# Patient Record
Sex: FEMALE | Race: WHITE | Employment: OTHER | URBAN - NONMETROPOLITAN AREA
[De-identification: names, ages, dates, MRNs, and addresses within clinical notes are randomized per-mention and may not be internally consistent; named-entity substitution may affect disease eponyms.]

---

## 2017-10-25 ENCOUNTER — OFFICE VISIT (OUTPATIENT)
Dept: GASTROENTEROLOGY | Age: 77
End: 2017-10-25
Payer: MEDICARE

## 2017-10-25 VITALS
HEART RATE: 61 BPM | SYSTOLIC BLOOD PRESSURE: 120 MMHG | WEIGHT: 147 LBS | HEIGHT: 66 IN | BODY MASS INDEX: 23.63 KG/M2 | OXYGEN SATURATION: 98 % | DIASTOLIC BLOOD PRESSURE: 82 MMHG

## 2017-10-25 DIAGNOSIS — Z86.010 HISTORY OF ADENOMATOUS POLYP OF COLON: Primary | ICD-10-CM

## 2017-10-25 DIAGNOSIS — Z80.0 FAMILY HISTORY OF COLON CANCER: ICD-10-CM

## 2017-10-25 PROCEDURE — G8400 PT W/DXA NO RESULTS DOC: HCPCS | Performed by: NURSE PRACTITIONER

## 2017-10-25 PROCEDURE — G8484 FLU IMMUNIZE NO ADMIN: HCPCS | Performed by: NURSE PRACTITIONER

## 2017-10-25 PROCEDURE — G8427 DOCREV CUR MEDS BY ELIG CLIN: HCPCS | Performed by: NURSE PRACTITIONER

## 2017-10-25 PROCEDURE — 4040F PNEUMOC VAC/ADMIN/RCVD: CPT | Performed by: NURSE PRACTITIONER

## 2017-10-25 PROCEDURE — 1036F TOBACCO NON-USER: CPT | Performed by: NURSE PRACTITIONER

## 2017-10-25 PROCEDURE — 1090F PRES/ABSN URINE INCON ASSESS: CPT | Performed by: NURSE PRACTITIONER

## 2017-10-25 PROCEDURE — 99214 OFFICE O/P EST MOD 30 MIN: CPT | Performed by: NURSE PRACTITIONER

## 2017-10-25 PROCEDURE — 1123F ACP DISCUSS/DSCN MKR DOCD: CPT | Performed by: NURSE PRACTITIONER

## 2017-10-25 PROCEDURE — G8420 CALC BMI NORM PARAMETERS: HCPCS | Performed by: NURSE PRACTITIONER

## 2017-10-25 RX ORDER — LOSARTAN POTASSIUM 50 MG/1
50 TABLET ORAL DAILY
COMMUNITY

## 2017-10-25 RX ORDER — ROSUVASTATIN CALCIUM 5 MG/1
5 TABLET, COATED ORAL DAILY
COMMUNITY

## 2017-10-25 RX ORDER — HYDROCHLOROTHIAZIDE 25 MG/1
25 TABLET ORAL DAILY
COMMUNITY
End: 2019-07-23

## 2017-10-25 RX ORDER — POTASSIUM CHLORIDE 750 MG/1
10 CAPSULE, EXTENDED RELEASE ORAL 2 TIMES DAILY
Status: ON HOLD | COMMUNITY
End: 2017-11-20

## 2017-10-25 ASSESSMENT — ENCOUNTER SYMPTOMS
BLOOD IN STOOL: 0
VOICE CHANGE: 0
SHORTNESS OF BREATH: 0
SORE THROAT: 0
BACK PAIN: 0
COUGH: 0
CHEST TIGHTNESS: 0
DIARRHEA: 0
VOMITING: 0
ABDOMINAL PAIN: 0
ABDOMINAL DISTENTION: 0
RECTAL PAIN: 0
NAUSEA: 0
CONSTIPATION: 0

## 2017-10-25 NOTE — PROGRESS NOTES
Subjective:      Patient ID: Elan Bryan is a 65 y.o. female. PCP: [facility]     [facility]  Chief Complaint   Patient presents with    Colonoscopy    Other     history of colon polyps       Pt due for screening colonoscopy with peronal history of adenomatous colon polyps and brother diagnosed colon cancer age early 62s. The patient denies abdominal or flank pain, anorexia, nausea or vomiting, dysphagia, change in bowel habits or black or bloody stools or weight loss. Last c-scope 2014 with removal of tubular adenoma x2. Prep was noted as suboptimal. 3 yr repeat screening recommended. Patient reports vomiting with trilyte last procedure. Has tolerated in past.       family history includes Colon Polyps in her brother. Past Medical History:   Diagnosis Date    Diverticulosis     Family history of colon cancer     Hyperlipidemia     Hypertension     Reflux        Past Surgical History:   Procedure Laterality Date     SECTION      CHOLECYSTECTOMY      COLONOSCOPY  /    COLONOSCOPY  2014    TA x2, suboptimal prep (3 yr)    FOOT SURGERY      HYSTERECTOMY      PARTIAL NEPHRECTOMY         Current Outpatient Prescriptions   Medication Sig Dispense Refill    losartan (COZAAR) 50 MG tablet Take 50 mg by mouth daily      potassium chloride (MICRO-K) 10 MEQ extended release capsule Take 10 mEq by mouth 2 times daily      hydrochlorothiazide (HYDRODIURIL) 25 MG tablet Take 25 mg by mouth daily      rosuvastatin (CRESTOR) 5 MG tablet Take 5 mg by mouth daily      Multiple Vitamins-Minerals (MULTIVITAMIN & MINERAL PO) Take  by mouth.  Bimatoprost (LUMIGAN OP) Apply  to eye.  NIFEDIPINE PO Take  by mouth.  Metoprolol Succinate (TOPROL XL PO) Take 50 mg by mouth.  omeprazole (PRILOSEC) 20 MG capsule Take 20 mg by mouth daily.  ASPIRIN PO Take  by mouth.  Cyanocobalamin (B-12 PO) Take  by mouth.       Calcium Carbonate-Vitamin D (OSCAL 500/200 D-3 alert and oriented to person, place, and time. She has normal strength. Skin: Skin is warm, dry and intact. No cyanosis. No pallor. Psychiatric: She has a normal mood and affect. Her behavior is normal. Thought content normal. Cognition and memory are normal.       Assessment:      1. History of adenomatous polyp of colon    2. Family history of colon cancer            Plan:      Schedule colonoscopy screening    Instruct on bowel prep. Nothing to eat or drink after midnight the day of the exam.  Unable to drive for 24 hours after the procedure. No aspirin or nonsteroidal anti-inflammatories for 5 days before procedure. Risks, benefits and alternatives to colonoscopy were discussed. Patient voices understanding of risk of perforation, bleeding and infection. Time was allowed for questions which were answered to patients satisfaction. Patient is agreeable to proceed. Plan for anesthesia: MAC  no reported complications    I have discussed with the patient and/or the patient representative the indication, alternatives, and the possible risks and/or complications of the planned anesthesia methods.

## 2017-10-25 NOTE — PATIENT INSTRUCTIONS
Schedule colonoscopy. No aspirin, ibuprofen, naproxen, fish oil or vitamin E for 5 days before procedure. Do not eat or drink after midnight the day of the procedure. Allowed medications can be taken with a small sip of water. Please review your prep instructions for allowed medications. You will not be able to drive for 24 hours after the procedure due to sedation. Bring a  with you the day of the procedure. If you are on blood thinners, clearance from the prescribing physician will be obtained before your procedure is scheduled. If it is determined it is not safe to hold these medications for a short time an alternative procedure for evaluation may be recommended. Risks of colonoscopy include, but are not limited to, perforation, bleeding, and infection, Risk of perforation and bleeding are increased if there is a polyp removed. Anesthesia risks will be reviewed with you before the procedure by a member of the anesthesia department. Your physician may also schedule a follow up appointment with the nurse practitioner to discuss pathology, symptoms or to check if you have had any problems related to your procedure. If you prefer not to return to the office after your procedure please discuss this with your physician on the day of your colonoscopy. The physician will talk with you and/or your family after the procedure is completed. Final recommendations are based on the pathologist report if biopsies or specimens are taken. For Colonoscopy: You will be given specific directions regarding restrictions to diet and bowel prep instructions including laxatives. Please read these instructions one week prior to your scheduled procedure to ensure that you are prepared. If you have any questions regarding these instructions please call our office Mon through Fri from 8:00 am to 4:00 pm.     Follow prep instructions provided for bowel prep. Take all of the bowel prep as directed.  If you are having problems with nausea, stop your prep for 30-45 min to allow the nausea to subside before resuming your prep. It is important to drink plenty of fluids throughout the day before taking your laxatives. This will help to protect your kidneys, prevent dehydration and maximize the effect of the bowel prep. If polyps are removed during the procedure they will be sent to a pathologist for analysis. Unless you have a follow up appointment scheduled, you will be notified by mail of the pathology results within 4 weeks. If you have not received results after 4 weeks you may call the office to obtain this information. Your diet before a colonoscopy bowel preparation is very important to ensure a successful colon exam. It is recommended to consider certain changes to your diet three to four days prior to the procedure. Remember that your bowels need to be empty for the exam.    What foods are good to eat? Cut down on heavy solid foods three to four days before the procedure and start introducing lighter meals to your diet. The following food suggestions are a good part of your diet before a colonoscopy bowel preparation. Light meat that is easily digestible such as chicken (without the skin)   Potatoes without skin   Cheese   Eggs   A light meal of steamed white fish   Light clear soups    Foods and drinks to avoid  Avoid foods that contain too much fiber. Stay clear of dark colored beverages. They can stick to the walls of the digestive tract and make it difficult to differentiate from blood. Some of these foods are:  Red meat, rice, nuts and vegetables   Milk, other milk based fluids and cream   Most fruit and puddings   Whole grain pasta   Cereals, bran and seeds   Colored beverages, especially those that are red or purple in color   Red colored Jell-O   On the day before the colonoscopy, continue to drink plenty of clear fluids.  It is important   to keep yourself hydrated before the exam. Please follow all instructions as provided for cleansing the bowel. Failure to have an adequately prepped colon may cause you to have incomplete exam with further testing required.      [email or web address]

## 2017-11-20 ENCOUNTER — ANESTHESIA (OUTPATIENT)
Dept: OPERATING ROOM | Age: 77
End: 2017-11-20

## 2017-11-20 ENCOUNTER — HOSPITAL ENCOUNTER (OUTPATIENT)
Age: 77
Setting detail: SPECIMEN
Discharge: HOME | End: 2017-11-20
Payer: MEDICARE

## 2017-11-20 ENCOUNTER — ANESTHESIA EVENT (OUTPATIENT)
Dept: OPERATING ROOM | Age: 77
End: 2017-11-20

## 2017-11-20 ENCOUNTER — HOSPITAL ENCOUNTER (OUTPATIENT)
Age: 77
Setting detail: OUTPATIENT SURGERY
Discharge: HOME | End: 2017-11-20
Attending: INTERNAL MEDICINE | Admitting: INTERNAL MEDICINE
Payer: MEDICARE

## 2017-11-20 VITALS — OXYGEN SATURATION: 98 % | DIASTOLIC BLOOD PRESSURE: 50 MMHG | SYSTOLIC BLOOD PRESSURE: 114 MMHG

## 2017-11-20 VITALS
WEIGHT: 145 LBS | HEART RATE: 60 BPM | RESPIRATION RATE: 20 BRPM | TEMPERATURE: 98.5 F | DIASTOLIC BLOOD PRESSURE: 76 MMHG | BODY MASS INDEX: 24.16 KG/M2 | SYSTOLIC BLOOD PRESSURE: 180 MMHG | OXYGEN SATURATION: 98 % | HEIGHT: 65 IN

## 2017-11-20 PROCEDURE — 45380 COLONOSCOPY AND BIOPSY: CPT

## 2017-11-20 PROCEDURE — G8907 PT DOC NO EVENTS ON DISCHARG: HCPCS

## 2017-11-20 PROCEDURE — G8918 PT W/O PREOP ORDER IV AB PRO: HCPCS

## 2017-11-20 PROCEDURE — 88305 TISSUE EXAM BY PATHOLOGIST: CPT

## 2017-11-20 PROCEDURE — 45380 COLONOSCOPY AND BIOPSY: CPT | Performed by: INTERNAL MEDICINE

## 2017-11-20 RX ORDER — LIDOCAINE HYDROCHLORIDE 10 MG/ML
INJECTION, SOLUTION EPIDURAL; INFILTRATION; INTRACAUDAL; PERINEURAL PRN
Status: DISCONTINUED | OUTPATIENT
Start: 2017-11-20 | End: 2017-11-20 | Stop reason: SDUPTHER

## 2017-11-20 RX ORDER — SODIUM CHLORIDE 9 MG/ML
INJECTION, SOLUTION INTRAVENOUS CONTINUOUS
Status: DISCONTINUED | OUTPATIENT
Start: 2017-11-20 | End: 2017-11-20 | Stop reason: HOSPADM

## 2017-11-20 RX ORDER — PROPOFOL 10 MG/ML
INJECTION, EMULSION INTRAVENOUS PRN
Status: DISCONTINUED | OUTPATIENT
Start: 2017-11-20 | End: 2017-11-20 | Stop reason: SDUPTHER

## 2017-11-20 RX ORDER — SODIUM CHLORIDE, SODIUM LACTATE, POTASSIUM CHLORIDE, CALCIUM CHLORIDE 600; 310; 30; 20 MG/100ML; MG/100ML; MG/100ML; MG/100ML
INJECTION, SOLUTION INTRAVENOUS CONTINUOUS
Status: DISCONTINUED | OUTPATIENT
Start: 2017-11-20 | End: 2017-11-20 | Stop reason: HOSPADM

## 2017-11-20 RX ADMIN — SODIUM CHLORIDE: 9 INJECTION, SOLUTION INTRAVENOUS at 10:00

## 2017-11-20 RX ADMIN — PROPOFOL 240 MG: 10 INJECTION, EMULSION INTRAVENOUS at 10:23

## 2017-11-20 RX ADMIN — LIDOCAINE HYDROCHLORIDE 5 ML: 10 INJECTION, SOLUTION EPIDURAL; INFILTRATION; INTRACAUDAL; PERINEURAL at 10:23

## 2017-11-20 ASSESSMENT — PAIN SCALES - GENERAL
PAINLEVEL_OUTOF10: 0
PAINLEVEL_OUTOF10: 0

## 2017-11-20 NOTE — OP NOTE
Post Procedure Note    Name of surgeon / : Lyric Fuller DO    Date of Service: 11/20/17    Withdrawal Time: >6min    Prep Quality: good     Pre-operative Diagnosis:   Active Hospital Problems    Diagnosis Date Noted    History of adenomatous polyp of colon [Z86.010] 10/25/2017    Family history of colon cancer [Z80.0] 02/07/2014    Diverticulosis [K57.90] 02/07/2014       Post-operative Diagnosis/Findings: colon polyp, diverticulosis    Procedure: Procedure(s):  COLONOSCOPY     Anesthesia: Monitor Anesthesia Care    Surgeons/Assistants: Lyric Fuller DO    Referring Physician: No ref. provider found    Procedure Note:  After informed consent was obtained, the patient was placed in the left lateral decubitus position and sedated per MAC. A rectal exam was done which was normal.  The colonoscope was inserted into the rectum and retroflexed which was normal.  The colonoscope was then advanced to the cecum under direct visualization. The appendiceal orifice and ileocecal valve were identified. The colonoscope was withdrawn. A polyp was seen in the transverse colon. It was dimunitive in size. It was removed via removed by cold biopsy and sent for pathology. She had pandiverticulosis. No other abnormalities were discovered. The colonoscope was completely withdrawn. The patient tolerated the procedure. Estimated Blood Loss: None    Complications: None    Specimens:   ID Type Source Tests Collected by Time Destination   A : transverse colon polyp Tissue Colon SURGICAL PATHOLOGY Aliyah Hanna DO 11/20/2017 1030        Discussion: The patient had Colon Polyps. I will f/u on Pathology and likely recommend a repeat colonoscopy in 5 years.     Aliyah Hanna DO  11/20/17  10:43 AM

## 2017-11-20 NOTE — ANESTHESIA POSTPROCEDURE EVALUATION
Department of Anesthesiology  Postprocedure Note    Patient: Gabi Irwin  MRN: [id number]  YOB: 1940  Date of evaluation: 11/20/2017  Time:  10:39 AM     Procedure Summary     Date:  11/20/17 Room / Location:  [facility] ENDO 01 / [facility] OR    Anesthesia Start:  1021 Anesthesia Stop:      Procedure:  COLONOSCOPY DIAGNOSTIC OR SCREENING (N/A ) Diagnosis:  (SCREEN, HX YANELI POLYPS, FH CLN CA)    Surgeon:  Rylee Snow DO Responsible Provider:  Rebeca Lowery CRNA    Anesthesia Type:  general ASA Status:  3          Anesthesia Type: general    Kody Phase I:      Kody Phase II:      Last vitals: Reviewed and per EMR flowsheets.        Anesthesia Post Evaluation    Patient location during evaluation: bedside  Patient participation: complete - patient participated  Level of consciousness: sleepy but conscious  Pain score: 0  Airway patency: patent  Nausea & Vomiting: no nausea and no vomiting  Complications: no  Cardiovascular status: hemodynamically stable and blood pressure returned to baseline  Respiratory status: acceptable and nasal cannula  Hydration status: stable

## 2017-11-20 NOTE — H&P
Patient Name: Elidia Stewart  : 1940  MRN: [id number]    Allergies: Allergies   Allergen Reactions    Codeine          ENDOSCOPY / COLONOSCOPY / BRONCHOSCOPY      PRE-SEDATION ASSESSMENT      Procedure:    [x] Colonoscopy     [] Endoscopy      [] ERCP      [] Bronchoscopy      [] Other  [] History and Physical completed in chart for Inpatient or within 30 daysfrom office. I have examined the patient's status immediately prior to the procedure and:    [x] No interval change in patient status since H&P completed  [] Interval change in patient status (explained below)           BRIEF H&P    HPI/changes/indicators/diagnosis  Active Hospital Problems    Diagnosis Date Noted    History of adenomatous polyp of colon [Z86.010] 10/25/2017    Family history of colon cancer [Z80.0] 2014    Diverticulosis [K57.90] 2014       Medications:   Prior to Admission medications    Medication Sig Start Date End Date Taking? Authorizing Provider   losartan (COZAAR) 50 MG tablet Take 50 mg by mouth daily    Historical Provider, MD   hydrochlorothiazide (HYDRODIURIL) 25 MG tablet Take 25 mg by mouth daily    Historical Provider, MD   rosuvastatin (CRESTOR) 5 MG tablet Take 5 mg by mouth daily    Historical Provider, MD   Bimatoprost (LUMIGAN OP) Apply  to eye. Historical Provider, MD   NIFEDIPINE PO Take  by mouth. Historical Provider, MD   Metoprolol Succinate (TOPROL XL PO) Take 50 mg by mouth. Historical Provider, MD   omeprazole (PRILOSEC) 20 MG capsule Take 20 mg by mouth daily. Historical Provider, MD   ASPIRIN PO Take  by mouth. Historical Provider, MD   Calcium Carbonate-Vitamin D (OSCAL 500/200 D-3 PO) Take  by mouth. Historical Provider, MD       Allergies:   is allergic to codeine.       Vital Signs:   Vitals:    17 0938   BP: (!) 160/80   Pulse: 74   Resp: 18   SpO2: 98%       ROS:  Cardiac:  [x]WNL  []Comments:  Pulmonary:  [x]WNL   []Comments:  Neuro/Mental Status:  [x]WNL

## 2017-11-20 NOTE — ANESTHESIA PRE PROCEDURE
Department of Anesthesiology  Preprocedure Note       Name:  Jovan Young   Age:  65 y.o.  :  1940                                          MRN:  [id number]         Date:  2017      Surgeon: Lilia Weinberg):  Yunier Hanna DO    Procedure: Procedure(s):  COLONOSCOPY DIAGNOSTIC OR SCREENING    Medications prior to admission:   Prior to Admission medications    Medication Sig Start Date End Date Taking? Authorizing Provider   losartan (COZAAR) 50 MG tablet Take 50 mg by mouth daily    Historical Provider, MD   hydrochlorothiazide (HYDRODIURIL) 25 MG tablet Take 25 mg by mouth daily    Historical Provider, MD   rosuvastatin (CRESTOR) 5 MG tablet Take 5 mg by mouth daily    Historical Provider, MD   Bimatoprost (LUMIGAN OP) Apply  to eye. Historical Provider, MD   NIFEDIPINE PO Take  by mouth. Historical Provider, MD   Metoprolol Succinate (TOPROL XL PO) Take 50 mg by mouth. Historical Provider, MD   omeprazole (PRILOSEC) 20 MG capsule Take 20 mg by mouth daily. Historical Provider, MD   ASPIRIN PO Take  by mouth. Historical Provider, MD   Calcium Carbonate-Vitamin D (OSCAL 500/200 D-3 PO) Take  by mouth. Historical Provider, MD       Current medications:    Current Facility-Administered Medications   Medication Dose Route Frequency Provider Last Rate Last Dose    lactated ringers infusion   Intravenous Continuous Cristian Hanna DO           Allergies:     Allergies   Allergen Reactions    Codeine        Problem List:    Patient Active Problem List   Diagnosis Code    Encounter for screening colonoscopy Z12.11    Family history of colon cancer Z80.0    Family history of colonic polyps Z83.71    Diverticulosis K57.90    History of diverticulitis of colon Z87.19    History of adenomatous polyp of colon Z86.010       Past Medical History:        Diagnosis Date    Diverticulosis     Family history of colon cancer     Hyperlipidemia     Hypertension     Reflux        Past Surgical

## 2019-07-23 ENCOUNTER — OFFICE VISIT (OUTPATIENT)
Dept: NEUROSURGERY | Age: 79
End: 2019-07-23
Payer: MEDICARE

## 2019-07-23 VITALS
WEIGHT: 124 LBS | BODY MASS INDEX: 19.93 KG/M2 | HEART RATE: 83 BPM | DIASTOLIC BLOOD PRESSURE: 89 MMHG | SYSTOLIC BLOOD PRESSURE: 139 MMHG | OXYGEN SATURATION: 100 % | HEIGHT: 66 IN

## 2019-07-23 DIAGNOSIS — M79.605 LEFT LEG PAIN: ICD-10-CM

## 2019-07-23 DIAGNOSIS — M48.061 LUMBAR FORAMINAL STENOSIS: Primary | ICD-10-CM

## 2019-07-23 DIAGNOSIS — M54.42 ACUTE MIDLINE LOW BACK PAIN WITH LEFT-SIDED SCIATICA: ICD-10-CM

## 2019-07-23 DIAGNOSIS — M51.37 DDD (DEGENERATIVE DISC DISEASE), LUMBOSACRAL: ICD-10-CM

## 2019-07-23 DIAGNOSIS — M51.36 DDD (DEGENERATIVE DISC DISEASE), LUMBAR: ICD-10-CM

## 2019-07-23 DIAGNOSIS — M43.16 SPONDYLOLISTHESIS AT L4-L5 LEVEL: ICD-10-CM

## 2019-07-23 DIAGNOSIS — R20.8 DECREASED SENSATION OF FOOT: ICD-10-CM

## 2019-07-23 PROCEDURE — 1090F PRES/ABSN URINE INCON ASSESS: CPT | Performed by: NURSE PRACTITIONER

## 2019-07-23 PROCEDURE — 1123F ACP DISCUSS/DSCN MKR DOCD: CPT | Performed by: NURSE PRACTITIONER

## 2019-07-23 PROCEDURE — 99214 OFFICE O/P EST MOD 30 MIN: CPT | Performed by: NURSE PRACTITIONER

## 2019-07-23 PROCEDURE — 4040F PNEUMOC VAC/ADMIN/RCVD: CPT | Performed by: NURSE PRACTITIONER

## 2019-07-23 PROCEDURE — 1036F TOBACCO NON-USER: CPT | Performed by: NURSE PRACTITIONER

## 2019-07-23 PROCEDURE — G8427 DOCREV CUR MEDS BY ELIG CLIN: HCPCS | Performed by: NURSE PRACTITIONER

## 2019-07-23 PROCEDURE — G8420 CALC BMI NORM PARAMETERS: HCPCS | Performed by: NURSE PRACTITIONER

## 2019-07-23 PROCEDURE — G8400 PT W/DXA NO RESULTS DOC: HCPCS | Performed by: NURSE PRACTITIONER

## 2019-07-23 RX ORDER — METOPROLOL TARTRATE 50 MG/1
TABLET, FILM COATED ORAL
COMMUNITY
Start: 2019-06-13

## 2019-07-23 RX ORDER — HYDROCHLOROTHIAZIDE 50 MG/1
TABLET ORAL
COMMUNITY
Start: 2019-05-22

## 2019-07-23 RX ORDER — NIFEDIPINE 90 MG/1
TABLET, FILM COATED, EXTENDED RELEASE ORAL
COMMUNITY
Start: 2019-06-13

## 2019-07-23 RX ORDER — NABUMETONE 500 MG/1
TABLET, FILM COATED ORAL
Refills: 0 | COMMUNITY
Start: 2019-06-13

## 2019-07-23 ASSESSMENT — ENCOUNTER SYMPTOMS
RESPIRATORY NEGATIVE: 1
GASTROINTESTINAL NEGATIVE: 1
BACK PAIN: 1
PHOTOPHOBIA: 1

## 2019-07-30 ENCOUNTER — HOSPITAL ENCOUNTER (OUTPATIENT)
Dept: GENERAL RADIOLOGY | Age: 79
Discharge: HOME | End: 2019-07-30
Payer: MEDICARE

## 2019-07-30 ENCOUNTER — HOSPITAL ENCOUNTER (OUTPATIENT)
Age: 79
Setting detail: OUTPATIENT SURGERY
Discharge: HOME | End: 2019-07-30
Attending: PHYSICAL MEDICINE & REHABILITATION | Admitting: PHYSICAL MEDICINE & REHABILITATION

## 2019-07-30 VITALS
OXYGEN SATURATION: 97 % | HEART RATE: 67 BPM | RESPIRATION RATE: 20 BRPM | SYSTOLIC BLOOD PRESSURE: 173 MMHG | DIASTOLIC BLOOD PRESSURE: 78 MMHG

## 2019-07-30 DIAGNOSIS — R52 PAIN: ICD-10-CM

## 2019-07-30 PROCEDURE — 62323 NJX INTERLAMINAR LMBR/SAC: CPT

## 2019-07-30 PROCEDURE — G8918 PT W/O PREOP ORDER IV AB PRO: HCPCS

## 2019-07-30 PROCEDURE — G8907 PT DOC NO EVENTS ON DISCHARG: HCPCS

## 2019-07-30 PROCEDURE — 3209999900 FLUORO FOR SURGICAL PROCEDURES

## 2019-07-30 RX ORDER — 0.9 % SODIUM CHLORIDE 0.9 %
VIAL (ML) INJECTION PRN
Status: DISCONTINUED | OUTPATIENT
Start: 2019-07-30 | End: 2019-07-30 | Stop reason: ALTCHOICE

## 2019-07-30 RX ORDER — LIDOCAINE HYDROCHLORIDE 10 MG/ML
INJECTION, SOLUTION INFILTRATION; PERINEURAL PRN
Status: DISCONTINUED | OUTPATIENT
Start: 2019-07-30 | End: 2019-07-30 | Stop reason: ALTCHOICE

## 2019-07-30 RX ORDER — METHYLPREDNISOLONE ACETATE 80 MG/ML
INJECTION, SUSPENSION INTRA-ARTICULAR; INTRALESIONAL; INTRAMUSCULAR; SOFT TISSUE PRN
Status: DISCONTINUED | OUTPATIENT
Start: 2019-07-30 | End: 2019-07-30 | Stop reason: ALTCHOICE

## 2019-09-17 ENCOUNTER — HOSPITAL ENCOUNTER (OUTPATIENT)
Dept: GENERAL RADIOLOGY | Age: 79
Discharge: HOME | End: 2019-09-17
Payer: MEDICARE

## 2019-09-17 ENCOUNTER — HOSPITAL ENCOUNTER (OUTPATIENT)
Age: 79
Setting detail: OUTPATIENT SURGERY
Discharge: HOME | End: 2019-09-17
Attending: PHYSICAL MEDICINE & REHABILITATION | Admitting: PHYSICAL MEDICINE & REHABILITATION

## 2019-09-17 VITALS
WEIGHT: 127 LBS | BODY MASS INDEX: 20.41 KG/M2 | DIASTOLIC BLOOD PRESSURE: 72 MMHG | SYSTOLIC BLOOD PRESSURE: 168 MMHG | HEIGHT: 66 IN | HEART RATE: 57 BPM | RESPIRATION RATE: 16 BRPM | OXYGEN SATURATION: 98 %

## 2019-09-17 DIAGNOSIS — L90.5 SCAR PAINFUL: ICD-10-CM

## 2019-09-17 DIAGNOSIS — R52 SCAR PAINFUL: ICD-10-CM

## 2019-09-17 PROCEDURE — G8918 PT W/O PREOP ORDER IV AB PRO: HCPCS

## 2019-09-17 PROCEDURE — G8907 PT DOC NO EVENTS ON DISCHARG: HCPCS

## 2019-09-17 PROCEDURE — 3209999900 FLUORO FOR SURGICAL PROCEDURES

## 2019-09-17 PROCEDURE — 62323 NJX INTERLAMINAR LMBR/SAC: CPT

## 2019-09-17 RX ORDER — LIDOCAINE HYDROCHLORIDE 10 MG/ML
INJECTION, SOLUTION INFILTRATION; PERINEURAL PRN
Status: DISCONTINUED | OUTPATIENT
Start: 2019-09-17 | End: 2019-09-17 | Stop reason: ALTCHOICE

## 2019-09-17 RX ORDER — METHYLPREDNISOLONE ACETATE 80 MG/ML
INJECTION, SUSPENSION INTRA-ARTICULAR; INTRALESIONAL; INTRAMUSCULAR; SOFT TISSUE PRN
Status: DISCONTINUED | OUTPATIENT
Start: 2019-09-17 | End: 2019-09-17 | Stop reason: ALTCHOICE

## 2019-09-17 RX ORDER — 0.9 % SODIUM CHLORIDE 0.9 %
VIAL (ML) INJECTION PRN
Status: DISCONTINUED | OUTPATIENT
Start: 2019-09-17 | End: 2019-09-17 | Stop reason: ALTCHOICE

## 2019-09-17 ASSESSMENT — PAIN SCALES - GENERAL: PAINLEVEL_OUTOF10: 0

## 2019-09-17 NOTE — INTERVAL H&P NOTE
H&P Update         Patient examined. There has been no change.     Electronically signed by Mery Jefferson on 9/17/19 at 9:14 AM

## 2019-09-24 ENCOUNTER — OFFICE VISIT (OUTPATIENT)
Dept: NEUROSURGERY | Age: 79
End: 2019-09-24
Payer: MEDICARE

## 2019-09-24 VITALS
SYSTOLIC BLOOD PRESSURE: 150 MMHG | HEART RATE: 55 BPM | BODY MASS INDEX: 20.73 KG/M2 | WEIGHT: 129 LBS | DIASTOLIC BLOOD PRESSURE: 76 MMHG | HEIGHT: 66 IN

## 2019-09-24 DIAGNOSIS — M51.37 DDD (DEGENERATIVE DISC DISEASE), LUMBOSACRAL: ICD-10-CM

## 2019-09-24 DIAGNOSIS — M51.36 DDD (DEGENERATIVE DISC DISEASE), LUMBAR: ICD-10-CM

## 2019-09-24 DIAGNOSIS — M48.061 LUMBAR FORAMINAL STENOSIS: Primary | ICD-10-CM

## 2019-09-24 DIAGNOSIS — M43.16 SPONDYLOLISTHESIS AT L4-L5 LEVEL: ICD-10-CM

## 2019-09-24 PROCEDURE — 1090F PRES/ABSN URINE INCON ASSESS: CPT | Performed by: NURSE PRACTITIONER

## 2019-09-24 PROCEDURE — 4040F PNEUMOC VAC/ADMIN/RCVD: CPT | Performed by: NURSE PRACTITIONER

## 2019-09-24 PROCEDURE — 1036F TOBACCO NON-USER: CPT | Performed by: NURSE PRACTITIONER

## 2019-09-24 PROCEDURE — G8427 DOCREV CUR MEDS BY ELIG CLIN: HCPCS | Performed by: NURSE PRACTITIONER

## 2019-09-24 PROCEDURE — 1123F ACP DISCUSS/DSCN MKR DOCD: CPT | Performed by: NURSE PRACTITIONER

## 2019-09-24 PROCEDURE — G8400 PT W/DXA NO RESULTS DOC: HCPCS | Performed by: NURSE PRACTITIONER

## 2019-09-24 PROCEDURE — 99213 OFFICE O/P EST LOW 20 MIN: CPT | Performed by: NURSE PRACTITIONER

## 2019-09-24 PROCEDURE — G8420 CALC BMI NORM PARAMETERS: HCPCS | Performed by: NURSE PRACTITIONER

## 2019-09-24 RX ORDER — TRAMADOL HYDROCHLORIDE 50 MG/1
TABLET ORAL
Refills: 0 | COMMUNITY
Start: 2019-07-23

## 2019-09-24 RX ORDER — BIMATOPROST 0.01 %
DROPS OPHTHALMIC (EYE)
COMMUNITY
Start: 2019-08-02

## 2019-09-24 RX ORDER — GABAPENTIN 300 MG/1
CAPSULE ORAL
Refills: 2 | COMMUNITY
Start: 2019-09-09

## 2019-09-24 RX ORDER — POTASSIUM CHLORIDE 750 MG/1
TABLET, EXTENDED RELEASE ORAL
COMMUNITY
Start: 2019-09-09

## 2019-09-24 ASSESSMENT — ENCOUNTER SYMPTOMS
EYES NEGATIVE: 1
GASTROINTESTINAL NEGATIVE: 1
RESPIRATORY NEGATIVE: 1

## 2019-09-24 NOTE — PROGRESS NOTES
Gastrointestinal: Negative. Genitourinary: Negative. Musculoskeletal: Positive for back pain. Skin: Negative. Neurological: Positive for tingling. Endo/Heme/Allergies: Negative. Psychiatric/Behavioral: Positive for memory loss.        PHYSICAL EXAM:  Vitals:    09/24/19 1015   BP: (!) 150/76   Pulse: 55     Constitutional: appears well-developed and well-nourished. Eyes - conjunctiva normal.  Pupils react to light  Ear, nose, throat -hearing intact to finger rub, No scars, masses, or lesions over external nose or ears, no atrophy oftongue  Neck-symmetric, no masses noted, no jugular vein distension  Respiration- chest wall appears symmetric, good expansion, normal effort without use of accessory muscles  Musculoskeletal - no significantwasting of muscles noted, no bony deformities, gait no gross ataxia  Extremities-no clubbing, cyanosis oredema  Skin - warm, dry, and intact. No rash, erythema, or pallor. Psychiatric - mood, affect, and behavior appear normal.     Neurologic Examination  Awake, Alert and oriented x 4  Normal speech pattern, following commands    Motor:  RIGHT:     iliopsoas 5/5    knee flexor 5/5    knee extension 5/5    EHL/dorsiflexion 5/5    plantar flexion 5/5    LEFT:      iliopsoas 5/5    knee flexor 5/5    knee extension 5/5    EHL/dorsiflexion 5/5    plantar flexion 5/5    Decrease to pinprick sensation left L5 and S1 dermatome   Reflexes are 2+ and symmetric  Moderate myofacial tenderness to palpation left SI joint   Slight Antalgic Gait pattern        DATA and IMAGING:    Nursing/pcp notes, imaging, labs, and vitals reviewed.      PT,OT and/or speech notes reviewed    No results found for: WBC, HGB, HCT, MCV, PLTNo results found for: NA, K, CL, CO2, BUN, CREATININE, GLUCOSE, CALCIUM, PROT, LABALBU, BILITOT, ALKPHOS, AST, ALT, LABGLOM, GFRAA, AGRATIO, GLOBNo results found for: INR, PROTIME      MRI Lumbar spine [place]11[place]2019[place] [facility] [place]  I have personally reviewed these imagesand

## 2020-01-28 ENCOUNTER — HOSPITAL ENCOUNTER (OUTPATIENT)
Age: 80
Setting detail: OUTPATIENT SURGERY
Discharge: HOME | End: 2020-01-28
Attending: PHYSICAL MEDICINE & REHABILITATION | Admitting: PHYSICAL MEDICINE & REHABILITATION

## 2020-01-28 ENCOUNTER — HOSPITAL ENCOUNTER (OUTPATIENT)
Dept: GENERAL RADIOLOGY | Age: 80
Discharge: HOME | End: 2020-01-28
Payer: MEDICARE

## 2020-01-28 VITALS
RESPIRATION RATE: 18 BRPM | DIASTOLIC BLOOD PRESSURE: 83 MMHG | SYSTOLIC BLOOD PRESSURE: 162 MMHG | HEART RATE: 55 BPM | OXYGEN SATURATION: 99 %

## 2020-01-28 PROCEDURE — 62323 NJX INTERLAMINAR LMBR/SAC: CPT

## 2020-01-28 PROCEDURE — 3209999900 FLUORO FOR SURGICAL PROCEDURES

## 2020-01-28 PROCEDURE — G8918 PT W/O PREOP ORDER IV AB PRO: HCPCS

## 2020-01-28 PROCEDURE — G8907 PT DOC NO EVENTS ON DISCHARG: HCPCS

## 2020-01-28 RX ORDER — LIDOCAINE HYDROCHLORIDE 10 MG/ML
INJECTION, SOLUTION INFILTRATION; PERINEURAL PRN
Status: DISCONTINUED | OUTPATIENT
Start: 2020-01-28 | End: 2020-01-28 | Stop reason: ALTCHOICE

## 2020-01-28 RX ORDER — SODIUM CHLORIDE 9 MG/ML
INJECTION INTRAVENOUS PRN
Status: DISCONTINUED | OUTPATIENT
Start: 2020-01-28 | End: 2020-01-28 | Stop reason: ALTCHOICE

## 2020-01-28 RX ORDER — METHYLPREDNISOLONE ACETATE 80 MG/ML
INJECTION, SUSPENSION INTRA-ARTICULAR; INTRALESIONAL; INTRAMUSCULAR; SOFT TISSUE PRN
Status: DISCONTINUED | OUTPATIENT
Start: 2020-01-28 | End: 2020-01-28 | Stop reason: ALTCHOICE

## 2020-06-23 ENCOUNTER — HOSPITAL ENCOUNTER (OUTPATIENT)
Dept: PAIN MANAGEMENT | Age: 80
Discharge: HOME | End: 2020-06-23
Payer: MEDICARE

## 2020-06-23 VITALS
DIASTOLIC BLOOD PRESSURE: 73 MMHG | BODY MASS INDEX: 20.82 KG/M2 | RESPIRATION RATE: 18 BRPM | HEIGHT: 66 IN | SYSTOLIC BLOOD PRESSURE: 151 MMHG | TEMPERATURE: 96 F | HEART RATE: 60 BPM | OXYGEN SATURATION: 95 %

## 2020-06-23 PROCEDURE — 3209999900 FLUORO FOR SURGICAL PROCEDURES

## 2020-06-23 PROCEDURE — 62323 NJX INTERLAMINAR LMBR/SAC: CPT

## 2020-06-23 PROCEDURE — 2580000003 HC RX 258

## 2020-06-23 PROCEDURE — 6360000002 HC RX W HCPCS

## 2020-06-23 PROCEDURE — 2500000003 HC RX 250 WO HCPCS

## 2020-06-23 RX ORDER — LIDOCAINE HYDROCHLORIDE 10 MG/ML
INJECTION, SOLUTION EPIDURAL; INFILTRATION; INTRACAUDAL; PERINEURAL
Status: COMPLETED | OUTPATIENT
Start: 2020-06-23 | End: 2020-06-23

## 2020-06-23 RX ORDER — METHYLPREDNISOLONE ACETATE 80 MG/ML
INJECTION, SUSPENSION INTRA-ARTICULAR; INTRALESIONAL; INTRAMUSCULAR; SOFT TISSUE
Status: COMPLETED | OUTPATIENT
Start: 2020-06-23 | End: 2020-06-23

## 2020-06-23 RX ORDER — SODIUM CHLORIDE 9 MG/ML
INJECTION INTRAVENOUS
Status: COMPLETED | OUTPATIENT
Start: 2020-06-23 | End: 2020-06-23

## 2020-06-23 RX ADMIN — LIDOCAINE HYDROCHLORIDE 5 ML: 10 INJECTION, SOLUTION EPIDURAL; INFILTRATION; INTRACAUDAL; PERINEURAL at 08:28

## 2020-06-23 RX ADMIN — SODIUM CHLORIDE 5 ML: 9 INJECTION INTRAVENOUS at 08:28

## 2020-06-23 RX ADMIN — METHYLPREDNISOLONE ACETATE 80 MG: 80 INJECTION, SUSPENSION INTRA-ARTICULAR; INTRALESIONAL; INTRAMUSCULAR; SOFT TISSUE at 08:28

## 2020-06-23 ASSESSMENT — PAIN DESCRIPTION - DESCRIPTORS: DESCRIPTORS: ACHING;CONSTANT;TINGLING

## 2020-06-23 ASSESSMENT — PAIN DESCRIPTION - LOCATION: LOCATION: BACK

## 2020-06-23 ASSESSMENT — PAIN DESCRIPTION - ONSET: ONSET: AWAKENED FROM SLEEP

## 2020-06-23 ASSESSMENT — PAIN - FUNCTIONAL ASSESSMENT
PAIN_FUNCTIONAL_ASSESSMENT: PREVENTS OR INTERFERES SOME ACTIVE ACTIVITIES AND ADLS
PAIN_FUNCTIONAL_ASSESSMENT: 0-10

## 2020-06-23 ASSESSMENT — PAIN DESCRIPTION - FREQUENCY: FREQUENCY: CONTINUOUS

## 2020-06-23 ASSESSMENT — PAIN SCALES - GENERAL: PAINLEVEL_OUTOF10: 6

## 2020-06-23 ASSESSMENT — PAIN DESCRIPTION - ORIENTATION: ORIENTATION: RIGHT

## 2020-06-23 ASSESSMENT — PAIN DESCRIPTION - PAIN TYPE: TYPE: CHRONIC PAIN

## 2020-06-23 ASSESSMENT — PAIN DESCRIPTION - PROGRESSION: CLINICAL_PROGRESSION: NOT CHANGED

## 2025-05-07 ENCOUNTER — APPOINTMENT (OUTPATIENT)
Dept: GENERAL RADIOLOGY | Facility: HOSPITAL | Age: 85
End: 2025-05-07
Payer: COMMERCIAL

## 2025-05-07 ENCOUNTER — HOSPITAL ENCOUNTER (OUTPATIENT)
Facility: HOSPITAL | Age: 85
Discharge: NURSING, REHAB OR LONG-TERM CARE | End: 2025-05-27
Attending: INTERNAL MEDICINE | Admitting: INTERNAL MEDICINE
Payer: COMMERCIAL

## 2025-05-07 DIAGNOSIS — J90 PLEURAL EFFUSION: Primary | ICD-10-CM

## 2025-05-07 PROCEDURE — 71045 X-RAY EXAM CHEST 1 VIEW: CPT

## 2025-05-07 RX ORDER — SODIUM CHLORIDE 0.9 % (FLUSH) 0.9 %
10 SYRINGE (ML) INJECTION EVERY 12 HOURS SCHEDULED
Status: DISCONTINUED | OUTPATIENT
Start: 2025-05-07 | End: 2025-05-11

## 2025-05-07 RX ORDER — ACETAMINOPHEN 325 MG/1
650 TABLET ORAL EVERY 6 HOURS PRN
Status: DISCONTINUED | OUTPATIENT
Start: 2025-05-07 | End: 2025-05-27 | Stop reason: HOSPADM

## 2025-05-07 RX ORDER — TIMOLOL MALEATE 5 MG/ML
1 SOLUTION/ DROPS OPHTHALMIC DAILY
Status: DISCONTINUED | OUTPATIENT
Start: 2025-05-08 | End: 2025-05-27 | Stop reason: HOSPADM

## 2025-05-07 RX ORDER — ROSUVASTATIN CALCIUM 10 MG/1
5 TABLET, COATED ORAL NIGHTLY
Status: DISCONTINUED | OUTPATIENT
Start: 2025-05-07 | End: 2025-05-27 | Stop reason: HOSPADM

## 2025-05-07 RX ORDER — FUROSEMIDE 40 MG/1
40 TABLET ORAL DAILY
Status: DISCONTINUED | OUTPATIENT
Start: 2025-05-08 | End: 2025-05-12

## 2025-05-07 RX ORDER — ALLOPURINOL 100 MG/1
100 TABLET ORAL DAILY
Status: DISCONTINUED | OUTPATIENT
Start: 2025-05-08 | End: 2025-05-27 | Stop reason: HOSPADM

## 2025-05-07 RX ORDER — DEXTROSE MONOHYDRATE 25 G/50ML
25 INJECTION, SOLUTION INTRAVENOUS
Status: DISCONTINUED | OUTPATIENT
Start: 2025-05-07 | End: 2025-05-27 | Stop reason: HOSPADM

## 2025-05-07 RX ORDER — SODIUM CHLORIDE 0.9 % (FLUSH) 0.9 %
10 SYRINGE (ML) INJECTION AS NEEDED
Status: DISCONTINUED | OUTPATIENT
Start: 2025-05-07 | End: 2025-05-11

## 2025-05-07 RX ORDER — PANTOPRAZOLE SODIUM 40 MG/1
40 TABLET, DELAYED RELEASE ORAL
Status: DISCONTINUED | OUTPATIENT
Start: 2025-05-08 | End: 2025-05-27 | Stop reason: HOSPADM

## 2025-05-07 RX ORDER — ASPIRIN 81 MG/1
81 TABLET ORAL DAILY
Status: DISCONTINUED | OUTPATIENT
Start: 2025-05-08 | End: 2025-05-27 | Stop reason: HOSPADM

## 2025-05-07 RX ORDER — METOPROLOL TARTRATE 25 MG/1
25 TABLET, FILM COATED ORAL EVERY 12 HOURS SCHEDULED
Status: DISCONTINUED | OUTPATIENT
Start: 2025-05-07 | End: 2025-05-27 | Stop reason: HOSPADM

## 2025-05-07 RX ORDER — IPRATROPIUM BROMIDE AND ALBUTEROL SULFATE 2.5; .5 MG/3ML; MG/3ML
3 SOLUTION RESPIRATORY (INHALATION) EVERY 6 HOURS PRN
Status: DISCONTINUED | OUTPATIENT
Start: 2025-05-07 | End: 2025-05-27 | Stop reason: HOSPADM

## 2025-05-07 RX ORDER — POTASSIUM CHLORIDE 750 MG/1
10 TABLET, EXTENDED RELEASE ORAL DAILY
Status: DISCONTINUED | OUTPATIENT
Start: 2025-05-08 | End: 2025-05-21

## 2025-05-07 RX ORDER — NICOTINE POLACRILEX 4 MG
15 LOZENGE BUCCAL
Status: DISCONTINUED | OUTPATIENT
Start: 2025-05-07 | End: 2025-05-27 | Stop reason: HOSPADM

## 2025-05-07 RX ORDER — SODIUM CHLORIDE 9 MG/ML
40 INJECTION, SOLUTION INTRAVENOUS AS NEEDED
Status: DISCONTINUED | OUTPATIENT
Start: 2025-05-07 | End: 2025-05-11

## 2025-05-07 RX ORDER — LATANOPROST 50 UG/ML
1 SOLUTION/ DROPS OPHTHALMIC NIGHTLY
Status: DISCONTINUED | OUTPATIENT
Start: 2025-05-07 | End: 2025-05-27 | Stop reason: HOSPADM

## 2025-05-07 RX ORDER — TAMSULOSIN HYDROCHLORIDE 0.4 MG/1
0.4 CAPSULE ORAL NIGHTLY
Status: DISCONTINUED | OUTPATIENT
Start: 2025-05-07 | End: 2025-05-27 | Stop reason: HOSPADM

## 2025-05-07 RX ORDER — ASPIRIN 81 MG/1
81 TABLET, CHEWABLE ORAL DAILY
Status: DISCONTINUED | OUTPATIENT
Start: 2025-05-08 | End: 2025-05-07

## 2025-05-07 RX ORDER — GABAPENTIN 300 MG/1
300 CAPSULE ORAL EVERY 8 HOURS SCHEDULED
Status: DISCONTINUED | OUTPATIENT
Start: 2025-05-07 | End: 2025-05-27 | Stop reason: HOSPADM

## 2025-05-07 RX ORDER — IPRATROPIUM BROMIDE AND ALBUTEROL SULFATE 2.5; .5 MG/3ML; MG/3ML
3 SOLUTION RESPIRATORY (INHALATION)
Status: DISCONTINUED | OUTPATIENT
Start: 2025-05-07 | End: 2025-05-09

## 2025-05-07 RX ORDER — IBUPROFEN 600 MG/1
1 TABLET ORAL
Status: DISCONTINUED | OUTPATIENT
Start: 2025-05-07 | End: 2025-05-27 | Stop reason: HOSPADM

## 2025-05-08 ENCOUNTER — APPOINTMENT (OUTPATIENT)
Dept: GENERAL RADIOLOGY | Facility: HOSPITAL | Age: 85
End: 2025-05-08
Payer: COMMERCIAL

## 2025-05-08 ENCOUNTER — APPOINTMENT (OUTPATIENT)
Dept: ULTRASOUND IMAGING | Facility: HOSPITAL | Age: 85
End: 2025-05-08
Payer: COMMERCIAL

## 2025-05-08 LAB
ALBUMIN SERPL-MCNC: 2.3 G/DL (ref 3.5–5.2)
ALBUMIN/GLOB SERPL: 1.3 G/DL
ALP SERPL-CCNC: 70 U/L (ref 39–117)
ALT SERPL W P-5'-P-CCNC: 16 U/L (ref 1–33)
ANION GAP SERPL CALCULATED.3IONS-SCNC: 13 MMOL/L (ref 5–15)
AST SERPL-CCNC: 18 U/L (ref 1–32)
BACTERIA UR QL AUTO: ABNORMAL /HPF
BASOPHILS # BLD AUTO: 0.03 10*3/MM3 (ref 0–0.2)
BASOPHILS NFR BLD AUTO: 0.3 % (ref 0–1.5)
BILIRUB SERPL-MCNC: 0.2 MG/DL (ref 0–1.2)
BILIRUB UR QL STRIP: NEGATIVE
BUN SERPL-MCNC: 96 MG/DL (ref 8–23)
BUN/CREAT SERPL: 48 (ref 7–25)
CALCIUM SPEC-SCNC: 7.7 MG/DL (ref 8.6–10.5)
CHLORIDE SERPL-SCNC: 102 MMOL/L (ref 98–107)
CLARITY UR: ABNORMAL
CO2 SERPL-SCNC: 22 MMOL/L (ref 22–29)
COLOR UR: YELLOW
CREAT SERPL-MCNC: 2 MG/DL (ref 0.57–1)
DEPRECATED RDW RBC AUTO: 51.8 FL (ref 37–54)
EGFRCR SERPLBLD CKD-EPI 2021: 24.1 ML/MIN/1.73
EOSINOPHIL # BLD AUTO: 0.48 10*3/MM3 (ref 0–0.4)
EOSINOPHIL NFR BLD AUTO: 4.9 % (ref 0.3–6.2)
ERYTHROCYTE [DISTWIDTH] IN BLOOD BY AUTOMATED COUNT: 19.5 % (ref 12.3–15.4)
GLOBULIN UR ELPH-MCNC: 1.8 GM/DL
GLUCOSE BLDC GLUCOMTR-MCNC: 125 MG/DL (ref 70–130)
GLUCOSE SERPL-MCNC: 96 MG/DL (ref 65–99)
GLUCOSE UR STRIP-MCNC: NEGATIVE MG/DL
HCT VFR BLD AUTO: 33.9 % (ref 34–46.6)
HGB BLD-MCNC: 10.9 G/DL (ref 12–15.9)
HGB UR QL STRIP.AUTO: NEGATIVE
HYALINE CASTS UR QL AUTO: ABNORMAL /LPF
IMM GRANULOCYTES # BLD AUTO: 0.23 10*3/MM3 (ref 0–0.05)
IMM GRANULOCYTES NFR BLD AUTO: 2.3 % (ref 0–0.5)
KETONES UR QL STRIP: NEGATIVE
LEUKOCYTE ESTERASE UR QL STRIP.AUTO: ABNORMAL
LYMPHOCYTES # BLD AUTO: 0.69 10*3/MM3 (ref 0.7–3.1)
LYMPHOCYTES NFR BLD AUTO: 7 % (ref 19.6–45.3)
MCH RBC QN AUTO: 27.9 PG (ref 26.6–33)
MCHC RBC AUTO-ENTMCNC: 32.2 G/DL (ref 31.5–35.7)
MCV RBC AUTO: 86.7 FL (ref 79–97)
MONOCYTES # BLD AUTO: 0.73 10*3/MM3 (ref 0.1–0.9)
MONOCYTES NFR BLD AUTO: 7.4 % (ref 5–12)
NEUTROPHILS NFR BLD AUTO: 7.72 10*3/MM3 (ref 1.7–7)
NEUTROPHILS NFR BLD AUTO: 78.1 % (ref 42.7–76)
NITRITE UR QL STRIP: NEGATIVE
NRBC BLD AUTO-RTO: 0 /100 WBC (ref 0–0.2)
PH UR STRIP.AUTO: <=5 [PH] (ref 5–8)
PLATELET # BLD AUTO: 316 10*3/MM3 (ref 140–450)
PMV BLD AUTO: 10.8 FL (ref 6–12)
POTASSIUM SERPL-SCNC: 4.5 MMOL/L (ref 3.5–5.2)
PREALB SERPL-MCNC: 25.2 MG/DL (ref 20–40)
PROT SERPL-MCNC: 4.1 G/DL (ref 6–8.5)
PROT UR QL STRIP: ABNORMAL
RBC # BLD AUTO: 3.91 10*6/MM3 (ref 3.77–5.28)
RBC # UR STRIP: ABNORMAL /HPF
REF LAB TEST METHOD: ABNORMAL
SODIUM SERPL-SCNC: 137 MMOL/L (ref 136–145)
SP GR UR STRIP: 1.01 (ref 1–1.03)
SQUAMOUS #/AREA URNS HPF: ABNORMAL /HPF
UROBILINOGEN UR QL STRIP: ABNORMAL
WBC # UR STRIP: ABNORMAL /HPF
WBC NRBC COR # BLD AUTO: 9.88 10*3/MM3 (ref 3.4–10.8)
YEAST URNS QL MICRO: ABNORMAL /HPF

## 2025-05-08 PROCEDURE — 93010 ELECTROCARDIOGRAM REPORT: CPT | Performed by: INTERNAL MEDICINE

## 2025-05-08 PROCEDURE — 82948 REAGENT STRIP/BLOOD GLUCOSE: CPT

## 2025-05-08 PROCEDURE — 84134 ASSAY OF PREALBUMIN: CPT | Performed by: INTERNAL MEDICINE

## 2025-05-08 PROCEDURE — 71045 X-RAY EXAM CHEST 1 VIEW: CPT

## 2025-05-08 PROCEDURE — 76775 US EXAM ABDO BACK WALL LIM: CPT

## 2025-05-08 PROCEDURE — 97162 PT EVAL MOD COMPLEX 30 MIN: CPT | Performed by: PHYSICAL THERAPIST

## 2025-05-08 PROCEDURE — 92523 SPEECH SOUND LANG COMPREHEN: CPT

## 2025-05-08 PROCEDURE — 97166 OT EVAL MOD COMPLEX 45 MIN: CPT | Performed by: OCCUPATIONAL THERAPIST

## 2025-05-08 PROCEDURE — 92610 EVALUATE SWALLOWING FUNCTION: CPT

## 2025-05-08 PROCEDURE — 93005 ELECTROCARDIOGRAM TRACING: CPT | Performed by: INTERNAL MEDICINE

## 2025-05-08 PROCEDURE — 99222 1ST HOSP IP/OBS MODERATE 55: CPT | Performed by: SURGERY

## 2025-05-08 PROCEDURE — 81001 URINALYSIS AUTO W/SCOPE: CPT | Performed by: INTERNAL MEDICINE

## 2025-05-08 PROCEDURE — 97530 THERAPEUTIC ACTIVITIES: CPT | Performed by: PHYSICAL THERAPIST

## 2025-05-08 PROCEDURE — 85025 COMPLETE CBC W/AUTO DIFF WBC: CPT | Performed by: INTERNAL MEDICINE

## 2025-05-08 PROCEDURE — 80053 COMPREHEN METABOLIC PANEL: CPT | Performed by: INTERNAL MEDICINE

## 2025-05-08 NOTE — CONSULTS
Nephrology ([facility]) Consult Note      Patient:  Genevieve Cerda  YOB: 1940  Date of Service: 5/8/2025  MRN: [id number]   Acct: [id number]   Primary Care Physician: Provider, No Known  Advance Directive:   There are no questions and answers to display.     Admit Date: 5/7/2025       Hospital Day: 0  Referring Provider: Nelson Ramirez MD      Patient personally seen and examined.  Complete chart including Consults, Notes, Operative Reports, Labs, Cardiology, and Radiology studies reviewed as able.        Subjective:  Genevieve Cerda is a 84 y.o. female for whom we were consulted for evaluation and treatment of acute kidney injury with chronic kidney disease stage IIIb.  Patient is followed Dr. Gamez in the office was last seen in January of this year.  Baseline creatinine around 1.6.  More recently she was treated at [facility] for hypoxemic respiratory failure with pleural effusion, MICHOACANO, acidosis, hyponatremia and hyperkalemia.  She was treated with diuretics, antibiotics, dopamine and ultimately had chest tube placements.  Seen with family initially and also reviewed with primary service.  Patient denied chest pain, nausea vomiting.  Denied dysuria or hematuria.  Family noted she has frequent urinary tract infections.    Temp- 98.3  Pulse- 116  Resp- 14  BP- 112/82  O2%- 100      Allergies:  Codeine    Home Meds:  No medications prior to admission.       Medicines:  Current Facility-Administered Medications   Medication Dose Route Frequency Provider Last Rate Last Admin    acetaminophen (TYLENOL) tablet 650 mg  650 mg Oral Q6H PRN Nelson Ramirez MD        allopurinol (ZYLOPRIM) tablet 100 mg  100 mg Oral Daily Nelson Ramirez MD        apixaban (ELIQUIS) tablet 2.5 mg  2.5 mg Oral Q12H Nelson Ramirez MD        aspirin EC tablet 81 mg  81 mg Oral Daily Nelson Ramirez MD        dextrose (D50W) (25 g/50 mL) IV injection  25 g  25 g Intravenous Q15 Min PRN Nelson Ramirez MD        dextrose (GLUTOSE) oral gel 15 g  15 g Oral Q15 Min PRN Nelson Ramirez MD        [START ON 5/9/2025] epoetin louis (EPOGEN,PROCRIT) injection 10,000 Units  10,000 Units Subcutaneous Once per day on Monday Wednesday Friday Nelson Ramirez MD        furosemide (LASIX) tablet 40 mg  40 mg Oral Daily Nelson Ramirez MD        gabapentin (NEURONTIN) capsule 300 mg  300 mg Oral Q8H Nelson Ramirez MD        glucagon (GLUCAGEN) injection 1 mg  1 mg Subcutaneous Q15 Min PRN Nelson Ramirez MD        ipratropium-albuterol (DUO-NEB) nebulizer solution 3 mL  3 mL Nebulization Q6H - RT Nelson Ramirez MD        ipratropium-albuterol (DUO-NEB) nebulizer solution 3 mL  3 mL Nebulization Q6H PRN Nelson Ramirez MD        latanoprost (XALATAN) 0.005 % ophthalmic solution 1 drop  1 drop Both Eyes Nightly Nelsno Ramirez MD        magnesium oxide (MAG-OX) tablet 400 mg  400 mg Oral Daily Nelson Ramirez MD        metoprolol tartrate (LOPRESSOR) tablet 25 mg  25 mg Oral Q12H Nelson Ramirez MD        pantoprazole (PROTONIX) EC tablet 40 mg  40 mg Oral Q AM Nelson Ramirez MD        potassium chloride (KLOR-CON M10) CR tablet 10 mEq  10 mEq Oral Daily Nelson Ramirez MD        rosuvastatin (CRESTOR) tablet 5 mg  5 mg Oral Nightly Nelson Ramirez MD        sodium chloride 0.9 % flush 10 mL  10 mL Intravenous Q12H Nelson Ramirez MD        sodium chloride 0.9 % flush 10 mL  10 mL Intravenous PRN Nelson Ramirez MD        sodium chloride 0.9 % infusion 40 mL  40 mL Intravenous PRN Nelson Ramirez MD        tamsulosin (FLOMAX) 24 hr capsule 0.4 mg  0.4 mg Oral Nightly Nelson Ramirez MD        timolol (TIMOPTIC) 0.5 % ophthalmic solution 1 drop  1 drop Both Eyes Daily Nelson Ramirez MD           Past Medical History:  No past medical history on  "file.    Past Surgical History:  No past surgical history on file.    Family History  No family history on file.    Social History  Social History     Socioeconomic History    Marital status: Unknown         Review of Systems:  History obtained from chart review and the patient  General ROS: No fever or chills  Respiratory ROS: No cough, shortness of breath, wheezing  Cardiovascular ROS: No chest pain or palpitations  Gastrointestinal ROS: No abdominal pain or melena  Genito-Urinary ROS: No dysuria or hematuria  Psych ROS: No anxiety and depression  14 point ROS reviewed with the patient and negative except as noted above and in the HPI unless unable to obtain.      Objective:  Patient Vitals for the past 24 hrs:   Height Weight   05/07/25 1500 157.5 cm (62.01\") 67.9 kg (149 lb 12.8 oz)     No intake or output data in the 24 hours ending 05/08/25 1423  General: awake/alert   Chest:  clear to auscultation bilaterally without respiratory distress  CVS: IRRR  Abdominal: soft, nontender, positive bowel sounds  Extremities: no cyanosis or edema  Skin: warm and dry without rash      Labs:  Results from last 7 days   Lab Units 05/08/25  0447   WBC 10*3/mm3 9.88   HEMOGLOBIN g/dL 10.9*   HEMATOCRIT % 33.9*   PLATELETS 10*3/mm3 316         Results from last 7 days   Lab Units 05/08/25  0447   SODIUM mmol/L 137   POTASSIUM mmol/L 4.5   CHLORIDE mmol/L 102   CO2 mmol/L 22.0   BUN mg/dL 96*   CREATININE mg/dL 2.00*   CALCIUM mg/dL 7.7*   EGFR mL/min/1.73 24.1*   BILIRUBIN mg/dL 0.2   ALK PHOS U/L 70   ALT (SGPT) U/L 16   AST (SGOT) U/L 18   GLUCOSE mg/dL 96       Radiology:   Imaging Results (Last 72 Hours)       Procedure Component Value Units Date/Time    XR Chest 1 View [[id number]] Collected: 05/08/25 1155     Updated: 05/08/25 1200    Narrative:      EXAM: XR CHEST 1 VW-      DATE: 5/8/2025 10:45 AM     HISTORY: Chest tube       COMPARISON: 5/7/2025.     TECHNIQUE:  Frontal view(s) of the chest submitted.     FINDINGS:    A " "pigtail pleural drain on the left remains in place at the left lung  base. A second right-sided drain is incompletely imaged. There is stable  interstitial prominence of the left lung and calcified granuloma at the  right lung base. No large pneumothorax is seen. No effusion is seen.  There is enlargement the cardiac silhouette and calcification of the  thoracic aorta.          Impression:         1. Stable chest with left lung interstitial prominence and no visualized  effusion or pneumothorax.     This report was signed and finalized on 5/8/2025 11:56 AM by Ulysses Nolen.       XR Chest 1 View [[id number]] Collected: 05/07/25 1845     Updated: 05/07/25 1851    Narrative:      EXAMINATION:  XR CHEST 1 VW-  5/7/2025 5:40 PM     HISTORY: Bilateral chest tubes.     COMPARISON: 7/13/2025.     TECHNIQUE: Single view AP image.     FINDINGS: There are bilateral chest tubes overlying the lung bases. The  right chest tube is not fully imaged. There is no measurable  pneumothorax. There is mild patchy infiltrate at the right lung base.  There is patchy infiltrate in the left perihilar region and left lung  base. There is minimal blunting of the left costophrenic angle. There is  bronchial wall thickening. The heart is normal in size. The thoracic  aorta is atheromatous. There is an oval device overlying the chest that  may be a loop recorder.          Impression:      1. Bilateral chest tubes. No evidence of pneumothorax. There may be  minimal pleural effusion on the left with blunting of the costophrenic  angle.  2. Patchy infiltrates bilaterally may represent pneumonia.  3. Bronchial wall thickening, likely chronic.           This report was signed and finalized on 5/7/2025 6:48 PM by Dr. Magan Reynoso MD.               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  Feel free to contact me if I can be of any further assistance.      Dusty Sapp MD  5/8/2025  14:23 CDT

## 2025-05-08 NOTE — CONSULTS
Referring Provider: Dr. Ramirez  Reason for Consultation: Bilateral pleural effusions with chest tubes in place    Patient Care Team:  Provider, No Known as PCP - General    Chief complaint shortness of breath    Subjective .     History of present illness: Ms. Cerda is an 84-year-old female who resides in a skilled nursing facility and was ultimately transferred to [facility] with hypoxia.  She initially required BiPAP and chest x-ray revealed large bilateral pleural effusions.  She does have chronic kidney disease and creatinine at that time was 2.3.  She was ultimately admitted and a right and left pigtail catheter was placed and effusions were drained.  She continued to have large volume output from pigtail catheters and was ultimately transferred to the [facility] for chest tube management.  She is currently resting in bed with bilateral chest tubes in place to 20 cm suction, no airleak.  Albumin this morning is 2.3, prealbumin is pending.    History  There are no questions and answers to display.         No past medical history on file., No past surgical history on file., No family history on file.,  ,   No medications prior to admission.   , Allergies: Codeine    Review of Systems  Review of Systems   Constitutional:  Positive for fatigue. Negative for chills, diaphoresis and fever.   HENT:  Negative for trouble swallowing and voice change.    Eyes:  Negative for visual disturbance.   Respiratory:  Positive for shortness of breath. Negative for chest tightness.    Cardiovascular:  Negative for chest pain, palpitations and leg swelling.   Gastrointestinal:  Negative for abdominal pain, diarrhea, nausea and vomiting.   Genitourinary:  Negative for difficulty urinating, dysuria and hematuria.   Musculoskeletal:  Negative for arthralgias and myalgias.   Skin:  Negative for color change, pallor, rash and wound.   Allergic/Immunologic: Negative for immunocompromised state.   Neurological:  Negative  "for dizziness, syncope and light-headedness.   Psychiatric/Behavioral:  Negative for agitation, confusion and self-injury.         Objective     Vital Signs   Visit Vitals  Ht 157.5 cm (62.01\")   Wt 67.9 kg (149 lb 12.8 oz)   BMI 27.39 kg/m²       Physical Exam  Vitals reviewed.   Constitutional:       General: She is not in acute distress.  HENT:      Head: Normocephalic.   Eyes:      Pupils: Pupils are equal, round, and reactive to light.   Cardiovascular:      Rate and Rhythm: Normal rate and regular rhythm.      Heart sounds: Normal heart sounds. No murmur heard.  Pulmonary:      Breath sounds: Normal breath sounds. No wheezing or rales.      Comments: Right and left pigtail catheter in place to 20 cm suction, no airleak  Abdominal:      General: There is no distension.      Palpations: Abdomen is soft.      Tenderness: There is no abdominal tenderness.   Musculoskeletal:         General: No swelling or tenderness.   Skin:     General: Skin is warm and dry.   Neurological:      General: No focal deficit present.      Mental Status: She is alert and oriented to person, place, and time.   Psychiatric:         Mood and Affect: Mood normal.         Behavior: Behavior normal.         Thought Content: Thought content normal.         Judgment: Judgment normal.           LAB:   CBC:  Results from last 7 days   Lab Units 05/08/25  0447   WBC 10*3/mm3 9.88   HEMATOCRIT % 33.9*   PLATELETS 10*3/mm3 316          BMP:)  Results from last 7 days   Lab Units 05/08/25  0447   SODIUM mmol/L 137   POTASSIUM mmol/L 4.5   CHLORIDE mmol/L 102   CO2 mmol/L 22.0   GLUCOSE mg/dL 96   BUN mg/dL 96*   CREATININE mg/dL 2.00*           COAG:      Invalid input(s): \"PT\"        IMAGES:       Imaging Results (Last 24 Hours)       Procedure Component Value Units Date/Time    XR Chest 1 View [[id number]] Collected: 05/07/25 1845     Updated: 05/07/25 1851    Narrative:      EXAMINATION:  XR CHEST 1 VW-  5/7/2025 5:40 PM     HISTORY: Bilateral " chest tubes.     COMPARISON: 7/13/2025.     TECHNIQUE: Single view AP image.     FINDINGS: There are bilateral chest tubes overlying the lung bases. The  right chest tube is not fully imaged. There is no measurable  pneumothorax. There is mild patchy infiltrate at the right lung base.  There is patchy infiltrate in the left perihilar region and left lung  base. There is minimal blunting of the left costophrenic angle. There is  bronchial wall thickening. The heart is normal in size. The thoracic  aorta is atheromatous. There is an oval device overlying the chest that  may be a loop recorder.          Impression:      1. Bilateral chest tubes. No evidence of pneumothorax. There may be  minimal pleural effusion on the left with blunting of the costophrenic  angle.  2. Patchy infiltrates bilaterally may represent pneumonia.  3. Bronchial wall thickening, likely chronic.           This report was signed and finalized on 5/7/2025 6:48 PM by Dr. Magan Reynoso MD.                            Assessment & Plan      Bilateral pleural effusions  Congestive heart failure  Chronic kidney disease, stage 4      Daily chest x-ray  Continue right and left pigtail catheter to 20 cm suction.  Begin boost protein shakes 3 times daily  Flush right and left pigtail catheter with 10 mL normal saline every 8 hours to maintain tube patency  Aggressive diuresis as renal function will tolerate      SARAH Giraldo  05/08/25  07:17 CDT

## 2025-05-08 NOTE — PROGRESS NOTES
Ms. Cerda is a member of the [employer] for [facility], [id number].  Per her permission with the program which partners with the hospital, I contacted her [employer]/c[employer].  They will alert her  that she is in the LTACH.  She was asleep during my visit, but I did speak with her sister and daughter for a few minutes.  I will continue to monitor her progress.

## 2025-05-08 NOTE — PROGRESS NOTES
Adult Nutrition  Assessment/PES    Patient Name:  Genevieve Cerda  YOB: 1940  MRN: [id number]  Admit Date:  5/7/2025    Assessment Date:  5/8/2025       Reason for Assessment       Row Name 05/08/25 1421          Reason for Assessment    Reason For Assessment per organizational policy  LTACH admission     Diagnosis cardiac disease;renal disease                 Nutrition/Diet History       Row Name 05/08/25 1423          Nutrition/Diet History    Typical Intake (Food/Fluid/EN/PN) Pt reports her appetite is good. Soft to chew,ground meat,thin liquids. Pt agreeable to oral supplements. Encouraged oral intake. Boost Original BID vanilla,magic cup mixed berry daily. Pt admitted for chest tube management.     Food Intolerance(s) NKFA     Functional Status other (see comments)     Factors Affecting Nutritional Intake appetite                 Labs/Tests/Procedures/Meds       Row Name 05/08/25 1424          Labs/Procedures/Meds    Lab Results Reviewed reviewed, pertinent     Lab Results Comments BUN,Cr,alb        Diagnostic Tests/Procedures    Diagnostic Test/Procedure Reviewed reviewed     Diagnostic Test/Procedures Comments US Renal,XR Chest        Medications    Pertinent Medications Reviewed reviewed, pertinent     Pertinent Medications Comments lasix,protonix,allopurinol                 Physical Findings       Row Name 05/08/25 1420          Physical Findings    Overall Physical Appearance Room air,last BM 5/6,Jose Antonio score 13,RLE edema,right buttock wound (shear wound),bilateral back/lung bases (chest tube sites)sutures     Enteral Access Devices other (see comments);chest tube  bilateral pigtail catheters                 Estimated/Assessed Needs - Anthropometrics       Row Name 05/08/25 1424          Anthropometrics    Calculation Weight 67.6 kg (149 lb)        Estimated/Assessed Needs    Additional Documentation Protein Requirements (Group);Estimated Calorie Needs (Group);KCAL/KG (Group);Fluid  Requirements (Group)        Estimated Calorie Needs    Estimated Calorie Need Method kcal/kg        KCAL/KG    KCAL/KG 25 Kcal/Kg (kcal);30 Kcal/Kg (kcal)     25 Kcal/Kg (kcal) 1689.65     30 Kcal/Kg (kcal) 2027.58        Protein Requirements    Weight Used For Protein Calculations 67.6 kg (149 lb)     Est Protein Requirement Amount (gms/kg) 1.5 gm protein     Estimated Protein Requirements (gms/day) 101.38        Fluid Requirements    Fluid Requirements (mL/day) 1689  5723-3398 ml/day     Estimated Fluid Requirement Method other (see comments)  1ml/kcal                 Nutrition Prescription Ordered       Row Name 05/08/25 1429          Nutrition Prescription PO    Current PO Diet Soft Texture     Texture Chopped     Fluid Consistency Thin  w/ extra gravy                 Evaluation of Received Nutrient/Fluid Intake       Row Name 05/08/25 1429          Nutrient/Fluid Evaluation    Number of Days Evaluated 1 day     Additional Documentation Intake Assessment (Group)        Fluid Intake Evaluation    Oral Fluid (mL) 550        PO Evaluation    Number of Days PO Intake Evaluated Other (comment)  <24 hrs     Number of Meals 1     % PO Intake 75                 Malnutrition Severity Assessment       Row Name 05/08/25 1636          Malnutrition Severity Assessment    Malnutrition Type Chronic Disease - Related Malnutrition        Insufficient Energy Intake     Insufficient Energy Intake Findings Moderate     Insufficient Energy Intake  <75% of est. energy requirement for > or equal to 1 month        Muscle Loss    Loss of Muscle Mass Findings Severe     [place] Region Severe - deep hollowing/scooping, lack of muscle to touch, facial bones well defined     Clavicle Bone Region Severe - protruding prominent bone     Dorsal Hand Region Severe - prominent depression        Fat Loss    Subcutaneous Fat Loss Findings Severe     Orbital Region  Severe - pronounced hollowness/depression, dark circles, loose saggy skin     Upper  Arm Region Severe - mostly skin, very little space between folds, fingers touch        Criteria Met (Must meet criteria for severity in at least 2 of these categories: M Wasting, Fat Loss, Fluid, Secondary Signs, Wt. Status, Intake)    Patient meets criteria for  Severe Malnutrition              Problem/Interventions:   Problem 1       Row Name 05/08/25 164          Nutrition Diagnoses Problem 1    Problem 1 Malnutrition  Severe in context of chronic illness     Etiology (related to) Factors Affecting Nutrition;Medical Diagnosis     Pulmonary/Critical Care Acute respiratory failure;Hypoxemia;Pneumonia  bilateral pleural effusions     Skin Skin breakdown;Other (comment);Surgical wound  right buttock wound; chest tube incision     Mental State/Condition Impaired Cognitive Status/Motor Control     Other Severe muscle wasting and severe fat loss     Signs/Symptoms (evidenced by) PO Intake     Percent (%) intake recorded 55 %     Over number of meals 2                    Intervention Goal       Row Name 05/08/25 2992          Intervention Goal    General Reduce/improve symptoms;Meet nutritional needs for age/condition;Disease management/therapy     PO Meet estimated needs;Establish PO;Tolerate PO;Increase intake     Weight Maintain weight                    Nutrition Intervention       Row Name 05/08/25 7732          Nutrition Intervention    RD/Tech Action Follow Tx progress;Care plan reviewd;Encourage intake;Advise alternate selection;Recommend/ordered     Recommended/Ordered Supplement                 Nutrition Prescription       Row Name 05/08/25 9362          Nutrition Prescription PO    PO Prescription Begin/change supplement     Supplement Other (comment);Magic Cup  Boost Original     Supplement Frequency 3 times a day;Daily     New PO Prescription Ordered? Yes                 Education/Evaluation       Row Name 05/08/25 0828          Education    Education No discharge needs identified at this time         Monitor/Evaluation    Monitor Per protocol                     Electronically signed by:  Cori Campos RDN, LD  05/08/25 16:54 CDT

## 2025-05-08 NOTE — H&P
JOHNATHON Alcala APRN          Internal Medicine History and Physical      Name: Genevieve Cerda  MRN: [id number]     Acct: [id number]  Room: [facility]/    Admit Date: 5/7/2025  PCP: Provider, No Known    Chief Complaint:     Weakness    History Obtained From:     chart review and the patient    History of Present Illness:      Genevieve Cerda is a  84 y.o.  female who presents with need for continued chest tube management and rehabilitation efforts following a recent acute care stay. The patient had been in her usual state of health undergoing rehabilitation efforts at a local SNF when she developed altered mental status and hypoxia. She was transferred to an outlying ER for evaluation and treatment. She was noted ot have Sp02 75% and was placed on bipap.  Workup revealed significant hyponatremia, hyperkalemia, elevated creatinine and evidence of acidemia. CT head was negative and chest imaging revealed large bilateral pleural effusions. She was aggressively diuresed, treated with IV antibiotics and started on IV dopamine due to bradycardia. She was further noted to have evidence of urinary retention. She was transitioned to hydration with hypertonic saline and was seen in consultation by nephrology. She was then aggressively diuresed alternating with fluid resuscitation. Renal function and electrolyte derangements have improved/stabilized. She was evaluated by pulmonology and treated with IV antibiotics. She received 9 days of Merrem with negative cultures. She underwent bilateral thoracenteses on 4/29 and has had persistent high volume output (moreso on the left). She transferred to our facility for continued chest tube management and rehabilitation efforts.     Past Medical History:     PAF  HTN  CKD  HLD  Left renal artery aneurysm    Past Surgical History:     Left nephrectomy    Medications Prior to Admission:       See chart    Allergies:       Codeine    Social  "History:     Tobacco:    has no history on file for tobacco use.  Alcohol:      has no history on file for alcohol use.  Drug Use:  has no history on file for drug use.    Family History:     No family history on file.    Review of Systems:     Review of Systems   Constitutional: Positive for malaise/fatigue. Negative for chills, decreased appetite, weight gain and weight loss.   HENT:  Negative for congestion, ear discharge, hoarse voice and tinnitus.    Eyes:  Negative for blurred vision, discharge, visual disturbance and visual halos.   Cardiovascular:  Negative for chest pain, claudication, dyspnea on exertion, irregular heartbeat, leg swelling, orthopnea and paroxysmal nocturnal dyspnea.   Respiratory:  Negative for cough, shortness of breath, sputum production and wheezing.    Endocrine: Negative for cold intolerance, heat intolerance and polyuria.   Hematologic/Lymphatic: Negative for adenopathy. Does not bruise/bleed easily.   Skin:  Negative for dry skin, itching and suspicious lesions.   Musculoskeletal:  Negative for arthritis, back pain, falls, joint pain, muscle weakness and myalgias.   Gastrointestinal:  Negative for abdominal pain, constipation, diarrhea, dysphagia and hematemesis.   Genitourinary:  Negative for bladder incontinence, dysuria and frequency.   Neurological:  Positive for weakness. Negative for aphonia, disturbances in coordination and dizziness.   Psychiatric/Behavioral:  Negative for altered mental status, depression, memory loss and substance abuse. The patient does not have insomnia and is not nervous/anxious.        Code Status:    There are no questions and answers to display.       Physical Exam:     Vitals:  Ht 157.5 cm (62.01\")   Wt 67.9 kg (149 lb 12.8 oz)   BMI 27.39 kg/m²   T 98.3 P 116 R 14 /82 Sp02 100% (room air)  Physical Exam  Vitals and nursing note reviewed.   Constitutional:       Appearance: Normal appearance.   HENT:      Head: Normocephalic and atraumatic. "      Right Ear: External ear normal.      Left Ear: External ear normal.      Nose: Nose normal.      Mouth/Throat:      Pharynx: Oropharynx is clear.   Eyes:      Extraocular Movements: Extraocular movements intact.      Conjunctiva/sclera: Conjunctivae normal.      Pupils: Pupils are equal, round, and reactive to light.   Cardiovascular:      Rate and Rhythm: Normal rate and regular rhythm.      Pulses: Normal pulses.      Heart sounds: Normal heart sounds.   Pulmonary:      Effort: Pulmonary effort is normal.      Breath sounds: Normal breath sounds.      Comments: Bilateral chest tubes intact to pleurevac  Abdominal:      General: Bowel sounds are normal.      Palpations: Abdomen is soft.   Musculoskeletal:      Cervical back: Normal range of motion and neck supple.      Comments: Generalized weakness   Skin:     General: Skin is warm and dry.   Neurological:      Mental Status: She is alert and oriented to person, place, and time.   Psychiatric:         Mood and Affect: Mood normal.         Behavior: Behavior normal.               Data:     Lab Results (last 7 days)       Procedure Component Value Units Date/Time    Prealbumin [[id number]]  (Normal) Collected: 05/08/25 0446    Specimen: Blood Updated: 05/08/25 1200     Prealbumin 25.2 mg/dL     Comprehensive Metabolic Panel [[id number]]  (Abnormal) Collected: 05/08/25 0447    Specimen: Blood Updated: 05/08/25 0533     Glucose 96 mg/dL      BUN 96 mg/dL      Creatinine 2.00 mg/dL      Sodium 137 mmol/L      Potassium 4.5 mmol/L      Chloride 102 mmol/L      CO2 22.0 mmol/L      Calcium 7.7 mg/dL      Total Protein 4.1 g/dL      Albumin 2.3 g/dL      ALT (SGPT) 16 U/L      AST (SGOT) 18 U/L      Alkaline Phosphatase 70 U/L      Total Bilirubin 0.2 mg/dL      Globulin 1.8 gm/dL      A/G Ratio 1.3 g/dL      BUN/Creatinine Ratio 48.0     Anion Gap 13.0 mmol/L      eGFR 24.1 mL/min/1.73     Narrative:      GFR Categories in Chronic Kidney Disease (CKD)              GFR  Category          GFR (mL/min/1.73)    Interpretation  G1                    90 or greater        Normal or high (1)  G2                    60-89                Mild decrease (1)  G3a                   45-59                Mild to moderate decrease  G3b                   30-44                Moderate to severe decrease  G4                    15-29                Severe decrease  G5                    14 or less           Kidney failure    (1)In the absence of evidence of kidney disease, neither GFR category G1 or G2 fulfill the criteria for CKD.    eGFR calculation 2021 CKD-EPI creatinine equation, which does not include race as a factor    CBC & Differential [[id number]]  (Abnormal) Collected: 05/08/25 0447    Specimen: Blood Updated: 05/08/25 0513    Narrative:      The following orders were created for panel order CBC & Differential.  Procedure                               Abnormality         Status                     ---------                               -----------         ------                     CBC Auto Differential[[id number]]        Abnormal            Final result                 Please view results for these tests on the individual orders.    CBC Auto Differential [[id number]]  (Abnormal) Collected: 05/08/25 0447    Specimen: Blood Updated: 05/08/25 0513     WBC 9.88 10*3/mm3      RBC 3.91 10*6/mm3      Hemoglobin 10.9 g/dL      Hematocrit 33.9 %      MCV 86.7 fL      MCH 27.9 pg      MCHC 32.2 g/dL      RDW 19.5 %      RDW-SD 51.8 fl      MPV 10.8 fL      Platelets 316 10*3/mm3      Neutrophil % 78.1 %      Lymphocyte % 7.0 %      Monocyte % 7.4 %      Eosinophil % 4.9 %      Basophil % 0.3 %      Immature Grans % 2.3 %      Neutrophils, Absolute 7.72 10*3/mm3      Lymphocytes, Absolute 0.69 10*3/mm3      Monocytes, Absolute 0.73 10*3/mm3      Eosinophils, Absolute 0.48 10*3/mm3      Basophils, Absolute 0.03 10*3/mm3      Immature Grans, Absolute 0.23 10*3/mm3      nRBC 0.0 /100 WBC           XR  Chest 1 View  Result Date: 5/8/2025  Narrative: EXAM: XR CHEST 1 VW-  DATE: 5/8/2025 10:45 AM  HISTORY: Chest tube   COMPARISON: 5/7/2025.  TECHNIQUE:  Frontal view(s) of the chest submitted.  FINDINGS:  A pigtail pleural drain on the left remains in place at the left lung base. A second right-sided drain is incompletely imaged. There is stable interstitial prominence of the left lung and calcified granuloma at the right lung base. No large pneumothorax is seen. No effusion is seen. There is enlargement the cardiac silhouette and calcification of the thoracic aorta.       Impression:  1. Stable chest with left lung interstitial prominence and no visualized effusion or pneumothorax.  This report was signed and finalized on 5/8/2025 11:56 AM by Ulysses Nolen.      XR Chest 1 View  Result Date: 5/7/2025  Narrative: EXAMINATION:  XR CHEST 1 VW-  5/7/2025 5:40 PM  HISTORY: Bilateral chest tubes.  COMPARISON: 7/13/2025.  TECHNIQUE: Single view AP image.  FINDINGS: There are bilateral chest tubes overlying the lung bases. The right chest tube is not fully imaged. There is no measurable pneumothorax. There is mild patchy infiltrate at the right lung base. There is patchy infiltrate in the left perihilar region and left lung base. There is minimal blunting of the left costophrenic angle. There is bronchial wall thickening. The heart is normal in size. The thoracic aorta is atheromatous. There is an oval device overlying the chest that may be a loop recorder.       Impression: 1. Bilateral chest tubes. No evidence of pneumothorax. There may be minimal pleural effusion on the left with blunting of the costophrenic angle. 2. Patchy infiltrates bilaterally may represent pneumonia. 3. Bronchial wall thickening, likely chronic.    This report was signed and finalized on 5/7/2025 6:48 PM by Dr. Magan Reynoso MD.          Assessment:           * No active hospital problems. *    No past medical history on file.    Plan:      Bilateral pleural effusions  CKD4  PAF  Chronic anticoagulation  Chronic diastolic CHF  Solitary kidney s/p left nephrectomy  Urinary retention  Multifactorial anemia    Continue current treatment. Monitor counts. Increase activity. Labs Monday. Chest tube management per CTS. Diuresis per nephrology. Maintain patient safety. Encourage increased po intake. Aggressive therapies as tolerated.       Electronically signed by SARAH Santiago on 5/8/2025 at 12:12 CDT     Copy sent to Dr. Alicia, No Known    I have discussed the care of Genevieve Cerda, including pertinent history and exam findings, with the nurse practitioner.    I have seen and examined the patient and the key elements of all parts of the encounter have been performed by me.  I agree with the assessment, plan and orders as documented by SARAH Montejo, after I modified the exam findings and the plan of treatments and the final version is my approved version of the assessment.        Electronically signed by Nelson Ramirez MD on 5/8/2025 at 17:43 CDT

## 2025-05-09 ENCOUNTER — APPOINTMENT (OUTPATIENT)
Dept: GENERAL RADIOLOGY | Facility: HOSPITAL | Age: 85
End: 2025-05-09
Payer: COMMERCIAL

## 2025-05-09 LAB
25(OH)D3 SERPL-MCNC: 6.3 NG/ML (ref 30–100)
ANION GAP SERPL CALCULATED.3IONS-SCNC: 9 MMOL/L (ref 5–15)
BUN SERPL-MCNC: 99 MG/DL (ref 8–23)
BUN/CREAT SERPL: 55.9 (ref 7–25)
CALCIUM SPEC-SCNC: 7.7 MG/DL (ref 8.6–10.5)
CHLORIDE SERPL-SCNC: 105 MMOL/L (ref 98–107)
CO2 SERPL-SCNC: 23 MMOL/L (ref 22–29)
CREAT SERPL-MCNC: 1.77 MG/DL (ref 0.57–1)
CRP SERPL-MCNC: 1.15 MG/DL (ref 0–0.5)
EGFRCR SERPLBLD CKD-EPI 2021: 27.9 ML/MIN/1.73
GLUCOSE SERPL-MCNC: 89 MG/DL (ref 65–99)
MAGNESIUM SERPL-MCNC: 2.2 MG/DL (ref 1.6–2.4)
PHOSPHATE SERPL-MCNC: 5.1 MG/DL (ref 2.5–4.5)
POTASSIUM SERPL-SCNC: 4.9 MMOL/L (ref 3.5–5.2)
PROT ?TM UR-MCNC: 122.8 MG/DL
PTH-INTACT SERPL-MCNC: 114.5 PG/ML (ref 15–65)
QT INTERVAL: 344 MS
QTC INTERVAL: 423 MS
SODIUM SERPL-SCNC: 137 MMOL/L (ref 136–145)
SODIUM UR-SCNC: <20 MMOL/L

## 2025-05-09 PROCEDURE — 82306 VITAMIN D 25 HYDROXY: CPT | Performed by: INTERNAL MEDICINE

## 2025-05-09 PROCEDURE — 80048 BASIC METABOLIC PNL TOTAL CA: CPT | Performed by: INTERNAL MEDICINE

## 2025-05-09 PROCEDURE — 82570 ASSAY OF URINE CREATININE: CPT | Performed by: INTERNAL MEDICINE

## 2025-05-09 PROCEDURE — 97530 THERAPEUTIC ACTIVITIES: CPT

## 2025-05-09 PROCEDURE — 86140 C-REACTIVE PROTEIN: CPT | Performed by: INTERNAL MEDICINE

## 2025-05-09 PROCEDURE — 97110 THERAPEUTIC EXERCISES: CPT

## 2025-05-09 PROCEDURE — 83735 ASSAY OF MAGNESIUM: CPT | Performed by: INTERNAL MEDICINE

## 2025-05-09 PROCEDURE — 84100 ASSAY OF PHOSPHORUS: CPT | Performed by: INTERNAL MEDICINE

## 2025-05-09 PROCEDURE — 99232 SBSQ HOSP IP/OBS MODERATE 35: CPT | Performed by: SURGERY

## 2025-05-09 PROCEDURE — 83970 ASSAY OF PARATHORMONE: CPT | Performed by: INTERNAL MEDICINE

## 2025-05-09 PROCEDURE — 25010000002 EPOETIN ALFA PER 1000 UNITS: Performed by: INTERNAL MEDICINE

## 2025-05-09 PROCEDURE — 84300 ASSAY OF URINE SODIUM: CPT | Performed by: INTERNAL MEDICINE

## 2025-05-09 PROCEDURE — 71045 X-RAY EXAM CHEST 1 VIEW: CPT

## 2025-05-09 PROCEDURE — 84156 ASSAY OF PROTEIN URINE: CPT | Performed by: INTERNAL MEDICINE

## 2025-05-09 PROCEDURE — 84540 ASSAY OF URINE/UREA-N: CPT | Performed by: INTERNAL MEDICINE

## 2025-05-09 RX ORDER — ERGOCALCIFEROL 1.25 MG/1
50000 CAPSULE, LIQUID FILLED ORAL
Status: DISCONTINUED | OUTPATIENT
Start: 2025-05-09 | End: 2025-05-27 | Stop reason: HOSPADM

## 2025-05-09 RX ORDER — FUROSEMIDE 40 MG/1
40 TABLET ORAL ONCE
Status: DISCONTINUED | OUTPATIENT
Start: 2025-05-09 | End: 2025-05-12

## 2025-05-09 NOTE — PROGRESS NOTES
Nephrology ([facility]) Progress Note      Patient:  Genevieve Cerda  YOB: 1940  Date of Service: 5/9/2025  MRN: [id number]   Acct: [id number]   Primary Care Physician: Provider, No Known  Advance Directive:   There are no questions and answers to display.     Admit Date: 5/7/2025       Hospital Day: 0  Referring Provider: Nelson Ramirez MD      Patient personally seen and examined.  Complete chart including Consults, Notes, Operative Reports, Labs, Cardiology, and Radiology studies reviewed as able.        Subjective:  Genevieve Cerda is a 84 y.o. female for whom we were consulted for evaluation and treatment of acute kidney injury with chronic kidney disease stage IIIb.  Patient is followed Dr. Gamez in the office was last seen in January of this year.  Baseline creatinine around 1.6.  More recently she was treated at [facility] for hypoxemic respiratory failure with pleural effusion, MICHOACANO, acidosis, hyponatremia and hyperkalemia.  She was treated with diuretics, antibiotics, dopamine and ultimately had chest tube placements.  Seen with family initially and also reviewed with primary service.  Patient denied chest pain, nausea vomiting.  Denied dysuria or hematuria.  Family noted she has frequent urinary tract infections.     Today, no overnight events.  Chest tubes remain in place.  Seen with therapy.  BUN continue to rise but creatinine down a bit with oral diuretics.    Temp- 98.0  Pulse- 81  Resp- 13  BP- 112/60  O2%- 99      Allergies:  Codeine    Home Meds:  No medications prior to admission.       Medicines:  Current Facility-Administered Medications   Medication Dose Route Frequency Provider Last Rate Last Admin    acetaminophen (TYLENOL) tablet 650 mg  650 mg Oral Q6H PRN Nelson Ramirez MD        allopurinol (ZYLOPRIM) tablet 100 mg  100 mg Oral Daily Nelson Ramirez MD        apixaban (ELIQUIS) tablet 2.5 mg  2.5 mg Oral Q12H  Nelson Ramirez MD        aspirin EC tablet 81 mg  81 mg Oral Daily Nelson Ramirez MD        dextrose (D50W) (25 g/50 mL) IV injection 25 g  25 g Intravenous Q15 Min PRN Nelson Ramirez MD        dextrose (GLUTOSE) oral gel 15 g  15 g Oral Q15 Min PRN Nelson Ramirez MD        epoetin louis (EPOGEN,PROCRIT) injection 10,000 Units  10,000 Units Subcutaneous Once per day on Monday Wednesday Friday Nelson Ramirez MD        furosemide (LASIX) tablet 40 mg  40 mg Oral Daily Nelson Ramirez MD        furosemide (LASIX) tablet 40 mg  40 mg Oral Once Yuniel Del Rosario MD        gabapentin (NEURONTIN) capsule 300 mg  300 mg Oral Q8H Nelson Ramirez MD        glucagon (GLUCAGEN) injection 1 mg  1 mg Subcutaneous Q15 Min PRN Nelson Ramirez MD        ipratropium-albuterol (DUO-NEB) nebulizer solution 3 mL  3 mL Nebulization Q6H PRN Nelson Ramirez MD        latanoprost (XALATAN) 0.005 % ophthalmic solution 1 drop  1 drop Both Eyes Nightly Nelson Ramirez MD        magnesium oxide (MAG-OX) tablet 400 mg  400 mg Oral Daily Nelson Ramirez MD        metoprolol tartrate (LOPRESSOR) tablet 25 mg  25 mg Oral Q12H eNlson Ramirez MD        pantoprazole (PROTONIX) EC tablet 40 mg  40 mg Oral Q AM Nelson Ramirez MD        potassium chloride (KLOR-CON M10) CR tablet 10 mEq  10 mEq Oral Daily Nelson Ramirez MD        rosuvastatin (CRESTOR) tablet 5 mg  5 mg Oral Nightly Nelson Ramirez MD        sodium chloride 0.9 % flush 10 mL  10 mL Intravenous Q12H Nelson Ramirez MD        sodium chloride 0.9 % flush 10 mL  10 mL Intravenous PRN Nelson Ramirez MD        sodium chloride 0.9 % infusion 40 mL  40 mL Intravenous PRN Nelson Ramirez MD        tamsulosin (FLOMAX) 24 hr capsule 0.4 mg  0.4 mg Oral Nightly Nelson Ramirez MD        timolol (TIMOPTIC) 0.5 % ophthalmic solution 1 drop  1 drop Both Eyes Daily  Nelson Ramirez MD        vitamin D (ERGOCALCIFEROL) capsule 50,000 Units  50,000 Units Oral Q7 Days Dusty Sapp MD           Past Medical History:  No past medical history on file.    Past Surgical History:  No past surgical history on file.    Family History  No family history on file.    Social History  Social History     Socioeconomic History    Marital status: Unknown       Review of Systems:  History obtained from chart review and the patient  General ROS: No fever or chills  Respiratory ROS: No cough, shortness of breath, wheezing  Cardiovascular ROS: No chest pain or palpitations  Gastrointestinal ROS: No abdominal pain or melena  Genito-Urinary ROS: No dysuria or hematuria  Psych ROS: No anxiety and depression  14 point ROS reviewed with the patient and negative except as noted above and in the HPI unless unable to obtain.    Objective:  No data found.  No intake or output data in the 24 hours ending 05/09/25 1623  General: awake/alert   Chest:  clear to auscultation bilaterally without respiratory distress  CVS: regular rate and rhythm  Abdominal: soft, nontender, positive bowel sounds  Extremities: no cyanosis or edema  Skin: warm and dry without rash      Labs:  Results from last 7 days   Lab Units 05/08/25  0447   WBC 10*3/mm3 9.88   HEMOGLOBIN g/dL 10.9*   HEMATOCRIT % 33.9*   PLATELETS 10*3/mm3 316         Results from last 7 days   Lab Units 05/09/25  0247 05/08/25  0447   SODIUM mmol/L 137 137   POTASSIUM mmol/L 4.9 4.5   CHLORIDE mmol/L 105 102   CO2 mmol/L 23.0 22.0   BUN mg/dL 99* 96*   CREATININE mg/dL 1.77* 2.00*   CALCIUM mg/dL 7.7* 7.7*   EGFR mL/min/1.73 27.9* 24.1*   BILIRUBIN mg/dL  --  0.2   ALK PHOS U/L  --  70   ALT (SGPT) U/L  --  16   AST (SGOT) U/L  --  18   GLUCOSE mg/dL 89 96       Radiology:   Imaging Results (Last 72 Hours)       Procedure Component Value Units Date/Time    XR Chest 1 View [[id number]] Collected: 05/09/25 0721     Updated: 05/09/25 0727     Narrative:      EXAMINATION: XR CHEST 1 VW-     5/9/2025 2:45 AM     HISTORY: Chest tube     Images are stored in PACS per institutional protocol.     1 view chest x-ray.     COMPARISON:  Yesterday at 11:12 a.m.     Stable heart size.  Aortic arch calcification.     Chronic lung changes.  Interval clearing of mild patchy infiltrate or edema on the LEFT.  The RIGHT lung is relatively clear.     Bilateral pleural space pigtail catheters are unchanged.     No pneumothorax.       Impression:      1. Partial clearing of the LEFT lung and slightly improved lung  expansion. Overall stable appearance of the chest.           This report was signed and finalized on 5/9/2025 7:24 AM by Dr. Gerald Serna MD.       US Renal Limited [[id number]] Collected: 05/08/25 1818     Updated: 05/08/25 1826    Narrative:      RENAL ULTRASOUND COMPLETE 5/8/2025 4:02 PM     REASON FOR EXAM: Chronic kidney disease. Prior left nephrectomy.     COMPARISON: None.       TECHNIQUE: Multiple longitudinal and transverse realtime sonographic  images of the kidneys and urinary bladder are obtained.     FINDINGS:     RIGHT KIDNEY: The right kidney measures 11.4 cm in length. The cortex is  isoechoic to the liver. The cortical thickness is normal. There are no  solid or cystic masses. There is no hydronephrosis. There may be a tiny  stone within the central right kidney measuring 2 mm.     LEFT KIDNEY: Prior left nephrectomy.     PELVIS: The bladder is not fully distended. No focal bladder abnormality  is seen. There is no free fluid in the pelvis.     ADDITIONAL FINDINGS: The abdominal aorta is atheromatous. No aneurysm is  seen.       Impression:      1. The right kidney is normal in size. There is mild increased  echogenicity of the renal cortex. It is isoechoic to liver. The findings  suggest mild medical-renal disease. There is no cortical thinning. There  is no hydronephrosis. There may be one tiny 2 mm stone in the mid  kidney.  2. Prior left  nephrectomy.           The images and report are stored on PAC's per institutional and state  guidelines.           This report was signed and finalized on 5/8/2025 6:22 PM by Dr. Magan Reynoso MD.       XR Chest 1 View [[id number]] Collected: 05/08/25 1155     Updated: 05/08/25 1200    Narrative:      EXAM: XR CHEST 1 VW-      DATE: 5/8/2025 10:45 AM     HISTORY: Chest tube       COMPARISON: 5/7/2025.     TECHNIQUE:  Frontal view(s) of the chest submitted.     FINDINGS:    A pigtail pleural drain on the left remains in place at the left lung  base. A second right-sided drain is incompletely imaged. There is stable  interstitial prominence of the left lung and calcified granuloma at the  right lung base. No large pneumothorax is seen. No effusion is seen.  There is enlargement the cardiac silhouette and calcification of the  thoracic aorta.          Impression:         1. Stable chest with left lung interstitial prominence and no visualized  effusion or pneumothorax.     This report was signed and finalized on 5/8/2025 11:56 AM by Ulysses Nolen.       XR Chest 1 View [[id number]] Collected: 05/07/25 1845     Updated: 05/07/25 1851    Narrative:      EXAMINATION:  XR CHEST 1 VW-  5/7/2025 5:40 PM     HISTORY: Bilateral chest tubes.     COMPARISON: 7/13/2025.     TECHNIQUE: Single view AP image.     FINDINGS: There are bilateral chest tubes overlying the lung bases. The  right chest tube is not fully imaged. There is no measurable  pneumothorax. There is mild patchy infiltrate at the right lung base.  There is patchy infiltrate in the left perihilar region and left lung  base. There is minimal blunting of the left costophrenic angle. There is  bronchial wall thickening. The heart is normal in size. The thoracic  aorta is atheromatous. There is an oval device overlying the chest that  may be a loop recorder.          Impression:      1. Bilateral chest tubes. No evidence of pneumothorax. There may be  minimal  "pleural effusion on the left with blunting of the costophrenic  angle.  2. Patchy infiltrates bilaterally may represent pneumonia.  3. Bronchial wall thickening, likely chronic.           This report was signed and finalized on 5/7/2025 6:48 PM by Dr. Magan Reynoso MD.               Culture:  No results found for: \"BLOODCX\", \"URINECX\", \"WOUNDCX\", \"MRSACX\", \"RESPCX\", \"STOOLCX\"      Assessment   Acute kidney injury  Chronic kidney disease stage IIIb  Bilateral pleural effusion  Paroxysmal atrial fibrillation  Chronic diastolic congestive heart failure  Solitary kidney due to prior left nephrectomy  Urinary retention  Anemia     Plan:  Discussed with patient, nursing, therapy  Workup reviewed today  Monitor labs  Monitor ins and outs and daily weights closely for further information  Chest tube management per CT surgery  Extra IV diuretic dose per CTS, monitor continue developing contraction closely        Dusty Sapp MD  5/9/2025  16:23 CDT      "

## 2025-05-09 NOTE — PROGRESS NOTES
JOHNATHON Alcala APRN        Internal Medicine Progress Note    5/9/2025   06:46 CDT    Name:  Genevieve Cerda  MRN:    [id number]     Acct:     [id number]   Room:  [address] [place] [address] Day: 0     Admit Date: 5/7/2025  4:40 PM  PCP: Provider, No Known    Subjective:     C/C: weakness    Interval History: Status:  improved. Resting in bed. No family at bedside. Afebrile. Maintaining adequate 02 sats on room air. Still with increased output from chest tube. Denies pain. Counts stable. Renal function stable.     Review of Systems   Constitutional: Positive for malaise/fatigue. Negative for chills, decreased appetite, weight gain and weight loss.   HENT:  Negative for congestion, ear discharge, hoarse voice and tinnitus.    Eyes:  Negative for blurred vision, discharge, visual disturbance and visual halos.   Cardiovascular:  Negative for chest pain, claudication, dyspnea on exertion, irregular heartbeat, leg swelling, orthopnea and paroxysmal nocturnal dyspnea.   Respiratory:  Negative for cough, shortness of breath, sputum production and wheezing.    Endocrine: Negative for cold intolerance, heat intolerance and polyuria.   Hematologic/Lymphatic: Negative for adenopathy. Does not bruise/bleed easily.   Skin:  Negative for dry skin, itching and suspicious lesions.   Musculoskeletal:  Negative for arthritis, back pain, falls, joint pain, muscle weakness and myalgias.   Gastrointestinal:  Negative for abdominal pain, constipation, diarrhea, dysphagia and hematemesis.   Genitourinary:  Negative for bladder incontinence, dysuria and frequency.   Neurological:  Positive for weakness. Negative for aphonia, disturbances in coordination and dizziness.   Psychiatric/Behavioral:  Negative for altered mental status, depression, memory loss and substance abuse. The patient does not have insomnia and is not nervous/anxious.          Medications:     Allergies:   Allergies   Allergen Reactions    Codeine  Itching       Current Meds:   Current Facility-Administered Medications:     acetaminophen (TYLENOL) tablet 650 mg, 650 mg, Oral, Q6H PRN, Nelson Ramirez MD    allopurinol (ZYLOPRIM) tablet 100 mg, 100 mg, Oral, Daily, Nelson Ramirez MD    apixaban (ELIQUIS) tablet 2.5 mg, 2.5 mg, Oral, Q12H, Nelson Ramirez MD    aspirin EC tablet 81 mg, 81 mg, Oral, Daily, Nelson Ramirez MD    dextrose (D50W) (25 g/50 mL) IV injection 25 g, 25 g, Intravenous, Q15 Min PRN, Nelson Ramirez MD    dextrose (GLUTOSE) oral gel 15 g, 15 g, Oral, Q15 Min PRN, Nelson Ramirez MD    epoetin louis (EPOGEN,PROCRIT) injection 10,000 Units, 10,000 Units, Subcutaneous, Once per day on Monday Wednesday Friday, Nelson Ramirez MD    furosemide (LASIX) tablet 40 mg, 40 mg, Oral, Daily, Nelson Ramirez MD    gabapentin (NEURONTIN) capsule 300 mg, 300 mg, Oral, Q8H, Nelson Ramirez MD    glucagon (GLUCAGEN) injection 1 mg, 1 mg, Subcutaneous, Q15 Min PRN, Nelson Ramirez MD    ipratropium-albuterol (DUO-NEB) nebulizer solution 3 mL, 3 mL, Nebulization, Q6H - RT, Nelson Ramirez MD    ipratropium-albuterol (DUO-NEB) nebulizer solution 3 mL, 3 mL, Nebulization, Q6H PRN, Nelson Ramirez MD    latanoprost (XALATAN) 0.005 % ophthalmic solution 1 drop, 1 drop, Both Eyes, Nightly, Nelson Ramirez MD    magnesium oxide (MAG-OX) tablet 400 mg, 400 mg, Oral, Daily, Nelson Ramirez MD    metoprolol tartrate (LOPRESSOR) tablet 25 mg, 25 mg, Oral, Q12H, Nelson Ramirez MD    pantoprazole (PROTONIX) EC tablet 40 mg, 40 mg, Oral, Q AM, Nelson Ramirez MD    potassium chloride (KLOR-CON M10) CR tablet 10 mEq, 10 mEq, Oral, Daily, Nelson Ramirez MD    rosuvastatin (CRESTOR) tablet 5 mg, 5 mg, Oral, Nightly, Nelson Ramirez MD    sodium chloride 0.9 % flush 10 mL, 10 mL, Intravenous, Q12H, Nelson Ramirez MD    sodium chloride 0.9 %  "flush 10 mL, 10 mL, Intravenous, PRN, Nelson Ramirez MD    sodium chloride 0.9 % infusion 40 mL, 40 mL, Intravenous, PRN, Nelson Ramirez MD    tamsulosin (FLOMAX) 24 hr capsule 0.4 mg, 0.4 mg, Oral, Nightly, Nelson Ramirez MD    timolol (TIMOPTIC) 0.5 % ophthalmic solution 1 drop, 1 drop, Both Eyes, Daily, Nelson Ramirez MD    Data:     Code Status:    There are no questions and answers to display.       No family history on file.    Social History     Socioeconomic History    Marital status: Unknown       Vitals:  Ht 157.5 cm (62.01\")   Wt 67.9 kg (149 lb 12.8 oz)   BMI 27.39 kg/m²   T 98.0 P 81 R 13 /60 Sp02 99% (room air)          I/O (24Hr):  No intake or output data in the 24 hours ending 05/09/25 0646    Labs and imaging:      Recent Results (from the past 12 hours)   POC Glucose Once    Collection Time: 05/08/25  9:45 PM    Specimen: Blood   Result Value Ref Range    Glucose 125 70 - 130 mg/dL   Basic Metabolic Panel    Collection Time: 05/09/25  2:47 AM    Specimen: Blood   Result Value Ref Range    Glucose 89 65 - 99 mg/dL    BUN 99 (H) 8 - 23 mg/dL    Creatinine 1.77 (H) 0.57 - 1.00 mg/dL    Sodium 137 136 - 145 mmol/L    Potassium 4.9 3.5 - 5.2 mmol/L    Chloride 105 98 - 107 mmol/L    CO2 23.0 22.0 - 29.0 mmol/L    Calcium 7.7 (L) 8.6 - 10.5 mg/dL    BUN/Creatinine Ratio 55.9 (H) 7.0 - 25.0    Anion Gap 9.0 5.0 - 15.0 mmol/L    eGFR 27.9 (L) >60.0 mL/min/1.73   Magnesium    Collection Time: 05/09/25  2:47 AM    Specimen: Blood   Result Value Ref Range    Magnesium 2.2 1.6 - 2.4 mg/dL   Phosphorus    Collection Time: 05/09/25  2:47 AM    Specimen: Blood   Result Value Ref Range    Phosphorus 5.1 (H) 2.5 - 4.5 mg/dL   PTH, Intact    Collection Time: 05/09/25  2:47 AM    Specimen: Blood   Result Value Ref Range    PTH, Intact 114.5 (H) 15.0 - 65.0 pg/mL   C-reactive Protein    Collection Time: 05/09/25  2:47 AM    Specimen: Blood   Result Value Ref Range    C-Reactive " Protein 1.15 (H) 0.00 - 0.50 mg/dL                               Physical Examination:        Physical Exam  Vitals and nursing note reviewed.   Constitutional:       Appearance: Normal appearance.   HENT:      Head: Normocephalic and atraumatic.      Right Ear: External ear normal.      Left Ear: External ear normal.      Nose: Nose normal.      Mouth/Throat:      Mouth: Mucous membranes are dry.      Pharynx: Oropharynx is clear.   Eyes:      Extraocular Movements: Extraocular movements intact.      Conjunctiva/sclera: Conjunctivae normal.      Pupils: Pupils are equal, round, and reactive to light.   Cardiovascular:      Rate and Rhythm: Normal rate and regular rhythm.      Pulses: Normal pulses.      Heart sounds: Normal heart sounds.   Pulmonary:      Effort: Pulmonary effort is normal.      Breath sounds: Normal breath sounds.      Comments: Bilateral chest tubes to pleurevac  Abdominal:      General: Bowel sounds are normal.      Palpations: Abdomen is soft.   Musculoskeletal:      Cervical back: Normal range of motion and neck supple.      Comments: Generalized weakness   Skin:     General: Skin is warm and dry.   Neurological:      Mental Status: She is alert and oriented to person, place, and time.      Comments: Intermittent confusion at times   Psychiatric:         Mood and Affect: Mood normal.         Behavior: Behavior normal.           Assessment:            * No active hospital problems. *    No past medical history on file.     Plan:        Bilateral pleural effusions  CKD4  PAF  Chronic anticoagulation  Chronic diastolic CHF  Solitary kidney s/p left nephrectomy  Urinary retention  Multifactorial anemia  Continue current treatment. Monitor counts. Increase activity. Labs Monday. Chest tube management per CTS. Appreciate nephrology assistance. Aggressive therapies as tolerated. Maintain patient safety.       Electronically signed by SARAH Santiago on 5/9/2025 at 06:46 CDT

## 2025-05-09 NOTE — PROGRESS NOTES
"Patient name: Genevieve Cerda  Patient : 1940  VISIT # [id number]  MR #[id number]    Procedure:  Procedure Date:  POD:* No surgery found *    Subjective     Chief complaint: Shortness of breath    Patient resting in bed on room air.  Right and left pigtail catheter remain in place at 20 cm suction.  Prealbumin is 25.    ROS: No fevers or chills.  Shortness of breath improving.  No chest pain       Objective     Visit Vitals  Ht 157.5 cm (62.01\")   Wt 67.9 kg (149 lb 12.8 oz)   BMI 27.39 kg/m²     No intake or output data in the 24 hours ending 2514    Right pigtail catheter: 290 mL in 24 hours, serous, no airleak  Left pigtail catheter 300 mL in 24 hours, serous, no airleak      Lab:     CBC:  Results from last 7 days   Lab Units 25  0447   WBC 10*3/mm3 9.88   HEMATOCRIT % 33.9*   PLATELETS 10*3/mm3 316          BMP:  Results from last 7 days   Lab Units 25  0247 25  0447   SODIUM mmol/L 137 137   POTASSIUM mmol/L 4.9 4.5   CHLORIDE mmol/L 105 102   CO2 mmol/L 23.0 22.0   GLUCOSE mg/dL 89 96   BUN mg/dL 99* 96*   CREATININE mg/dL 1.77* 2.00*          COAG:      Invalid input(s): \"PT\"    IMAGES:       Imaging Results (Last 24 Hours)       Procedure Component Value Units Date/Time    XR Chest 1 View [[id number]] Collected: 25     Updated: 25    Narrative:      EXAMINATION: XR CHEST 1 VW-     2025 2:45 AM     HISTORY: Chest tube     Images are stored in PACS per institutional protocol.     1 view chest x-ray.     COMPARISON:  Yesterday at 11:12 a.m.     Stable heart size.  Aortic arch calcification.     Chronic lung changes.  Interval clearing of mild patchy infiltrate or edema on the LEFT.  The RIGHT lung is relatively clear.     Bilateral pleural space pigtail catheters are unchanged.     No pneumothorax.       Impression:      1. Partial clearing of the LEFT lung and slightly improved lung  expansion. Overall stable appearance of the chest.           This " report was signed and finalized on 5/9/2025 7:24 AM by Dr. Gerald Serna MD.       US Renal Limited [[id number]] Collected: 05/08/25 1818     Updated: 05/08/25 1826    Narrative:      RENAL ULTRASOUND COMPLETE 5/8/2025 4:02 PM     REASON FOR EXAM: Chronic kidney disease. Prior left nephrectomy.     COMPARISON: None.       TECHNIQUE: Multiple longitudinal and transverse realtime sonographic  images of the kidneys and urinary bladder are obtained.     FINDINGS:     RIGHT KIDNEY: The right kidney measures 11.4 cm in length. The cortex is  isoechoic to the liver. The cortical thickness is normal. There are no  solid or cystic masses. There is no hydronephrosis. There may be a tiny  stone within the central right kidney measuring 2 mm.     LEFT KIDNEY: Prior left nephrectomy.     PELVIS: The bladder is not fully distended. No focal bladder abnormality  is seen. There is no free fluid in the pelvis.     ADDITIONAL FINDINGS: The abdominal aorta is atheromatous. No aneurysm is  seen.       Impression:      1. The right kidney is normal in size. There is mild increased  echogenicity of the renal cortex. It is isoechoic to liver. The findings  suggest mild medical-renal disease. There is no cortical thinning. There  is no hydronephrosis. There may be one tiny 2 mm stone in the mid  kidney.  2. Prior left nephrectomy.           The images and report are stored on PAC's per institutional and state  guidelines.           This report was signed and finalized on 5/8/2025 6:22 PM by Dr. Magan Reynoso MD.       XR Chest 1 View [[id number]] Collected: 05/08/25 1155     Updated: 05/08/25 1200    Narrative:      EXAM: XR CHEST 1 VW-      DATE: 5/8/2025 10:45 AM     HISTORY: Chest tube       COMPARISON: 5/7/2025.     TECHNIQUE:  Frontal view(s) of the chest submitted.     FINDINGS:    A pigtail pleural drain on the left remains in place at the left lung  base. A second right-sided drain is incompletely imaged. There is  stable  interstitial prominence of the left lung and calcified granuloma at the  right lung base. No large pneumothorax is seen. No effusion is seen.  There is enlargement the cardiac silhouette and calcification of the  thoracic aorta.          Impression:         1. Stable chest with left lung interstitial prominence and no visualized  effusion or pneumothorax.     This report was signed and finalized on 5/8/2025 11:56 AM by Ulysses Nolen.             CXR: Right and left pigtail catheter in stable position with no pneumothorax or pleural effusion.    Physical Exam:  General: Alert, oriented. No apparent distress.  Chronically ill-appearing  Cardiovascular: Regular rate and rhythm without murmur, rubs, or gallops.    Pulmonary: Clear to auscultation bilaterally without wheezing, rubs, or rales.  Chest: Right and left chest tube to 20 cm suction. No air leak. Fluid is serous.  Abdomen: Soft, nondistended, and nontender.  Extremities: Warm, moves all extremities. No edema.   Neurologic:  Grossly intact with no focal deficits.            Impression:  Bilateral pleural effusions  Congestive heart failure  Chronic kidney disease, stage IV        Plan:  Continue right and left pigtail catheter to 20 cm suction  Daily chest x-ray  Nursing to flush right and left pigtail catheter with 10 mL normal saline every 8 hours to maintain tube patency  Continue protein shakes  Continue aggressive diuresis as renal function will tolerate    CHF and CKD management per attending      SARAH Giraldo  05/09/25  09:14 CDT

## 2025-05-10 ENCOUNTER — APPOINTMENT (OUTPATIENT)
Dept: GENERAL RADIOLOGY | Facility: HOSPITAL | Age: 85
End: 2025-05-10
Payer: COMMERCIAL

## 2025-05-10 LAB
ANION GAP SERPL CALCULATED.3IONS-SCNC: 11 MMOL/L (ref 5–15)
BUN SERPL-MCNC: 95 MG/DL (ref 8–23)
BUN/CREAT SERPL: 52.8 (ref 7–25)
CALCIUM SPEC-SCNC: 7.5 MG/DL (ref 8.6–10.5)
CHLORIDE SERPL-SCNC: 107 MMOL/L (ref 98–107)
CO2 SERPL-SCNC: 22 MMOL/L (ref 22–29)
CREAT SERPL-MCNC: 1.8 MG/DL (ref 0.57–1)
CREAT UR-MCNC: 60.3 MG/DL
EGFRCR SERPLBLD CKD-EPI 2021: 27.3 ML/MIN/1.73
GLUCOSE SERPL-MCNC: 107 MG/DL (ref 65–99)
POTASSIUM SERPL-SCNC: 4.7 MMOL/L (ref 3.5–5.2)
SODIUM SERPL-SCNC: 140 MMOL/L (ref 136–145)
UUN 24H UR-MCNC: 790 MG/DL

## 2025-05-10 PROCEDURE — 71045 X-RAY EXAM CHEST 1 VIEW: CPT

## 2025-05-10 PROCEDURE — 97530 THERAPEUTIC ACTIVITIES: CPT

## 2025-05-10 PROCEDURE — 80048 BASIC METABOLIC PNL TOTAL CA: CPT | Performed by: INTERNAL MEDICINE

## 2025-05-10 PROCEDURE — 97110 THERAPEUTIC EXERCISES: CPT

## 2025-05-10 NOTE — PROGRESS NOTES
Nephrology ([facility]) Progress Note      Patient:  Genevieve Cerda  YOB: 1940  Date of Service: 5/10/2025  MRN: [id number]   Acct: [id number]   Primary Care Physician: Provider, No Known  Advance Directive:   There are no questions and answers to display.     Admit Date: 5/7/2025       Hospital Day: 0  Referring Provider: Nelson Ramirez MD      Patient personally seen and examined.  Complete chart including Consults, Notes, Operative Reports, Labs, Cardiology, and Radiology studies reviewed as able.        Subjective:  Genevieve Cerda is a 84 y.o. female for whom we were consulted for evaluation and treatment of acute kidney injury with chronic kidney disease stage IIIb.  Patient is followed Dr. Gamez in the office was last seen in January of this year.  Baseline creatinine around 1.6.  More recently she was treated at [facility] for hypoxemic respiratory failure with pleural effusion, MICHOACANO, acidosis, hyponatremia and hyperkalemia.  She was treated with diuretics, antibiotics, dopamine and ultimately had chest tube placements.  Seen with family initially and also reviewed with primary service.  Patient denied chest pain, nausea vomiting.  Denied dysuria or hematuria.  Family noted she has frequent urinary tract infections.     Today, no overnight events.  Chest tubes remain in place.  Seen with family and updated on current labs.    Temp- 98.0  Pulse- 83  Resp- 19  BP- 100/45  O2%- 98      Allergies:  Codeine    Home Meds:  No medications prior to admission.       Medicines:  Current Facility-Administered Medications   Medication Dose Route Frequency Provider Last Rate Last Admin    acetaminophen (TYLENOL) tablet 650 mg  650 mg Oral Q6H PRN Nelson Ramirez MD        allopurinol (ZYLOPRIM) tablet 100 mg  100 mg Oral Daily Nelson Ramirez MD        apixaban (ELIQUIS) tablet 2.5 mg  2.5 mg Oral Q12H Nelson Ramirez MD        aspirin  EC tablet 81 mg  81 mg Oral Daily Nelson Ramirez MD        dextrose (D50W) (25 g/50 mL) IV injection 25 g  25 g Intravenous Q15 Min PRN Nelson Ramirez MD        dextrose (GLUTOSE) oral gel 15 g  15 g Oral Q15 Min PRN Nelson Ramirez MD        epoetin louis (EPOGEN,PROCRIT) injection 10,000 Units  10,000 Units Subcutaneous Once per day on Monday Wednesday Friday Nelson Ramirez MD        furosemide (LASIX) tablet 40 mg  40 mg Oral Daily Nelson Ramirez MD        furosemide (LASIX) tablet 40 mg  40 mg Oral Once Yuniel Del Rosario MD        gabapentin (NEURONTIN) capsule 300 mg  300 mg Oral Q8H Nelson Ramirez MD        glucagon (GLUCAGEN) injection 1 mg  1 mg Subcutaneous Q15 Min PRN Nelson Ramirez MD        ipratropium-albuterol (DUO-NEB) nebulizer solution 3 mL  3 mL Nebulization Q6H PRN Nelson Ramirez MD        latanoprost (XALATAN) 0.005 % ophthalmic solution 1 drop  1 drop Both Eyes Nightly Nelson Ramirez MD        magnesium oxide (MAG-OX) tablet 400 mg  400 mg Oral Daily Nelson Ramirez MD        metoprolol tartrate (LOPRESSOR) tablet 25 mg  25 mg Oral Q12H Nelson Ramirez MD        pantoprazole (PROTONIX) EC tablet 40 mg  40 mg Oral Q AM Nelson Ramirez MD        potassium chloride (KLOR-CON M10) CR tablet 10 mEq  10 mEq Oral Daily Nelson Ramirez MD        rosuvastatin (CRESTOR) tablet 5 mg  5 mg Oral Nightly Nelson Ramirez MD        sodium chloride 0.9 % flush 10 mL  10 mL Intravenous Q12H Nelson Ramirez MD        sodium chloride 0.9 % flush 10 mL  10 mL Intravenous PRN Nelson Ramirez MD        sodium chloride 0.9 % infusion 40 mL  40 mL Intravenous PRN Nelson Ramirez MD        tamsulosin (FLOMAX) 24 hr capsule 0.4 mg  0.4 mg Oral Nightly Nelson Ramirez MD        timolol (TIMOPTIC) 0.5 % ophthalmic solution 1 drop  1 drop Both Eyes Daily Nelson Ramirez MD        vitamin D  (ERGOCALCIFEROL) capsule 50,000 Units  50,000 Units Oral Q7 Days Dusty Sapp MD           Past Medical History:  No past medical history on file.    Past Surgical History:  No past surgical history on file.    Family History  No family history on file.    Social History  Social History     Socioeconomic History    Marital status: Unknown       Review of Systems:  History obtained from chart review and the patient  General ROS: No fever or chills  Respiratory ROS: No cough, shortness of breath, wheezing  Cardiovascular ROS: No chest pain or palpitations  Gastrointestinal ROS: No abdominal pain or melena  Genito-Urinary ROS: No dysuria or hematuria  Psych ROS: No anxiety and depression  14 point ROS reviewed with the patient and negative except as noted above and in the HPI unless unable to obtain.    Objective:  No data found.  No intake or output data in the 24 hours ending 05/10/25 1423  General: awake/alert   Chest:  clear to auscultation bilaterally without respiratory distress  CVS: regular rate and rhythm  Abdominal: soft, nontender, positive bowel sounds  Extremities: no cyanosis or edema  Skin: warm and dry without rash      Labs:  Results from last 7 days   Lab Units 05/08/25  0447   WBC 10*3/mm3 9.88   HEMOGLOBIN g/dL 10.9*   HEMATOCRIT % 33.9*   PLATELETS 10*3/mm3 316         Results from last 7 days   Lab Units 05/10/25  0529 05/09/25  0247 05/08/25  0447   SODIUM mmol/L 140 137 137   POTASSIUM mmol/L 4.7 4.9 4.5   CHLORIDE mmol/L 107 105 102   CO2 mmol/L 22.0 23.0 22.0   BUN mg/dL 95* 99* 96*   CREATININE mg/dL 1.80* 1.77* 2.00*   CALCIUM mg/dL 7.5* 7.7* 7.7*   EGFR mL/min/1.73 27.3* 27.9* 24.1*   BILIRUBIN mg/dL  --   --  0.2   ALK PHOS U/L  --   --  70   ALT (SGPT) U/L  --   --  16   AST (SGOT) U/L  --   --  18   GLUCOSE mg/dL 107* 89 96       Radiology:   Imaging Results (Last 72 Hours)       Procedure Component Value Units Date/Time    XR Chest 1 View [[id number]] Collected: 05/10/25  0735     Updated: 05/10/25 0755    Narrative:      EXAM: XR CHEST 1 VW-      DATE: 5/10/2025 3:15 AM     HISTORY: Chest tube       COMPARISON: 5/9/2025.     TECHNIQUE:  Frontal view(s) of the chest submitted.     FINDINGS:    Pigtail pleural drain remains at the left lung base. No pneumothorax or  significant effusion is seen. There is increasing opacity at the right  lower lung may represent atelectasis or pneumonia. There is also a  pigtail pleural drain at the right lung base without significant  effusion or pneumothorax. There is enlargement of the cardiac silhouette  and calcification of the thoracic aorta.          Impression:         1. Bilateral pigtail chest tubes without significant effusion or  pneumothorax.  2. Increasing opacity right lower lung atelectasis versus pneumonia.     This report was signed and finalized on 5/10/2025 7:52 AM by Ulysses Nolen.       XR Chest 1 View [[id number]] Collected: 05/09/25 0721     Updated: 05/09/25 0727    Narrative:      EXAMINATION: XR CHEST 1 VW-     5/9/2025 2:45 AM     HISTORY: Chest tube     Images are stored in PACS per institutional protocol.     1 view chest x-ray.     COMPARISON:  Yesterday at 11:12 a.m.     Stable heart size.  Aortic arch calcification.     Chronic lung changes.  Interval clearing of mild patchy infiltrate or edema on the LEFT.  The RIGHT lung is relatively clear.     Bilateral pleural space pigtail catheters are unchanged.     No pneumothorax.       Impression:      1. Partial clearing of the LEFT lung and slightly improved lung  expansion. Overall stable appearance of the chest.           This report was signed and finalized on 5/9/2025 7:24 AM by Dr. Gerald Serna MD.       [employer] [facility] [employer] [[id number]] Collected: 05/08/25 1818     Updated: 05/08/25 1826    Narrative:      RENAL ULTRASOUND COMPLETE 5/8/2025 4:02 PM     REASON FOR EXAM: Chronic kidney disease. Prior left nephrectomy.     COMPARISON: None.       TECHNIQUE: Multiple  longitudinal and transverse realtime sonographic  images of the kidneys and urinary bladder are obtained.     FINDINGS:     RIGHT KIDNEY: The right kidney measures 11.4 cm in length. The cortex is  isoechoic to the liver. The cortical thickness is normal. There are no  solid or cystic masses. There is no hydronephrosis. There may be a tiny  stone within the central right kidney measuring 2 mm.     LEFT KIDNEY: Prior left nephrectomy.     PELVIS: The bladder is not fully distended. No focal bladder abnormality  is seen. There is no free fluid in the pelvis.     ADDITIONAL FINDINGS: The abdominal aorta is atheromatous. No aneurysm is  seen.       Impression:      1. The right kidney is normal in size. There is mild increased  echogenicity of the renal cortex. It is isoechoic to liver. The findings  suggest mild medical-renal disease. There is no cortical thinning. There  is no hydronephrosis. There may be one tiny 2 mm stone in the mid  kidney.  2. Prior left nephrectomy.           The images and report are stored on PAC's per institutional and state  guidelines.           This report was signed and finalized on 5/8/2025 6:22 PM by Dr. Magan Reynoso MD.       XR Chest 1 View [[id number]] Collected: 05/08/25 1155     Updated: 05/08/25 1200    Narrative:      EXAM: XR CHEST 1 VW-      DATE: 5/8/2025 10:45 AM     HISTORY: Chest tube       COMPARISON: 5/7/2025.     TECHNIQUE:  Frontal view(s) of the chest submitted.     FINDINGS:    A pigtail pleural drain on the left remains in place at the left lung  base. A second right-sided drain is incompletely imaged. There is stable  interstitial prominence of the left lung and calcified granuloma at the  right lung base. No large pneumothorax is seen. No effusion is seen.  There is enlargement the cardiac silhouette and calcification of the  thoracic aorta.          Impression:         1. Stable chest with left lung interstitial prominence and no visualized  effusion or  "pneumothorax.     This report was signed and finalized on 5/8/2025 11:56 AM by Ulysses Nolen.       XR Chest 1 View [[id number]] Collected: 05/07/25 1845     Updated: 05/07/25 1851    Narrative:      EXAMINATION:  XR CHEST 1 VW-  5/7/2025 5:40 PM     HISTORY: Bilateral chest tubes.     COMPARISON: 7/13/2025.     TECHNIQUE: Single view AP image.     FINDINGS: There are bilateral chest tubes overlying the lung bases. The  right chest tube is not fully imaged. There is no measurable  pneumothorax. There is mild patchy infiltrate at the right lung base.  There is patchy infiltrate in the left perihilar region and left lung  base. There is minimal blunting of the left costophrenic angle. There is  bronchial wall thickening. The heart is normal in size. The thoracic  aorta is atheromatous. There is an oval device overlying the chest that  may be a loop recorder.          Impression:      1. Bilateral chest tubes. No evidence of pneumothorax. There may be  minimal pleural effusion on the left with blunting of the costophrenic  angle.  2. Patchy infiltrates bilaterally may represent pneumonia.  3. Bronchial wall thickening, likely chronic.           This report was signed and finalized on 5/7/2025 6:48 PM by Dr. Magan Reynoso MD.               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

## 2025-05-10 NOTE — PROGRESS NOTES
JOHNATHON Alcala APRN        Internal Medicine Progress Note    5/10/2025   09:13 CDT    Name:  Genevieve Cerda  MRN:    [id number]     Acct:     [id number]   Room:  [address] [place] [address] Day: 0     Admit Date: 5/7/2025  4:40 PM  PCP: Provider, No Known    Subjective:     C/C: weakness    Interval History: Status:  improved. Resting in bed. No family at bedside. Afebrile. Woke from sleep. Maintaining adequate 02 sats on room air. Still with increased output from chest tube. Denies pain. Renal function stable.     Review of Systems   Constitutional: Positive for malaise/fatigue. Negative for chills, decreased appetite, weight gain and weight loss.   HENT:  Negative for congestion, ear discharge, hoarse voice and tinnitus.    Eyes:  Negative for blurred vision, discharge, visual disturbance and visual halos.   Cardiovascular:  Negative for chest pain, claudication, dyspnea on exertion, irregular heartbeat, leg swelling, orthopnea and paroxysmal nocturnal dyspnea.   Respiratory:  Negative for cough, shortness of breath, sputum production and wheezing.    Endocrine: Negative for cold intolerance, heat intolerance and polyuria.   Hematologic/Lymphatic: Negative for adenopathy. Does not bruise/bleed easily.   Skin:  Negative for dry skin, itching and suspicious lesions.   Musculoskeletal:  Negative for arthritis, back pain, falls, joint pain, muscle weakness and myalgias.   Gastrointestinal:  Negative for abdominal pain, constipation, diarrhea, dysphagia and hematemesis.   Genitourinary:  Negative for bladder incontinence, dysuria and frequency.   Neurological:  Positive for weakness. Negative for aphonia, disturbances in coordination and dizziness.   Psychiatric/Behavioral:  Negative for altered mental status, depression, memory loss and substance abuse. The patient does not have insomnia and is not nervous/anxious.          Medications:     Allergies:   Allergies   Allergen Reactions     Codeine Itching       Current Meds:   Current Facility-Administered Medications:     acetaminophen (TYLENOL) tablet 650 mg, 650 mg, Oral, Q6H PRN, Nelson Ramirez MD    allopurinol (ZYLOPRIM) tablet 100 mg, 100 mg, Oral, Daily, Nelson Ramirez MD    apixaban (ELIQUIS) tablet 2.5 mg, 2.5 mg, Oral, Q12H, Nelson Ramirez MD    aspirin EC tablet 81 mg, 81 mg, Oral, Daily, Nelson Ramirez MD    dextrose (D50W) (25 g/50 mL) IV injection 25 g, 25 g, Intravenous, Q15 Min PRN, Nelson Ramirez MD    dextrose (GLUTOSE) oral gel 15 g, 15 g, Oral, Q15 Min PRN, Nelson Ramirez MD    epoetin louis (EPOGEN,PROCRIT) injection 10,000 Units, 10,000 Units, Subcutaneous, Once per day on Monday Wednesday Friday, Nelson Ramirez MD    furosemide (LASIX) tablet 40 mg, 40 mg, Oral, Daily, Nelson Ramirez MD    furosemide (LASIX) tablet 40 mg, 40 mg, Oral, Once, Yuniel Del Rosario MD    gabapentin (NEURONTIN) capsule 300 mg, 300 mg, Oral, Q8H, Nelson Ramirez MD    glucagon (GLUCAGEN) injection 1 mg, 1 mg, Subcutaneous, Q15 Min PRN, Nelson Ramirez MD    ipratropium-albuterol (DUO-NEB) nebulizer solution 3 mL, 3 mL, Nebulization, Q6H PRN, Nelson Ramirez MD    latanoprost (XALATAN) 0.005 % ophthalmic solution 1 drop, 1 drop, Both Eyes, Nightly, Nelson Ramirez MD    magnesium oxide (MAG-OX) tablet 400 mg, 400 mg, Oral, Daily, Nelson Ramirez MD    metoprolol tartrate (LOPRESSOR) tablet 25 mg, 25 mg, Oral, Q12H, Nelson Ramirez MD    pantoprazole (PROTONIX) EC tablet 40 mg, 40 mg, Oral, Q AM, Nelson Ramirez MD    potassium chloride (KLOR-CON M10) CR tablet 10 mEq, 10 mEq, Oral, Daily, Nelson Ramirez MD    rosuvastatin (CRESTOR) tablet 5 mg, 5 mg, Oral, Nightly, Nelson Ramirez MD    sodium chloride 0.9 % flush 10 mL, 10 mL, Intravenous, Q12H, Nelson Ramirez MD    sodium chloride 0.9 % flush 10 mL, 10 mL, Intravenous,  "PRN, Nelson Ramirez MD    sodium chloride 0.9 % infusion 40 mL, 40 mL, Intravenous, PRN, Nelson Ramirez MD    tamsulosin (FLOMAX) 24 hr capsule 0.4 mg, 0.4 mg, Oral, Nightly, Nelson Ramirez MD    timolol (TIMOPTIC) 0.5 % ophthalmic solution 1 drop, 1 drop, Both Eyes, Daily, Nelson Ramirez MD    vitamin D (ERGOCALCIFEROL) capsule 50,000 Units, 50,000 Units, Oral, Q7 Days, Dusty Sapp MD    Data:     Code Status:    There are no questions and answers to display.       No family history on file.    Social History     Socioeconomic History    Marital status: Unknown       Vitals:  Ht 157.5 cm (62.01\")   Wt 67.9 kg (149 lb 12.8 oz)   BMI 27.39 kg/m²   T 98.0 P 83 R 19 /45 Sp02 98% (room air)          I/O (24Hr):  No intake or output data in the 24 hours ending 05/10/25 0913    Labs and imaging:      Recent Results (from the past 12 hours)   Basic Metabolic Panel    Collection Time: 05/10/25  5:29 AM    Specimen: Blood   Result Value Ref Range    Glucose 107 (H) 65 - 99 mg/dL    BUN 95 (H) 8 - 23 mg/dL    Creatinine 1.80 (H) 0.57 - 1.00 mg/dL    Sodium 140 136 - 145 mmol/L    Potassium 4.7 3.5 - 5.2 mmol/L    Chloride 107 98 - 107 mmol/L    CO2 22.0 22.0 - 29.0 mmol/L    Calcium 7.5 (L) 8.6 - 10.5 mg/dL    BUN/Creatinine Ratio 52.8 (H) 7.0 - 25.0    Anion Gap 11.0 5.0 - 15.0 mmol/L    eGFR 27.3 (L) >60.0 mL/min/1.73                               Physical Examination:        Physical Exam  Vitals and nursing note reviewed.   Constitutional:       Appearance: Normal appearance.   HENT:      Head: Normocephalic and atraumatic.      Right Ear: External ear normal.      Left Ear: External ear normal.      Nose: Nose normal.      Mouth/Throat:      Mouth: Mucous membranes are dry.      Pharynx: Oropharynx is clear.   Eyes:      Extraocular Movements: Extraocular movements intact.      Conjunctiva/sclera: Conjunctivae normal.      Pupils: Pupils are equal, round, and " reactive to light.   Cardiovascular:      Rate and Rhythm: Normal rate and regular rhythm.      Pulses: Normal pulses.      Heart sounds: Normal heart sounds.   Pulmonary:      Effort: Pulmonary effort is normal.      Breath sounds: Normal breath sounds.      Comments: Bilateral chest tubes to pleurevac  Abdominal:      General: Bowel sounds are normal.      Palpations: Abdomen is soft.   Musculoskeletal:      Cervical back: Normal range of motion and neck supple.      Comments: Generalized weakness   Skin:     General: Skin is warm and dry.   Neurological:      Mental Status: She is alert and oriented to person, place, and time.      Comments: Intermittent confusion at times   Psychiatric:         Mood and Affect: Mood normal.         Behavior: Behavior normal.           Assessment:            * No active hospital problems. *    No past medical history on file.     Plan:        Bilateral pleural effusions  CKD4  PAF  Chronic anticoagulation  Chronic diastolic CHF  Solitary kidney s/p left nephrectomy  Urinary retention  Multifactorial anemia  Continue current treatment. Monitor counts. Increase activity. Labs Monday. Chest tube management per CTS. Appreciate nephrology assistance. Aggressive therapies as tolerated. Maintain patient safety.       Electronically signed by SARAH Santiago on 5/10/2025 at 09:13 CDT

## 2025-05-11 ENCOUNTER — APPOINTMENT (OUTPATIENT)
Dept: GENERAL RADIOLOGY | Facility: HOSPITAL | Age: 85
End: 2025-05-11
Payer: COMMERCIAL

## 2025-05-11 PROBLEM — E43 SEVERE PROTEIN-CALORIE MALNUTRITION: Status: ACTIVE | Noted: 2025-05-11

## 2025-05-11 LAB
ANION GAP SERPL CALCULATED.3IONS-SCNC: 11 MMOL/L (ref 5–15)
BUN SERPL-MCNC: 91 MG/DL (ref 8–23)
BUN/CREAT SERPL: 57.6 (ref 7–25)
CALCIUM SPEC-SCNC: 7.5 MG/DL (ref 8.6–10.5)
CHLORIDE SERPL-SCNC: 107 MMOL/L (ref 98–107)
CO2 SERPL-SCNC: 24 MMOL/L (ref 22–29)
CREAT SERPL-MCNC: 1.58 MG/DL (ref 0.57–1)
EGFRCR SERPLBLD CKD-EPI 2021: 32 ML/MIN/1.73
GLUCOSE SERPL-MCNC: 103 MG/DL (ref 65–99)
POTASSIUM SERPL-SCNC: 4.2 MMOL/L (ref 3.5–5.2)
SODIUM SERPL-SCNC: 142 MMOL/L (ref 136–145)

## 2025-05-11 PROCEDURE — 71045 X-RAY EXAM CHEST 1 VIEW: CPT

## 2025-05-11 PROCEDURE — 80048 BASIC METABOLIC PNL TOTAL CA: CPT | Performed by: INTERNAL MEDICINE

## 2025-05-11 RX ORDER — AMOXICILLIN 250 MG
2 CAPSULE ORAL DAILY PRN
Status: DISCONTINUED | OUTPATIENT
Start: 2025-05-11 | End: 2025-05-27 | Stop reason: HOSPADM

## 2025-05-11 RX ORDER — POLYETHYLENE GLYCOL 3350 17 G/17G
17 POWDER, FOR SOLUTION ORAL DAILY PRN
Status: DISCONTINUED | OUTPATIENT
Start: 2025-05-11 | End: 2025-05-27 | Stop reason: HOSPADM

## 2025-05-11 NOTE — PROGRESS NOTES
JOHNATHON Alcala APRN        Internal Medicine Progress Note    5/11/2025   08:12 CDT    Name:  Genevieve Cerda  MRN:    [id number]     Acct:     [id number]   Room:  [address] [place] [address] Day: 0     Admit Date: 5/7/2025  4:40 PM  PCP: Provider, No Known    Subjective:     C/C: weakness    Interval History: Status:  improved.  No family at bedside. Sitting up in bed eating breakfast. She denies pain at present. Denies shortness of breath or other concerns.    Afebrile. Criteria met for severe protein calorie malnutrition per dietician.      Review of Systems   Constitutional: Positive for malaise/fatigue. Negative for chills, decreased appetite, weight gain and weight loss.   HENT:  Negative for congestion, ear discharge, hoarse voice and tinnitus.    Eyes:  Negative for blurred vision, discharge, visual disturbance and visual halos.   Cardiovascular:  Negative for chest pain, claudication, dyspnea on exertion, irregular heartbeat, leg swelling, orthopnea and paroxysmal nocturnal dyspnea.   Respiratory:  Negative for cough, shortness of breath, sputum production and wheezing.    Endocrine: Negative for cold intolerance, heat intolerance and polyuria.   Hematologic/Lymphatic: Negative for adenopathy. Does not bruise/bleed easily.   Skin:  Negative for dry skin, itching and suspicious lesions.   Musculoskeletal:  Negative for arthritis, back pain, falls, joint pain, muscle weakness and myalgias.   Gastrointestinal:  Negative for abdominal pain, constipation, diarrhea, dysphagia and hematemesis.   Genitourinary:  Negative for bladder incontinence, dysuria and frequency.   Neurological:  Positive for weakness. Negative for aphonia, disturbances in coordination and dizziness.   Psychiatric/Behavioral:  Negative for altered mental status, depression, memory loss and substance abuse. The patient does not have insomnia and is not nervous/anxious.          Medications:     Allergies:   Allergies    Allergen Reactions    Codeine Itching       Current Meds:   Current Facility-Administered Medications:     acetaminophen (TYLENOL) tablet 650 mg, 650 mg, Oral, Q6H PRN, Nelson Ramirez MD    allopurinol (ZYLOPRIM) tablet 100 mg, 100 mg, Oral, Daily, Nelson Ramirez MD    apixaban (ELIQUIS) tablet 2.5 mg, 2.5 mg, Oral, Q12H, Nelson Ramirez MD    aspirin EC tablet 81 mg, 81 mg, Oral, Daily, Nelson Ramirez MD    dextrose (D50W) (25 g/50 mL) IV injection 25 g, 25 g, Intravenous, Q15 Min PRN, Nelson Ramirez MD    dextrose (GLUTOSE) oral gel 15 g, 15 g, Oral, Q15 Min PRN, Nelson Ramirez MD    epoetin louis (EPOGEN,PROCRIT) injection 10,000 Units, 10,000 Units, Subcutaneous, Once per day on Monday Wednesday Friday, Nelson Ramirez MD    furosemide (LASIX) tablet 40 mg, 40 mg, Oral, Daily, Nelson Ramirez MD    furosemide (LASIX) tablet 40 mg, 40 mg, Oral, Once, Yuniel Del Rosario MD    gabapentin (NEURONTIN) capsule 300 mg, 300 mg, Oral, Q8H, Nelson Ramirez MD    glucagon (GLUCAGEN) injection 1 mg, 1 mg, Subcutaneous, Q15 Min PRN, Nelson Ramirez MD    ipratropium-albuterol (DUO-NEB) nebulizer solution 3 mL, 3 mL, Nebulization, Q6H PRN, Nelson Ramirez MD    latanoprost (XALATAN) 0.005 % ophthalmic solution 1 drop, 1 drop, Both Eyes, Nightly, Nelson Ramirez MD    magnesium oxide (MAG-OX) tablet 400 mg, 400 mg, Oral, Daily, Nelson Ramirez MD    metoprolol tartrate (LOPRESSOR) tablet 25 mg, 25 mg, Oral, Q12H, Nelson Ramirez MD    pantoprazole (PROTONIX) EC tablet 40 mg, 40 mg, Oral, Q AM, Nelson Ramirez MD    potassium chloride (KLOR-CON M10) CR tablet 10 mEq, 10 mEq, Oral, Daily, Nelson Ramirez MD    rosuvastatin (CRESTOR) tablet 5 mg, 5 mg, Oral, Nightly, Nelson Ramirez MD    sodium chloride 0.9 % flush 10 mL, 10 mL, Intravenous, Q12H, Nelson Ramirez MD    sodium chloride 0.9 % flush 10  "mL, 10 mL, Intravenous, PRN, Nelson Ramirez MD    sodium chloride 0.9 % infusion 40 mL, 40 mL, Intravenous, PRN, Nelson Ramirez MD    tamsulosin (FLOMAX) 24 hr capsule 0.4 mg, 0.4 mg, Oral, Nightly, Nelson Ramirez MD    timolol (TIMOPTIC) 0.5 % ophthalmic solution 1 drop, 1 drop, Both Eyes, Daily, Nelson Ramirez MD    vitamin D (ERGOCALCIFEROL) capsule 50,000 Units, 50,000 Units, Oral, Q7 Days, Dusty Sapp MD    Data:     Code Status:    There are no questions and answers to display.       No family history on file.    Social History     Socioeconomic History    Marital status: Unknown       Vitals:  Ht 157.5 cm (62.01\")   Wt 67.9 kg (149 lb 12.8 oz)   BMI 27.39 kg/m²   T 98.6 P 75 R 14 /52 Sp02 94% (room air)          I/O (24Hr):  No intake or output data in the 24 hours ending 05/11/25 0812    Labs and imaging:      No results found for this or any previous visit (from the past 12 hours).                              Physical Examination:        Physical Exam  Vitals and nursing note reviewed.   Constitutional:       Appearance: Normal appearance.   HENT:      Head: Normocephalic and atraumatic.      Right Ear: External ear normal.      Left Ear: External ear normal.      Nose: Nose normal.      Mouth/Throat:      Mouth: Mucous membranes are dry.      Pharynx: Oropharynx is clear.   Eyes:      Extraocular Movements: Extraocular movements intact.      Conjunctiva/sclera: Conjunctivae normal.      Pupils: Pupils are equal, round, and reactive to light.   Cardiovascular:      Rate and Rhythm: Normal rate and regular rhythm.      Pulses: Normal pulses.      Heart sounds: Normal heart sounds.   Pulmonary:      Effort: Pulmonary effort is normal.      Breath sounds: Normal breath sounds.      Comments: Bilateral chest tubes to pleurevac  Abdominal:      General: Bowel sounds are normal.      Palpations: Abdomen is soft.   Musculoskeletal:      Cervical back: Normal " range of motion and neck supple.      Comments: Generalized weakness   Skin:     General: Skin is warm and dry.   Neurological:      Mental Status: She is alert and oriented to person, place, and time.      Comments: Intermittent confusion at times   Psychiatric:         Mood and Affect: Mood normal.         Behavior: Behavior normal.           Assessment:            * No active hospital problems. *    No past medical history on file.     Plan:        Bilateral pleural effusions  CKD4  PAF  Chronic anticoagulation  Chronic diastolic CHF  Solitary kidney s/p left nephrectomy  Urinary retention  Multifactorial anemia  Severe protein calorie malnutrition     Continue current treatment. Monitor counts. Increase activity. Labs Monday. Chest tube management per CTS. Appreciate nephrology assistance. Aggressive therapies as tolerated. Maintain patient safety.       Electronically signed by SARAH Mcneil on 5/11/2025 at 08:12 CDT       I have discussed the care of Genevieve Cerda, including pertinent history and exam findings, with the nurse practitioner.    I have seen and examined the patient and the key elements of all parts of the encounter have been performed by me.  I agree with the assessment, plan and orders as documented by SARAH Mackey, after I modified the exam findings and the plan of treatments and the final version is my approved version of the assessment.        Electronically signed by Nelson Ramirez MD on 5/12/2025 at 09:07 CDT

## 2025-05-11 NOTE — PROGRESS NOTES
Nephrology ([facility]) Progress Note      Patient:  Genevieve Cerda  YOB: 1940  Date of Service: 5/11/2025  MRN: [id number]   Acct: [id number]   Primary Care Physician: Provider, No Known  Advance Directive:   There are no questions and answers to display.     Admit Date: 5/7/2025       Hospital Day: 0  Referring Provider: Nelson Ramirez MD      Patient personally seen and examined.  Complete chart including Consults, Notes, Operative Reports, Labs, Cardiology, and Radiology studies reviewed as able.        Subjective:  Genevieve Cerda is a 84 y.o. female for whom we were consulted for evaluation and treatment of acute kidney injury with chronic kidney disease stage IIIb.  Patient is followed Dr. Gamez in the office was last seen in January of this year.  Baseline creatinine around 1.6.  More recently she was treated at [facility] for hypoxemic respiratory failure with pleural effusion, MICHOACANO, acidosis, hyponatremia and hyperkalemia.  She was treated with diuretics, antibiotics, dopamine and ultimately had chest tube placements.  Seen with family initially and also reviewed with primary service.  Patient denied chest pain, nausea vomiting.  Denied dysuria or hematuria.  Family noted she has frequent urinary tract infections.     Today, no overnight events.  Seen with family and updated on current labs.  Patient anxious for discharge.  Chest tubes remain in place.    Temp- 98.6  Pulse- 75  Resp- 14  BP- 118/52  O2%- 94      Allergies:  Codeine    Home Meds:  No medications prior to admission.       Medicines:  Current Facility-Administered Medications   Medication Dose Route Frequency Provider Last Rate Last Admin    acetaminophen (TYLENOL) tablet 650 mg  650 mg Oral Q6H PRN Nelson Ramirez MD        allopurinol (ZYLOPRIM) tablet 100 mg  100 mg Oral Daily Nelson Ramirez MD        apixaban (ELIQUIS) tablet 2.5 mg  2.5 mg Oral Q12H James  Nelson Mclaughlin MD        aspirin EC tablet 81 mg  81 mg Oral Daily Nelson Ramirez MD        dextrose (D50W) (25 g/50 mL) IV injection 25 g  25 g Intravenous Q15 Min PRN Nelson Ramirez MD        dextrose (GLUTOSE) oral gel 15 g  15 g Oral Q15 Min PRN Nelson Ramirez MD        epoetin louis (EPOGEN,PROCRIT) injection 10,000 Units  10,000 Units Subcutaneous Once per day on Monday Wednesday Friday Nelson Ramirez MD        furosemide (LASIX) tablet 40 mg  40 mg Oral Daily Nelson Ramirez MD        furosemide (LASIX) tablet 40 mg  40 mg Oral Once Yuniel Del Rosario MD        gabapentin (NEURONTIN) capsule 300 mg  300 mg Oral Q8H Nelson Ramirez MD        glucagon (GLUCAGEN) injection 1 mg  1 mg Subcutaneous Q15 Min PRN Nelson Ramirez MD        ipratropium-albuterol (DUO-NEB) nebulizer solution 3 mL  3 mL Nebulization Q6H PRN Nelson Ramirez MD        latanoprost (XALATAN) 0.005 % ophthalmic solution 1 drop  1 drop Both Eyes Nightly Nelson Ramirez MD        magnesium oxide (MAG-OX) tablet 400 mg  400 mg Oral Daily Nelson Ramirez MD        metoprolol tartrate (LOPRESSOR) tablet 25 mg  25 mg Oral Q12H Nelson Ramirez MD        pantoprazole (PROTONIX) EC tablet 40 mg  40 mg Oral Q AM Nelson Ramirez MD        potassium chloride (KLOR-CON M10) CR tablet 10 mEq  10 mEq Oral Daily Nelson Ramirez MD        rosuvastatin (CRESTOR) tablet 5 mg  5 mg Oral Nightly Nelson Ramirez MD        sodium chloride 0.9 % flush 10 mL  10 mL Intravenous Q12H Nelson Ramirez MD        sodium chloride 0.9 % flush 10 mL  10 mL Intravenous PRN Nelson Ramirez MD        sodium chloride 0.9 % infusion 40 mL  40 mL Intravenous PRN Nelson Ramirez MD        tamsulosin (FLOMAX) 24 hr capsule 0.4 mg  0.4 mg Oral Nightly Nelson Ramirez MD        timolol (TIMOPTIC) 0.5 % ophthalmic solution 1 drop  1 drop Both Eyes Daily James  Nelson Mclaughlin MD        vitamin D (ERGOCALCIFEROL) capsule 50,000 Units  50,000 Units Oral Q7 Days Dusty Sapp MD           Past Medical History:  No past medical history on file.    Past Surgical History:  No past surgical history on file.    Family History  No family history on file.    Social History  Social History     Socioeconomic History    Marital status: Unknown       Review of Systems:  History obtained from chart review and the patient  General ROS: No fever or chills  Respiratory ROS: No cough, shortness of breath, wheezing  Cardiovascular ROS: No chest pain or palpitations  Gastrointestinal ROS: No abdominal pain or melena  Genito-Urinary ROS: No dysuria or hematuria  Psych ROS: No anxiety and depression  14 point ROS reviewed with the patient and negative except as noted above and in the HPI unless unable to obtain.    Objective:  No data found.  No intake or output data in the 24 hours ending 05/11/25 1201  General: awake/alert   Chest:  clear to auscultation bilaterally without respiratory distress  CVS: regular rate and rhythm  Abdominal: soft, nontender, positive bowel sounds  Extremities: no cyanosis or edema  Skin: warm and dry without rash      Labs:  Results from last 7 days   Lab Units 05/08/25  0447   WBC 10*3/mm3 9.88   HEMOGLOBIN g/dL 10.9*   HEMATOCRIT % 33.9*   PLATELETS 10*3/mm3 316         Results from last 7 days   Lab Units 05/10/25  0529 05/09/25  0247 05/08/25  0447   SODIUM mmol/L 140 137 137   POTASSIUM mmol/L 4.7 4.9 4.5   CHLORIDE mmol/L 107 105 102   CO2 mmol/L 22.0 23.0 22.0   BUN mg/dL 95* 99* 96*   CREATININE mg/dL 1.80* 1.77* 2.00*   CALCIUM mg/dL 7.5* 7.7* 7.7*   EGFR mL/min/1.73 27.3* 27.9* 24.1*   BILIRUBIN mg/dL  --   --  0.2   ALK PHOS U/L  --   --  70   ALT (SGPT) U/L  --   --  16   AST (SGOT) U/L  --   --  18   GLUCOSE mg/dL 107* 89 96       Radiology:   Imaging Results (Last 72 Hours)       Procedure Component Value Units Date/Time    XR Chest 1 View  [[id number]] Collected: 05/11/25 0743     Updated: 05/11/25 0747    Narrative:      EXAM: XR CHEST 1 VW-      DATE: 5/11/2025 2:40 AM     HISTORY: Chest tube       COMPARISON: 5/10/2025.     TECHNIQUE:  Frontal view(s) of the chest submitted.     FINDINGS:    There are persistent but improving interstitial opacities may represent  improving edema. Bilateral pigtail pleural drains remain in place. No  large effusion or pneumothorax is seen. There is enlargement of the  cardiac silhouette and calcification of the thoracic aorta.          Impression:         1. Improving interstitial prominence likely improving edema and improved  opacity at the right lower lung.     This report was signed and finalized on 5/11/2025 7:44 AM by Ulysses Nolen.       XR Chest 1 View [[id number]] Collected: 05/10/25 0735     Updated: 05/10/25 0755    Narrative:      EXAM: XR CHEST 1 VW-      DATE: 5/10/2025 3:15 AM     HISTORY: Chest tube       COMPARISON: 5/9/2025.     TECHNIQUE:  Frontal view(s) of the chest submitted.     FINDINGS:    Pigtail pleural drain remains at the left lung base. No pneumothorax or  significant effusion is seen. There is increasing opacity at the right  lower lung may represent atelectasis or pneumonia. There is also a  pigtail pleural drain at the right lung base without significant  effusion or pneumothorax. There is enlargement of the cardiac silhouette  and calcification of the thoracic aorta.          Impression:         1. Bilateral pigtail chest tubes without significant effusion or  pneumothorax.  2. Increasing opacity right lower lung atelectasis versus pneumonia.     This report was signed and finalized on 5/10/2025 7:52 AM by Ulysses Nolen.       XR Chest 1 View [[id number]] Collected: 05/09/25 0721     Updated: 05/09/25 0727    Narrative:      EXAMINATION: XR CHEST 1 VW-     5/9/2025 2:45 AM     HISTORY: Chest tube     Images are stored in PACS per institutional protocol.     1 view chest  x-ray.     COMPARISON:  Yesterday at 11:12 a.m.     Stable heart size.  Aortic arch calcification.     Chronic lung changes.  Interval clearing of mild patchy infiltrate or edema on the LEFT.  The RIGHT lung is relatively clear.     Bilateral pleural space pigtail catheters are unchanged.     No pneumothorax.       Impression:      1. Partial clearing of the LEFT lung and slightly improved lung  expansion. Overall stable appearance of the chest.           This report was signed and finalized on 5/9/2025 7:24 AM by Dr. Gerald Serna MD.       US Renal Limited [[id number]] Collected: 05/08/25 1818     Updated: 05/08/25 1826    Narrative:      RENAL ULTRASOUND COMPLETE 5/8/2025 4:02 PM     REASON FOR EXAM: Chronic kidney disease. Prior left nephrectomy.     COMPARISON: None.       TECHNIQUE: Multiple longitudinal and transverse realtime sonographic  images of the kidneys and urinary bladder are obtained.     FINDINGS:     RIGHT KIDNEY: The right kidney measures 11.4 cm in length. The cortex is  isoechoic to the liver. The cortical thickness is normal. There are no  solid or cystic masses. There is no hydronephrosis. There may be a tiny  stone within the central right kidney measuring 2 mm.     LEFT KIDNEY: Prior left nephrectomy.     PELVIS: The bladder is not fully distended. No focal bladder abnormality  is seen. There is no free fluid in the pelvis.     ADDITIONAL FINDINGS: The abdominal aorta is atheromatous. No aneurysm is  seen.       Impression:      1. The right kidney is normal in size. There is mild increased  echogenicity of the renal cortex. It is isoechoic to liver. The findings  suggest mild medical-renal disease. There is no cortical thinning. There  is no hydronephrosis. There may be one tiny 2 mm stone in the mid  kidney.  2. Prior left nephrectomy.           The images and report are stored on PAC's per institutional and state  guidelines.           This report was signed and finalized on 5/8/2025  "6:22 PM by Dr. Magan Reynoso MD.               Culture:  No results found for: \"BLOODCX\", \"URINECX\", \"WOUNDCX\", \"MRSACX\", \"RESPCX\", \"STOOLCX\"      Assessment   Acute kidney injury  Chronic kidney disease stage IIIb  Bilateral pleural effusion  Paroxysmal atrial fibrillation  Chronic diastolic congestive heart failure  Solitary kidney due to prior left nephrectomy  Urinary retention  Anemia     Plan:  Discussed with patient, nursing, family  Workup reviewed today  Monitor labs  Monitor ins and outs and daily weights closely for further information  Chest tube management per CT surgery  Labs roughly stable with IV diuretic dosing, continue with adjustments as needed and close clinical laboratory monitoring        Dusty Sapp MD  5/11/2025  12:01 CDT      "

## 2025-05-12 ENCOUNTER — APPOINTMENT (OUTPATIENT)
Dept: GENERAL RADIOLOGY | Facility: HOSPITAL | Age: 85
End: 2025-05-12
Payer: COMMERCIAL

## 2025-05-12 LAB
ANION GAP SERPL CALCULATED.3IONS-SCNC: 10 MMOL/L (ref 5–15)
BASOPHILS # BLD AUTO: 0.04 10*3/MM3 (ref 0–0.2)
BASOPHILS NFR BLD AUTO: 0.4 % (ref 0–1.5)
BUN SERPL-MCNC: 87 MG/DL (ref 8–23)
BUN/CREAT SERPL: 50.9 (ref 7–25)
CALCIUM SPEC-SCNC: 7.2 MG/DL (ref 8.6–10.5)
CHLORIDE SERPL-SCNC: 106 MMOL/L (ref 98–107)
CO2 SERPL-SCNC: 23 MMOL/L (ref 22–29)
CREAT SERPL-MCNC: 1.71 MG/DL (ref 0.57–1)
DEPRECATED RDW RBC AUTO: 66.8 FL (ref 37–54)
EGFRCR SERPLBLD CKD-EPI 2021: 29.1 ML/MIN/1.73
EOSINOPHIL # BLD AUTO: 0.22 10*3/MM3 (ref 0–0.4)
EOSINOPHIL NFR BLD AUTO: 2 % (ref 0.3–6.2)
ERYTHROCYTE [DISTWIDTH] IN BLOOD BY AUTOMATED COUNT: 21.3 % (ref 12.3–15.4)
GLUCOSE SERPL-MCNC: 110 MG/DL (ref 65–99)
HCT VFR BLD AUTO: 33.4 % (ref 34–46.6)
HGB BLD-MCNC: 10.2 G/DL (ref 12–15.9)
IMM GRANULOCYTES # BLD AUTO: 0.08 10*3/MM3 (ref 0–0.05)
IMM GRANULOCYTES NFR BLD AUTO: 0.7 % (ref 0–0.5)
LYMPHOCYTES # BLD AUTO: 0.99 10*3/MM3 (ref 0.7–3.1)
LYMPHOCYTES NFR BLD AUTO: 9.2 % (ref 19.6–45.3)
MCH RBC QN AUTO: 27.5 PG (ref 26.6–33)
MCHC RBC AUTO-ENTMCNC: 30.5 G/DL (ref 31.5–35.7)
MCV RBC AUTO: 90 FL (ref 79–97)
MONOCYTES # BLD AUTO: 1.02 10*3/MM3 (ref 0.1–0.9)
MONOCYTES NFR BLD AUTO: 9.5 % (ref 5–12)
NEUTROPHILS NFR BLD AUTO: 78.2 % (ref 42.7–76)
NEUTROPHILS NFR BLD AUTO: 8.4 10*3/MM3 (ref 1.7–7)
NRBC BLD AUTO-RTO: 0 /100 WBC (ref 0–0.2)
NT-PROBNP SERPL-MCNC: ABNORMAL PG/ML (ref 0–1800)
PLATELET # BLD AUTO: 327 10*3/MM3 (ref 140–450)
PMV BLD AUTO: 10.6 FL (ref 6–12)
POTASSIUM SERPL-SCNC: 4.7 MMOL/L (ref 3.5–5.2)
PREALB SERPL-MCNC: 19.7 MG/DL (ref 20–40)
RBC # BLD AUTO: 3.71 10*6/MM3 (ref 3.77–5.28)
SODIUM SERPL-SCNC: 139 MMOL/L (ref 136–145)
WBC NRBC COR # BLD AUTO: 10.75 10*3/MM3 (ref 3.4–10.8)

## 2025-05-12 PROCEDURE — 80048 BASIC METABOLIC PNL TOTAL CA: CPT | Performed by: INTERNAL MEDICINE

## 2025-05-12 PROCEDURE — 71045 X-RAY EXAM CHEST 1 VIEW: CPT

## 2025-05-12 PROCEDURE — 85025 COMPLETE CBC W/AUTO DIFF WBC: CPT | Performed by: INTERNAL MEDICINE

## 2025-05-12 PROCEDURE — 97530 THERAPEUTIC ACTIVITIES: CPT

## 2025-05-12 PROCEDURE — 84134 ASSAY OF PREALBUMIN: CPT | Performed by: INTERNAL MEDICINE

## 2025-05-12 PROCEDURE — 92507 TX SP LANG VOICE COMM INDIV: CPT

## 2025-05-12 PROCEDURE — 83880 ASSAY OF NATRIURETIC PEPTIDE: CPT | Performed by: INTERNAL MEDICINE

## 2025-05-12 PROCEDURE — 99232 SBSQ HOSP IP/OBS MODERATE 35: CPT | Performed by: SURGERY

## 2025-05-12 RX ORDER — FUROSEMIDE 40 MG/1
40 TABLET ORAL
Status: DISCONTINUED | OUTPATIENT
Start: 2025-05-12 | End: 2025-05-27 | Stop reason: HOSPADM

## 2025-05-12 RX ORDER — SODIUM CHLORIDE 0.9 % (FLUSH) 0.9 %
10 SYRINGE (ML) INJECTION EVERY 8 HOURS
Status: DISCONTINUED | OUTPATIENT
Start: 2025-05-12 | End: 2025-05-20

## 2025-05-12 NOTE — PROGRESS NOTES
Nephrology ([facility]) Progress Note      Patient:  Genevieve Cerda  YOB: 1940  Date of Service: 5/12/2025  MRN: [id number]   Acct: [id number]   Primary Care Physician: Provider, No Known  Advance Directive:   There are no questions and answers to display.     Admit Date: 5/7/2025       Hospital Day: 0  Referring Provider: Nelson Ramirez MD      Patient personally seen and examined.  Complete chart including Consults, Notes, Operative Reports, Labs, Cardiology, and Radiology studies reviewed as able.        Subjective:  Genevieve Cerda is a 84 y.o. female for whom we were consulted for evaluation and treatment of acute kidney injury with chronic kidney disease stage IIIb.  Patient is followed Dr. Gamez in the office was last seen in January of this year.  Baseline creatinine around 1.6.  More recently she was treated at [facility] for hypoxemic respiratory failure with pleural effusion, MICHOACANO, acidosis, hyponatremia and hyperkalemia.  She was treated with diuretics, antibiotics, dopamine and ultimately had chest tube placements.  Seen with family initially and also reviewed with primary service.  Patient denied chest pain, nausea vomiting.  Denied dysuria or hematuria.  Family noted she has frequent urinary tract infections.     Today, no overnight events.  Had 1 chest tube removed and 1 remaining. She is weak.     Temp- 98.2  Pulse- 86  Resp- 14  BP- 120/53  O2%- 94      Allergies:  Codeine    Home Meds:  No medications prior to admission.       Medicines:  Current Facility-Administered Medications   Medication Dose Route Frequency Provider Last Rate Last Admin    acetaminophen (TYLENOL) tablet 650 mg  650 mg Oral Q6H PRN Nelson Ramirez MD        allopurinol (ZYLOPRIM) tablet 100 mg  100 mg Oral Daily Nelson Ramirez MD        apixaban (ELIQUIS) tablet 2.5 mg  2.5 mg Oral Q12H Nelson Ramirez MD        aspirin EC tablet 81 mg  81  mg Oral Daily Nelson Ramirez MD        dextrose (D50W) (25 g/50 mL) IV injection 25 g  25 g Intravenous Q15 Min PRN Nelson Ramirez MD        dextrose (GLUTOSE) oral gel 15 g  15 g Oral Q15 Min PRN Nelson Ramirez MD        [Held by provider] epoetin louis (EPOGEN,PROCRIT) injection 10,000 Units  10,000 Units Subcutaneous Once per day on Monday Wednesday Friday Nelson Ramirez MD        furosemide (LASIX) tablet 40 mg  40 mg Oral BID Diuretics Yuniel Del Rosario MD        gabapentin (NEURONTIN) capsule 300 mg  300 mg Oral Q8H Nelson Ramirez MD        glucagon (GLUCAGEN) injection 1 mg  1 mg Subcutaneous Q15 Min PRN Nelson Ramirez MD        ipratropium-albuterol (DUO-NEB) nebulizer solution 3 mL  3 mL Nebulization Q6H PRN Nelson Ramirez MD        latanoprost (XALATAN) 0.005 % ophthalmic solution 1 drop  1 drop Both Eyes Nightly Nelson Ramirez MD        magnesium oxide (MAG-OX) tablet 400 mg  400 mg Oral Daily Nelson Ramirez MD        metoprolol tartrate (LOPRESSOR) tablet 25 mg  25 mg Oral Q12H Nelson Ramirez MD        pantoprazole (PROTONIX) EC tablet 40 mg  40 mg Oral Q AM Nelson Ramirez MD        polyethylene glycol (MIRALAX) packet 17 g  17 g Oral Daily PRN Cris Puentes, SARAH        potassium chloride (KLOR-CON M10) CR tablet 10 mEq  10 mEq Oral Daily Nelson Ramirez MD        rosuvastatin (CRESTOR) tablet 5 mg  5 mg Oral Nightly Nelson Ramirez MD        sennosides-docusate (PERICOLACE) 8.6-50 MG per tablet 2 tablet  2 tablet Oral Daily PRN Cris Puentes, APRKARINA        sodium chloride 0.9 % flush 10 mL  10 mL Intravenous Q8H Yuniel Del Rosario MD        tamsulosin (FLOMAX) 24 hr capsule 0.4 mg  0.4 mg Oral Nightly Nelson Ramirez MD        timolol (TIMOPTIC) 0.5 % ophthalmic solution 1 drop  1 drop Both Eyes Daily Nelson Ramirez MD        vitamin D (ERGOCALCIFEROL) capsule 50,000 Units  50,000 Units Oral  Q7 Days Dusyt Sapp MD           Past Medical History:  No past medical history on file.    Past Surgical History:  No past surgical history on file.    Family History  No family history on file.    Social History  Social History     Socioeconomic History    Marital status: Unknown       Review of Systems:  History obtained from chart review and the patient  General ROS: No fever or chills  Respiratory ROS: No cough, shortness of breath, wheezing  Cardiovascular ROS: No chest pain or palpitations  Gastrointestinal ROS: No abdominal pain or melena  Genito-Urinary ROS: No dysuria or hematuria  Psych ROS: No anxiety and depression  14 point ROS reviewed with the patient and negative except as noted above and in the HPI unless unable to obtain.    Objective:  Patient Vitals for the past 24 hrs:   Weight   05/11/25 2300 64.7 kg (142 lb 11.2 oz)     No intake or output data in the 24 hours ending 05/12/25 1209  General: awake/alert   Chest:  clear to auscultation bilaterally without respiratory distress  CVS: regular rate and rhythm  Abdominal: soft, nontender, positive bowel sounds  Extremities: no cyanosis or edema  Skin: warm and dry without rash      Labs:  Results from last 7 days   Lab Units 05/12/25  0340 05/08/25  0447   WBC 10*3/mm3 10.75 9.88   HEMOGLOBIN g/dL 10.2* 10.9*   HEMATOCRIT % 33.4* 33.9*   PLATELETS 10*3/mm3 327 316         Results from last 7 days   Lab Units 05/12/25  0340 05/11/25  1117 05/10/25  0529 05/09/25  0247 05/08/25  0447   SODIUM mmol/L 139 142 140   < > 137   POTASSIUM mmol/L 4.7 4.2 4.7   < > 4.5   CHLORIDE mmol/L 106 107 107   < > 102   CO2 mmol/L 23.0 24.0 22.0   < > 22.0   BUN mg/dL 87* 91* 95*   < > 96*   CREATININE mg/dL 1.71* 1.58* 1.80*   < > 2.00*   CALCIUM mg/dL 7.2* 7.5* 7.5*   < > 7.7*   EGFR mL/min/1.73 29.1* 32.0* 27.3*   < > 24.1*   BILIRUBIN mg/dL  --   --   --   --  0.2   ALK PHOS U/L  --   --   --   --  70   ALT (SGPT) U/L  --   --   --   --  16   AST  (SGOT) U/L  --   --   --   --  18   GLUCOSE mg/dL 110* 103* 107*   < > 96    < > = values in this interval not displayed.       Radiology:   Imaging Results (Last 72 Hours)       Procedure Component Value Units Date/Time    XR Chest 1 View [[id number]] Collected: 05/12/25 0735     Updated: 05/12/25 0739    Narrative:      EXAM: XR CHEST 1 VW-      DATE: 5/12/2025 2:25 AM     HISTORY: Chest tube       COMPARISON: 5/11/2025.     TECHNIQUE:  Frontal and lateral views of the chest submitted.     FINDINGS:    Interstitial prominence may represent edema and is not significantly  changed. There is a probable small to moderate right pleural effusion  with associated atelectasis which appears increased in size. Pigtail  pleural drains noted bilaterally. No pneumothorax is seen. There is  calcification of the thoracic aorta.          Impression:      1. Possibly increasing size of layering right pleural effusion and  associated atelectasis.  2. Stable interstitial prominence worrisome for edema.     This report was signed and finalized on 5/12/2025 7:36 AM by Ulysses Nolen.       XR Chest 1 View [[id number]] Collected: 05/11/25 0743     Updated: 05/11/25 0747    Narrative:      EXAM: XR CHEST 1 VW-      DATE: 5/11/2025 2:40 AM     HISTORY: Chest tube       COMPARISON: 5/10/2025.     TECHNIQUE:  Frontal view(s) of the chest submitted.     FINDINGS:    There are persistent but improving interstitial opacities may represent  improving edema. Bilateral pigtail pleural drains remain in place. No  large effusion or pneumothorax is seen. There is enlargement of the  cardiac silhouette and calcification of the thoracic aorta.          Impression:         1. Improving interstitial prominence likely improving edema and improved  opacity at the right lower lung.     This report was signed and finalized on 5/11/2025 7:44 AM by Ulysses Nolen.       XR Chest 1 View [[id number]] Collected: 05/10/25 0735     Updated: 05/10/25 0755     "Narrative:      EXAM: XR CHEST 1 VW-      DATE: 5/10/2025 3:15 AM     HISTORY: Chest tube       COMPARISON: 5/9/2025.     TECHNIQUE:  Frontal view(s) of the chest submitted.     FINDINGS:    Pigtail pleural drain remains at the left lung base. No pneumothorax or  significant effusion is seen. There is increasing opacity at the right  lower lung may represent atelectasis or pneumonia. There is also a  pigtail pleural drain at the right lung base without significant  effusion or pneumothorax. There is enlargement of the cardiac silhouette  and calcification of the thoracic aorta.          Impression:         1. Bilateral pigtail chest tubes without significant effusion or  pneumothorax.  2. Increasing opacity right lower lung atelectasis versus pneumonia.     This report was signed and finalized on 5/10/2025 7:52 AM by Ulysses Nolen.               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

## 2025-05-12 NOTE — PROGRESS NOTES
James Ramirez M.D.  SARAH Montejo        Internal Medicine Progress Note    5/12/2025   09:06 CDT    Name:  Genevieve Cerda  MRN:    [id number]     Acct:     [id number]   Room:  [address] [place] [address] Day: 0     Admit Date: 5/7/2025  4:40 PM  PCP: Provider, No Known    Subjective:     C/C: weakness    Interval History: Status:  improved.  Resting in bed. No family at bedside. Afebrile. Woke from sleep. Maintaining adequate 02 sats on room air. Appears in no acute distress. Right chest tube removed earlier today per CTS. Continues with left side chest tube. Counts stable.     Review of Systems   Constitutional: Positive for malaise/fatigue. Negative for chills, decreased appetite, weight gain and weight loss.   HENT:  Negative for congestion, ear discharge, hoarse voice and tinnitus.    Eyes:  Negative for blurred vision, discharge, visual disturbance and visual halos.   Cardiovascular:  Negative for chest pain, claudication, dyspnea on exertion, irregular heartbeat, leg swelling, orthopnea and paroxysmal nocturnal dyspnea.   Respiratory:  Negative for cough, shortness of breath, sputum production and wheezing.    Endocrine: Negative for cold intolerance, heat intolerance and polyuria.   Hematologic/Lymphatic: Negative for adenopathy. Does not bruise/bleed easily.   Skin:  Negative for dry skin, itching and suspicious lesions.   Musculoskeletal:  Negative for arthritis, back pain, falls, joint pain, muscle weakness and myalgias.   Gastrointestinal:  Negative for abdominal pain, constipation, diarrhea, dysphagia and hematemesis.   Genitourinary:  Negative for bladder incontinence, dysuria and frequency.   Neurological:  Positive for weakness. Negative for aphonia, disturbances in coordination and dizziness.   Psychiatric/Behavioral:  Negative for altered mental status, depression, memory loss and substance abuse. The patient does not have insomnia and is not nervous/anxious.          Medications:      Allergies:   Allergies   Allergen Reactions    Codeine Itching       Current Meds:   Current Facility-Administered Medications:     acetaminophen (TYLENOL) tablet 650 mg, 650 mg, Oral, Q6H PRN, Nelson Ramirez MD    allopurinol (ZYLOPRIM) tablet 100 mg, 100 mg, Oral, Daily, Nelson Ramirez MD    apixaban (ELIQUIS) tablet 2.5 mg, 2.5 mg, Oral, Q12H, Nelson Ramirez MD    aspirin EC tablet 81 mg, 81 mg, Oral, Daily, Nelson Ramirez MD    dextrose (D50W) (25 g/50 mL) IV injection 25 g, 25 g, Intravenous, Q15 Min PRN, Nelson Ramirez MD    dextrose (GLUTOSE) oral gel 15 g, 15 g, Oral, Q15 Min PRN, Nelson Ramirez MD    epoetin louis (EPOGEN,PROCRIT) injection 10,000 Units, 10,000 Units, Subcutaneous, Once per day on Monday Wednesday Friday, Nelson Ramirez MD    furosemide (LASIX) tablet 40 mg, 40 mg, Oral, Once, Yuniel Del Rosario MD    furosemide (LASIX) tablet 40 mg, 40 mg, Oral, BID Diuretics, Yuniel Del Rosario MD    gabapentin (NEURONTIN) capsule 300 mg, 300 mg, Oral, Q8H, Nelson Ramirez MD    glucagon (GLUCAGEN) injection 1 mg, 1 mg, Subcutaneous, Q15 Min PRN, Nelson Ramirez MD    ipratropium-albuterol (DUO-NEB) nebulizer solution 3 mL, 3 mL, Nebulization, Q6H PRN, Nleson Ramirez MD    latanoprost (XALATAN) 0.005 % ophthalmic solution 1 drop, 1 drop, Both Eyes, Nightly, Nelson Ramirez MD    magnesium oxide (MAG-OX) tablet 400 mg, 400 mg, Oral, Daily, Nelson Ramirez MD    metoprolol tartrate (LOPRESSOR) tablet 25 mg, 25 mg, Oral, Q12H, Nelson Ramirez MD    pantoprazole (PROTONIX) EC tablet 40 mg, 40 mg, Oral, Q AM, Nelson Ramirez MD    polyethylene glycol (MIRALAX) packet 17 g, 17 g, Oral, Daily PRN, Cris Puentes APRN    potassium chloride (KLOR-CON M10) CR tablet 10 mEq, 10 mEq, Oral, Daily, Nelson Ramirez MD    rosuvastatin (CRESTOR) tablet 5 mg, 5 mg, Oral, Nightly, Nelson Ramirez MD     "sennosides-docusate (PERICOLACE) 8.6-50 MG per tablet 2 tablet, 2 tablet, Oral, Daily PRN, Cris Puentes R, APRN    sodium chloride 0.9 % flush 10 mL, 10 mL, Intravenous, Q8H, Yuniel Del Rosario MD    tamsulosin (FLOMAX) 24 hr capsule 0.4 mg, 0.4 mg, Oral, Nightly, Nelson Ramirez MD    timolol (TIMOPTIC) 0.5 % ophthalmic solution 1 drop, 1 drop, Both Eyes, Daily, Nelson Ramirez MD    vitamin D (ERGOCALCIFEROL) capsule 50,000 Units, 50,000 Units, Oral, Q7 Days, Dusty Sapp MD    Data:     Code Status:    There are no questions and answers to display.       No family history on file.    Social History     Socioeconomic History    Marital status: Unknown       Vitals:  Ht 157.5 cm (62.01\")   Wt 64.7 kg (142 lb 11.2 oz)   BMI 26.09 kg/m²   T 98.2 P 86 R 20 /53 Sp02 96% (room air)          I/O (24Hr):  No intake or output data in the 24 hours ending 05/12/25 0906    Labs and imaging:      Recent Results (from the past 12 hours)   BNP    Collection Time: 05/12/25  3:40 AM    Specimen: Blood   Result Value Ref Range    proBNP 11,090.0 (H) 0.0 - 1,800.0 pg/mL   CBC Auto Differential    Collection Time: 05/12/25  3:40 AM    Specimen: Blood   Result Value Ref Range    WBC 10.75 3.40 - 10.80 10*3/mm3    RBC 3.71 (L) 3.77 - 5.28 10*6/mm3    Hemoglobin 10.2 (L) 12.0 - 15.9 g/dL    Hematocrit 33.4 (L) 34.0 - 46.6 %    MCV 90.0 79.0 - 97.0 fL    MCH 27.5 26.6 - 33.0 pg    MCHC 30.5 (L) 31.5 - 35.7 g/dL    RDW 21.3 (H) 12.3 - 15.4 %    RDW-SD 66.8 (H) 37.0 - 54.0 fl    MPV 10.6 6.0 - 12.0 fL    Platelets 327 140 - 450 10*3/mm3    Neutrophil % 78.2 (H) 42.7 - 76.0 %    Lymphocyte % 9.2 (L) 19.6 - 45.3 %    Monocyte % 9.5 5.0 - 12.0 %    Eosinophil % 2.0 0.3 - 6.2 %    Basophil % 0.4 0.0 - 1.5 %    Immature Grans % 0.7 (H) 0.0 - 0.5 %    Neutrophils, Absolute 8.40 (H) 1.70 - 7.00 10*3/mm3    Lymphocytes, Absolute 0.99 0.70 - 3.10 10*3/mm3    Monocytes, Absolute 1.02 (H) 0.10 - 0.90 10*3/mm3    " Eosinophils, Absolute 0.22 0.00 - 0.40 10*3/mm3    Basophils, Absolute 0.04 0.00 - 0.20 10*3/mm3    Immature Grans, Absolute 0.08 (H) 0.00 - 0.05 10*3/mm3    nRBC 0.0 0.0 - 0.2 /100 WBC   Basic Metabolic Panel    Collection Time: 05/12/25  3:40 AM    Specimen: Blood   Result Value Ref Range    Glucose 110 (H) 65 - 99 mg/dL    BUN 87 (H) 8 - 23 mg/dL    Creatinine 1.71 (H) 0.57 - 1.00 mg/dL    Sodium 139 136 - 145 mmol/L    Potassium 4.7 3.5 - 5.2 mmol/L    Chloride 106 98 - 107 mmol/L    CO2 23.0 22.0 - 29.0 mmol/L    Calcium 7.2 (L) 8.6 - 10.5 mg/dL    BUN/Creatinine Ratio 50.9 (H) 7.0 - 25.0    Anion Gap 10.0 5.0 - 15.0 mmol/L    eGFR 29.1 (L) >60.0 mL/min/1.73                                 Physical Examination:        Physical Exam  Vitals and nursing note reviewed.   Constitutional:       Appearance: Normal appearance.   HENT:      Head: Normocephalic and atraumatic.      Right Ear: External ear normal.      Left Ear: External ear normal.      Nose: Nose normal.      Mouth/Throat:      Mouth: Mucous membranes are dry.      Pharynx: Oropharynx is clear.   Eyes:      Extraocular Movements: Extraocular movements intact.      Conjunctiva/sclera: Conjunctivae normal.      Pupils: Pupils are equal, round, and reactive to light.   Cardiovascular:      Rate and Rhythm: Normal rate and regular rhythm.      Pulses: Normal pulses.      Heart sounds: Normal heart sounds.   Pulmonary:      Effort: Pulmonary effort is normal.      Breath sounds: Normal breath sounds.      Comments: Left chest tube to pleurevac  Abdominal:      General: Bowel sounds are normal.      Palpations: Abdomen is soft.   Musculoskeletal:      Cervical back: Normal range of motion and neck supple.      Comments: Generalized weakness   Skin:     General: Skin is warm and dry.   Neurological:      Mental Status: She is alert and oriented to person, place, and time.      Comments: Intermittent confusion at times   Psychiatric:         Mood and Affect:  Mood normal.         Behavior: Behavior normal.           Assessment:            Severe protein-calorie malnutrition    No past medical history on file.     Plan:        Bilateral pleural effusions  CKD4  PAF  Chronic anticoagulation  Chronic diastolic CHF  Solitary kidney s/p left nephrectomy  Urinary retention  Multifactorial anemia  Severe protein calorie malnutrition     Continue current treatment. Monitor counts. Increase activity. Labs Thursday. Chest tube management per CTS. Appreciate nephrology assistance. Aggressive therapies as tolerated. Maintain patient safety.       Electronically signed by SARAH Santiago on 5/12/2025 at 09:06 CDT

## 2025-05-12 NOTE — PROGRESS NOTES
"Patient name: Genevieve Cerda  Patient : 1940  VISIT # [id number]  MR #[id number]    Procedure:  Procedure Date:  POD:* No surgery found *    Subjective     Chief complaint: Shortness of breath    Patient resting in bed on room air.  Right and left pigtail catheter remain in place at 20 cm suction.  Prealbumin is pending.  Creatinine this morning 1.71 and is stable.    ROS: No fevers or chills.  Shortness of breath improving.  No chest pain       Objective     Visit Vitals  Ht 157.5 cm (62.01\")   Wt 64.7 kg (142 lb 11.2 oz)   BMI 26.09 kg/m²     No intake or output data in the 24 hours ending 25 0812    Right pigtail catheter: 185 mL in 24 hours, serous, no airleak  Left pigtail catheter 325 mL in 24 hours, serous, no airleak      Lab:     CBC:  Results from last 7 days   Lab Units 25  0340 25  0447   WBC 10*3/mm3 10.75 9.88   HEMATOCRIT % 33.4* 33.9*   PLATELETS 10*3/mm3 327 316          BMP:  Results from last 7 days   Lab Units 25  0340 25  1117 05/10/25  0529   SODIUM mmol/L 139 142 140   POTASSIUM mmol/L 4.7 4.2 4.7   CHLORIDE mmol/L 106 107 107   CO2 mmol/L 23.0 24.0 22.0   GLUCOSE mg/dL 110* 103* 107*   BUN mg/dL 87* 91* 95*   CREATININE mg/dL 1.71* 1.58* 1.80*          COAG:      Invalid input(s): \"PT\"    IMAGES:       Imaging Results (Last 24 Hours)       Procedure Component Value Units Date/Time    XR Chest 1 View [[id number]] Collected: 25 0735     Updated: 25 0739    Narrative:      EXAM: XR CHEST 1 VW-      DATE: 2025 2:25 AM     HISTORY: Chest tube       COMPARISON: 2025.     TECHNIQUE:  Frontal and lateral views of the chest submitted.     FINDINGS:    Interstitial prominence may represent edema and is not significantly  changed. There is a probable small to moderate right pleural effusion  with associated atelectasis which appears increased in size. Pigtail  pleural drains noted bilaterally. No pneumothorax is seen. There is  calcification of " the thoracic aorta.          Impression:      1. Possibly increasing size of layering right pleural effusion and  associated atelectasis.  2. Stable interstitial prominence worrisome for edema.     This report was signed and finalized on 5/12/2025 7:36 AM by Ulysses Nolen.             CXR: Right and left pigtail catheter in stable position with no pneumothorax or pleural effusion.  Right basilar atelectasis.    Physical Exam:  General: Alert, oriented. No apparent distress.  Chronically ill-appearing  Cardiovascular: Regular rate and rhythm without murmur, rubs, or gallops.    Pulmonary: Clear to auscultation bilaterally without wheezing, rubs, or rales.  Chest: Right and left chest tube to 20 cm suction. No air leak. Fluid is serous.  Abdomen: Soft, nondistended, and nontender.  Extremities: Warm, moves all extremities. No edema.   Neurologic:  Grossly intact with no focal deficits.            Impression:  Bilateral pleural effusions  Congestive heart failure  Chronic kidney disease, stage IV        Plan:  Right pigtail catheter removed without remark  Continue left pigtail catheter to 20 cm suction  Daily chest x-ray  Nursing to flush left pigtail catheter with 10 mL normal saline every 8 hours to maintain tube patency  Increase Lasix to 40 mg p.o. twice daily  Continue protein shakes  Continue aggressive diuresis as renal function will tolerate    CHF and CKD management per attending      SARAH Giraldo  05/12/25  08:12 CDT

## 2025-05-13 ENCOUNTER — APPOINTMENT (OUTPATIENT)
Dept: GENERAL RADIOLOGY | Facility: HOSPITAL | Age: 85
End: 2025-05-13
Payer: COMMERCIAL

## 2025-05-13 PROCEDURE — 71045 X-RAY EXAM CHEST 1 VIEW: CPT

## 2025-05-13 PROCEDURE — 97530 THERAPEUTIC ACTIVITIES: CPT

## 2025-05-13 PROCEDURE — 97535 SELF CARE MNGMENT TRAINING: CPT

## 2025-05-13 NOTE — PROGRESS NOTES
JOHNATHON Alcala APRN        Internal Medicine Progress Note    5/13/2025   13:18 CDT    Name:  Genevieve Cerda  MRN:    [id number]     Acct:     [id number]   Room:  [address] [place] [address] Day: 0     Admit Date: 5/7/2025  4:40 PM  PCP: Provider, No Known    Subjective:     C/C: weakness    Interval History: Status:  improved.  Resting in bed. Family at bedside. Afebrile. Woke from sleep. Maintaining adequate 02 sats on room air. Continues with left side chest tube. Progressing with therapies. Appears in no acute distress.     Review of Systems   Constitutional: Positive for malaise/fatigue. Negative for chills, decreased appetite, weight gain and weight loss.   HENT:  Negative for congestion, ear discharge, hoarse voice and tinnitus.    Eyes:  Negative for blurred vision, discharge, visual disturbance and visual halos.   Cardiovascular:  Negative for chest pain, claudication, dyspnea on exertion, irregular heartbeat, leg swelling, orthopnea and paroxysmal nocturnal dyspnea.   Respiratory:  Negative for cough, shortness of breath, sputum production and wheezing.    Endocrine: Negative for cold intolerance, heat intolerance and polyuria.   Hematologic/Lymphatic: Negative for adenopathy. Does not bruise/bleed easily.   Skin:  Negative for dry skin, itching and suspicious lesions.   Musculoskeletal:  Negative for arthritis, back pain, falls, joint pain, muscle weakness and myalgias.   Gastrointestinal:  Negative for abdominal pain, constipation, diarrhea, dysphagia and hematemesis.   Genitourinary:  Negative for bladder incontinence, dysuria and frequency.   Neurological:  Positive for weakness. Negative for aphonia, disturbances in coordination and dizziness.   Psychiatric/Behavioral:  Negative for altered mental status, depression, memory loss and substance abuse. The patient does not have insomnia and is not nervous/anxious.          Medications:     Allergies:   Allergies   Allergen  Reactions    Codeine Itching       Current Meds:   Current Facility-Administered Medications:     acetaminophen (TYLENOL) tablet 650 mg, 650 mg, Oral, Q6H PRN, Nelson Ramirez MD    allopurinol (ZYLOPRIM) tablet 100 mg, 100 mg, Oral, Daily, Nelson Ramirez MD    apixaban (ELIQUIS) tablet 2.5 mg, 2.5 mg, Oral, Q12H, Nelson Ramirez MD    aspirin EC tablet 81 mg, 81 mg, Oral, Daily, Nelson Ramirez MD    dextrose (D50W) (25 g/50 mL) IV injection 25 g, 25 g, Intravenous, Q15 Min PRN, Nelson Ramirez MD    dextrose (GLUTOSE) oral gel 15 g, 15 g, Oral, Q15 Min PRN, Nelson Ramirez MD    [Held by provider] epoetin louis (EPOGEN,PROCRIT) injection 10,000 Units, 10,000 Units, Subcutaneous, Once per day on Monday Wednesday Friday, Nelson Ramirez MD    furosemide (LASIX) tablet 40 mg, 40 mg, Oral, BID Diuretics, Yuniel Del Rosario MD    gabapentin (NEURONTIN) capsule 300 mg, 300 mg, Oral, Q8H, Nelson Ramirez MD    glucagon (GLUCAGEN) injection 1 mg, 1 mg, Subcutaneous, Q15 Min PRN, Nelson Ramirez MD    ipratropium-albuterol (DUO-NEB) nebulizer solution 3 mL, 3 mL, Nebulization, Q6H PRN, Nelson Ramirez MD    latanoprost (XALATAN) 0.005 % ophthalmic solution 1 drop, 1 drop, Both Eyes, Nightly, Nelson Ramirez MD    magnesium oxide (MAG-OX) tablet 400 mg, 400 mg, Oral, Daily, Nelson Ramirez MD    metoprolol tartrate (LOPRESSOR) tablet 25 mg, 25 mg, Oral, Q12H, Nelson Ramirez MD    pantoprazole (PROTONIX) EC tablet 40 mg, 40 mg, Oral, Q AM, Nelson Ramirez MD    polyethylene glycol (MIRALAX) packet 17 g, 17 g, Oral, Daily PRN, Cris Puentes APRN    potassium chloride (KLOR-CON M10) CR tablet 10 mEq, 10 mEq, Oral, Daily, Nelson Ramirez MD    rosuvastatin (CRESTOR) tablet 5 mg, 5 mg, Oral, Nightly, JamesNelson MD    sennosides-docusate (PERICOLACE) 8.6-50 MG per tablet 2 tablet, 2 tablet, Oral, Daily PRN, Dhaval  "SARAH Rodrigues    sodium chloride 0.9 % flush 10 mL, 10 mL, Intravenous, Q8H, Miguel Ángel, Yuniel COLLINS MD    tamsulosin (FLOMAX) 24 hr capsule 0.4 mg, 0.4 mg, Oral, Nightly, Nelson Ramirez MD    timolol (TIMOPTIC) 0.5 % ophthalmic solution 1 drop, 1 drop, Both Eyes, Daily, Nelson Ramirez MD    vitamin D (ERGOCALCIFEROL) capsule 50,000 Units, 50,000 Units, Oral, Q7 Days, Dusty Sapp MD    Data:     Code Status:    There are no questions and answers to display.       No family history on file.    Social History     Socioeconomic History    Marital status: Unknown       Vitals:  Ht 157.5 cm (62.01\")   Wt 64.7 kg (142 lb 11.2 oz)   BMI 26.09 kg/m²   T 98.2 P 87 R 15 /55 Sp02 96% (room air)          I/O (24Hr):  No intake or output data in the 24 hours ending 05/13/25 1318    Labs and imaging:      No results found for this or any previous visit (from the past 12 hours).                                Physical Examination:        Physical Exam  Vitals and nursing note reviewed.   Constitutional:       Appearance: Normal appearance.   HENT:      Head: Normocephalic and atraumatic.      Right Ear: External ear normal.      Left Ear: External ear normal.      Nose: Nose normal.      Mouth/Throat:      Mouth: Mucous membranes are dry.      Pharynx: Oropharynx is clear.   Eyes:      Extraocular Movements: Extraocular movements intact.      Conjunctiva/sclera: Conjunctivae normal.      Pupils: Pupils are equal, round, and reactive to light.   Cardiovascular:      Rate and Rhythm: Normal rate and regular rhythm.      Pulses: Normal pulses.      Heart sounds: Normal heart sounds.   Pulmonary:      Effort: Pulmonary effort is normal.      Breath sounds: Normal breath sounds.      Comments: Left chest tube to pleurevac  Abdominal:      General: Bowel sounds are normal.      Palpations: Abdomen is soft.   Musculoskeletal:      Cervical back: Normal range of motion and neck supple.      Comments: " Generalized weakness   Skin:     General: Skin is warm and dry.   Neurological:      Mental Status: She is alert and oriented to person, place, and time.      Comments: Intermittent confusion at times   Psychiatric:         Mood and Affect: Mood normal.         Behavior: Behavior normal.           Assessment:            Severe protein-calorie malnutrition    No past medical history on file.     Plan:        Bilateral pleural effusions  CKD4  PAF  Chronic anticoagulation  Chronic diastolic CHF  Solitary kidney s/p left nephrectomy  Urinary retention  Multifactorial anemia  Severe protein calorie malnutrition     Continue current treatment. Monitor counts. Increase activity. Labs Thursday. Chest tube management per CTS. Appreciate nephrology assistance. Aggressive therapies as tolerated. Maintain patient safety.       Electronically signed by SARAH Santiago on 5/13/2025 at 13:18 CDT

## 2025-05-13 NOTE — PROGRESS NOTES
Nephrology ([facility]) Progress Note      Patient:  Genevieve Cerda  YOB: 1940  Date of Service: 5/13/2025  MRN: [id number]   Acct: [id number]   Primary Care Physician: Provider, No Known  Advance Directive:   There are no questions and answers to display.     Admit Date: 5/7/2025       Hospital Day: 0  Referring Provider: Nelson Ramirez MD      Patient personally seen and examined.  Complete chart including Consults, Notes, Operative Reports, Labs, Cardiology, and Radiology studies reviewed as able.        Subjective:  Genevieve Cerda is a 84 y.o. female for whom we were consulted for evaluation and treatment of acute kidney injury with chronic kidney disease stage IIIb.  Patient is followed Dr. Gamez in the office was last seen in January of this year.  Baseline creatinine around 1.6.  More recently she was treated at [facility] for hypoxemic respiratory failure with pleural effusion, MICHOACANO, acidosis, hyponatremia and hyperkalemia.  She was treated with diuretics, antibiotics, dopamine and ultimately had chest tube placements.  Seen with family initially and also reviewed with primary service.  Patient denied chest pain, nausea vomiting.  Denied dysuria or hematuria.  Family noted she has frequent urinary tract infections.     Today, no overnight events.  Tolerating her therapy.    Temp- 98.2  Pulse- 57  Resp- 14  BP- 107/55  O2%- 94      Allergies:  Codeine    Home Meds:  No medications prior to admission.       Medicines:  Current Facility-Administered Medications   Medication Dose Route Frequency Provider Last Rate Last Admin    acetaminophen (TYLENOL) tablet 650 mg  650 mg Oral Q6H PRN Nelson Ramirez MD        allopurinol (ZYLOPRIM) tablet 100 mg  100 mg Oral Daily Nelson Ramirez MD        apixaban (ELIQUIS) tablet 2.5 mg  2.5 mg Oral Q12H Nelson Ramirez MD        aspirin EC tablet 81 mg  81 mg Oral Daily Nelson Ramirez  MD Arnoldo        dextrose (D50W) (25 g/50 mL) IV injection 25 g  25 g Intravenous Q15 Min PRN Nelson Ramirez MD        dextrose (GLUTOSE) oral gel 15 g  15 g Oral Q15 Min PRN Nelson Ramirez MD        [Held by provider] epoetin louis (EPOGEN,PROCRIT) injection 10,000 Units  10,000 Units Subcutaneous Once per day on Monday Wednesday Friday Nelson Ramirez MD        furosemide (LASIX) tablet 40 mg  40 mg Oral BID Diuretics Yuniel Del Rosario MD        gabapentin (NEURONTIN) capsule 300 mg  300 mg Oral Q8H Nelson Ramirez MD        glucagon (GLUCAGEN) injection 1 mg  1 mg Subcutaneous Q15 Min PRN Nelson Ramirez MD        ipratropium-albuterol (DUO-NEB) nebulizer solution 3 mL  3 mL Nebulization Q6H PRN Nelson Ramirez MD        latanoprost (XALATAN) 0.005 % ophthalmic solution 1 drop  1 drop Both Eyes Nightly Nelson Ramirez MD        magnesium oxide (MAG-OX) tablet 400 mg  400 mg Oral Daily Nelson Ramirez MD        metoprolol tartrate (LOPRESSOR) tablet 25 mg  25 mg Oral Q12H Nelson Ramirez MD        pantoprazole (PROTONIX) EC tablet 40 mg  40 mg Oral Q AM Nelson Ramirez MD        polyethylene glycol (MIRALAX) packet 17 g  17 g Oral Daily PRN Cris Puentes, APRKARINA        potassium chloride (KLOR-CON M10) CR tablet 10 mEq  10 mEq Oral Daily Nelson Ramirez MD        rosuvastatin (CRESTOR) tablet 5 mg  5 mg Oral Nightly Nelson Ramirez MD        sennosides-docusate (PERICOLACE) 8.6-50 MG per tablet 2 tablet  2 tablet Oral Daily PRN Cris Puentes, APRKARINA        sodium chloride 0.9 % flush 10 mL  10 mL Intravenous Q8H Yuniel Del Rosario MD        tamsulosin (FLOMAX) 24 hr capsule 0.4 mg  0.4 mg Oral Nightly Nelson Ramirez MD        timolol (TIMOPTIC) 0.5 % ophthalmic solution 1 drop  1 drop Both Eyes Daily Nelson Ramirez MD        vitamin D (ERGOCALCIFEROL) capsule 50,000 Units  50,000 Units Oral Q7 Days Dusty Sapp  MD Rancho           Past Medical History:  No past medical history on file.    Past Surgical History:  No past surgical history on file.    Family History  No family history on file.    Social History  Social History     Socioeconomic History    Marital status: Unknown       Review of Systems:  History obtained from chart review and the patient  General ROS: No fever or chills  Respiratory ROS: No cough, shortness of breath, wheezing  Cardiovascular ROS: No chest pain or palpitations  Gastrointestinal ROS: No abdominal pain or melena  Genito-Urinary ROS: No dysuria or hematuria  Psych ROS: No anxiety and depression  14 point ROS reviewed with the patient and negative except as noted above and in the HPI unless unable to obtain.    Objective:  No data found.    No intake or output data in the 24 hours ending 05/13/25 1223  General: awake/alert   Chest:  clear to auscultation bilaterally without respiratory distress  CVS: regular rate and rhythm  Abdominal: soft, nontender, positive bowel sounds  Extremities: no cyanosis or edema  Skin: warm and dry without rash      Labs:  Results from last 7 days   Lab Units 05/12/25  0340 05/08/25  0447   WBC 10*3/mm3 10.75 9.88   HEMOGLOBIN g/dL 10.2* 10.9*   HEMATOCRIT % 33.4* 33.9*   PLATELETS 10*3/mm3 327 316         Results from last 7 days   Lab Units 05/12/25  0340 05/11/25  1117 05/10/25  0529 05/09/25  0247 05/08/25  0447   SODIUM mmol/L 139 142 140   < > 137   POTASSIUM mmol/L 4.7 4.2 4.7   < > 4.5   CHLORIDE mmol/L 106 107 107   < > 102   CO2 mmol/L 23.0 24.0 22.0   < > 22.0   BUN mg/dL 87* 91* 95*   < > 96*   CREATININE mg/dL 1.71* 1.58* 1.80*   < > 2.00*   CALCIUM mg/dL 7.2* 7.5* 7.5*   < > 7.7*   EGFR mL/min/1.73 29.1* 32.0* 27.3*   < > 24.1*   BILIRUBIN mg/dL  --   --   --   --  0.2   ALK PHOS U/L  --   --   --   --  70   ALT (SGPT) U/L  --   --   --   --  16   AST (SGOT) U/L  --   --   --   --  18   GLUCOSE mg/dL 110* 103* 107*   < > 96    < > = values in this  interval not displayed.       Radiology:   Imaging Results (Last 72 Hours)       Procedure Component Value Units Date/Time    XR Chest 1 View [[id number]] Collected: 05/13/25 0708     Updated: 05/13/25 0712    Narrative:      EXAMINATION: XR CHEST 1 VW-  5/13/2025 7:08 AM     HISTORY: Chest tube     FINDINGS: Today's exam is compared to previous study of 1 day earlier. A  left-sided thoracostomy tube projects over the left lung base. The left  lung is fully expanded and clear with no pneumothorax. There is  elevation of the right diaphragm with right basilar atelectasis. There  is moderate cardiomegaly.       Impression:      1. Elevated right diaphragm with right basilar atelectasis.  2. Left-sided pigtail catheter again projects over the left lung base  with evacuation of previously noted effusion. No pneumothorax.     This report was signed and finalized on 5/13/2025 7:09 AM by Dr. Christiano Arteaga MD.       XR Chest 1 View [[id number]] Collected: 05/12/25 0735     Updated: 05/12/25 0739    Narrative:      EXAM: XR CHEST 1 VW-      DATE: 5/12/2025 2:25 AM     HISTORY: Chest tube       COMPARISON: 5/11/2025.     TECHNIQUE:  Frontal and lateral views of the chest submitted.     FINDINGS:    Interstitial prominence may represent edema and is not significantly  changed. There is a probable small to moderate right pleural effusion  with associated atelectasis which appears increased in size. Pigtail  pleural drains noted bilaterally. No pneumothorax is seen. There is  calcification of the thoracic aorta.          Impression:      1. Possibly increasing size of layering right pleural effusion and  associated atelectasis.  2. Stable interstitial prominence worrisome for edema.     This report was signed and finalized on 5/12/2025 7:36 AM by Ulysses Nolen.       XR Chest 1 View [[id number]] Collected: 05/11/25 0743     Updated: 05/11/25 0747    Narrative:      EXAM: XR CHEST 1 VW-      DATE: 5/11/2025 2:40 AM    "  HISTORY: Chest tube       COMPARISON: 5/10/2025.     TECHNIQUE:  Frontal view(s) of the chest submitted.     FINDINGS:    There are persistent but improving interstitial opacities may represent  improving edema. Bilateral pigtail pleural drains remain in place. No  large effusion or pneumothorax is seen. There is enlargement of the  cardiac silhouette and calcification of the thoracic aorta.          Impression:         1. Improving interstitial prominence likely improving edema and improved  opacity at the right lower lung.     This report was signed and finalized on 5/11/2025 7:44 AM by Ulysses Nolen.               Culture:  No results found for: \"BLOODCX\", \"URINECX\", \"WOUNDCX\", \"MRSACX\", \"RESPCX\", \"STOOLCX\"      Assessment   Acute kidney injury  Chronic kidney disease stage IIIb  Bilateral pleural effusion  Paroxysmal atrial fibrillation  Chronic diastolic congestive heart failure  Solitary kidney due to prior left nephrectomy  Urinary retention  Anemia     Plan:  Discussed with patient, nursing  Workup reviewed today  Monitor labs  Monitor ins and outs and daily weights closely for further information  Chest tube management per CT surgery  Labs roughly stable   Holding ESAs for improving hematocrit      Brennon Gamez MD  5/13/2025  12:23 CDT      "

## 2025-05-14 ENCOUNTER — APPOINTMENT (OUTPATIENT)
Dept: GENERAL RADIOLOGY | Facility: HOSPITAL | Age: 85
End: 2025-05-14
Payer: COMMERCIAL

## 2025-05-14 PROCEDURE — 25010000002 LIDOCAINE PF 2% 2 % SOLUTION 10 ML AMPULE: Performed by: NURSE PRACTITIONER

## 2025-05-14 PROCEDURE — 71045 X-RAY EXAM CHEST 1 VIEW: CPT

## 2025-05-14 PROCEDURE — 99232 SBSQ HOSP IP/OBS MODERATE 35: CPT | Performed by: SURGERY

## 2025-05-14 PROCEDURE — 97530 THERAPEUTIC ACTIVITIES: CPT

## 2025-05-14 PROCEDURE — 92507 TX SP LANG VOICE COMM INDIV: CPT

## 2025-05-14 PROCEDURE — 97535 SELF CARE MNGMENT TRAINING: CPT

## 2025-05-14 NOTE — PROGRESS NOTES
Nephrology ([facility]) Progress Note      Patient:  Genevieve Cerda  YOB: 1940  Date of Service: 5/14/2025  MRN: [id number]   Acct: [id number]   Primary Care Physician: Provider, No Known  Advance Directive:   There are no questions and answers to display.     Admit Date: 5/7/2025       Hospital Day: 0  Referring Provider: Nelson Ramirez MD      Patient personally seen and examined.  Complete chart including Consults, Notes, Operative Reports, Labs, Cardiology, and Radiology studies reviewed as able.        Subjective:  Genevieve Cerda is a 84 y.o. female for whom we were consulted for evaluation and treatment of acute kidney injury with chronic kidney disease stage IIIb.  Patient is followed Dr. Gamez in the office was last seen in January of this year.  Baseline creatinine around 1.6.  More recently she was treated at [facility] for hypoxemic respiratory failure with pleural effusion, MICHOACANO, acidosis, hyponatremia and hyperkalemia.  She was treated with diuretics, antibiotics, dopamine and ultimately had chest tube placements.  Seen with family initially and also reviewed with primary service.  Patient denied chest pain, nausea vomiting.  Denied dysuria or hematuria.  Family noted she has frequent urinary tract infections.     Today, no overnight events.  Tolerating her therapy.    Temp- 98.2  Pulse- 85  Resp- 14  BP- 113/60  O2%- 94      Allergies:  Codeine    Home Meds:  No medications prior to admission.       Medicines:  Current Facility-Administered Medications   Medication Dose Route Frequency Provider Last Rate Last Admin    acetaminophen (TYLENOL) tablet 650 mg  650 mg Oral Q6H PRN Nelson Ramirez MD        allopurinol (ZYLOPRIM) tablet 100 mg  100 mg Oral Daily Nelson Ramirez MD        apixaban (ELIQUIS) tablet 2.5 mg  2.5 mg Oral Q12H Nelson Ramirez MD        aspirin EC tablet 81 mg  81 mg Oral Daily Nelson Ramirez  MD Arnoldo        dextrose (D50W) (25 g/50 mL) IV injection 25 g  25 g Intravenous Q15 Min PRN Nelson Ramirez MD        dextrose (GLUTOSE) oral gel 15 g  15 g Oral Q15 Min PRN Nelson Ramirez MD        [Held by provider] epoetin louis (EPOGEN,PROCRIT) injection 10,000 Units  10,000 Units Subcutaneous Once per day on Monday Wednesday Friday Nelson Ramirez MD        furosemide (LASIX) tablet 40 mg  40 mg Oral BID Diuretics Yuniel Del Rosario MD        gabapentin (NEURONTIN) capsule 300 mg  300 mg Oral Q8H Nelson Ramirez MD        glucagon (GLUCAGEN) injection 1 mg  1 mg Subcutaneous Q15 Min PRN Nelson Ramirez MD        ipratropium-albuterol (DUO-NEB) nebulizer solution 3 mL  3 mL Nebulization Q6H PRN Nelson Ramirez MD        latanoprost (XALATAN) 0.005 % ophthalmic solution 1 drop  1 drop Both Eyes Nightly Nelson Ramirez MD        magnesium oxide (MAG-OX) tablet 400 mg  400 mg Oral Daily Nelson Ramirez MD        metoprolol tartrate (LOPRESSOR) tablet 25 mg  25 mg Oral Q12H Nelson Ramirez MD        pantoprazole (PROTONIX) EC tablet 40 mg  40 mg Oral Q AM Nelson Ramirez MD        polyethylene glycol (MIRALAX) packet 17 g  17 g Oral Daily PRN Cris Puentes APRN        potassium chloride (KLOR-CON M10) CR tablet 10 mEq  10 mEq Oral Daily Nelson Ramirez MD        rosuvastatin (CRESTOR) tablet 5 mg  5 mg Oral Nightly Nelson Ramirez MD        sennosides-docusate (PERICOLACE) 8.6-50 MG per tablet 2 tablet  2 tablet Oral Daily PRN Cris Puentes, SARAH        sodium chloride 0.9 % flush 10 mL  10 mL Intravenous Q8H Yuniel Del Rosario MD        Sterile Talc for Pleurodesis - Sterile Talc 8 gm + Lidocaine PF 2% 20 mL in NS (Total 100 mL) Split Syringe  8 g Intrapleural Once Aura Hudson APRN        tamsulosin (FLOMAX) 24 hr capsule 0.4 mg  0.4 mg Oral Nightly Nelson Ramirez MD        timolol (TIMOPTIC) 0.5 % ophthalmic solution  1 drop  1 drop Both Eyes Daily Nelson Ramirez MD        vitamin D (ERGOCALCIFEROL) capsule 50,000 Units  50,000 Units Oral Q7 Days Dusty Sapp MD           Past Medical History:  No past medical history on file.    Past Surgical History:  No past surgical history on file.    Family History  No family history on file.    Social History  Social History     Socioeconomic History    Marital status: Unknown       Review of Systems:  History obtained from chart review and the patient  General ROS: No fever or chills  Respiratory ROS: No cough, shortness of breath, wheezing  Cardiovascular ROS: No chest pain or palpitations  Gastrointestinal ROS: No abdominal pain or melena  Genito-Urinary ROS: No dysuria or hematuria  Psych ROS: No anxiety and depression  14 point ROS reviewed with the patient and negative except as noted above and in the HPI unless unable to obtain.    Objective:  No data found.    No intake or output data in the 24 hours ending 05/14/25 1230  General: awake/alert   Chest:  Bilat chest expansion  CVS: regular rate and rhythm  Abdominal: soft, nontender, positive bowel sounds  Extremities: no cyanosis or edema  Skin: warm and dry without rash      Labs:  Results from last 7 days   Lab Units 05/12/25  0340 05/08/25  0447   WBC 10*3/mm3 10.75 9.88   HEMOGLOBIN g/dL 10.2* 10.9*   HEMATOCRIT % 33.4* 33.9*   PLATELETS 10*3/mm3 327 316         Results from last 7 days   Lab Units 05/12/25  0340 05/11/25  1117 05/10/25  0529 05/09/25  0247 05/08/25  0447   SODIUM mmol/L 139 142 140   < > 137   POTASSIUM mmol/L 4.7 4.2 4.7   < > 4.5   CHLORIDE mmol/L 106 107 107   < > 102   CO2 mmol/L 23.0 24.0 22.0   < > 22.0   BUN mg/dL 87* 91* 95*   < > 96*   CREATININE mg/dL 1.71* 1.58* 1.80*   < > 2.00*   CALCIUM mg/dL 7.2* 7.5* 7.5*   < > 7.7*   EGFR mL/min/1.73 29.1* 32.0* 27.3*   < > 24.1*   BILIRUBIN mg/dL  --   --   --   --  0.2   ALK PHOS U/L  --   --   --   --  70   ALT (SGPT) U/L  --   --   --    --  16   AST (SGOT) U/L  --   --   --   --  18   GLUCOSE mg/dL 110* 103* 107*   < > 96    < > = values in this interval not displayed.       Radiology:   Imaging Results (Last 72 Hours)       Procedure Component Value Units Date/Time    XR Chest 1 View [[id number]] Collected: 05/14/25 0735     Updated: 05/14/25 0739    Narrative:      EXAM: XR CHEST 1 VW-      DATE: 5/14/2025 1:34 AM     HISTORY: Chest tube       COMPARISON: 5/13/2025.     TECHNIQUE:  Frontal view(s) of the chest submitted.     FINDINGS:    Pigtail pleural drain remains at the left lung base. There is increasing  opacity at the right mid to lower lung may be due to atelectasis or  pneumonia. Small underlying effusions not excluded. No pneumothorax is  seen. Heart size is within normal limits. There is calcification of the  thoracic aorta.          Impression:         1. Increasing opacity at the right mid to lower lung may be due to  atelectasis or pneumonia.     This report was signed and finalized on 5/14/2025 7:36 AM by Ulysses Nolen.       XR Chest 1 View [[id number]] Collected: 05/13/25 0708     Updated: 05/13/25 0712    Narrative:      EXAMINATION: XR CHEST 1 VW-  5/13/2025 7:08 AM     HISTORY: Chest tube     FINDINGS: Today's exam is compared to previous study of 1 day earlier. A  left-sided thoracostomy tube projects over the left lung base. The left  lung is fully expanded and clear with no pneumothorax. There is  elevation of the right diaphragm with right basilar atelectasis. There  is moderate cardiomegaly.       Impression:      1. Elevated right diaphragm with right basilar atelectasis.  2. Left-sided pigtail catheter again projects over the left lung base  with evacuation of previously noted effusion. No pneumothorax.     This report was signed and finalized on 5/13/2025 7:09 AM by Dr. Christiano Arteaga MD.       XR Chest 1 View [[id number]] Collected: 05/12/25 0735     Updated: 05/12/25 0739    Narrative:      EXAM: XR CHEST 1 VW-  "     DATE: 5/12/2025 2:25 AM     HISTORY: Chest tube       COMPARISON: 5/11/2025.     TECHNIQUE:  Frontal and lateral views of the chest submitted.     FINDINGS:    Interstitial prominence may represent edema and is not significantly  changed. There is a probable small to moderate right pleural effusion  with associated atelectasis which appears increased in size. Pigtail  pleural drains noted bilaterally. No pneumothorax is seen. There is  calcification of the thoracic aorta.          Impression:      1. Possibly increasing size of layering right pleural effusion and  associated atelectasis.  2. Stable interstitial prominence worrisome for edema.     This report was signed and finalized on 5/12/2025 7:36 AM by Ulysses Nolen.               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

## 2025-05-14 NOTE — PROGRESS NOTES
Adult Nutrition  Assessment/PES    Patient Name:  Genevieve Cerda  YOB: 1940  MRN: [id number]  Admit Date:  5/7/2025    Assessment Date:  5/14/2025       Reason for Assessment       Row Name 05/14/25 9356          Reason for Assessment    Reason For Assessment follow-up protocol     Diagnosis cardiac disease;renal disease;hematological/related complications              Nutrition/Diet History       Row Name 05/14/25 0124          Nutrition/Diet History    Typical Intake (Food/Fluid/EN/PN) Nutrition follow up. Pt reports good appetite. Pt on Soft to chew,chopped meats,thin liquids with extra gravy alltrays. Magic cup with lunch daily. Boost Original TID. PO intake avg. 62.8% of the past seven meals;po fluid intake avg 1363 ml over the past three days.Encourage oral intake and oral supplement use as tolerated.  Pt has left upper chest tube;plans for talc pleurodesis today. Pt ambualtes with walker,chairfast. Jose Antonio score 16 with poor skin turogr. Pt current weight 139.3lbs (5/13),142lbs (5/11),149lbs (5/7); wt loss of 9.7 lbs over the past seven days; wt may fluctuate based on bedscale weights. Pertinent labs glucose 110 mg/dl,BUN 87 mg/dl,Cr 1.71mg/dl,PAB 19.7 (5/12),Phos 5.1 mg/dl. Pertient medications eliquis,aspirin,lasix (ordered)protonix,ergocalciferol 50,000IU weekly. Cont to follow for plan of care.     Supplemental Drinks/Foods/Additives Magic cup daily;Boost Original TID     Factors Affecting Nutritional Intake restricted diet            Estimated/Assessed Needs - Anthropometrics       Row Name 05/14/25 1341          Anthropometrics    Weight 63.2 kg (139 lb 4.8 oz)             Electronically signed by:  Cori Campos RDN, STACEY  05/14/25 13:52 CDT

## 2025-05-14 NOTE — PROGRESS NOTES
James Ramirez M.D.  SARAH Montejo        Internal Medicine Progress Note    5/14/2025   09:17 CDT    Name:  Genevieve Cerda  MRN:    [id number]     Acct:     [id number]   Room:  [address] [place] [address] Day: 0     Admit Date: 5/7/2025  4:40 PM  PCP: Provider, No Known    Subjective:     C/C: weakness    Interval History: Status:  improved.  Up to chair. No family at bedside. Afebrile. Working with therapies. Maintaining adequate 02 sats on room air. Continues with left side chest tube. Plans for talc pleurodesis later today per CTS. Progressing with therapies. Appears in no acute distress.     Review of Systems   Constitutional: Positive for malaise/fatigue. Negative for chills, decreased appetite, weight gain and weight loss.   HENT:  Negative for congestion, ear discharge, hoarse voice and tinnitus.    Eyes:  Negative for blurred vision, discharge, visual disturbance and visual halos.   Cardiovascular:  Negative for chest pain, claudication, dyspnea on exertion, irregular heartbeat, leg swelling, orthopnea and paroxysmal nocturnal dyspnea.   Respiratory:  Negative for cough, shortness of breath, sputum production and wheezing.    Endocrine: Negative for cold intolerance, heat intolerance and polyuria.   Hematologic/Lymphatic: Negative for adenopathy. Does not bruise/bleed easily.   Skin:  Negative for dry skin, itching and suspicious lesions.   Musculoskeletal:  Negative for arthritis, back pain, falls, joint pain, muscle weakness and myalgias.   Gastrointestinal:  Negative for abdominal pain, constipation, diarrhea, dysphagia and hematemesis.   Genitourinary:  Negative for bladder incontinence, dysuria and frequency.   Neurological:  Positive for weakness. Negative for aphonia, disturbances in coordination and dizziness.   Psychiatric/Behavioral:  Negative for altered mental status, depression, memory loss and substance abuse. The patient does not have insomnia and is not nervous/anxious.           Medications:     Allergies:   Allergies   Allergen Reactions    Codeine Itching       Current Meds:   Current Facility-Administered Medications:     acetaminophen (TYLENOL) tablet 650 mg, 650 mg, Oral, Q6H PRN, Nelson Ramirez MD    allopurinol (ZYLOPRIM) tablet 100 mg, 100 mg, Oral, Daily, Nelson Ramirez MD    apixaban (ELIQUIS) tablet 2.5 mg, 2.5 mg, Oral, Q12H, Nelson Ramirez MD    aspirin EC tablet 81 mg, 81 mg, Oral, Daily, Nelson Ramirez MD    dextrose (D50W) (25 g/50 mL) IV injection 25 g, 25 g, Intravenous, Q15 Min PRN, Nelson Ramirez MD    dextrose (GLUTOSE) oral gel 15 g, 15 g, Oral, Q15 Min PRN, Nelson Ramirez MD    [Held by provider] epoetin louis (EPOGEN,PROCRIT) injection 10,000 Units, 10,000 Units, Subcutaneous, Once per day on Monday Wednesday Friday, Nelson Ramirez MD    furosemide (LASIX) tablet 40 mg, 40 mg, Oral, BID Diuretics, Yuniel Del Rosario MD    gabapentin (NEURONTIN) capsule 300 mg, 300 mg, Oral, Q8H, Nelson Ramirez MD    glucagon (GLUCAGEN) injection 1 mg, 1 mg, Subcutaneous, Q15 Min PRN, Nelson Ramirez MD    ipratropium-albuterol (DUO-NEB) nebulizer solution 3 mL, 3 mL, Nebulization, Q6H PRN, Nelson Ramirez MD    latanoprost (XALATAN) 0.005 % ophthalmic solution 1 drop, 1 drop, Both Eyes, Nightly, Nelson Ramirez MD    magnesium oxide (MAG-OX) tablet 400 mg, 400 mg, Oral, Daily, Nelson Ramirez MD    metoprolol tartrate (LOPRESSOR) tablet 25 mg, 25 mg, Oral, Q12H, Nelson Ramirez MD    pantoprazole (PROTONIX) EC tablet 40 mg, 40 mg, Oral, Q AM, Nelson Ramirez MD    polyethylene glycol (MIRALAX) packet 17 g, 17 g, Oral, Daily PRN, Cris Puentes APRN    potassium chloride (KLOR-CON M10) CR tablet 10 mEq, 10 mEq, Oral, Daily, Nelson Ramirez MD    rosuvastatin (CRESTOR) tablet 5 mg, 5 mg, Oral, Nightly, Nelson Ramirez MD    sennosides-docusate (PERICOLACE)  "8.6-50 MG per tablet 2 tablet, 2 tablet, Oral, Daily PRN, Cris Puentes, SARAH    sodium chloride 0.9 % flush 10 mL, 10 mL, Intravenous, Q8H, Yuniel Del Rosario MD    Sterile Talc for Pleurodesis - Sterile Talc 8 gm + Lidocaine PF 2% 20 mL in NS (Total 100 mL) Split Syringe, 8 g, Intrapleural, Once, Aura Hudson APRN    tamsulosin (FLOMAX) 24 hr capsule 0.4 mg, 0.4 mg, Oral, Nightly, Nelson Ramirez MD    timolol (TIMOPTIC) 0.5 % ophthalmic solution 1 drop, 1 drop, Both Eyes, Daily, Nelson Ramirez MD    vitamin D (ERGOCALCIFEROL) capsule 50,000 Units, 50,000 Units, Oral, Q7 Days, Dusty Sapp MD    Data:     Code Status:    There are no questions and answers to display.       No family history on file.    Social History     Socioeconomic History    Marital status: Unknown       Vitals:  Ht 157.5 cm (62.01\")   Wt 64.7 kg (142 lb 11.2 oz)   BMI 26.09 kg/m²   T 98.2 P 85 R 22 /60 Sp02 96% (room air)          I/O (24Hr):  No intake or output data in the 24 hours ending 05/14/25 0917    Labs and imaging:      No results found for this or any previous visit (from the past 12 hours).                                Physical Examination:        Physical Exam  Vitals and nursing note reviewed.   Constitutional:       Appearance: Normal appearance.   HENT:      Head: Normocephalic and atraumatic.      Right Ear: External ear normal.      Left Ear: External ear normal.      Nose: Nose normal.      Mouth/Throat:      Mouth: Mucous membranes are dry.      Pharynx: Oropharynx is clear.   Eyes:      Extraocular Movements: Extraocular movements intact.      Conjunctiva/sclera: Conjunctivae normal.      Pupils: Pupils are equal, round, and reactive to light.   Cardiovascular:      Rate and Rhythm: Normal rate and regular rhythm.      Pulses: Normal pulses.      Heart sounds: Normal heart sounds.   Pulmonary:      Effort: Pulmonary effort is normal.      Breath sounds: Normal breath sounds.      " Comments: Left chest tube to pleurevac  Abdominal:      General: Bowel sounds are normal.      Palpations: Abdomen is soft.   Musculoskeletal:      Cervical back: Normal range of motion and neck supple.      Comments: Generalized weakness   Skin:     General: Skin is warm and dry.   Neurological:      Mental Status: She is alert and oriented to person, place, and time.      Comments: Intermittent confusion at times   Psychiatric:         Mood and Affect: Mood normal.         Behavior: Behavior normal.           Assessment:            Severe protein-calorie malnutrition    No past medical history on file.     Plan:        Bilateral pleural effusions  CKD4  PAF  Chronic anticoagulation  Chronic diastolic CHF  Solitary kidney s/p left nephrectomy  Urinary retention  Multifactorial anemia  Severe protein calorie malnutrition     Continue current treatment. Monitor counts. Increase activity. Labs in am. Chest tube management per CTS. Appreciate nephrology assistance. Aggressive therapies as tolerated. Maintain patient safety.       Electronically signed by SARAH Santiago on 5/14/2025 at 09:17 CDT

## 2025-05-14 NOTE — PROGRESS NOTES
"Patient name: Genevieve Cerda  Patient : 1940  VISIT # [id number]  MR #[id number]    Procedure:  Procedure Date:  POD:* No surgery found *    Subjective     Chief complaint: Shortness of breath    Patient resting in bed on room air.  Left pigtail catheter remains in place at 20 cm suction.  Prealbumin is down to 19.    ROS: No fevers or chills.  Shortness of breath improving.  No chest pain       Objective     Visit Vitals  Ht 157.5 cm (62.01\")   Wt 64.7 kg (142 lb 11.2 oz)   BMI 26.09 kg/m²     No intake or output data in the 24 hours ending 25 0803      Left pigtail catheter: 310 mL in 24 hours, serous, no airleak      Lab:     CBC:  Results from last 7 days   Lab Units 25  0340 25  0447   WBC 10*3/mm3 10.75 9.88   HEMATOCRIT % 33.4* 33.9*   PLATELETS 10*3/mm3 327 316          BMP:  Results from last 7 days   Lab Units 25  0340 25  1117 05/10/25  0529   SODIUM mmol/L 139 142 140   POTASSIUM mmol/L 4.7 4.2 4.7   CHLORIDE mmol/L 106 107 107   CO2 mmol/L 23.0 24.0 22.0   GLUCOSE mg/dL 110* 103* 107*   BUN mg/dL 87* 91* 95*   CREATININE mg/dL 1.71* 1.58* 1.80*          COAG:      Invalid input(s): \"PT\"    IMAGES:       Imaging Results (Last 24 Hours)       Procedure Component Value Units Date/Time    XR Chest 1 View [[id number]] Collected: 25 0735     Updated: 25 0739    Narrative:      EXAM: XR CHEST 1 VW-      DATE: 2025 1:34 AM     HISTORY: Chest tube       COMPARISON: 2025.     TECHNIQUE:  Frontal view(s) of the chest submitted.     FINDINGS:    Pigtail pleural drain remains at the left lung base. There is increasing  opacity at the right mid to lower lung may be due to atelectasis or  pneumonia. Small underlying effusions not excluded. No pneumothorax is  seen. Heart size is within normal limits. There is calcification of the  thoracic aorta.          Impression:         1. Increasing opacity at the right mid to lower lung may be due to  atelectasis or " pneumonia.     This report was signed and finalized on 5/14/2025 7:36 AM by Ulysses Nolen.             CXR: Left pigtail catheter in stable position with no pneumothorax or pleural effusion.  Right basilar atelectasis, slightly worsening    Physical Exam:  General: Alert, oriented. No apparent distress.  Chronically ill-appearing  Cardiovascular: Regular rate and rhythm without murmur, rubs, or gallops.    Pulmonary: Clear to auscultation bilaterally without wheezing, rubs, or rales.  Chest: Left chest tube to 20 cm suction. No air leak. Fluid is serous.  Abdomen: Soft, nondistended, and nontender.  Extremities: Warm, moves all extremities. No edema.   Neurologic:  Grossly intact with no focal deficits.            Impression:  Bilateral pleural effusions  Congestive heart failure  Chronic kidney disease, stage IV        Plan:  Plan on talc pleurodesis through left pigtail catheter at bedside today  Continue left pigtail catheter to 20 cm suction  Daily chest x-ray  Nursing to flush left pigtail catheter with 10 mL normal saline every 8 hours to maintain tube patency  Continue diuresis as renal function will tolerate  Continue protein shakes    Nursing instructions for talc pleurodesis:  Keep CT clamped.  Monitor for signs of respiratory distress every 15 minutes.  Please turn patient every 30 minutes.  Right side down first then reposition to supine then reposition to left side down and finally return to supine position.  Total time is 2 hours.  After 2 hours, release chest tube clamps and place chest tube back to -20 cm suction.       CHF and CKD management per attending      SARAH Giraldo  05/14/25  08:03 CDT

## 2025-05-14 NOTE — PROGRESS NOTES
[facility] Fall Risk Assessment Note    Name: Genevieve Cerda  MRN: [id number]  CSN: [id number]  Admit Date/Time: 5/7/2025  4:40 PM.    Currently ordered medications associated with an increased risk for fall include:    Scheduled Meds:  apixaban, 2.5 mg, Oral, Q12H  furosemide, 40 mg, Oral, BID Diuretics  gabapentin, 300 mg, Oral, Q8H  magnesium oxide, 400 mg, Oral, Daily  metoprolol tartrate, 25 mg, Oral, Q12H  pantoprazole, 40 mg, Oral, Q AM  tamsulosin, 0.4 mg, Oral, Nightly        Continuous Infusions:[unfilled]    PRN Meds:  [unfilled]    Comments/Recommendations:      Samuel Mcallister RPH  05/14/25 16:25 CDT

## 2025-05-15 ENCOUNTER — APPOINTMENT (OUTPATIENT)
Dept: GENERAL RADIOLOGY | Facility: HOSPITAL | Age: 85
End: 2025-05-15
Payer: COMMERCIAL

## 2025-05-15 LAB
ANION GAP SERPL CALCULATED.3IONS-SCNC: 10 MMOL/L (ref 5–15)
BASOPHILS # BLD AUTO: 0.04 10*3/MM3 (ref 0–0.2)
BASOPHILS NFR BLD AUTO: 0.5 % (ref 0–1.5)
BUN SERPL-MCNC: 84 MG/DL (ref 8–23)
BUN/CREAT SERPL: 47.7 (ref 7–25)
CALCIUM SPEC-SCNC: 7.2 MG/DL (ref 8.6–10.5)
CHLORIDE SERPL-SCNC: 103 MMOL/L (ref 98–107)
CO2 SERPL-SCNC: 23 MMOL/L (ref 22–29)
CREAT SERPL-MCNC: 1.76 MG/DL (ref 0.57–1)
DEPRECATED RDW RBC AUTO: 64.5 FL (ref 37–54)
EGFRCR SERPLBLD CKD-EPI 2021: 28.1 ML/MIN/1.73
EOSINOPHIL # BLD AUTO: 0.15 10*3/MM3 (ref 0–0.4)
EOSINOPHIL NFR BLD AUTO: 1.8 % (ref 0.3–6.2)
ERYTHROCYTE [DISTWIDTH] IN BLOOD BY AUTOMATED COUNT: 20.5 % (ref 12.3–15.4)
GLUCOSE SERPL-MCNC: 114 MG/DL (ref 65–99)
HCT VFR BLD AUTO: 31.7 % (ref 34–46.6)
HGB BLD-MCNC: 9.7 G/DL (ref 12–15.9)
IMM GRANULOCYTES # BLD AUTO: 0.04 10*3/MM3 (ref 0–0.05)
IMM GRANULOCYTES NFR BLD AUTO: 0.5 % (ref 0–0.5)
LYMPHOCYTES # BLD AUTO: 0.88 10*3/MM3 (ref 0.7–3.1)
LYMPHOCYTES NFR BLD AUTO: 10.8 % (ref 19.6–45.3)
MCH RBC QN AUTO: 27.3 PG (ref 26.6–33)
MCHC RBC AUTO-ENTMCNC: 30.6 G/DL (ref 31.5–35.7)
MCV RBC AUTO: 89.3 FL (ref 79–97)
MONOCYTES # BLD AUTO: 0.86 10*3/MM3 (ref 0.1–0.9)
MONOCYTES NFR BLD AUTO: 10.6 % (ref 5–12)
NEUTROPHILS NFR BLD AUTO: 6.18 10*3/MM3 (ref 1.7–7)
NEUTROPHILS NFR BLD AUTO: 75.8 % (ref 42.7–76)
NRBC BLD AUTO-RTO: 0 /100 WBC (ref 0–0.2)
NT-PROBNP SERPL-MCNC: 9518 PG/ML (ref 0–1800)
PLATELET # BLD AUTO: 277 10*3/MM3 (ref 140–450)
PMV BLD AUTO: 11.3 FL (ref 6–12)
POTASSIUM SERPL-SCNC: 4.6 MMOL/L (ref 3.5–5.2)
RBC # BLD AUTO: 3.55 10*6/MM3 (ref 3.77–5.28)
SODIUM SERPL-SCNC: 136 MMOL/L (ref 136–145)
WBC NRBC COR # BLD AUTO: 8.15 10*3/MM3 (ref 3.4–10.8)

## 2025-05-15 PROCEDURE — 97110 THERAPEUTIC EXERCISES: CPT

## 2025-05-15 PROCEDURE — 97530 THERAPEUTIC ACTIVITIES: CPT

## 2025-05-15 PROCEDURE — 71045 X-RAY EXAM CHEST 1 VIEW: CPT

## 2025-05-15 PROCEDURE — 85025 COMPLETE CBC W/AUTO DIFF WBC: CPT | Performed by: INTERNAL MEDICINE

## 2025-05-15 PROCEDURE — 83880 ASSAY OF NATRIURETIC PEPTIDE: CPT | Performed by: INTERNAL MEDICINE

## 2025-05-15 PROCEDURE — 80048 BASIC METABOLIC PNL TOTAL CA: CPT | Performed by: INTERNAL MEDICINE

## 2025-05-15 PROCEDURE — 99232 SBSQ HOSP IP/OBS MODERATE 35: CPT | Performed by: SURGERY

## 2025-05-15 RX ORDER — CITALOPRAM HYDROBROMIDE 20 MG/1
10 TABLET ORAL DAILY
Status: DISCONTINUED | OUTPATIENT
Start: 2025-05-15 | End: 2025-05-27 | Stop reason: HOSPADM

## 2025-05-15 RX ORDER — MIDODRINE HYDROCHLORIDE 5 MG/1
5 TABLET ORAL
Status: DISCONTINUED | OUTPATIENT
Start: 2025-05-15 | End: 2025-05-27 | Stop reason: HOSPADM

## 2025-05-15 NOTE — PROGRESS NOTES
James Ramirez M.D.  SARAH Montejo        Internal Medicine Progress Note    5/15/2025   12:08 CDT    Name:  Genevieve Cerda  MRN:    [id number]     Acct:     [id number]   Room:  [address] [place] [address] Day: 0     Admit Date: 5/7/2025  4:40 PM  PCP: Provider, No Known    Subjective:     C/C: weakness    Interval History: Status:  improved.  Resting in bed eating lunch. Family at bedside. Afebrile. Progressing with therapies. Maintaining adequate 02 sats on room air. Continues with left side chest tube. Denies pain. Appears in no acute distress. Family concerned about depression and confusion - discussed the possible causes including illness, medications, age and changes in routine with hospitalization.     Review of Systems   Constitutional: Positive for malaise/fatigue. Negative for chills, decreased appetite, weight gain and weight loss.   HENT:  Negative for congestion, ear discharge, hoarse voice and tinnitus.    Eyes:  Negative for blurred vision, discharge, visual disturbance and visual halos.   Cardiovascular:  Negative for chest pain, claudication, dyspnea on exertion, irregular heartbeat, leg swelling, orthopnea and paroxysmal nocturnal dyspnea.   Respiratory:  Negative for cough, shortness of breath, sputum production and wheezing.    Endocrine: Negative for cold intolerance, heat intolerance and polyuria.   Hematologic/Lymphatic: Negative for adenopathy. Does not bruise/bleed easily.   Skin:  Negative for dry skin, itching and suspicious lesions.   Musculoskeletal:  Negative for arthritis, back pain, falls, joint pain, muscle weakness and myalgias.   Gastrointestinal:  Negative for abdominal pain, constipation, diarrhea, dysphagia and hematemesis.   Genitourinary:  Negative for bladder incontinence, dysuria and frequency.   Neurological:  Positive for weakness. Negative for aphonia, disturbances in coordination and dizziness.   Psychiatric/Behavioral:  Negative for altered mental status,  depression, memory loss and substance abuse. The patient does not have insomnia and is not nervous/anxious.          Medications:     Allergies:   Allergies   Allergen Reactions    Codeine Itching       Current Meds:   Current Facility-Administered Medications:     acetaminophen (TYLENOL) tablet 650 mg, 650 mg, Oral, Q6H PRN, Nelson Ramirez MD    allopurinol (ZYLOPRIM) tablet 100 mg, 100 mg, Oral, Daily, Nelson Ramirez MD    apixaban (ELIQUIS) tablet 2.5 mg, 2.5 mg, Oral, Q12H, Nelson Ramirez MD    aspirin EC tablet 81 mg, 81 mg, Oral, Daily, Nelson Ramirez MD    dextrose (D50W) (25 g/50 mL) IV injection 25 g, 25 g, Intravenous, Q15 Min PRN, Nelson Ramirez MD    dextrose (GLUTOSE) oral gel 15 g, 15 g, Oral, Q15 Min PRN, Nelson Ramirez MD    [Held by provider] epoetin louis (EPOGEN,PROCRIT) injection 10,000 Units, 10,000 Units, Subcutaneous, Once per day on Monday Wednesday Friday, Nelson Ramirez MD    furosemide (LASIX) tablet 40 mg, 40 mg, Oral, BID Diuretics, Yuniel Del Rosario MD    gabapentin (NEURONTIN) capsule 300 mg, 300 mg, Oral, Q8H, Nelson Ramirez MD    glucagon (GLUCAGEN) injection 1 mg, 1 mg, Subcutaneous, Q15 Min PRN, Nelson Ramirez MD    ipratropium-albuterol (DUO-NEB) nebulizer solution 3 mL, 3 mL, Nebulization, Q6H PRN, Nelson Ramirez MD    latanoprost (XALATAN) 0.005 % ophthalmic solution 1 drop, 1 drop, Both Eyes, Nightly, Nelson Ramirez MD    magnesium oxide (MAG-OX) tablet 400 mg, 400 mg, Oral, Daily, Nelson Ramirez MD    metoprolol tartrate (LOPRESSOR) tablet 25 mg, 25 mg, Oral, Q12H, Nelson Ramirez MD    pantoprazole (PROTONIX) EC tablet 40 mg, 40 mg, Oral, Q AM, Nelson Ramirez MD    polyethylene glycol (MIRALAX) packet 17 g, 17 g, Oral, Daily PRN, Dhaval, Cris R, APRN    potassium chloride (KLOR-CON M10) CR tablet 10 mEq, 10 mEq, Oral, Daily, Nelson Ramirez MD    rosuvastatin  "(CRESTOR) tablet 5 mg, 5 mg, Oral, Nightly, Nelson Ramirez MD    sennosides-docusate (PERICOLACE) 8.6-50 MG per tablet 2 tablet, 2 tablet, Oral, Daily PRN, Cris Puentes APRN    sodium chloride 0.9 % flush 10 mL, 10 mL, Intravenous, Q8H, Yuniel Del Rosario MD    Sterile Talc for Pleurodesis - Sterile Talc 8 gm + Lidocaine PF 2% 20 mL in NS (Total 100 mL) Split Syringe, 8 g, Intrapleural, Once, Aura Hudson APRN    tamsulosin (FLOMAX) 24 hr capsule 0.4 mg, 0.4 mg, Oral, Nightly, Nelson Ramirez MD    timolol (TIMOPTIC) 0.5 % ophthalmic solution 1 drop, 1 drop, Both Eyes, Daily, Nelson Ramirez MD    vitamin D (ERGOCALCIFEROL) capsule 50,000 Units, 50,000 Units, Oral, Q7 Days, Dusty Sapp MD    Data:     Code Status:    There are no questions and answers to display.       No family history on file.    Social History     Socioeconomic History    Marital status: Unknown       Vitals:  Ht 157.5 cm (62.01\")   Wt 63.2 kg (139 lb 4.8 oz)   BMI 25.47 kg/m²   T 98.3 P 96 R 20 /69 Sp02 94% (room air)          I/O (24Hr):  No intake or output data in the 24 hours ending 05/15/25 1208    Labs and imaging:      Recent Results (from the past 12 hours)   Basic Metabolic Panel    Collection Time: 05/15/25  7:28 AM    Specimen: Blood   Result Value Ref Range    Glucose 114 (H) 65 - 99 mg/dL    BUN 84 (H) 8 - 23 mg/dL    Creatinine 1.76 (H) 0.57 - 1.00 mg/dL    Sodium 136 136 - 145 mmol/L    Potassium 4.6 3.5 - 5.2 mmol/L    Chloride 103 98 - 107 mmol/L    CO2 23.0 22.0 - 29.0 mmol/L    Calcium 7.2 (L) 8.6 - 10.5 mg/dL    BUN/Creatinine Ratio 47.7 (H) 7.0 - 25.0    Anion Gap 10.0 5.0 - 15.0 mmol/L    eGFR 28.1 (L) >60.0 mL/min/1.73   BNP    Collection Time: 05/15/25  7:28 AM    Specimen: Blood   Result Value Ref Range    proBNP 9,518.0 (H) 0.0 - 1,800.0 pg/mL   CBC Auto Differential    Collection Time: 05/15/25  7:28 AM    Specimen: Blood   Result Value Ref Range    WBC 8.15 3.40 - " 10.80 10*3/mm3    RBC 3.55 (L) 3.77 - 5.28 10*6/mm3    Hemoglobin 9.7 (L) 12.0 - 15.9 g/dL    Hematocrit 31.7 (L) 34.0 - 46.6 %    MCV 89.3 79.0 - 97.0 fL    MCH 27.3 26.6 - 33.0 pg    MCHC 30.6 (L) 31.5 - 35.7 g/dL    RDW 20.5 (H) 12.3 - 15.4 %    RDW-SD 64.5 (H) 37.0 - 54.0 fl    MPV 11.3 6.0 - 12.0 fL    Platelets 277 140 - 450 10*3/mm3    Neutrophil % 75.8 42.7 - 76.0 %    Lymphocyte % 10.8 (L) 19.6 - 45.3 %    Monocyte % 10.6 5.0 - 12.0 %    Eosinophil % 1.8 0.3 - 6.2 %    Basophil % 0.5 0.0 - 1.5 %    Immature Grans % 0.5 0.0 - 0.5 %    Neutrophils, Absolute 6.18 1.70 - 7.00 10*3/mm3    Lymphocytes, Absolute 0.88 0.70 - 3.10 10*3/mm3    Monocytes, Absolute 0.86 0.10 - 0.90 10*3/mm3    Eosinophils, Absolute 0.15 0.00 - 0.40 10*3/mm3    Basophils, Absolute 0.04 0.00 - 0.20 10*3/mm3    Immature Grans, Absolute 0.04 0.00 - 0.05 10*3/mm3    nRBC 0.0 0.0 - 0.2 /100 WBC                                   Physical Examination:        Physical Exam  Vitals and nursing note reviewed.   Constitutional:       Appearance: Normal appearance.   HENT:      Head: Normocephalic and atraumatic.      Right Ear: External ear normal.      Left Ear: External ear normal.      Nose: Nose normal.      Mouth/Throat:      Mouth: Mucous membranes are dry.      Pharynx: Oropharynx is clear.   Eyes:      Extraocular Movements: Extraocular movements intact.      Conjunctiva/sclera: Conjunctivae normal.      Pupils: Pupils are equal, round, and reactive to light.   Cardiovascular:      Rate and Rhythm: Normal rate and regular rhythm.      Pulses: Normal pulses.      Heart sounds: Normal heart sounds.   Pulmonary:      Effort: Pulmonary effort is normal.      Breath sounds: Normal breath sounds.      Comments: Left chest tube to pleurevac  Abdominal:      General: Bowel sounds are normal.      Palpations: Abdomen is soft.   Musculoskeletal:      Cervical back: Normal range of motion and neck supple.      Comments: Generalized weakness   Skin:      General: Skin is warm and dry.   Neurological:      Mental Status: She is alert and oriented to person, place, and time.      Comments: Intermittent confusion at times   Psychiatric:         Mood and Affect: Mood normal.         Behavior: Behavior normal.           Assessment:            Severe protein-calorie malnutrition    No past medical history on file.     Plan:        Bilateral pleural effusions  CKD4  PAF  Chronic anticoagulation  Chronic diastolic CHF  Solitary kidney s/p left nephrectomy  Urinary retention  Multifactorial anemia  Severe protein calorie malnutrition     Continue current treatment. Monitor counts. Increase activity. Labs Monday. Chest tube management per CTS. Appreciate nephrology assistance. Aggressive therapies as tolerated. Maintain patient safety.       Electronically signed by SARAH Santiago on 5/15/2025 at 12:08 CDT     I have discussed the care of Genevieve Cerda, including pertinent history and exam findings, with the nurse practitioner.    I have seen and examined the patient and the key elements of all parts of the encounter have been performed by me.  I agree with the assessment, plan and orders as documented by SARAH Montejo, after I modified the exam findings and the plan of treatments and the final version is my approved version of the assessment.        Electronically signed by Nelson Ramirez MD on 5/16/2025 at 07:18 CDT

## 2025-05-15 NOTE — PROGRESS NOTES
"Patient name: Genevieve Cerda  Patient : 1940  VISIT # [id number]  MR #[id number]    Procedure:  Procedure Date:  POD:* No surgery found *    Subjective     Chief complaint: Shortness of breath    Patient sleeping in bed on room air.  Left pigtail catheter remains in place at 20 cm suction. Daughter reports patient having some confusion and fever.     ROS: No fevers or chills.  Shortness of breath improving.  No chest pain       Objective     Visit Vitals  Ht 157.5 cm (62.01\")   Wt 63.2 kg (139 lb 4.8 oz)   BMI 25.47 kg/m²   , RR 17, /69, O2 93%    No intake or output data in the 24 hours ending 05/15/25 0815    Left pigtail catheter: 570 mL in 24 hours, serous, no airleak      Lab:     CBC:  Results from last 7 days   Lab Units 05/15/25  0728 25  0340   WBC 10*3/mm3 8.15 10.75   HEMATOCRIT % 31.7* 33.4*   PLATELETS 10*3/mm3 277 327          BMP:  Results from last 7 days   Lab Units 05/15/25  0728 25  0340 25  1117   SODIUM mmol/L 136 139 142   POTASSIUM mmol/L 4.6 4.7 4.2   CHLORIDE mmol/L 103 106 107   CO2 mmol/L 23.0 23.0 24.0   GLUCOSE mg/dL 114* 110* 103*   BUN mg/dL 84* 87* 91*   CREATININE mg/dL 1.76* 1.71* 1.58*          COAG:      Invalid input(s): \"PT\"    IMAGES:       Imaging Results (Last 24 Hours)       Procedure Component Value Units Date/Time    XR Chest 1 View [[id number]] Collected: 05/15/25 0659     Updated: 05/15/25 0704    Narrative:      EXAMINATION: XR CHEST 1 VW- 5/15/2025 6:59 AM     HISTORY: Chest tube.     REPORT: A frontal view of the chest was obtained.     COMPARISON: Chest x-ray 2025 0248 hours.     The left basilar pleural drain appears in satisfactory position as  before. There is improved aeration of the left lung base and no  pneumothorax is identified. There is an increase in opacities overlying  the right lung base and now the right midlung zone, which may be related  to increasing effusion and atelectasis, underlying pneumonia is " not  excluded. The heart is enlarged. Mild central and basilar vascular  crowding without overt changes of CHF. No acute osseous abnormality.       Impression:      Stable satisfactory position of the left basilar pleural  drain, with improved aeration of the left lung base. No pneumothorax is  identified. There is increasing infiltrate and/or effusion on the right  as described above. Cardiomegaly without overt CHF.     This report was signed and finalized on 5/15/2025 7:01 AM by Dr. Alex Aguillon MD.             CXR: Left pigtail intact, no pneumothorax, increasing opacity on the right     Physical Exam:  General: sleeping, awakens easily.  No apparent distress.  Chronically ill-appearing  Cardiovascular: mild tachycardic rate and rhythm without murmur, rubs, or gallops.    Pulmonary: Clear to auscultation bilaterally without wheezing, rubs, or rales.  Chest: Left chest tube to 20 cm suction. No air leak. Fluid is serous.  Abdomen: Soft, nondistended, and nontender.  Extremities: Warm, moves all extremities. No edema.   Neurologic:  Grossly intact with no focal deficits.            Impression:  Bilateral pleural effusions  Congestive heart failure  Chronic kidney disease, stage IV      Plan:  S/p  talc pleurodesis through left pigtail catheter at bedside 5/14/25  Continue left pigtail catheter to 20 cm suction  Daily chest x-ray  Nursing to flush left pigtail catheter with 10 mL normal saline every 8 hours to maintain tube patency  Continue diuresis as renal function will tolerate  Continue protein shakes       CHF and CKD management per attending  Discussed with daughter at bedside      Aura Craft, SARAH  05/15/25  08:15 CDT

## 2025-05-15 NOTE — PROGRESS NOTES
Nephrology ([facility]) Progress Note      Patient:  Genevieve Cerda  YOB: 1940  Date of Service: 5/15/2025  MRN: [id number]   Acct: [id number]   Primary Care Physician: Provider, No Known  Advance Directive:   There are no questions and answers to display.     Admit Date: 5/7/2025       Hospital Day: 0  Referring Provider: Nelson Ramirez MD      Patient personally seen and examined.  Complete chart including Consults, Notes, Operative Reports, Labs, Cardiology, and Radiology studies reviewed as able.        Subjective:  Genevieve Cerda is a 84 y.o. female for whom we were consulted for evaluation and treatment of acute kidney injury with chronic kidney disease stage IIIb.  Patient is followed Dr. Gamez in the office was last seen in January of this year.  Baseline creatinine around 1.6.  More recently she was treated at [facility] for hypoxemic respiratory failure with pleural effusion, MICHOACANO, acidosis, hyponatremia and hyperkalemia.  She was treated with diuretics, antibiotics, dopamine and ultimately had chest tube placements.  Seen with family initially and also reviewed with primary service.  Patient denied chest pain, nausea vomiting.  Denied dysuria or hematuria.  Family noted she has frequent urinary tract infections.     Today, no overnight events.  Has been hypotensive some today. Her metoprolol was held.     Temp- 98.3  Pulse- 96  Resp- 14  BP- 100/68  O2%- 94      Allergies:  Codeine    Home Meds:  No medications prior to admission.       Medicines:  Current Facility-Administered Medications   Medication Dose Route Frequency Provider Last Rate Last Admin    acetaminophen (TYLENOL) tablet 650 mg  650 mg Oral Q6H PRN Nelson Ramirez MD        allopurinol (ZYLOPRIM) tablet 100 mg  100 mg Oral Daily Nelson Ramirez MD        apixaban (ELIQUIS) tablet 2.5 mg  2.5 mg Oral Q12H Nelson Ramirez MD        aspirin EC tablet 81 mg   81 mg Oral Daily Nelson Ramirez MD        dextrose (D50W) (25 g/50 mL) IV injection 25 g  25 g Intravenous Q15 Min PRN Nelson Ramirez MD        dextrose (GLUTOSE) oral gel 15 g  15 g Oral Q15 Min PRN Nelson Ramirez MD        [Held by provider] epoetin louis (EPOGEN,PROCRIT) injection 10,000 Units  10,000 Units Subcutaneous Once per day on Monday Wednesday Friday Nelson Ramirez MD        furosemide (LASIX) tablet 40 mg  40 mg Oral BID Diuretics Yuniel Del Rosario MD        gabapentin (NEURONTIN) capsule 300 mg  300 mg Oral Q8H Nelson Ramirez MD        glucagon (GLUCAGEN) injection 1 mg  1 mg Subcutaneous Q15 Min PRN Nelson Ramirez MD        ipratropium-albuterol (DUO-NEB) nebulizer solution 3 mL  3 mL Nebulization Q6H PRN Nelson Ramirez MD        latanoprost (XALATAN) 0.005 % ophthalmic solution 1 drop  1 drop Both Eyes Nightly Nelson Ramirez MD        magnesium oxide (MAG-OX) tablet 400 mg  400 mg Oral Daily Nelson Ramirez MD        metoprolol tartrate (LOPRESSOR) tablet 25 mg  25 mg Oral Q12H Neslon Ramirez MD        midodrine (PROAMATINE) tablet 5 mg  5 mg Oral BID AC Brennon Gamez MD        pantoprazole (PROTONIX) EC tablet 40 mg  40 mg Oral Q AM Nelson Ramirez MD        polyethylene glycol (MIRALAX) packet 17 g  17 g Oral Daily PRN Cris Puentes APRN        potassium chloride (KLOR-CON M10) CR tablet 10 mEq  10 mEq Oral Daily Nelson Ramirez MD        rosuvastatin (CRESTOR) tablet 5 mg  5 mg Oral Nightly Nelson Ramirez MD        sennosides-docusate (PERICOLACE) 8.6-50 MG per tablet 2 tablet  2 tablet Oral Daily PRN Cris Puentes APRN        sodium chloride 0.9 % flush 10 mL  10 mL Intravenous Q8H Yuniel Del Rosario MD        Sterile Talc for Pleurodesis - Sterile Talc 8 gm + Lidocaine PF 2% 20 mL in NS (Total 100 mL) Split Syringe  8 g Intrapleural Once Aura Hudson APRN        tamsulosin  (FLOMAX) 24 hr capsule 0.4 mg  0.4 mg Oral Nightly Nelson Ramirez MD        timolol (TIMOPTIC) 0.5 % ophthalmic solution 1 drop  1 drop Both Eyes Daily Nelson Ramirez MD        vitamin D (ERGOCALCIFEROL) capsule 50,000 Units  50,000 Units Oral Q7 Days Dusty Sapp MD           Past Medical History:  No past medical history on file.    Past Surgical History:  No past surgical history on file.    Family History  No family history on file.    Social History  Social History     Socioeconomic History    Marital status: Unknown       Review of Systems:  History obtained from chart review and the patient  General ROS: No fever or chills  Respiratory ROS: No cough, shortness of breath, wheezing  Cardiovascular ROS: No chest pain or palpitations  Gastrointestinal ROS: No abdominal pain or melena  Genito-Urinary ROS: No dysuria or hematuria  Psych ROS: No anxiety and depression  14 point ROS reviewed with the patient and negative except as noted above and in the HPI unless unable to obtain.    Objective:  Patient Vitals for the past 24 hrs:   Weight   05/14/25 1341 63.2 kg (139 lb 4.8 oz)       No intake or output data in the 24 hours ending 05/15/25 1223  General: awake/alert   Chest:  Bilat chest expansion  CVS: regular rate and rhythm  Abdominal: soft, nontender, positive bowel sounds  Extremities: no cyanosis or edema  Skin: warm and dry without rash      Labs:  Results from last 7 days   Lab Units 05/15/25  0728 05/12/25  0340   WBC 10*3/mm3 8.15 10.75   HEMOGLOBIN g/dL 9.7* 10.2*   HEMATOCRIT % 31.7* 33.4*   PLATELETS 10*3/mm3 277 327         Results from last 7 days   Lab Units 05/15/25  0728 05/12/25  0340 05/11/25  1117   SODIUM mmol/L 136 139 142   POTASSIUM mmol/L 4.6 4.7 4.2   CHLORIDE mmol/L 103 106 107   CO2 mmol/L 23.0 23.0 24.0   BUN mg/dL 84* 87* 91*   CREATININE mg/dL 1.76* 1.71* 1.58*   CALCIUM mg/dL 7.2* 7.2* 7.5*   EGFR mL/min/1.73 28.1* 29.1* 32.0*   GLUCOSE mg/dL 114* 110*  103*       Radiology:   Imaging Results (Last 72 Hours)       Procedure Component Value Units Date/Time    XR Chest 1 View [[id number]] Collected: 05/15/25 0659     Updated: 05/15/25 0704    Narrative:      EXAMINATION: XR CHEST 1 VW- 5/15/2025 6:59 AM     HISTORY: Chest tube.     REPORT: A frontal view of the chest was obtained.     COMPARISON: Chest x-ray 5/14/2025 0248 hours.     The left basilar pleural drain appears in satisfactory position as  before. There is improved aeration of the left lung base and no  pneumothorax is identified. There is an increase in opacities overlying  the right lung base and now the right midlung zone, which may be related  to increasing effusion and atelectasis, underlying pneumonia is not  excluded. The heart is enlarged. Mild central and basilar vascular  crowding without overt changes of CHF. No acute osseous abnormality.       Impression:      Stable satisfactory position of the left basilar pleural  drain, with improved aeration of the left lung base. No pneumothorax is  identified. There is increasing infiltrate and/or effusion on the right  as described above. Cardiomegaly without overt CHF.     This report was signed and finalized on 5/15/2025 7:01 AM by Dr. Alex Aguillon MD.       XR Chest 1 View [[id number]] Collected: 05/14/25 0735     Updated: 05/14/25 0739    Narrative:      EXAM: XR CHEST 1 VW-      DATE: 5/14/2025 1:34 AM     HISTORY: Chest tube       COMPARISON: 5/13/2025.     TECHNIQUE:  Frontal view(s) of the chest submitted.     FINDINGS:    Pigtail pleural drain remains at the left lung base. There is increasing  opacity at the right mid to lower lung may be due to atelectasis or  pneumonia. Small underlying effusions not excluded. No pneumothorax is  seen. Heart size is within normal limits. There is calcification of the  thoracic aorta.          Impression:         1. Increasing opacity at the right mid to lower lung may be due to  atelectasis or pneumonia.    "  This report was signed and finalized on 5/14/2025 7:36 AM by Ulysses Nolen.       XR Chest 1 View [[id number]] Collected: 05/13/25 0708     Updated: 05/13/25 0712    Narrative:      EXAMINATION: XR CHEST 1 VW-  5/13/2025 7:08 AM     HISTORY: Chest tube     FINDINGS: Today's exam is compared to previous study of 1 day earlier. A  left-sided thoracostomy tube projects over the left lung base. The left  lung is fully expanded and clear with no pneumothorax. There is  elevation of the right diaphragm with right basilar atelectasis. There  is moderate cardiomegaly.       Impression:      1. Elevated right diaphragm with right basilar atelectasis.  2. Left-sided pigtail catheter again projects over the left lung base  with evacuation of previously noted effusion. No pneumothorax.     This report was signed and finalized on 5/13/2025 7:09 AM by Dr. Christiano Arteaga MD.               Culture:  No results found for: \"BLOODCX\", \"URINECX\", \"WOUNDCX\", \"MRSACX\", \"RESPCX\", \"STOOLCX\"      Assessment   Acute kidney injury-renal function is fluctuating some  Chronic kidney disease stage IIIb  Bilateral pleural effusion  Paroxysmal atrial fibrillation  Chronic diastolic congestive heart failure  Solitary kidney due to prior left nephrectomy  Urinary retention  Anemia     Plan:  Discussed with patient, nursing  Follow up labs  Will add midodrine 5 mg bid for pressure support      Brennon Gamez MD  5/15/2025  12:23 CDT      "

## 2025-05-16 ENCOUNTER — APPOINTMENT (OUTPATIENT)
Dept: GENERAL RADIOLOGY | Facility: HOSPITAL | Age: 85
End: 2025-05-16
Payer: COMMERCIAL

## 2025-05-16 PROCEDURE — 71045 X-RAY EXAM CHEST 1 VIEW: CPT

## 2025-05-16 PROCEDURE — 97535 SELF CARE MNGMENT TRAINING: CPT

## 2025-05-16 PROCEDURE — 99232 SBSQ HOSP IP/OBS MODERATE 35: CPT | Performed by: SURGERY

## 2025-05-16 PROCEDURE — 97110 THERAPEUTIC EXERCISES: CPT

## 2025-05-16 PROCEDURE — 97530 THERAPEUTIC ACTIVITIES: CPT

## 2025-05-16 NOTE — PROGRESS NOTES
Nephrology ([facility]) Progress Note      Patient:  Genevieve Cerda  YOB: 1940  Date of Service: 5/16/2025  MRN: [id number]   Acct: [id number]   Primary Care Physician: Provider, No Known  Advance Directive:   There are no questions and answers to display.     Admit Date: 5/7/2025       Hospital Day: 0  Referring Provider: Nelson Ramirez MD      Patient personally seen and examined.  Complete chart including Consults, Notes, Operative Reports, Labs, Cardiology, and Radiology studies reviewed as able.        Subjective:  Genevieve Cerda is a 84 y.o. female for whom we were consulted for evaluation and treatment of acute kidney injury with chronic kidney disease stage IIIb.  Patient is followed Dr. Gamez in the office was last seen in January of this year.  Baseline creatinine around 1.6.  More recently she was treated at [facility] for hypoxemic respiratory failure with pleural effusion, MICHOACANO, acidosis, hyponatremia and hyperkalemia.  She was treated with diuretics, antibiotics, dopamine and ultimately had chest tube placements.  Seen with family initially and also reviewed with primary service.  Patient denied chest pain, nausea vomiting.  Denied dysuria or hematuria.  Family noted she has frequent urinary tract infections.     Today, no overnight events.    Temp- 98.2  Pulse- 86  Resp- 14  BP- 98/66  O2%- 94      Allergies:  Codeine    Home Meds:  No medications prior to admission.       Medicines:  Current Facility-Administered Medications   Medication Dose Route Frequency Provider Last Rate Last Admin    acetaminophen (TYLENOL) tablet 650 mg  650 mg Oral Q6H PRN Nelson Ramirez MD        allopurinol (ZYLOPRIM) tablet 100 mg  100 mg Oral Daily Nelson Ramirez MD        apixaban (ELIQUIS) tablet 2.5 mg  2.5 mg Oral Q12H Nelson Ramirez MD        aspirin EC tablet 81 mg  81 mg Oral Daily Nelson Ramirez MD        citalopram  (CeleXA) tablet 10 mg  10 mg Oral Daily Cande Andino APRN        dextrose (D50W) (25 g/50 mL) IV injection 25 g  25 g Intravenous Q15 Min PRN Nelson Ramirez MD        dextrose (GLUTOSE) oral gel 15 g  15 g Oral Q15 Min PRN Nelson Ramirez MD        [Held by provider] epoetin louis (EPOGEN,PROCRIT) injection 10,000 Units  10,000 Units Subcutaneous Once per day on Monday Wednesday Friday Nelson Ramirez MD        furosemide (LASIX) tablet 40 mg  40 mg Oral BID Diuretics Yuniel Del Rosario MD        gabapentin (NEURONTIN) capsule 300 mg  300 mg Oral Q8H Nelson Ramirez MD        glucagon (GLUCAGEN) injection 1 mg  1 mg Subcutaneous Q15 Min PRN Nelson Ramirez MD        ipratropium-albuterol (DUO-NEB) nebulizer solution 3 mL  3 mL Nebulization Q6H PRN Nelson Ramirez MD        latanoprost (XALATAN) 0.005 % ophthalmic solution 1 drop  1 drop Both Eyes Nightly Nelson Ramirez MD        magnesium oxide (MAG-OX) tablet 400 mg  400 mg Oral Daily Nelson Ramirez MD        metoprolol tartrate (LOPRESSOR) tablet 25 mg  25 mg Oral Q12H Nelson Ramirez MD        midodrine (PROAMATINE) tablet 5 mg  5 mg Oral BID AC Brennon Gamez MD        pantoprazole (PROTONIX) EC tablet 40 mg  40 mg Oral Q AM Nelson Ramirez MD        polyethylene glycol (MIRALAX) packet 17 g  17 g Oral Daily PRN Cris Puentes APRN        potassium chloride (KLOR-CON M10) CR tablet 10 mEq  10 mEq Oral Daily Nelson Ramirez MD        rosuvastatin (CRESTOR) tablet 5 mg  5 mg Oral Nightly Nelson Ramirez MD        sennosides-docusate (PERICOLACE) 8.6-50 MG per tablet 2 tablet  2 tablet Oral Daily PRN Cris uPentes APRN        sodium chloride 0.9 % flush 10 mL  10 mL Intravenous Q8H Yuniel Del Rosario MD        Sterile Talc for Pleurodesis - Sterile Talc 8 gm + Lidocaine PF 2% 20 mL in NS (Total 100 mL) Split Syringe  8 g Intrapleural Once Aura Hudson APRN         tamsulosin (FLOMAX) 24 hr capsule 0.4 mg  0.4 mg Oral Nightly Nelson Ramirez MD        timolol (TIMOPTIC) 0.5 % ophthalmic solution 1 drop  1 drop Both Eyes Daily Nelson Ramirez MD        vitamin D (ERGOCALCIFEROL) capsule 50,000 Units  50,000 Units Oral Q7 Days Dusty Sapp MD           Past Medical History:  No past medical history on file.    Past Surgical History:  No past surgical history on file.    Family History  No family history on file.    Social History  Social History     Socioeconomic History    Marital status: Unknown       Review of Systems:  History obtained from chart review and the patient  General ROS: No fever or chills  Respiratory ROS: No cough, shortness of breath, wheezing  Cardiovascular ROS: No chest pain or palpitations  Gastrointestinal ROS: No abdominal pain or melena  Genito-Urinary ROS: No dysuria or hematuria  Psych ROS: No anxiety and depression  14 point ROS reviewed with the patient and negative except as noted above and in the HPI unless unable to obtain.    Objective:  No data found.      No intake or output data in the 24 hours ending 05/16/25 1538  General: awake/alert   Chest:  Bilat chest expansion  CVS: regular rate and rhythm  Abdominal: soft, nontender, positive bowel sounds  Extremities: no cyanosis or edema  Skin: warm and dry without rash      Labs:  Results from last 7 days   Lab Units 05/15/25  0728 05/12/25  0340   WBC 10*3/mm3 8.15 10.75   HEMOGLOBIN g/dL 9.7* 10.2*   HEMATOCRIT % 31.7* 33.4*   PLATELETS 10*3/mm3 277 327         Results from last 7 days   Lab Units 05/15/25  0728 05/12/25  0340 05/11/25  1117   SODIUM mmol/L 136 139 142   POTASSIUM mmol/L 4.6 4.7 4.2   CHLORIDE mmol/L 103 106 107   CO2 mmol/L 23.0 23.0 24.0   BUN mg/dL 84* 87* 91*   CREATININE mg/dL 1.76* 1.71* 1.58*   CALCIUM mg/dL 7.2* 7.2* 7.5*   EGFR mL/min/1.73 28.1* 29.1* 32.0*   GLUCOSE mg/dL 114* 110* 103*       Radiology:   Imaging Results (Last 72 Hours)        Procedure Component Value Units Date/Time    XR Chest 1 View [[id number]] Collected: 05/16/25 0732     Updated: 05/16/25 0736    Narrative:      EXAM: XR CHEST 1 VW-      DATE: 5/16/2025 2:40 AM     HISTORY: Chest tube       COMPARISON: 5/15/2025.     TECHNIQUE:  Frontal view(s) of the chest submitted.     FINDINGS:    Pigtail pleural drain remains at the left lung base. There is left  basilar opacity likely due to residual fluid and atelectasis. Probable  right pleural effusion remains with persistent but slightly improving  opacity likely improving atelectasis. No pneumothorax is seen. There is  enlargement the cardiac silhouette and calcification of the thoracic  aorta. No pneumothorax is seen.          Impression:         1. Left basilar pleural drain and stable opacity at the left lung base.  2. Slightly improving opacity right lower lung likely improving  atelectasis.     This report was signed and finalized on 5/16/2025 7:33 AM by Ulysses Nolen.       XR Chest 1 View [[id number]] Collected: 05/15/25 0659     Updated: 05/15/25 0704    Narrative:      EXAMINATION: XR CHEST 1 VW- 5/15/2025 6:59 AM     HISTORY: Chest tube.     REPORT: A frontal view of the chest was obtained.     COMPARISON: Chest x-ray 5/14/2025 0248 hours.     The left basilar pleural drain appears in satisfactory position as  before. There is improved aeration of the left lung base and no  pneumothorax is identified. There is an increase in opacities overlying  the right lung base and now the right midlung zone, which may be related  to increasing effusion and atelectasis, underlying pneumonia is not  excluded. The heart is enlarged. Mild central and basilar vascular  crowding without overt changes of CHF. No acute osseous abnormality.       Impression:      Stable satisfactory position of the left basilar pleural  drain, with improved aeration of the left lung base. No pneumothorax is  identified. There is increasing infiltrate and/or  "effusion on the right  as described above. Cardiomegaly without overt CHF.     This report was signed and finalized on 5/15/2025 7:01 AM by Dr. Alex Aguillon MD.       XR Chest 1 View [[id number]] Collected: 05/14/25 0735     Updated: 05/14/25 0739    Narrative:      EXAM: XR CHEST 1 VW-      DATE: 5/14/2025 1:34 AM     HISTORY: Chest tube       COMPARISON: 5/13/2025.     TECHNIQUE:  Frontal view(s) of the chest submitted.     FINDINGS:    Pigtail pleural drain remains at the left lung base. There is increasing  opacity at the right mid to lower lung may be due to atelectasis or  pneumonia. Small underlying effusions not excluded. No pneumothorax is  seen. Heart size is within normal limits. There is calcification of the  thoracic aorta.          Impression:         1. Increasing opacity at the right mid to lower lung may be due to  atelectasis or pneumonia.     This report was signed and finalized on 5/14/2025 7:36 AM by Ulysses Nolen.               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

## 2025-05-16 NOTE — PROGRESS NOTES
"Patient name: Genevieve Cerda  Patient : 1940  VISIT # [id number]  MR #[id number]    Procedure:  Procedure Date:  POD:* No surgery found *    Subjective     Chief complaint: Shortness of breath    Patient sitting up in bed on RA eating breakfast.  Left pigtail catheter remains in place at 20 cm suction.     ROS: No fevers or chills.  Shortness of breath improving.  No chest pain       Objective     Visit Vitals  Ht 157.5 cm (62.01\")   Wt 63.2 kg (139 lb 4.8 oz)   BMI 25.47 kg/m²   HR 93, RR 16, BP 98/66    No intake or output data in the 24 hours ending 25 08    Left pigtail catheter: 280 mL in 24 hours, serous, no airleak      Lab:     CBC:  Results from last 7 days   Lab Units 05/15/25  0728 25  0340   WBC 10*3/mm3 8.15 10.75   HEMATOCRIT % 31.7* 33.4*   PLATELETS 10*3/mm3 277 327          BMP:  Results from last 7 days   Lab Units 05/15/25  0728 25  0340 25  1117   SODIUM mmol/L 136 139 142   POTASSIUM mmol/L 4.6 4.7 4.2   CHLORIDE mmol/L 103 106 107   CO2 mmol/L 23.0 23.0 24.0   GLUCOSE mg/dL 114* 110* 103*   BUN mg/dL 84* 87* 91*   CREATININE mg/dL 1.76* 1.71* 1.58*          COAG:      Invalid input(s): \"PT\"    IMAGES:       Imaging Results (Last 24 Hours)       Procedure Component Value Units Date/Time    XR Chest 1 View [[id number]] Collected: 25     Updated: 2536    Narrative:      EXAM: XR CHEST 1 VW-      DATE: 2025 2:40 AM     HISTORY: Chest tube       COMPARISON: 5/15/2025.     TECHNIQUE:  Frontal view(s) of the chest submitted.     FINDINGS:    Pigtail pleural drain remains at the left lung base. There is left  basilar opacity likely due to residual fluid and atelectasis. Probable  right pleural effusion remains with persistent but slightly improving  opacity likely improving atelectasis. No pneumothorax is seen. There is  enlargement the cardiac silhouette and calcification of the thoracic  aorta. No pneumothorax is seen.          Impression:   "       1. Left basilar pleural drain and stable opacity at the left lung base.  2. Slightly improving opacity right lower lung likely improving  atelectasis.     This report was signed and finalized on 5/16/2025 7:33 AM by Ulysses Nolen.             CXR: Left pigtail intact, no pneumothorax, mildly improving opacity on the right     Physical Exam:  General: sleeping, awakens easily.  No apparent distress.  Chronically ill-appearing  Cardiovascular: regular rate and rhythm without murmur, rubs, or gallops.    Pulmonary: Clear to auscultation bilaterally without wheezing, rubs, or rales.  Chest: Left chest tube to 20 cm suction. No air leak. Fluid is serous.  Abdomen: Soft, nondistended, and nontender.  Extremities: Warm, moves all extremities. No edema.   Neurologic:  Grossly intact with no focal deficits.          Impression:  Bilateral pleural effusions  Congestive heart failure  Chronic kidney disease, stage IV    Plan:  S/p  talc pleurodesis through left pigtail catheter at bedside 5/14/25  Continue left pigtail catheter to 20 cm suction  Daily chest x-ray  Nursing to continue flushing left pigtail catheter with 10 mL normal saline every 8 hours to maintain tube patency.  Continue diuresis as renal function will tolerate  Continue protein shakes       CHF and CKD management per attending  Discussed with patient and nurse       SARAH Armstrong  05/16/25  08:18 CDT

## 2025-05-16 NOTE — PROGRESS NOTES
JOHNATHON Alcala APRN        Internal Medicine Progress Note    5/16/2025   07:38 CDT    Name:  Genevieve Cerda  MRN:    [id number]     Acct:     [id number]   Room:  [address] [place] [address] Day: 0     Admit Date: 5/7/2025  4:40 PM  PCP: Provider, No Known    Subjective:     C/C: weakness    Interval History: Status:  improved.  Resting in bed eating lunch. No family at bedside. Afebrile. Progressing with therapies. Maintaining adequate 02 sats on room air. Continues with left side chest tube. Denies pain. Appears in no acute distress.     Review of Systems   Constitutional: Positive for malaise/fatigue. Negative for chills, decreased appetite, weight gain and weight loss.   HENT:  Negative for congestion, ear discharge, hoarse voice and tinnitus.    Eyes:  Negative for blurred vision, discharge, visual disturbance and visual halos.   Cardiovascular:  Negative for chest pain, claudication, dyspnea on exertion, irregular heartbeat, leg swelling, orthopnea and paroxysmal nocturnal dyspnea.   Respiratory:  Negative for cough, shortness of breath, sputum production and wheezing.    Endocrine: Negative for cold intolerance, heat intolerance and polyuria.   Hematologic/Lymphatic: Negative for adenopathy. Does not bruise/bleed easily.   Skin:  Negative for dry skin, itching and suspicious lesions.   Musculoskeletal:  Negative for arthritis, back pain, falls, joint pain, muscle weakness and myalgias.   Gastrointestinal:  Negative for abdominal pain, constipation, diarrhea, dysphagia and hematemesis.   Genitourinary:  Negative for bladder incontinence, dysuria and frequency.   Neurological:  Positive for weakness. Negative for aphonia, disturbances in coordination and dizziness.   Psychiatric/Behavioral:  Negative for altered mental status, depression, memory loss and substance abuse. The patient does not have insomnia and is not nervous/anxious.          Medications:     Allergies:   Allergies    Allergen Reactions    Codeine Itching       Current Meds:   Current Facility-Administered Medications:     acetaminophen (TYLENOL) tablet 650 mg, 650 mg, Oral, Q6H PRN, Nelson Ramirez MD    allopurinol (ZYLOPRIM) tablet 100 mg, 100 mg, Oral, Daily, Nelson Ramirez MD    apixaban (ELIQUIS) tablet 2.5 mg, 2.5 mg, Oral, Q12H, Nelson Ramirez MD    aspirin EC tablet 81 mg, 81 mg, Oral, Daily, Nelson Ramirez MD    citalopram (CeleXA) tablet 10 mg, 10 mg, Oral, Daily, Cande Andino APRN    dextrose (D50W) (25 g/50 mL) IV injection 25 g, 25 g, Intravenous, Q15 Min PRN, Nelson Ramirez MD    dextrose (GLUTOSE) oral gel 15 g, 15 g, Oral, Q15 Min PRN, Nelson Ramirez MD    [Held by provider] epoetin louis (EPOGEN,PROCRIT) injection 10,000 Units, 10,000 Units, Subcutaneous, Once per day on Monday Wednesday Friday, Nelson Ramirez MD    furosemide (LASIX) tablet 40 mg, 40 mg, Oral, BID Diuretics, Yuniel Del Rosario MD    gabapentin (NEURONTIN) capsule 300 mg, 300 mg, Oral, Q8H, Nelson Ramirez MD    glucagon (GLUCAGEN) injection 1 mg, 1 mg, Subcutaneous, Q15 Min PRN, Nelson Ramirez MD    ipratropium-albuterol (DUO-NEB) nebulizer solution 3 mL, 3 mL, Nebulization, Q6H PRN, Nelson Ramirez MD    latanoprost (XALATAN) 0.005 % ophthalmic solution 1 drop, 1 drop, Both Eyes, Nightly, Nelson Ramirez MD    magnesium oxide (MAG-OX) tablet 400 mg, 400 mg, Oral, Daily, Nelson Ramirez MD    metoprolol tartrate (LOPRESSOR) tablet 25 mg, 25 mg, Oral, Q12H, Nelson Ramirez MD    midodrine (PROAMATINE) tablet 5 mg, 5 mg, Oral, BID AC, Brennon Gamez MD    pantoprazole (PROTONIX) EC tablet 40 mg, 40 mg, Oral, Q AM, Nelson Ramirez MD    polyethylene glycol (MIRALAX) packet 17 g, 17 g, Oral, Daily PRN, Cris Puentes APRN    potassium chloride (KLOR-CON M10) CR tablet 10 mEq, 10 mEq, Oral, Daily, Nelson Ramirez MD     "rosuvastatin (CRESTOR) tablet 5 mg, 5 mg, Oral, Nightly, Nelson Ramirez MD    sennosides-docusate (PERICOLACE) 8.6-50 MG per tablet 2 tablet, 2 tablet, Oral, Daily PRN, Cris Puentes APRN    sodium chloride 0.9 % flush 10 mL, 10 mL, Intravenous, Q8H, Yuniel Del Rosario MD    Sterile Talc for Pleurodesis - Sterile Talc 8 gm + Lidocaine PF 2% 20 mL in NS (Total 100 mL) Split Syringe, 8 g, Intrapleural, Once, Aura Hudson APRN    tamsulosin (FLOMAX) 24 hr capsule 0.4 mg, 0.4 mg, Oral, Nightly, Nelson Ramirez MD    timolol (TIMOPTIC) 0.5 % ophthalmic solution 1 drop, 1 drop, Both Eyes, Daily, Nelson Ramirez MD    vitamin D (ERGOCALCIFEROL) capsule 50,000 Units, 50,000 Units, Oral, Q7 Days, Dusty Sapp MD    Data:     Code Status:    There are no questions and answers to display.       No family history on file.    Social History     Socioeconomic History    Marital status: Unknown       Vitals:  Ht 157.5 cm (62.01\")   Wt 63.2 kg (139 lb 4.8 oz)   BMI 25.47 kg/m²   T 98.2 P 86 R 15 BP 98/68 Sp02 94% (room air)          I/O (24Hr):  No intake or output data in the 24 hours ending 05/16/25 0738    Labs and imaging:      No results found for this or any previous visit (from the past 12 hours).                                  Physical Examination:        Physical Exam  Vitals and nursing note reviewed.   Constitutional:       Appearance: Normal appearance.   HENT:      Head: Normocephalic and atraumatic.      Right Ear: External ear normal.      Left Ear: External ear normal.      Nose: Nose normal.      Mouth/Throat:      Mouth: Mucous membranes are dry.      Pharynx: Oropharynx is clear.   Eyes:      Extraocular Movements: Extraocular movements intact.      Conjunctiva/sclera: Conjunctivae normal.      Pupils: Pupils are equal, round, and reactive to light.   Cardiovascular:      Rate and Rhythm: Normal rate and regular rhythm.      Pulses: Normal pulses.      Heart sounds: " Normal heart sounds.   Pulmonary:      Effort: Pulmonary effort is normal.      Breath sounds: Normal breath sounds.      Comments: Left chest tube to pleurevac  Abdominal:      General: Bowel sounds are normal.      Palpations: Abdomen is soft.   Musculoskeletal:      Cervical back: Normal range of motion and neck supple.      Comments: Generalized weakness   Skin:     General: Skin is warm and dry.   Neurological:      Mental Status: She is alert and oriented to person, place, and time.      Comments: Intermittent confusion at times   Psychiatric:         Mood and Affect: Mood normal.         Behavior: Behavior normal.           Assessment:            Severe protein-calorie malnutrition    No past medical history on file.     Plan:        Bilateral pleural effusions  CKD4  PAF  Chronic anticoagulation  Chronic diastolic CHF  Solitary kidney s/p left nephrectomy  Urinary retention  Multifactorial anemia  Severe protein calorie malnutrition     Continue current treatment. Monitor counts. Increase activity. Labs Monday. Chest tube management per CTS. Appreciate nephrology assistance. Aggressive therapies as tolerated. Maintain patient safety.       Electronically signed by SARAH Santiago on 5/16/2025 at 07:38 CDT     I have discussed the care of Genevieve Cerda, including pertinent history and exam findings, with the nurse practitioner.    I have seen and examined the patient and the key elements of all parts of the encounter have been performed by me.  I agree with the assessment, plan and orders as documented by SARAH Montejo, after I modified the exam findings and the plan of treatments and the final version is my approved version of the assessment.        Electronically signed by Nelson Ramirez MD on 5/16/2025 at 15:28 CDT

## 2025-05-17 ENCOUNTER — APPOINTMENT (OUTPATIENT)
Dept: GENERAL RADIOLOGY | Facility: HOSPITAL | Age: 85
End: 2025-05-17
Payer: COMMERCIAL

## 2025-05-17 LAB — GLUCOSE BLDC GLUCOMTR-MCNC: 169 MG/DL (ref 70–130)

## 2025-05-17 PROCEDURE — 82948 REAGENT STRIP/BLOOD GLUCOSE: CPT

## 2025-05-17 PROCEDURE — 71045 X-RAY EXAM CHEST 1 VIEW: CPT

## 2025-05-17 PROCEDURE — 97530 THERAPEUTIC ACTIVITIES: CPT

## 2025-05-17 NOTE — PROGRESS NOTES
JOHNATHON Alcala APRN        Internal Medicine Progress Note    5/17/2025   07:38 CDT    Name:  Genevieve Cerda  MRN:    [id number]     Acct:     [id number]   Room:  [address] [place] [address] Day: 0     Admit Date: 5/7/2025  4:40 PM  PCP: Provider, No Known    Subjective:     C/C: weakness    Interval History: Status:  improved.  Resting in bed eating lunch. No family at bedside. Afebrile. CT intact on Left. O2 sats stable on room air. Pt denies needs at present. Progressing well with therapy.        Review of Systems   Constitutional: Positive for malaise/fatigue. Negative for chills, decreased appetite, weight gain and weight loss.   HENT:  Negative for congestion, ear discharge, hoarse voice and tinnitus.    Eyes:  Negative for blurred vision, discharge, visual disturbance and visual halos.   Cardiovascular:  Negative for chest pain, claudication, dyspnea on exertion, irregular heartbeat, leg swelling, orthopnea and paroxysmal nocturnal dyspnea.   Respiratory:  Negative for cough, shortness of breath, sputum production and wheezing.    Endocrine: Negative for cold intolerance, heat intolerance and polyuria.   Hematologic/Lymphatic: Negative for adenopathy. Does not bruise/bleed easily.   Skin:  Negative for dry skin, itching and suspicious lesions.   Musculoskeletal:  Negative for arthritis, back pain, falls, joint pain, muscle weakness and myalgias.   Gastrointestinal:  Negative for abdominal pain, constipation, diarrhea, dysphagia and hematemesis.   Genitourinary:  Negative for bladder incontinence, dysuria and frequency.   Neurological:  Positive for weakness. Negative for aphonia, disturbances in coordination and dizziness.   Psychiatric/Behavioral:  Negative for altered mental status, depression, memory loss and substance abuse. The patient does not have insomnia and is not nervous/anxious.          Medications:     Allergies:   Allergies   Allergen Reactions    Codeine Itching        Current Meds:   Current Facility-Administered Medications:     acetaminophen (TYLENOL) tablet 650 mg, 650 mg, Oral, Q6H PRN, Nelson Ramirez MD    allopurinol (ZYLOPRIM) tablet 100 mg, 100 mg, Oral, Daily, Nelson Ramirez MD    apixaban (ELIQUIS) tablet 2.5 mg, 2.5 mg, Oral, Q12H, Nelson Ramirez MD    aspirin EC tablet 81 mg, 81 mg, Oral, Daily, Nelson Ramirez MD    citalopram (CeleXA) tablet 10 mg, 10 mg, Oral, Daily, Cande Andino APRN    dextrose (D50W) (25 g/50 mL) IV injection 25 g, 25 g, Intravenous, Q15 Min PRN, Nelson Ramirez MD    dextrose (GLUTOSE) oral gel 15 g, 15 g, Oral, Q15 Min PRN, Nelson Ramirez MD    [Held by provider] epoetin louis (EPOGEN,PROCRIT) injection 10,000 Units, 10,000 Units, Subcutaneous, Once per day on Monday Wednesday Friday, Nelson Ramirez MD    furosemide (LASIX) tablet 40 mg, 40 mg, Oral, BID Diuretics, Yuniel Del Rosario MD    gabapentin (NEURONTIN) capsule 300 mg, 300 mg, Oral, Q8H, Nelsno Ramirez MD    glucagon (GLUCAGEN) injection 1 mg, 1 mg, Subcutaneous, Q15 Min PRN, Nelson Ramirez MD    ipratropium-albuterol (DUO-NEB) nebulizer solution 3 mL, 3 mL, Nebulization, Q6H PRN, Nelson Ramirez MD    latanoprost (XALATAN) 0.005 % ophthalmic solution 1 drop, 1 drop, Both Eyes, Nightly, Nelson Ramirez MD    magnesium oxide (MAG-OX) tablet 400 mg, 400 mg, Oral, Daily, Nelson Ramirez MD    metoprolol tartrate (LOPRESSOR) tablet 25 mg, 25 mg, Oral, Q12H, Nelson Ramirez MD    midodrine (PROAMATINE) tablet 5 mg, 5 mg, Oral, BID AC, Brennon Gamez MD    pantoprazole (PROTONIX) EC tablet 40 mg, 40 mg, Oral, Q AM, Nelson Ramirez MD    polyethylene glycol (MIRALAX) packet 17 g, 17 g, Oral, Daily PRN, Cris Puentes APRN    potassium chloride (KLOR-CON M10) CR tablet 10 mEq, 10 mEq, Oral, Daily, Nelson Ramirez MD    rosuvastatin (CRESTOR) tablet 5 mg, 5 mg,  "Oral, Nightly, Nelson Ramirez MD    sennosides-docusate (PERICOLACE) 8.6-50 MG per tablet 2 tablet, 2 tablet, Oral, Daily PRN, Cris Puentes APRN    sodium chloride 0.9 % flush 10 mL, 10 mL, Intravenous, Q8H, Yuniel Del Rosario MD    Sterile Talc for Pleurodesis - Sterile Talc 8 gm + Lidocaine PF 2% 20 mL in NS (Total 100 mL) Split Syringe, 8 g, Intrapleural, Once, Auar Hudson APRN    tamsulosin (FLOMAX) 24 hr capsule 0.4 mg, 0.4 mg, Oral, Nightly, Nelson Ramirez MD    timolol (TIMOPTIC) 0.5 % ophthalmic solution 1 drop, 1 drop, Both Eyes, Daily, Nelson Ramirez MD    vitamin D (ERGOCALCIFEROL) capsule 50,000 Units, 50,000 Units, Oral, Q7 Days, Dusty Sapp MD    Data:     Code Status:    There are no questions and answers to display.       No family history on file.    Social History     Socioeconomic History    Marital status: Unknown       Vitals:  Ht 157.5 cm (62.01\")   Wt 63.2 kg (139 lb 4.8 oz)   BMI 25.47 kg/m²   T 98.0 P 99 R 21 /77 Sp02 93% (room air)          I/O (24Hr):  No intake or output data in the 24 hours ending 05/17/25 0738    Labs and imaging:      No results found for this or any previous visit (from the past 12 hours).                                  Physical Examination:        Physical Exam  Vitals and nursing note reviewed.   Constitutional:       Appearance: Normal appearance.   HENT:      Head: Normocephalic and atraumatic.      Right Ear: External ear normal.      Left Ear: External ear normal.      Nose: Nose normal.      Mouth/Throat:      Mouth: Mucous membranes are dry.      Pharynx: Oropharynx is clear.   Eyes:      Extraocular Movements: Extraocular movements intact.      Conjunctiva/sclera: Conjunctivae normal.      Pupils: Pupils are equal, round, and reactive to light.   Cardiovascular:      Rate and Rhythm: Normal rate and regular rhythm.      Pulses: Normal pulses.      Heart sounds: Normal heart sounds.   Pulmonary:      " Effort: Pulmonary effort is normal.      Breath sounds: Normal breath sounds.      Comments: Left chest tube to pleurevac  Abdominal:      General: Bowel sounds are normal.      Palpations: Abdomen is soft.   Musculoskeletal:      Cervical back: Normal range of motion and neck supple.      Comments: Generalized weakness   Skin:     General: Skin is warm and dry.   Neurological:      Mental Status: She is alert and oriented to person, place, and time.      Comments: Intermittent confusion at times   Psychiatric:         Mood and Affect: Mood normal.         Behavior: Behavior normal.           Assessment:            Severe protein-calorie malnutrition    No past medical history on file.     Plan:        Bilateral pleural effusions  CKD4  PAF  Chronic anticoagulation  Chronic diastolic CHF  Solitary kidney s/p left nephrectomy  Urinary retention  Multifactorial anemia  Severe protein calorie malnutrition     Continue current treatment. Monitor counts. Increase activity. Labs Monday. Chest tube management per CTS. Appreciate nephrology assistance. Aggressive therapies as tolerated. Maintain patient safety.       Electronically signed by SARAH Mcneil on 5/17/2025 at 07:38 CDT

## 2025-05-17 NOTE — PROGRESS NOTES
"Patient name: Genevieve Cerda  Patient : 1940  VISIT # [id number]  MR #[id number]    Procedure:  Procedure Date:  POD:* No surgery found *    Subjective     Chief complaint: Shortness of breath    Resting in bed, no acute distress.  Daughter at bedside. Left pigtail catheter remains in place at 20 cm suction.  Bilateral extremity pitting edema.    ROS: No fevers or chills.  Shortness of breath improving.  No chest pain       Objective     Visit Vitals  Ht 157.5 cm (62.01\")   Wt 63.2 kg (139 lb 4.8 oz)   BMI 25.47 kg/m²   /81, heart rate 119 (atrial fibrillation), respirations 21 and SpO2 94% on room air    No intake or output data in the 24 hours ending 25 1214    Left pigtail catheter: 220 mL in 24 hours, serous, no airleak      Lab:     CBC:  Results from last 7 days   Lab Units 05/15/25  0728 25  0340   WBC 10*3/mm3 8.15 10.75   HEMATOCRIT % 31.7* 33.4*   PLATELETS 10*3/mm3 277 327          BMP:  Results from last 7 days   Lab Units 05/15/25  0728 25  0340 25  1117   SODIUM mmol/L 136 139 142   POTASSIUM mmol/L 4.6 4.7 4.2   CHLORIDE mmol/L 103 106 107   CO2 mmol/L 23.0 23.0 24.0   GLUCOSE mg/dL 114* 110* 103*   BUN mg/dL 84* 87* 91*   CREATININE mg/dL 1.76* 1.71* 1.58*          COAG:      Invalid input(s): \"PT\"    IMAGES:       Imaging Results (Last 24 Hours)       Procedure Component Value Units Date/Time    XR Chest 1 View [[id number]] Collected: 25     Updated: 25    Narrative:      EXAM/TECHNIQUE: XR CHEST 1 VW-     INDICATION: Chest tube     COMPARISON: Prior day     FINDINGS:     Left basilar chest tube is stable in position. No change in residual  left basilar pleural/parenchymal opacity. No significant change in  veiling right mid and lower lung zone opacity. No visible pneumothorax.  Cardiac silhouette is stable but partially obscured. Implantable loop  recorder device is noted. No acute osseous finding.       Impression:      1.  Left basilar " chest tube is stable in position. No change in mild  residual left basilar pleural/parenchymal opacity.  2.  No change in veiling right mid and lower lung zone opacity, likely  related to layering pleural effusion and atelectasis.     This report was signed and finalized on 5/17/2025 4:41 AM by Dr. Arnoldo Escobedo MD.             CXR: Left pigtail catheter in place.  No pneumothorax.  Stable with no significant change    Physical Exam:  General: sleeping, awakens easily.  No apparent distress.  Chronically ill-appearing  Cardiovascular: Irregular regular rate and rhythm without murmur, rubs, or gallops.  A-fib  Pulmonary: Clear to auscultation bilaterally without wheezing, rubs, or rales.  Chest: Left chest tube to 20 cm suction. No air leak. Fluid is serous.  Abdomen: Soft, nondistended, and nontender.  Extremities: Warm, moves all extremities.  Bilateral lower extremity pitting edema.   Neurologic:  Grossly intact with no focal deficits.          Impression:  Bilateral pleural effusions  Congestive heart failure  Chronic kidney disease, stage IV    Plan:  S/p  talc pleurodesis through left pigtail catheter at bedside 5/14/25  Continue left pigtail catheter to 20 cm suction  Daily chest x-ray  Nursing to continue flushing left pigtail catheter with 10 mL normal saline every 8 hours to maintain tube patency.  Continue diuresis as renal function will tolerate: Currently receiving p.o. Lasix, recommend changing to IV  Continue protein shakes       CHF and CKD management per attending  Discussed with patient, daughter and nurse       Aminta Kapoor, SARAH  05/17/25  12:14 CDT

## 2025-05-17 NOTE — PROGRESS NOTES
Nephrology ([facility]) Progress Note      Patient:  Genevieve Cerda  YOB: 1940  Date of Service: 5/17/2025  MRN: [id number]   Acct: [id number]   Primary Care Physician: Provider, No Known  Advance Directive:   There are no questions and answers to display.     Admit Date: 5/7/2025       Hospital Day: 0  Referring Provider: Nelson Ramirez MD      Patient personally seen and examined.  Complete chart including Consults, Notes, Operative Reports, Labs, Cardiology, and Radiology studies reviewed as able.        Subjective:  eGnevieve Cerda is a 84 y.o. female for whom we were consulted for evaluation and treatment of acute kidney injury with chronic kidney disease stage IIIb.  Patient is followed Dr. Gamez in the office was last seen in January of this year.  Baseline creatinine around 1.6.  More recently she was treated at [facility] for hypoxemic respiratory failure with pleural effusion, MICHOACANO, acidosis, hyponatremia and hyperkalemia.  She was treated with diuretics, antibiotics, dopamine and ultimately had chest tube placements.  Seen with family initially and also reviewed with primary service.  Patient denied chest pain, nausea vomiting.  Denied dysuria or hematuria.  Family noted she has frequent urinary tract infections.     Today, no overnight events. Has been hypotensive. Chest tube not removed yet.     Temp- 98  Pulse- 99  Resp- 14  BP- 116/77  O2%- 94      Allergies:  Codeine    Home Meds:  No medications prior to admission.       Medicines:  Current Facility-Administered Medications   Medication Dose Route Frequency Provider Last Rate Last Admin    acetaminophen (TYLENOL) tablet 650 mg  650 mg Oral Q6H PRN Nelson Ramirez MD        allopurinol (ZYLOPRIM) tablet 100 mg  100 mg Oral Daily Nelson Ramirez MD        apixaban (ELIQUIS) tablet 2.5 mg  2.5 mg Oral Q12H Nelson Ramirez MD        aspirin EC tablet 81 mg  81 mg Oral  Daily Nelson Ramirez MD        citalopram (CeleXA) tablet 10 mg  10 mg Oral Daily Cande Andino APRN        dextrose (D50W) (25 g/50 mL) IV injection 25 g  25 g Intravenous Q15 Min PRN Nelson Ramirez MD        dextrose (GLUTOSE) oral gel 15 g  15 g Oral Q15 Min PRN Nelson Ramirez MD        [Held by provider] epoetin louis (EPOGEN,PROCRIT) injection 10,000 Units  10,000 Units Subcutaneous Once per day on Monday Wednesday Friday Nelson Ramirez MD        furosemide (LASIX) tablet 40 mg  40 mg Oral BID Diuretics Yuniel Del Rosario MD        gabapentin (NEURONTIN) capsule 300 mg  300 mg Oral Q8H Nelson Ramirez MD        glucagon (GLUCAGEN) injection 1 mg  1 mg Subcutaneous Q15 Min PRN Nelson Ramirez MD        ipratropium-albuterol (DUO-NEB) nebulizer solution 3 mL  3 mL Nebulization Q6H PRN Nelson Ramirez MD        latanoprost (XALATAN) 0.005 % ophthalmic solution 1 drop  1 drop Both Eyes Nightly Nelson Ramirez MD        magnesium oxide (MAG-OX) tablet 400 mg  400 mg Oral Daily Nelson Ramirez MD        metoprolol tartrate (LOPRESSOR) tablet 25 mg  25 mg Oral Q12H Nelson Ramirez MD        midodrine (PROAMATINE) tablet 5 mg  5 mg Oral BID AC Brennon Gamez MD        pantoprazole (PROTONIX) EC tablet 40 mg  40 mg Oral Q AM Nelson Ramirez MD        polyethylene glycol (MIRALAX) packet 17 g  17 g Oral Daily PRN Cris Puentes, APRKARINA        potassium chloride (KLOR-CON M10) CR tablet 10 mEq  10 mEq Oral Daily Nelson Ramirez MD        rosuvastatin (CRESTOR) tablet 5 mg  5 mg Oral Nightly Nelson Ramirez MD        sennosides-docusate (PERICOLACE) 8.6-50 MG per tablet 2 tablet  2 tablet Oral Daily PRN Cris Puentes R, APRKARINA        sodium chloride 0.9 % flush 10 mL  10 mL Intravenous Q8H Yuniel Del Rosario MD        Sterile Talc for Pleurodesis - Sterile Talc 8 gm + Lidocaine PF 2% 20 mL in NS (Total 100 mL) Split  Syringe  8 g Intrapleural Once Aura Hudson APRN        tamsulosin (FLOMAX) 24 hr capsule 0.4 mg  0.4 mg Oral Nightly Nelson Ramirez MD        timolol (TIMOPTIC) 0.5 % ophthalmic solution 1 drop  1 drop Both Eyes Daily Nelson Ramirez MD        vitamin D (ERGOCALCIFEROL) capsule 50,000 Units  50,000 Units Oral Q7 Days Dusty Sapp MD           Past Medical History:  No past medical history on file.    Past Surgical History:  No past surgical history on file.    Family History  No family history on file.    Social History  Social History     Socioeconomic History    Marital status: Unknown       Review of Systems:  History obtained from chart review and the patient  General ROS: No fever or chills  Respiratory ROS: No cough, shortness of breath, wheezing  Cardiovascular ROS: No chest pain or palpitations  Gastrointestinal ROS: No abdominal pain or melena  Genito-Urinary ROS: No dysuria or hematuria  Psych ROS: No anxiety and depression  14 point ROS reviewed with the patient and negative except as noted above and in the HPI unless unable to obtain.    Objective:  No data found.      No intake or output data in the 24 hours ending 05/17/25 1531  General: awake/alert   Chest:  Bilat chest expansion  CVS: regular rate and rhythm  Abdominal: soft, nontender, positive bowel sounds  Extremities: no cyanosis or edema  Skin: warm and dry without rash      Labs:  Results from last 7 days   Lab Units 05/15/25  0728 05/12/25  0340   WBC 10*3/mm3 8.15 10.75   HEMOGLOBIN g/dL 9.7* 10.2*   HEMATOCRIT % 31.7* 33.4*   PLATELETS 10*3/mm3 277 327         Results from last 7 days   Lab Units 05/15/25  0728 05/12/25  0340 05/11/25  1117   SODIUM mmol/L 136 139 142   POTASSIUM mmol/L 4.6 4.7 4.2   CHLORIDE mmol/L 103 106 107   CO2 mmol/L 23.0 23.0 24.0   BUN mg/dL 84* 87* 91*   CREATININE mg/dL 1.76* 1.71* 1.58*   CALCIUM mg/dL 7.2* 7.2* 7.5*   EGFR mL/min/1.73 28.1* 29.1* 32.0*   GLUCOSE mg/dL 114* 110*  103*       Radiology:   Imaging Results (Last 72 Hours)       Procedure Component Value Units Date/Time    XR Chest 1 View [[id number]] Collected: 05/17/25 0440     Updated: 05/17/25 0444    Narrative:      EXAM/TECHNIQUE: XR CHEST 1 VW-     INDICATION: Chest tube     COMPARISON: Prior day     FINDINGS:     Left basilar chest tube is stable in position. No change in residual  left basilar pleural/parenchymal opacity. No significant change in  veiling right mid and lower lung zone opacity. No visible pneumothorax.  Cardiac silhouette is stable but partially obscured. Implantable loop  recorder device is noted. No acute osseous finding.       Impression:      1.  Left basilar chest tube is stable in position. No change in mild  residual left basilar pleural/parenchymal opacity.  2.  No change in veiling right mid and lower lung zone opacity, likely  related to layering pleural effusion and atelectasis.     This report was signed and finalized on 5/17/2025 4:41 AM by Dr. Arnoldo Escobedo MD.       XR Chest 1 View [[id number]] Collected: 05/16/25 0732     Updated: 05/16/25 0736    Narrative:      EXAM: XR CHEST 1 VW-      DATE: 5/16/2025 2:40 AM     HISTORY: Chest tube       COMPARISON: 5/15/2025.     TECHNIQUE:  Frontal view(s) of the chest submitted.     FINDINGS:    Pigtail pleural drain remains at the left lung base. There is left  basilar opacity likely due to residual fluid and atelectasis. Probable  right pleural effusion remains with persistent but slightly improving  opacity likely improving atelectasis. No pneumothorax is seen. There is  enlargement the cardiac silhouette and calcification of the thoracic  aorta. No pneumothorax is seen.          Impression:         1. Left basilar pleural drain and stable opacity at the left lung base.  2. Slightly improving opacity right lower lung likely improving  atelectasis.     This report was signed and finalized on 5/16/2025 7:33 AM by Ulysses Nolen.       XR Chest  "1 View [[id number]] Collected: 05/15/25 0659     Updated: 05/15/25 0704    Narrative:      EXAMINATION: XR CHEST 1 VW- 5/15/2025 6:59 AM     HISTORY: Chest tube.     REPORT: A frontal view of the chest was obtained.     COMPARISON: Chest x-ray 5/14/2025 0248 hours.     The left basilar pleural drain appears in satisfactory position as  before. There is improved aeration of the left lung base and no  pneumothorax is identified. There is an increase in opacities overlying  the right lung base and now the right midlung zone, which may be related  to increasing effusion and atelectasis, underlying pneumonia is not  excluded. The heart is enlarged. Mild central and basilar vascular  crowding without overt changes of CHF. No acute osseous abnormality.       Impression:      Stable satisfactory position of the left basilar pleural  drain, with improved aeration of the left lung base. No pneumothorax is  identified. There is increasing infiltrate and/or effusion on the right  as described above. Cardiomegaly without overt CHF.     This report was signed and finalized on 5/15/2025 7:01 AM by Dr. Alex Aguillon MD.               Culture:  No results found for: \"BLOODCX\", \"URINECX\", \"WOUNDCX\", \"MRSACX\", \"RESPCX\", \"STOOLCX\"      Assessment   Acute kidney injury-renal function is fluctuating some  Chronic kidney disease stage IIIb  Bilateral pleural effusion  Paroxysmal atrial fibrillation  Chronic diastolic congestive heart failure  Solitary kidney due to prior left nephrectomy  Urinary retention  Anemia     Plan:  Discussed with patient, nursing  Follow up labs  Continue midodrine 5 mg bid for pressure support      Brennon Gamez MD  5/17/2025  15:31 CDT      "

## 2025-05-18 ENCOUNTER — APPOINTMENT (OUTPATIENT)
Dept: GENERAL RADIOLOGY | Facility: HOSPITAL | Age: 85
End: 2025-05-18
Payer: COMMERCIAL

## 2025-05-18 PROCEDURE — 71045 X-RAY EXAM CHEST 1 VIEW: CPT

## 2025-05-18 NOTE — PROGRESS NOTES
"Patient name: Genevieve Cerda  Patient : 1940  VISIT # [id number]  MR #[id number]    Procedure:  Procedure Date:  POD:* No surgery found *    Subjective     Chief complaint: Shortness of breath      Resting in bed, no acute distress.  Daughter at bedside.  Left pigtail catheter remains in place at 20 cm suction.  Chest tube output significantly improved 42 mL.      ROS: No fevers or chills.  Shortness of breath improving.  No chest pain       Objective     Visit Vitals  Ht 157.5 cm (62.01\")   Wt 63.2 kg (139 lb 4.8 oz)   BMI 25.47 kg/m²   BP 87/44, heart rate 90 (atrial fibrillation), respirations 15 and SpO2 90% on room air    No intake or output data in the 24 hours ending 25    Left pigtail catheter: 42 mL in 24 hours, serous, no airleak      Lab:     CBC:  Results from last 7 days   Lab Units 05/15/25  0728 25  0340   WBC 10*3/mm3 8.15 10.75   HEMATOCRIT % 31.7* 33.4*   PLATELETS 10*3/mm3 277 327          BMP:  Results from last 7 days   Lab Units 05/15/25  0728 25  0340 25  1117   SODIUM mmol/L 136 139 142   POTASSIUM mmol/L 4.6 4.7 4.2   CHLORIDE mmol/L 103 106 107   CO2 mmol/L 23.0 23.0 24.0   GLUCOSE mg/dL 114* 110* 103*   BUN mg/dL 84* 87* 91*   CREATININE mg/dL 1.76* 1.71* 1.58*          COAG:      Invalid input(s): \"PT\"    IMAGES:       Imaging Results (Last 24 Hours)       Procedure Component Value Units Date/Time    XR Chest 1 View [[id number]] Collected: 25     Updated: 25    Narrative:      EXAM/TECHNIQUE: XR CHEST 1 VW-     INDICATION: Chest tube     COMPARISON: Prior day     FINDINGS:     Left basilar chest tube is stable in position. No change in small  residual left-sided pleural effusion and left lower lung parenchymal  opacity. No change in small right-sided pleural effusion with overlying  atelectasis. No evidence of pneumothorax. Cardiac silhouette is enlarged  but stable. Implantable loop recorder device is noted.       Impression:   "    1.  No change in small residual left pleural effusion with overlying  atelectasis, with left-sided chest tube in place.  2.  No change in small right-sided pleural effusion with overlying  atelectasis.     This report was signed and finalized on 5/18/2025 4:53 AM by Dr. Arnoldo Escobedo MD.             CXR: No pneumothorax.  Left pigtail catheter in place.  Stable with no change.      Physical Exam:  General: sleeping, awakens easily.  No apparent distress.  Chronically ill-appearing  Cardiovascular: Irregular regular rate and rhythm without murmur, rubs, or gallops.  A-fib  Pulmonary: Clear to auscultation bilaterally without wheezing, rubs, or rales.  Chest: Left chest tube to 20 cm suction. No air leak. Fluid is serous.  Abdomen: Soft, nondistended, and nontender.  Extremities: Warm, moves all extremities.  Bilateral lower extremity edema.   Neurologic:  Grossly intact with no focal deficits.          Impression:  Bilateral pleural effusions  Congestive heart failure  Chronic kidney disease, stage IV    Plan:  S/p  talc pleurodesis through left pigtail catheter at bedside 5/14/25  Remove/discontinue left pigtail catheter   PA and lateral chest x-ray in the a.m.  Continue diuresis as renal function will tolerate  Continue protein shakes       CHF and CKD management per attending  Discussed with patient, daughter and nurse       SAARH Lewis  05/18/25  07:21 CDT

## 2025-05-18 NOTE — PROGRESS NOTES
Nephrology ([facility]) Progress Note      Patient:  Genevieve Cerda  YOB: 1940  Date of Service: 5/18/2025  MRN: [id number]   Acct: [id number]   Primary Care Physician: Provider, No Known  Advance Directive:   There are no questions and answers to display.     Admit Date: 5/7/2025       Hospital Day: 0  Referring Provider: Nelson Ramirez MD      Patient personally seen and examined.  Complete chart including Consults, Notes, Operative Reports, Labs, Cardiology, and Radiology studies reviewed as able.        Subjective:  Genevieve Cerda is a 84 y.o. female for whom we were consulted for evaluation and treatment of acute kidney injury with chronic kidney disease stage IIIb.  Patient is followed Dr. Gamez in the office was last seen in January of this year.  Baseline creatinine around 1.6.  More recently she was treated at [facility] for hypoxemic respiratory failure with pleural effusion, MICHOACANO, acidosis, hyponatremia and hyperkalemia.  She was treated with diuretics, antibiotics, dopamine and ultimately had chest tube placements.  Seen with family initially and also reviewed with primary service.  Patient denied chest pain, nausea vomiting.  Denied dysuria or hematuria.  Family noted she has frequent urinary tract infections.     Today, no overnight events. Still hypotensive some. Chest tube to be removed.     Temp- 98  Pulse- 84  Resp- 14  BP- 92/47  O2%- 94      Allergies:  Codeine    Home Meds:  No medications prior to admission.       Medicines:  Current Facility-Administered Medications   Medication Dose Route Frequency Provider Last Rate Last Admin    acetaminophen (TYLENOL) tablet 650 mg  650 mg Oral Q6H PRN Nelson Ramirez MD        allopurinol (ZYLOPRIM) tablet 100 mg  100 mg Oral Daily Nelson Ramirez MD        apixaban (ELIQUIS) tablet 2.5 mg  2.5 mg Oral Q12H Nelson Ramirez MD        aspirin EC tablet 81 mg  81 mg Oral  Daily Nelson Ramirez MD        citalopram (CeleXA) tablet 10 mg  10 mg Oral Daily Cande Andino APRN        dextrose (D50W) (25 g/50 mL) IV injection 25 g  25 g Intravenous Q15 Min PRN Nelson Ramirez MD        dextrose (GLUTOSE) oral gel 15 g  15 g Oral Q15 Min PRN Nelson Ramirez MD        [Held by provider] epoetin louis (EPOGEN,PROCRIT) injection 10,000 Units  10,000 Units Subcutaneous Once per day on Monday Wednesday Friday Nelson Ramirez MD        furosemide (LASIX) tablet 40 mg  40 mg Oral BID Diuretics Yuniel Del Rosario MD        gabapentin (NEURONTIN) capsule 300 mg  300 mg Oral Q8H Nelson Ramirez MD        glucagon (GLUCAGEN) injection 1 mg  1 mg Subcutaneous Q15 Min PRN Nelson Ramirez MD        ipratropium-albuterol (DUO-NEB) nebulizer solution 3 mL  3 mL Nebulization Q6H PRN Nelson Ramirez MD        latanoprost (XALATAN) 0.005 % ophthalmic solution 1 drop  1 drop Both Eyes Nightly Nelson Ramirez MD        magnesium oxide (MAG-OX) tablet 400 mg  400 mg Oral Daily Nelson Ramirez MD        metoprolol tartrate (LOPRESSOR) tablet 25 mg  25 mg Oral Q12H Nelson Ramirez MD        midodrine (PROAMATINE) tablet 5 mg  5 mg Oral BID AC Brennon Gamez MD        pantoprazole (PROTONIX) EC tablet 40 mg  40 mg Oral Q AM Nelson Ramirez MD        polyethylene glycol (MIRALAX) packet 17 g  17 g Oral Daily PRN Cris Puentes, APRKARINA        potassium chloride (KLOR-CON M10) CR tablet 10 mEq  10 mEq Oral Daily Nelson Ramirez MD        rosuvastatin (CRESTOR) tablet 5 mg  5 mg Oral Nightly Nelson Ramirez MD        sennosides-docusate (PERICOLACE) 8.6-50 MG per tablet 2 tablet  2 tablet Oral Daily PRN Cris Puentes R, APRKARINA        sodium chloride 0.9 % flush 10 mL  10 mL Intravenous Q8H Yuniel Del Rosario MD        Sterile Talc for Pleurodesis - Sterile Talc 8 gm + Lidocaine PF 2% 20 mL in NS (Total 100 mL) Split  Syringe  8 g Intrapleural Once Aura Hudson APRN        tamsulosin (FLOMAX) 24 hr capsule 0.4 mg  0.4 mg Oral Nightly Nelson Ramirez MD        timolol (TIMOPTIC) 0.5 % ophthalmic solution 1 drop  1 drop Both Eyes Daily Nelson Ramirez MD        vitamin D (ERGOCALCIFEROL) capsule 50,000 Units  50,000 Units Oral Q7 Days Dusty Sapp MD           Past Medical History:  No past medical history on file.    Past Surgical History:  No past surgical history on file.    Family History  No family history on file.    Social History  Social History     Socioeconomic History    Marital status: Unknown       Review of Systems:  History obtained from chart review and the patient  General ROS: No fever or chills  Respiratory ROS: No cough, shortness of breath, wheezing  Cardiovascular ROS: No chest pain or palpitations  Gastrointestinal ROS: No abdominal pain or melena  Genito-Urinary ROS: No dysuria or hematuria  Psych ROS: No anxiety and depression  14 point ROS reviewed with the patient and negative except as noted above and in the HPI unless unable to obtain.    Objective:  No data found.      No intake or output data in the 24 hours ending 05/18/25 1431  General: awake/alert   Chest:  Bilat chest expansion  CVS: regular rate and rhythm  Abdominal: soft, nontender, positive bowel sounds  Extremities: no cyanosis or edema  Skin: warm and dry without rash      Labs:  Results from last 7 days   Lab Units 05/15/25  0728 05/12/25  0340   WBC 10*3/mm3 8.15 10.75   HEMOGLOBIN g/dL 9.7* 10.2*   HEMATOCRIT % 31.7* 33.4*   PLATELETS 10*3/mm3 277 327         Results from last 7 days   Lab Units 05/15/25  0728 05/12/25  0340   SODIUM mmol/L 136 139   POTASSIUM mmol/L 4.6 4.7   CHLORIDE mmol/L 103 106   CO2 mmol/L 23.0 23.0   BUN mg/dL 84* 87*   CREATININE mg/dL 1.76* 1.71*   CALCIUM mg/dL 7.2* 7.2*   EGFR mL/min/1.73 28.1* 29.1*   GLUCOSE mg/dL 114* 110*       Radiology:   Imaging Results (Last 72 Hours)        Procedure Component Value Units Date/Time    XR Chest 1 View [[id number]] Collected: 05/18/25 0451     Updated: 05/18/25 0456    Narrative:      EXAM/TECHNIQUE: XR CHEST 1 VW-     INDICATION: Chest tube     COMPARISON: Prior day     FINDINGS:     Left basilar chest tube is stable in position. No change in small  residual left-sided pleural effusion and left lower lung parenchymal  opacity. No change in small right-sided pleural effusion with overlying  atelectasis. No evidence of pneumothorax. Cardiac silhouette is enlarged  but stable. Implantable loop recorder device is noted.       Impression:      1.  No change in small residual left pleural effusion with overlying  atelectasis, with left-sided chest tube in place.  2.  No change in small right-sided pleural effusion with overlying  atelectasis.     This report was signed and finalized on 5/18/2025 4:53 AM by Dr. Arnoldo Escobedo MD.       XR Chest 1 View [[id number]] Collected: 05/17/25 0440     Updated: 05/17/25 0444    Narrative:      EXAM/TECHNIQUE: XR CHEST 1 VW-     INDICATION: Chest tube     COMPARISON: Prior day     FINDINGS:     Left basilar chest tube is stable in position. No change in residual  left basilar pleural/parenchymal opacity. No significant change in  veiling right mid and lower lung zone opacity. No visible pneumothorax.  Cardiac silhouette is stable but partially obscured. Implantable loop  recorder device is noted. No acute osseous finding.       Impression:      1.  Left basilar chest tube is stable in position. No change in mild  residual left basilar pleural/parenchymal opacity.  2.  No change in veiling right mid and lower lung zone opacity, likely  related to layering pleural effusion and atelectasis.     This report was signed and finalized on 5/17/2025 4:41 AM by Dr. Arnoldo Escobedo MD.       XR Chest 1 View [[id number]] Collected: 05/16/25 0732     Updated: 05/16/25 0736    Narrative:      EXAM: XR CHEST 1 VW-      DATE:  "5/16/2025 2:40 AM     HISTORY: Chest tube       COMPARISON: 5/15/2025.     TECHNIQUE:  Frontal view(s) of the chest submitted.     FINDINGS:    Pigtail pleural drain remains at the left lung base. There is left  basilar opacity likely due to residual fluid and atelectasis. Probable  right pleural effusion remains with persistent but slightly improving  opacity likely improving atelectasis. No pneumothorax is seen. There is  enlargement the cardiac silhouette and calcification of the thoracic  aorta. No pneumothorax is seen.          Impression:         1. Left basilar pleural drain and stable opacity at the left lung base.  2. Slightly improving opacity right lower lung likely improving  atelectasis.     This report was signed and finalized on 5/16/2025 7:33 AM by Ulysses Nolen.               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

## 2025-05-18 NOTE — PROGRESS NOTES
James Ramirez M.D.  SARAH Montejo        Internal Medicine Progress Note    5/18/2025   08:43 CDT    Name:  Genevieve Cerda  MRN:    [id number]     Acct:     [id number]   Room:  [address] [place] [address] Day: 0     Admit Date: 5/7/2025  4:40 PM  PCP: Provider, No Known    Subjective:     C/C: weakness    Interval History: Status:  improved.  Resting in bed eating lunch. No family at bedside. Afebrile. Dr. Laird discussed with staff yesterday primary adding IV lasix due to potential PO not working well for pt. RN discussed with me, I deferred to nephrology.  Nephrology did not want to change to IV lasix due to renal functions.      Review of Systems   Constitutional: Positive for malaise/fatigue. Negative for chills, decreased appetite, weight gain and weight loss.   HENT:  Negative for congestion, ear discharge, hoarse voice and tinnitus.    Eyes:  Negative for blurred vision, discharge, visual disturbance and visual halos.   Cardiovascular:  Negative for chest pain, claudication, dyspnea on exertion, irregular heartbeat, leg swelling, orthopnea and paroxysmal nocturnal dyspnea.   Respiratory:  Negative for cough, shortness of breath, sputum production and wheezing.    Endocrine: Negative for cold intolerance, heat intolerance and polyuria.   Hematologic/Lymphatic: Negative for adenopathy. Does not bruise/bleed easily.   Skin:  Negative for dry skin, itching and suspicious lesions.   Musculoskeletal:  Negative for arthritis, back pain, falls, joint pain, muscle weakness and myalgias.   Gastrointestinal:  Negative for abdominal pain, constipation, diarrhea, dysphagia and hematemesis.   Genitourinary:  Negative for bladder incontinence, dysuria and frequency.   Neurological:  Positive for weakness. Negative for aphonia, disturbances in coordination and dizziness.   Psychiatric/Behavioral:  Negative for altered mental status, depression, memory loss and substance abuse. The patient does not have insomnia  and is not nervous/anxious.          Medications:     Allergies:   Allergies   Allergen Reactions    Codeine Itching       Current Meds:   Current Facility-Administered Medications:     acetaminophen (TYLENOL) tablet 650 mg, 650 mg, Oral, Q6H PRN, Nelson Ramirez MD    allopurinol (ZYLOPRIM) tablet 100 mg, 100 mg, Oral, Daily, Nelson Ramirez MD    apixaban (ELIQUIS) tablet 2.5 mg, 2.5 mg, Oral, Q12H, Nelson Ramirez MD    aspirin EC tablet 81 mg, 81 mg, Oral, Daily, Nelson Ramirez MD    citalopram (CeleXA) tablet 10 mg, 10 mg, Oral, Daily, Cande Andino APRN    dextrose (D50W) (25 g/50 mL) IV injection 25 g, 25 g, Intravenous, Q15 Min PRN, Nelson Ramirez MD    dextrose (GLUTOSE) oral gel 15 g, 15 g, Oral, Q15 Min PRN, Nelson Ramirez MD    [Held by provider] epoetin louis (EPOGEN,PROCRIT) injection 10,000 Units, 10,000 Units, Subcutaneous, Once per day on Monday Wednesday Friday, Nelson Ramirez MD    furosemide (LASIX) tablet 40 mg, 40 mg, Oral, BID Diuretics, Yuniel Del Rosario MD    gabapentin (NEURONTIN) capsule 300 mg, 300 mg, Oral, Q8H, Nelson Ramirez MD    glucagon (GLUCAGEN) injection 1 mg, 1 mg, Subcutaneous, Q15 Min PRN, Nelson Ramirez MD    ipratropium-albuterol (DUO-NEB) nebulizer solution 3 mL, 3 mL, Nebulization, Q6H PRN, Nelson Ramirez MD    latanoprost (XALATAN) 0.005 % ophthalmic solution 1 drop, 1 drop, Both Eyes, Nightly, Nelson Ramirez MD    magnesium oxide (MAG-OX) tablet 400 mg, 400 mg, Oral, Daily, Nelson Ramirez MD    metoprolol tartrate (LOPRESSOR) tablet 25 mg, 25 mg, Oral, Q12H, Nelson Ramirez MD    midodrine (PROAMATINE) tablet 5 mg, 5 mg, Oral, BID AC, Brennon Gamez MD    pantoprazole (PROTONIX) EC tablet 40 mg, 40 mg, Oral, Q AM, Nelson Ramirez MD    polyethylene glycol (MIRALAX) packet 17 g, 17 g, Oral, Daily PRN, Cris Puentes, SARAH    potassium chloride  "(KLOR-CON M10) CR tablet 10 mEq, 10 mEq, Oral, Daily, Nelson Ramirez MD    rosuvastatin (CRESTOR) tablet 5 mg, 5 mg, Oral, Nightly, Nelson Ramirez MD    sennosides-docusate (PERICOLACE) 8.6-50 MG per tablet 2 tablet, 2 tablet, Oral, Daily PRN, Cris Puentes, APRKARINA    sodium chloride 0.9 % flush 10 mL, 10 mL, Intravenous, Q8H, Yuniel Del Rosario MD    Sterile Talc for Pleurodesis - Sterile Talc 8 gm + Lidocaine PF 2% 20 mL in NS (Total 100 mL) Split Syringe, 8 g, Intrapleural, Once, Aura Hudson APRN    tamsulosin (FLOMAX) 24 hr capsule 0.4 mg, 0.4 mg, Oral, Nightly, Nelson Ramirez MD    timolol (TIMOPTIC) 0.5 % ophthalmic solution 1 drop, 1 drop, Both Eyes, Daily, Nelson Ramirez MD    vitamin D (ERGOCALCIFEROL) capsule 50,000 Units, 50,000 Units, Oral, Q7 Days, Dusty Sapp MD    Data:     Code Status:    There are no questions and answers to display.       No family history on file.    Social History     Socioeconomic History    Marital status: Unknown       Vitals:  Ht 157.5 cm (62.01\")   Wt 63.2 kg (139 lb 4.8 oz)   BMI 25.47 kg/m²   T 97.5 P 84 R 13 BP 92/47 Sp02 95% (room air)          I/O (24Hr):  No intake or output data in the 24 hours ending 05/18/25 0843    Labs and imaging:      No results found for this or any previous visit (from the past 12 hours).                                  Physical Examination:        Physical Exam  Vitals and nursing note reviewed.   Constitutional:       Appearance: Normal appearance.   HENT:      Head: Normocephalic and atraumatic.      Right Ear: External ear normal.      Left Ear: External ear normal.      Nose: Nose normal.      Mouth/Throat:      Mouth: Mucous membranes are dry.      Pharynx: Oropharynx is clear.   Eyes:      Extraocular Movements: Extraocular movements intact.      Conjunctiva/sclera: Conjunctivae normal.      Pupils: Pupils are equal, round, and reactive to light.   Cardiovascular:      Rate and " Rhythm: Normal rate and regular rhythm.      Pulses: Normal pulses.      Heart sounds: Normal heart sounds.   Pulmonary:      Effort: Pulmonary effort is normal.      Breath sounds: Normal breath sounds.      Comments: Left chest tube to pleurevac  Abdominal:      General: Bowel sounds are normal.      Palpations: Abdomen is soft.   Musculoskeletal:      Cervical back: Normal range of motion and neck supple.      Comments: Generalized weakness   Skin:     General: Skin is warm and dry.   Neurological:      Mental Status: She is alert and oriented to person, place, and time.      Comments: Intermittent confusion at times   Psychiatric:         Mood and Affect: Mood normal.         Behavior: Behavior normal.           Assessment:            Severe protein-calorie malnutrition    No past medical history on file.     Plan:        Bilateral pleural effusions  CKD4  PAF  Chronic anticoagulation  Chronic diastolic CHF  Solitary kidney s/p left nephrectomy  Urinary retention  Multifactorial anemia  Severe protein calorie malnutrition     Continue current treatment. Monitor counts. Increase activity. Labs Monday. Chest tube management per CTS. Appreciate nephrology assistance. Aggressive therapies as tolerated. Maintain patient safety.       Electronically signed by SARAH Mcneil on 5/18/2025 at 08:43 CDT     I have discussed the care of Genevieve Cerda, including pertinent history and exam findings, with the nurse practitioner.    I have seen and examined the patient and the key elements of all parts of the encounter have been performed by me.  I agree with the assessment, plan and orders as documented by SARAH Mackey, after I modified the exam findings and the plan of treatments and the final version is my approved version of the assessment.        Electronically signed by Nelson Ramirez MD on 5/18/2025 at 20:31 CDT

## 2025-05-19 ENCOUNTER — APPOINTMENT (OUTPATIENT)
Dept: GENERAL RADIOLOGY | Facility: HOSPITAL | Age: 85
End: 2025-05-19
Payer: COMMERCIAL

## 2025-05-19 DIAGNOSIS — J90 PLEURAL EFFUSION: Primary | ICD-10-CM

## 2025-05-19 LAB
ANION GAP SERPL CALCULATED.3IONS-SCNC: 12 MMOL/L (ref 5–15)
BASOPHILS # BLD AUTO: 0.05 10*3/MM3 (ref 0–0.2)
BASOPHILS NFR BLD AUTO: 1 % (ref 0–1.5)
BUN SERPL-MCNC: 87 MG/DL (ref 8–23)
BUN/CREAT SERPL: 40.3 (ref 7–25)
CALCIUM SPEC-SCNC: 7.4 MG/DL (ref 8.6–10.5)
CHLORIDE SERPL-SCNC: 101 MMOL/L (ref 98–107)
CO2 SERPL-SCNC: 23 MMOL/L (ref 22–29)
CREAT SERPL-MCNC: 2.16 MG/DL (ref 0.57–1)
DEPRECATED RDW RBC AUTO: 61.8 FL (ref 37–54)
EGFRCR SERPLBLD CKD-EPI 2021: 22 ML/MIN/1.73
EOSINOPHIL # BLD AUTO: 0.52 10*3/MM3 (ref 0–0.4)
EOSINOPHIL NFR BLD AUTO: 10.8 % (ref 0.3–6.2)
ERYTHROCYTE [DISTWIDTH] IN BLOOD BY AUTOMATED COUNT: 19.9 % (ref 12.3–15.4)
GLUCOSE SERPL-MCNC: 91 MG/DL (ref 65–99)
HCT VFR BLD AUTO: 31.3 % (ref 34–46.6)
HGB BLD-MCNC: 9.4 G/DL (ref 12–15.9)
IMM GRANULOCYTES # BLD AUTO: 0.01 10*3/MM3 (ref 0–0.05)
IMM GRANULOCYTES NFR BLD AUTO: 0.2 % (ref 0–0.5)
LYMPHOCYTES # BLD AUTO: 0.75 10*3/MM3 (ref 0.7–3.1)
LYMPHOCYTES NFR BLD AUTO: 15.6 % (ref 19.6–45.3)
MCH RBC QN AUTO: 26.4 PG (ref 26.6–33)
MCHC RBC AUTO-ENTMCNC: 30 G/DL (ref 31.5–35.7)
MCV RBC AUTO: 87.9 FL (ref 79–97)
MONOCYTES # BLD AUTO: 0.36 10*3/MM3 (ref 0.1–0.9)
MONOCYTES NFR BLD AUTO: 7.5 % (ref 5–12)
NEUTROPHILS NFR BLD AUTO: 3.11 10*3/MM3 (ref 1.7–7)
NEUTROPHILS NFR BLD AUTO: 64.9 % (ref 42.7–76)
NRBC BLD AUTO-RTO: 0 /100 WBC (ref 0–0.2)
NT-PROBNP SERPL-MCNC: 8725 PG/ML (ref 0–1800)
PLATELET # BLD AUTO: 261 10*3/MM3 (ref 140–450)
PMV BLD AUTO: 11.1 FL (ref 6–12)
POTASSIUM SERPL-SCNC: 4.6 MMOL/L (ref 3.5–5.2)
RBC # BLD AUTO: 3.56 10*6/MM3 (ref 3.77–5.28)
SODIUM SERPL-SCNC: 136 MMOL/L (ref 136–145)
WBC NRBC COR # BLD AUTO: 4.8 10*3/MM3 (ref 3.4–10.8)

## 2025-05-19 PROCEDURE — 97110 THERAPEUTIC EXERCISES: CPT

## 2025-05-19 PROCEDURE — 97530 THERAPEUTIC ACTIVITIES: CPT

## 2025-05-19 PROCEDURE — 71046 X-RAY EXAM CHEST 2 VIEWS: CPT

## 2025-05-19 PROCEDURE — 83880 ASSAY OF NATRIURETIC PEPTIDE: CPT | Performed by: INTERNAL MEDICINE

## 2025-05-19 PROCEDURE — 97535 SELF CARE MNGMENT TRAINING: CPT

## 2025-05-19 PROCEDURE — 85025 COMPLETE CBC W/AUTO DIFF WBC: CPT | Performed by: INTERNAL MEDICINE

## 2025-05-19 PROCEDURE — 80048 BASIC METABOLIC PNL TOTAL CA: CPT | Performed by: INTERNAL MEDICINE

## 2025-05-19 NOTE — PROGRESS NOTES
James Ramirez M.D.  SARAH Montejo        Internal Medicine Progress Note    5/19/2025   09:55 CDT    Name:  Genevieve Cerda  MRN:    [id number]     Acct:     [id number]   Room:  [address] [place] [address] Day: 0     Admit Date: 5/7/2025  4:40 PM  PCP: Provider, No Known    Subjective:     C/C: weakness    Interval History: Status:  improved.  Up to side of bed with therapy. Family at bedside. Afebrile. Progressing with therapies. Maintaining adequate 02 sats on room air. Denies pain at present. Slight decline in renal function. Counts otherwise stable. Doing well s/p chest tube removal.     Review of Systems   Constitutional: Positive for malaise/fatigue. Negative for chills, decreased appetite, weight gain and weight loss.   HENT:  Negative for congestion, ear discharge, hoarse voice and tinnitus.    Eyes:  Negative for blurred vision, discharge, visual disturbance and visual halos.   Cardiovascular:  Negative for chest pain, claudication, dyspnea on exertion, irregular heartbeat, leg swelling, orthopnea and paroxysmal nocturnal dyspnea.   Respiratory:  Negative for cough, shortness of breath, sputum production and wheezing.    Endocrine: Negative for cold intolerance, heat intolerance and polyuria.   Hematologic/Lymphatic: Negative for adenopathy. Does not bruise/bleed easily.   Skin:  Negative for dry skin, itching and suspicious lesions.   Musculoskeletal:  Negative for arthritis, back pain, falls, joint pain, muscle weakness and myalgias.   Gastrointestinal:  Negative for abdominal pain, constipation, diarrhea, dysphagia and hematemesis.   Genitourinary:  Negative for bladder incontinence, dysuria and frequency.   Neurological:  Positive for weakness. Negative for aphonia, disturbances in coordination and dizziness.   Psychiatric/Behavioral:  Negative for altered mental status, depression, memory loss and substance abuse. The patient does not have insomnia and is not nervous/anxious.           Medications:     Allergies:   Allergies   Allergen Reactions    Codeine Itching       Current Meds:   Current Facility-Administered Medications:     acetaminophen (TYLENOL) tablet 650 mg, 650 mg, Oral, Q6H PRN, Nelson Ramirez MD    allopurinol (ZYLOPRIM) tablet 100 mg, 100 mg, Oral, Daily, Nelson Ramirez MD    apixaban (ELIQUIS) tablet 2.5 mg, 2.5 mg, Oral, Q12H, Nelson Ramirez MD    aspirin EC tablet 81 mg, 81 mg, Oral, Daily, Nelson Ramirez MD    citalopram (CeleXA) tablet 10 mg, 10 mg, Oral, Daily, Cande Andino APRN    dextrose (D50W) (25 g/50 mL) IV injection 25 g, 25 g, Intravenous, Q15 Min PRN, Nelson Ramirez MD    dextrose (GLUTOSE) oral gel 15 g, 15 g, Oral, Q15 Min PRN, Nelson Ramirez MD    [Held by provider] epoetin louis (EPOGEN,PROCRIT) injection 10,000 Units, 10,000 Units, Subcutaneous, Once per day on Monday Wednesday Friday, Nelson Ramirez MD    furosemide (LASIX) tablet 40 mg, 40 mg, Oral, BID Diuretics, Yuniel Del Rosario MD    gabapentin (NEURONTIN) capsule 300 mg, 300 mg, Oral, Q8H, Nelson Ramirez MD    glucagon (GLUCAGEN) injection 1 mg, 1 mg, Subcutaneous, Q15 Min PRN, Nelson Ramirez MD    ipratropium-albuterol (DUO-NEB) nebulizer solution 3 mL, 3 mL, Nebulization, Q6H PRN, Nelson Ramirez MD    latanoprost (XALATAN) 0.005 % ophthalmic solution 1 drop, 1 drop, Both Eyes, Nightly, Nelson Ramirez MD    magnesium oxide (MAG-OX) tablet 400 mg, 400 mg, Oral, Daily, Nelson Ramirez MD    metoprolol tartrate (LOPRESSOR) tablet 25 mg, 25 mg, Oral, Q12H, Nelson Ramirez MD    midodrine (PROAMATINE) tablet 5 mg, 5 mg, Oral, BID AC, Brennon Gamez MD    pantoprazole (PROTONIX) EC tablet 40 mg, 40 mg, Oral, Q AM, Nelson Ramirez MD    polyethylene glycol (MIRALAX) packet 17 g, 17 g, Oral, Daily PRN, Cris Puentes APRN    potassium chloride (KLOR-CON M10) CR tablet 10 mEq, 10  "mEq, Oral, Daily, Nelson Ramirez MD    rosuvastatin (CRESTOR) tablet 5 mg, 5 mg, Oral, Nightly, Nelson Ramirez MD    sennosides-docusate (PERICOLACE) 8.6-50 MG per tablet 2 tablet, 2 tablet, Oral, Daily PRN, Cris Puentes APRN    sodium chloride 0.9 % flush 10 mL, 10 mL, Intravenous, Q8H, Yuniel Del Rosario MD    Sterile Talc for Pleurodesis - Sterile Talc 8 gm + Lidocaine PF 2% 20 mL in NS (Total 100 mL) Split Syringe, 8 g, Intrapleural, Once, Aura Hudson APRN    tamsulosin (FLOMAX) 24 hr capsule 0.4 mg, 0.4 mg, Oral, Nightly, Nelson Ramirez MD    timolol (TIMOPTIC) 0.5 % ophthalmic solution 1 drop, 1 drop, Both Eyes, Daily, Nelson Ramirez MD    vitamin D (ERGOCALCIFEROL) capsule 50,000 Units, 50,000 Units, Oral, Q7 Days, Dusty Sapp MD    Data:     Code Status:    There are no questions and answers to display.       No family history on file.    Social History     Socioeconomic History    Marital status: Unknown       Vitals:  Ht 157.5 cm (62.01\")   Wt 63.9 kg (140 lb 12.8 oz)   BMI 25.75 kg/m²   T 97.8 P 87 R 14 /69 Sp02 94% (room air)          I/O (24Hr):  No intake or output data in the 24 hours ending 05/19/25 0955    Labs and imaging:      Recent Results (from the past 12 hours)   BNP    Collection Time: 05/19/25  5:22 AM    Specimen: Blood   Result Value Ref Range    proBNP 8,725.0 (H) 0.0 - 1,800.0 pg/mL   Basic Metabolic Panel    Collection Time: 05/19/25  5:22 AM    Specimen: Blood   Result Value Ref Range    Glucose 91 65 - 99 mg/dL    BUN 87 (H) 8 - 23 mg/dL    Creatinine 2.16 (H) 0.57 - 1.00 mg/dL    Sodium 136 136 - 145 mmol/L    Potassium 4.6 3.5 - 5.2 mmol/L    Chloride 101 98 - 107 mmol/L    CO2 23.0 22.0 - 29.0 mmol/L    Calcium 7.4 (L) 8.6 - 10.5 mg/dL    BUN/Creatinine Ratio 40.3 (H) 7.0 - 25.0    Anion Gap 12.0 5.0 - 15.0 mmol/L    eGFR 22.0 (L) >60.0 mL/min/1.73   CBC Auto Differential    Collection Time: 05/19/25  5:22 AM    " Specimen: Blood   Result Value Ref Range    WBC 4.80 3.40 - 10.80 10*3/mm3    RBC 3.56 (L) 3.77 - 5.28 10*6/mm3    Hemoglobin 9.4 (L) 12.0 - 15.9 g/dL    Hematocrit 31.3 (L) 34.0 - 46.6 %    MCV 87.9 79.0 - 97.0 fL    MCH 26.4 (L) 26.6 - 33.0 pg    MCHC 30.0 (L) 31.5 - 35.7 g/dL    RDW 19.9 (H) 12.3 - 15.4 %    RDW-SD 61.8 (H) 37.0 - 54.0 fl    MPV 11.1 6.0 - 12.0 fL    Platelets 261 140 - 450 10*3/mm3    Neutrophil % 64.9 42.7 - 76.0 %    Lymphocyte % 15.6 (L) 19.6 - 45.3 %    Monocyte % 7.5 5.0 - 12.0 %    Eosinophil % 10.8 (H) 0.3 - 6.2 %    Basophil % 1.0 0.0 - 1.5 %    Immature Grans % 0.2 0.0 - 0.5 %    Neutrophils, Absolute 3.11 1.70 - 7.00 10*3/mm3    Lymphocytes, Absolute 0.75 0.70 - 3.10 10*3/mm3    Monocytes, Absolute 0.36 0.10 - 0.90 10*3/mm3    Eosinophils, Absolute 0.52 (H) 0.00 - 0.40 10*3/mm3    Basophils, Absolute 0.05 0.00 - 0.20 10*3/mm3    Immature Grans, Absolute 0.01 0.00 - 0.05 10*3/mm3    nRBC 0.0 0.0 - 0.2 /100 WBC                                     Physical Examination:        Physical Exam  Vitals and nursing note reviewed.   Constitutional:       Appearance: Normal appearance.   HENT:      Head: Normocephalic and atraumatic.      Right Ear: External ear normal.      Left Ear: External ear normal.      Nose: Nose normal.      Mouth/Throat:      Mouth: Mucous membranes are dry.      Pharynx: Oropharynx is clear.   Eyes:      Extraocular Movements: Extraocular movements intact.      Conjunctiva/sclera: Conjunctivae normal.      Pupils: Pupils are equal, round, and reactive to light.   Cardiovascular:      Rate and Rhythm: Normal rate and regular rhythm.      Pulses: Normal pulses.      Heart sounds: Normal heart sounds.   Pulmonary:      Effort: Pulmonary effort is normal.      Breath sounds: Normal breath sounds.   Abdominal:      General: Bowel sounds are normal.      Palpations: Abdomen is soft.   Musculoskeletal:      Cervical back: Normal range of motion and neck supple.      Comments:  Generalized weakness   Skin:     General: Skin is warm and dry.   Neurological:      Mental Status: She is alert and oriented to person, place, and time.      Comments: Intermittent confusion at times   Psychiatric:         Mood and Affect: Mood normal.         Behavior: Behavior normal.           Assessment:            Severe protein-calorie malnutrition    No past medical history on file.     Plan:        Bilateral pleural effusions  CKD4  PAF  Chronic anticoagulation  Chronic diastolic CHF  Solitary kidney s/p left nephrectomy  Urinary retention  Multifactorial anemia  Severe protein calorie malnutrition     Continue current treatment. Monitor counts. Increase activity. Labs Thursday.  Appreciate nephrology assistance. Aggressive therapies as tolerated. Maintain patient safety. Monitor renal function closely.       Electronically signed by SARAH Santiago on 5/19/2025 at 09:55 CDT

## 2025-05-19 NOTE — PROGRESS NOTES
Nephrology ([facility]) Progress Note      Patient:  Genevieve Cerda  YOB: 1940  Date of Service: 5/19/2025  MRN: [id number]   Acct: [id number]   Primary Care Physician: Provider, No Known  Advance Directive:   There are no questions and answers to display.     Admit Date: 5/7/2025       Hospital Day: 0  Referring Provider: Nelson Ramirez MD      Patient personally seen and examined.  Complete chart including Consults, Notes, Operative Reports, Labs, Cardiology, and Radiology studies reviewed as able.        Subjective:  Genevieve Cerda is a 84 y.o. female for whom we were consulted for evaluation and treatment of acute kidney injury with chronic kidney disease stage IIIb. Patient is followed Dr. Gamez in the office was last seen in January of this year. Baseline creatinine around 1.6. More recently she was treated at [facility] for hypoxemic respiratory failure with pleural effusion, MICHOACANO, acidosis, hyponatremia and hyperkalemia. She was treated with diuretics, antibiotics, dopamine and ultimately had chest tube placements. Seen with family initially and also reviewed with primary service. Patient denied chest pain, nausea vomiting. Denied dysuria or hematuria. Family noted she has frequent urinary tract infections.     Today, no overnight events.  Both catheters have been removed since my last evaluation.  Seen with family.  Up in chair.  Denies new complaints upon questioning.  Worsened labs reviewed with family and family.    Temp- 97.8  Pulse- 87  Resp- 14  BP- 116/69  O2%- 94      Allergies:  Codeine    Home Meds:  No medications prior to admission.       Medicines:  Current Facility-Administered Medications   Medication Dose Route Frequency Provider Last Rate Last Admin    acetaminophen (TYLENOL) tablet 650 mg  650 mg Oral Q6H PRN Nelson Ramirez MD        allopurinol (ZYLOPRIM) tablet 100 mg  100 mg Oral Daily Nelson Ramirez  MD        apixaban (ELIQUIS) tablet 2.5 mg  2.5 mg Oral Q12H Nelson Ramirez MD        aspirin EC tablet 81 mg  81 mg Oral Daily Nelson Ramirez MD        citalopram (CeleXA) tablet 10 mg  10 mg Oral Daily Cande Andino APRN        dextrose (D50W) (25 g/50 mL) IV injection 25 g  25 g Intravenous Q15 Min PRN Nelson Ramirez MD        dextrose (GLUTOSE) oral gel 15 g  15 g Oral Q15 Min PRN Nelson Ramirez MD        [Held by provider] epoetin louis (EPOGEN,PROCRIT) injection 10,000 Units  10,000 Units Subcutaneous Once per day on Monday Wednesday Friday Nelson Ramirez MD        furosemide (LASIX) tablet 40 mg  40 mg Oral BID Diuretics Yuniel Del Rosario MD        gabapentin (NEURONTIN) capsule 300 mg  300 mg Oral Q8H Nelson Ramirez MD        glucagon (GLUCAGEN) injection 1 mg  1 mg Subcutaneous Q15 Min PRN Nelson Ramirez MD        ipratropium-albuterol (DUO-NEB) nebulizer solution 3 mL  3 mL Nebulization Q6H PRN Nelson Ramirez MD        latanoprost (XALATAN) 0.005 % ophthalmic solution 1 drop  1 drop Both Eyes Nightly Nelson Ramirez MD        magnesium oxide (MAG-OX) tablet 400 mg  400 mg Oral Daily Nelson Ramirez MD        metoprolol tartrate (LOPRESSOR) tablet 25 mg  25 mg Oral Q12H Nelson Ramirez MD        midodrine (PROAMATINE) tablet 5 mg  5 mg Oral BID AC Brennon Gamez MD        pantoprazole (PROTONIX) EC tablet 40 mg  40 mg Oral Q AM Nelson Ramirez MD        polyethylene glycol (MIRALAX) packet 17 g  17 g Oral Daily PRN Cris Puentes, SARAH        potassium chloride (KLOR-CON M10) CR tablet 10 mEq  10 mEq Oral Daily Nelson Ramirez MD        rosuvastatin (CRESTOR) tablet 5 mg  5 mg Oral Nightly Nelson Ramirez MD        sennosides-docusate (PERICOLACE) 8.6-50 MG per tablet 2 tablet  2 tablet Oral Daily PRN Cris Puentes APRN        sodium chloride 0.9 % flush 10 mL  10 mL Intravenous Q8H  Yuniel Del Rosario MD        tamsulosin (FLOMAX) 24 hr capsule 0.4 mg  0.4 mg Oral Nightly Nelson Ramirez MD        timolol (TIMOPTIC) 0.5 % ophthalmic solution 1 drop  1 drop Both Eyes Daily Nelson Ramirez MD        vitamin D (ERGOCALCIFEROL) capsule 50,000 Units  50,000 Units Oral Q7 Days Dusty Sapp MD           Past Medical History:  No past medical history on file.    Past Surgical History:  No past surgical history on file.    Family History  No family history on file.    Social History  Social History     Socioeconomic History    Marital status: Unknown       Review of Systems:  History obtained from chart review and the patient  General ROS: No fever or chills  Respiratory ROS: No cough, shortness of breath, wheezing  Cardiovascular ROS: No chest pain or palpitations  Gastrointestinal ROS: No abdominal pain or melena  Genito-Urinary ROS: No dysuria or hematuria  Psych ROS: No anxiety and depression  14 point ROS reviewed with the patient and negative except as noted above and in the HPI unless unable to obtain.    Objective:  Patient Vitals for the past 24 hrs:   Weight   05/19/25 0500 63.9 kg (140 lb 12.8 oz)     No intake or output data in the 24 hours ending 05/19/25 1846  General: awake/alert   Chest:  clear to auscultation bilaterally without respiratory distress  CVS: regular rate and rhythm  Abdominal: soft, nontender, positive bowel sounds  Extremities: no cyanosis or edema  Skin: warm and dry without rash      Labs:  Results from last 7 days   Lab Units 05/19/25  0522 05/15/25  0728   WBC 10*3/mm3 4.80 8.15   HEMOGLOBIN g/dL 9.4* 9.7*   HEMATOCRIT % 31.3* 31.7*   PLATELETS 10*3/mm3 261 277         Results from last 7 days   Lab Units 05/19/25  0522 05/15/25  0728   SODIUM mmol/L 136 136   POTASSIUM mmol/L 4.6 4.6   CHLORIDE mmol/L 101 103   CO2 mmol/L 23.0 23.0   BUN mg/dL 87* 84*   CREATININE mg/dL 2.16* 1.76*   CALCIUM mg/dL 7.4* 7.2*   EGFR mL/min/1.73 22.0* 28.1*    GLUCOSE mg/dL 91 114*       Radiology:   Imaging Results (Last 72 Hours)       Procedure Component Value Units Date/Time    XR Chest PA & Lateral [[id number]] Collected: 05/19/25 1241     Updated: 05/19/25 1255    Narrative:      EXAMINATION: XR CHEST PA AND LATERAL- 5/19/2025 12:41 PM     HISTORY: Chest tube removal.     REPORT: Frontal and lateral views of the chest were obtained.     COMPARISON: Chest x-ray 5/18/2025.     The left basilar chest tube has been removed, no pneumothorax is  identified. There are opacities in both lung bases, much of which  probably represent atelectasis, persistent right pleural effusion is  suspected, small in size. The heart is enlarged, and implanted cardiac  monitoring device is noted over the left chest as before. Mild  persistent interstitial prominence.       Impression:      Removal of the left basilar chest tube, no pneumothorax is  seen. Probable trace residual left pleural effusion and there is a small  unchanged right pleural effusion. Moderate volume loss in the lung bases  cardiomegaly with no overt CHF changes.     This report was signed and finalized on 5/19/2025 12:52 PM by Dr. Alex Aguillon MD.       XR Chest 1 View [[id number]] Collected: 05/18/25 0451     Updated: 05/18/25 0456    Narrative:      EXAM/TECHNIQUE: XR CHEST 1 VW-     INDICATION: Chest tube     COMPARISON: Prior day     FINDINGS:     Left basilar chest tube is stable in position. No change in small  residual left-sided pleural effusion and left lower lung parenchymal  opacity. No change in small right-sided pleural effusion with overlying  atelectasis. No evidence of pneumothorax. Cardiac silhouette is enlarged  but stable. Implantable loop recorder device is noted.       Impression:      1.  No change in small residual left pleural effusion with overlying  atelectasis, with left-sided chest tube in place.  2.  No change in small right-sided pleural effusion with overlying  atelectasis.     This  "report was signed and finalized on 5/18/2025 4:53 AM by Dr. Arnoldo Escobedo MD.       XR Chest 1 View [[id number]] Collected: 05/17/25 0440     Updated: 05/17/25 0444    Narrative:      EXAM/TECHNIQUE: XR CHEST 1 VW-     INDICATION: Chest tube     COMPARISON: Prior day     FINDINGS:     Left basilar chest tube is stable in position. No change in residual  left basilar pleural/parenchymal opacity. No significant change in  veiling right mid and lower lung zone opacity. No visible pneumothorax.  Cardiac silhouette is stable but partially obscured. Implantable loop  recorder device is noted. No acute osseous finding.       Impression:      1.  Left basilar chest tube is stable in position. No change in mild  residual left basilar pleural/parenchymal opacity.  2.  No change in veiling right mid and lower lung zone opacity, likely  related to layering pleural effusion and atelectasis.     This report was signed and finalized on 5/17/2025 4:41 AM by Dr. Arnoldo Escobedo MD.               Culture:  No results found for: \"BLOODCX\", \"URINECX\", \"WOUNDCX\", \"MRSACX\", \"RESPCX\", \"STOOLCX\"      Assessment   Acute kidney injury-renal function is fluctuating some  Chronic kidney disease stage IIIb  Bilateral pleural effusion  Paroxysmal atrial fibrillation  Chronic diastolic congestive heart failure  Solitary kidney due to prior left nephrectomy  Urinary retention  Anemia     Plan:  Discussed with patient, nursing, family  Monitor labs daily given worsening renal function  Diuretics with close clinical laboratory monitoring  Surgical evaluation reviewed his current  Continue midodrine 5 mg bid for pressure support        Dusty Sapp MD  5/19/2025  18:46 CDT      "

## 2025-05-19 NOTE — PROGRESS NOTES
"Patient name: Genevieve Cerda  Patient : 1940  VISIT # [id number]  MR #[id number]    Procedure:  Procedure Date:  POD:* No surgery found *    Subjective     Chief complaint: Shortness of breath    Patient sitting up in chair on RA.  Left pigtail catheter removed yesterday without remark.    ROS: No fevers or chills.  Shortness of breath improving.  No chest pain       Objective     Visit Vitals  Ht 157.5 cm (62.01\")   Wt 63.9 kg (140 lb 12.8 oz)   BMI 25.75 kg/m²   HR 93, RR 16, BP 98/66    No intake or output data in the 24 hours ending 25 1227        Lab:     CBC:  Results from last 7 days   Lab Units 25  0522 05/15/25  0728   WBC 10*3/mm3 4.80 8.15   HEMATOCRIT % 31.3* 31.7*   PLATELETS 10*3/mm3 261 277          BMP:  Results from last 7 days   Lab Units 25  0522 05/15/25  0728   SODIUM mmol/L 136 136   POTASSIUM mmol/L 4.6 4.6   CHLORIDE mmol/L 101 103   CO2 mmol/L 23.0 23.0   GLUCOSE mg/dL 91 114*   BUN mg/dL 87* 84*   CREATININE mg/dL 2.16* 1.76*          COAG:      Invalid input(s): \"PT\"    IMAGES:       Imaging Results (Last 24 Hours)       Procedure Component Value Units Date/Time    XR Chest PA & Lateral [[id number]] Resulted: 25 1133     Updated: 25 1134          CXR: Ongoing right pleural effusion.  No left pleural effusion.  No pneumothorax.    Physical Exam:  General: Alert and oriented.  No apparent distress.  Chronically ill-appearing  Cardiovascular: regular rate and rhythm without murmur, rubs, or gallops.    Pulmonary: Clear to auscultation bilaterally without wheezing, rubs, or rales.  Abdomen: Soft, nondistended, and nontender.  Extremities: Warm, moves all extremities. No edema.   Neurologic:  Grossly intact with no focal deficits.          Impression:  Bilateral pleural effusions  Congestive heart failure  Chronic kidney disease, stage IV    Plan:  S/p  talc pleurodesis through left pigtail catheter at bedside 25  Left pigtail catheter removed " yesterday without remark.  Chest xray today is stable.    Continue diuresis as renal function will tolerate  Continue protein shakes     CT Surgery will sign off.  Please do not hesitate to call with questions or concerns.  Follow up with SARAH Glass in 1 month with CXR    CHF and CKD management per attending  Discussed with patient and nurse       SARAH Giraldo  05/19/25  12:27 CDT

## 2025-05-20 ENCOUNTER — APPOINTMENT (OUTPATIENT)
Dept: GENERAL RADIOLOGY | Facility: HOSPITAL | Age: 85
End: 2025-05-20
Payer: COMMERCIAL

## 2025-05-20 LAB
ANION GAP SERPL CALCULATED.3IONS-SCNC: 13 MMOL/L (ref 5–15)
BUN SERPL-MCNC: 87 MG/DL (ref 8–23)
BUN/CREAT SERPL: 39.7 (ref 7–25)
CALCIUM SPEC-SCNC: 7.4 MG/DL (ref 8.6–10.5)
CHLORIDE SERPL-SCNC: 100 MMOL/L (ref 98–107)
CO2 SERPL-SCNC: 22 MMOL/L (ref 22–29)
CREAT SERPL-MCNC: 2.19 MG/DL (ref 0.57–1)
EGFRCR SERPLBLD CKD-EPI 2021: 21.6 ML/MIN/1.73
GLUCOSE SERPL-MCNC: 89 MG/DL (ref 65–99)
MAGNESIUM SERPL-MCNC: 2.3 MG/DL (ref 1.6–2.4)
PHOSPHATE SERPL-MCNC: 4.4 MG/DL (ref 2.5–4.5)
POTASSIUM SERPL-SCNC: 4.7 MMOL/L (ref 3.5–5.2)
SODIUM SERPL-SCNC: 135 MMOL/L (ref 136–145)

## 2025-05-20 PROCEDURE — 97110 THERAPEUTIC EXERCISES: CPT

## 2025-05-20 PROCEDURE — 97530 THERAPEUTIC ACTIVITIES: CPT

## 2025-05-20 PROCEDURE — 84100 ASSAY OF PHOSPHORUS: CPT | Performed by: INTERNAL MEDICINE

## 2025-05-20 PROCEDURE — 92507 TX SP LANG VOICE COMM INDIV: CPT

## 2025-05-20 PROCEDURE — 92526 ORAL FUNCTION THERAPY: CPT

## 2025-05-20 PROCEDURE — 80048 BASIC METABOLIC PNL TOTAL CA: CPT | Performed by: INTERNAL MEDICINE

## 2025-05-20 PROCEDURE — 83735 ASSAY OF MAGNESIUM: CPT | Performed by: INTERNAL MEDICINE

## 2025-05-20 NOTE — PROGRESS NOTES
JOHNATHON Alcala APRN        Internal Medicine Progress Note    5/20/2025   11:59 CDT    Name:  Genevieve Cerda  MRN:    [id number]     Acct:     [id number]   Room:  [address] [place] [address] Day: 0     Admit Date: 5/7/2025  4:40 PM  PCP: Provider, No Known    Subjective:     C/C: weakness    Interval History: Status:  improved.  Up to chair. Family at bedside. Afebrile. Progressing with therapies. Maintaining adequate 02 sats on room air. Denies pain at present. Slight decline in renal function. Counts otherwise stable. Doing well s/p chest tube removal.     Review of Systems   Constitutional: Positive for malaise/fatigue. Negative for chills, decreased appetite, weight gain and weight loss.   HENT:  Negative for congestion, ear discharge, hoarse voice and tinnitus.    Eyes:  Negative for blurred vision, discharge, visual disturbance and visual halos.   Cardiovascular:  Negative for chest pain, claudication, dyspnea on exertion, irregular heartbeat, leg swelling, orthopnea and paroxysmal nocturnal dyspnea.   Respiratory:  Negative for cough, shortness of breath, sputum production and wheezing.    Endocrine: Negative for cold intolerance, heat intolerance and polyuria.   Hematologic/Lymphatic: Negative for adenopathy. Does not bruise/bleed easily.   Skin:  Negative for dry skin, itching and suspicious lesions.   Musculoskeletal:  Negative for arthritis, back pain, falls, joint pain, muscle weakness and myalgias.   Gastrointestinal:  Negative for abdominal pain, constipation, diarrhea, dysphagia and hematemesis.   Genitourinary:  Negative for bladder incontinence, dysuria and frequency.   Neurological:  Positive for weakness. Negative for aphonia, disturbances in coordination and dizziness.   Psychiatric/Behavioral:  Negative for altered mental status, depression, memory loss and substance abuse. The patient does not have insomnia and is not nervous/anxious.          Medications:      Allergies:   Allergies   Allergen Reactions    Codeine Itching       Current Meds:   Current Facility-Administered Medications:     acetaminophen (TYLENOL) tablet 650 mg, 650 mg, Oral, Q6H PRN, Nelson Ramirez MD    allopurinol (ZYLOPRIM) tablet 100 mg, 100 mg, Oral, Daily, Nelson Ramirez MD    apixaban (ELIQUIS) tablet 2.5 mg, 2.5 mg, Oral, Q12H, Nelson Ramirez MD    aspirin EC tablet 81 mg, 81 mg, Oral, Daily, Nelson Ramirez MD    citalopram (CeleXA) tablet 10 mg, 10 mg, Oral, Daily, Cande Andino APRN    dextrose (D50W) (25 g/50 mL) IV injection 25 g, 25 g, Intravenous, Q15 Min PRN, Nelson Ramirez MD    dextrose (GLUTOSE) oral gel 15 g, 15 g, Oral, Q15 Min PRN, Nelson Ramirez MD    [Held by provider] epoetin louis (EPOGEN,PROCRIT) injection 10,000 Units, 10,000 Units, Subcutaneous, Once per day on Monday Wednesday Friday, Nelson Ramirez MD    furosemide (LASIX) tablet 40 mg, 40 mg, Oral, BID Diuretics, Yuniel Del Rosario MD    gabapentin (NEURONTIN) capsule 300 mg, 300 mg, Oral, Q8H, Nelson Ramirez MD    glucagon (GLUCAGEN) injection 1 mg, 1 mg, Subcutaneous, Q15 Min PRN, Nelson Ramirez MD    ipratropium-albuterol (DUO-NEB) nebulizer solution 3 mL, 3 mL, Nebulization, Q6H PRN, Nelson Ramirez MD    latanoprost (XALATAN) 0.005 % ophthalmic solution 1 drop, 1 drop, Both Eyes, Nightly, Nelson Ramirez MD    magnesium oxide (MAG-OX) tablet 400 mg, 400 mg, Oral, Daily, Nelson Ramirez MD    metoprolol tartrate (LOPRESSOR) tablet 25 mg, 25 mg, Oral, Q12H, Nelson Ramirez MD    midodrine (PROAMATINE) tablet 5 mg, 5 mg, Oral, BID AC, Brennon Gamez MD    pantoprazole (PROTONIX) EC tablet 40 mg, 40 mg, Oral, Q AM, Nelson Ramirez MD    polyethylene glycol (MIRALAX) packet 17 g, 17 g, Oral, Daily PRN, Cris Puentes APRN    potassium chloride (KLOR-CON M10) CR tablet 10 mEq, 10 mEq, Oral, Daily,  "Nelson Ramirez MD    rosuvastatin (CRESTOR) tablet 5 mg, 5 mg, Oral, Nightly, Nelson Ramirez MD    sennosides-docusate (PERICOLACE) 8.6-50 MG per tablet 2 tablet, 2 tablet, Oral, Daily PRN, Cris Puentes RSRAAH    tamsulosin (FLOMAX) 24 hr capsule 0.4 mg, 0.4 mg, Oral, Nightly, Nelson Ramirez MD    timolol (TIMOPTIC) 0.5 % ophthalmic solution 1 drop, 1 drop, Both Eyes, Daily, Nelson Ramirez MD    vitamin D (ERGOCALCIFEROL) capsule 50,000 Units, 50,000 Units, Oral, Q7 Days, Dusty Sapp MD    Data:     Code Status:    There are no questions and answers to display.       No family history on file.    Social History     Socioeconomic History    Marital status: Unknown       Vitals:  Ht 157.5 cm (62.01\")   Wt 63.9 kg (140 lb 12.8 oz)   BMI 25.75 kg/m²   T 97.9 P 73 R 16 /81 Sp02 92% (room air)          I/O (24Hr):  No intake or output data in the 24 hours ending 05/20/25 1159    Labs and imaging:      Recent Results (from the past 12 hours)   Basic Metabolic Panel    Collection Time: 05/20/25  6:04 AM    Specimen: Blood   Result Value Ref Range    Glucose 89 65 - 99 mg/dL    BUN 87 (H) 8 - 23 mg/dL    Creatinine 2.19 (H) 0.57 - 1.00 mg/dL    Sodium 135 (L) 136 - 145 mmol/L    Potassium 4.7 3.5 - 5.2 mmol/L    Chloride 100 98 - 107 mmol/L    CO2 22.0 22.0 - 29.0 mmol/L    Calcium 7.4 (L) 8.6 - 10.5 mg/dL    BUN/Creatinine Ratio 39.7 (H) 7.0 - 25.0    Anion Gap 13.0 5.0 - 15.0 mmol/L    eGFR 21.6 (L) >60.0 mL/min/1.73   Magnesium    Collection Time: 05/20/25  6:04 AM    Specimen: Blood   Result Value Ref Range    Magnesium 2.3 1.6 - 2.4 mg/dL   Phosphorus    Collection Time: 05/20/25  6:04 AM    Specimen: Blood   Result Value Ref Range    Phosphorus 4.4 2.5 - 4.5 mg/dL                                     Physical Examination:        Physical Exam  Vitals and nursing note reviewed.   Constitutional:       Appearance: Normal appearance.   HENT:      Head: Normocephalic " and atraumatic.      Right Ear: External ear normal.      Left Ear: External ear normal.      Nose: Nose normal.      Mouth/Throat:      Mouth: Mucous membranes are dry.      Pharynx: Oropharynx is clear.   Eyes:      Extraocular Movements: Extraocular movements intact.      Conjunctiva/sclera: Conjunctivae normal.      Pupils: Pupils are equal, round, and reactive to light.   Cardiovascular:      Rate and Rhythm: Normal rate and regular rhythm.      Pulses: Normal pulses.      Heart sounds: Normal heart sounds.   Pulmonary:      Effort: Pulmonary effort is normal.      Breath sounds: Normal breath sounds.   Abdominal:      General: Bowel sounds are normal.      Palpations: Abdomen is soft.   Musculoskeletal:      Cervical back: Normal range of motion and neck supple.      Comments: Generalized weakness   Skin:     General: Skin is warm and dry.   Neurological:      Mental Status: She is alert and oriented to person, place, and time.      Comments: Intermittent confusion at times   Psychiatric:         Mood and Affect: Mood normal.         Behavior: Behavior normal.           Assessment:            Severe protein-calorie malnutrition    No past medical history on file.     Plan:        Bilateral pleural effusions  CKD4  PAF  Chronic anticoagulation  Chronic diastolic CHF  Solitary kidney s/p left nephrectomy  Urinary retention  Multifactorial anemia  Severe protein calorie malnutrition     Continue current treatment. Monitor counts. Increase activity. Labs Thursday.  Appreciate nephrology assistance. Aggressive therapies as tolerated. Maintain patient safety. Monitor renal function closely.       Electronically signed by SARAH Santiago on 5/20/2025 at 11:59 CDT

## 2025-05-21 LAB
ANION GAP SERPL CALCULATED.3IONS-SCNC: 11 MMOL/L (ref 5–15)
BUN SERPL-MCNC: 82 MG/DL (ref 8–23)
BUN/CREAT SERPL: 41.4 (ref 7–25)
CALCIUM SPEC-SCNC: 7.8 MG/DL (ref 8.6–10.5)
CHLORIDE SERPL-SCNC: 100 MMOL/L (ref 98–107)
CO2 SERPL-SCNC: 23 MMOL/L (ref 22–29)
CREAT SERPL-MCNC: 1.98 MG/DL (ref 0.57–1)
EGFRCR SERPLBLD CKD-EPI 2021: 24.4 ML/MIN/1.73
GLUCOSE SERPL-MCNC: 93 MG/DL (ref 65–99)
POTASSIUM SERPL-SCNC: 5 MMOL/L (ref 3.5–5.2)
SODIUM SERPL-SCNC: 134 MMOL/L (ref 136–145)

## 2025-05-21 PROCEDURE — 97530 THERAPEUTIC ACTIVITIES: CPT

## 2025-05-21 PROCEDURE — 97110 THERAPEUTIC EXERCISES: CPT

## 2025-05-21 PROCEDURE — 97535 SELF CARE MNGMENT TRAINING: CPT

## 2025-05-21 PROCEDURE — 80048 BASIC METABOLIC PNL TOTAL CA: CPT | Performed by: INTERNAL MEDICINE

## 2025-05-21 NOTE — PROGRESS NOTES
[facility] Fall Risk Assessment Note    Name: Genevieve Cerda  MRN: [id number]  CSN: [id number]  Admit Date/Time: 5/7/2025  4:40 PM.    Currently ordered medications associated with an increased risk for fall include:    Scheduled Meds:  apixaban, 2.5 mg, Oral, Q12H  aspirin, 81 mg, Oral, Daily  citalopram, 10 mg, Oral, Daily  furosemide, 40 mg, Oral, BID Diuretics  gabapentin, 300 mg, Oral, Q8H  metoprolol tartrate, 25 mg, Oral, Q12H  tamsulosin, 0.4 mg, Oral, Nightly      Continuous Infusions:     PRN Meds:    polyethylene glycol    senna-docusate sodium    Comments/Recommendations:      Samuel Mcallister RPH  05/21/25 09:59 CDT

## 2025-05-21 NOTE — PROGRESS NOTES
JOHNATHON Alcala APRN        Internal Medicine Progress Note    5/21/2025   11:35 CDT    Name:  Genevieve Cerda  MRN:    [id number]     Acct:     [id number]   Room:  [address] [place] [address] Day: 0     Admit Date: 5/7/2025  4:40 PM  PCP: Provider, No Known    Subjective:     C/C: weakness    Interval History: Status:  improved.  Up to chair. No family at bedside. Afebrile. Progressing with therapies. Maintaining adequate 02 sats on room air. Denies pain at present. Slight improvement in renal function. Counts otherwise stable. Doing well s/p chest tube removal.     Review of Systems   Constitutional: Positive for malaise/fatigue. Negative for chills, decreased appetite, weight gain and weight loss.   HENT:  Negative for congestion, ear discharge, hoarse voice and tinnitus.    Eyes:  Negative for blurred vision, discharge, visual disturbance and visual halos.   Cardiovascular:  Negative for chest pain, claudication, dyspnea on exertion, irregular heartbeat, leg swelling, orthopnea and paroxysmal nocturnal dyspnea.   Respiratory:  Negative for cough, shortness of breath, sputum production and wheezing.    Endocrine: Negative for cold intolerance, heat intolerance and polyuria.   Hematologic/Lymphatic: Negative for adenopathy. Does not bruise/bleed easily.   Skin:  Negative for dry skin, itching and suspicious lesions.   Musculoskeletal:  Negative for arthritis, back pain, falls, joint pain, muscle weakness and myalgias.   Gastrointestinal:  Negative for abdominal pain, constipation, diarrhea, dysphagia and hematemesis.   Genitourinary:  Negative for bladder incontinence, dysuria and frequency.   Neurological:  Positive for weakness. Negative for aphonia, disturbances in coordination and dizziness.   Psychiatric/Behavioral:  Negative for altered mental status, depression, memory loss and substance abuse. The patient does not have insomnia and is not nervous/anxious.          Medications:      Allergies:   Allergies   Allergen Reactions    Codeine Itching       Current Meds:   Current Facility-Administered Medications:     acetaminophen (TYLENOL) tablet 650 mg, 650 mg, Oral, Q6H PRN, Nelson Ramirez MD    allopurinol (ZYLOPRIM) tablet 100 mg, 100 mg, Oral, Daily, Nelson Ramirez MD    apixaban (ELIQUIS) tablet 2.5 mg, 2.5 mg, Oral, Q12H, Nelson Ramirez MD    aspirin EC tablet 81 mg, 81 mg, Oral, Daily, Nelson Ramirez MD    citalopram (CeleXA) tablet 10 mg, 10 mg, Oral, Daily, Cande Andino APRN    dextrose (D50W) (25 g/50 mL) IV injection 25 g, 25 g, Intravenous, Q15 Min PRN, Nelson Ramirez MD    dextrose (GLUTOSE) oral gel 15 g, 15 g, Oral, Q15 Min PRN, Nelson Ramirez MD    [Held by provider] epoetin louis (EPOGEN,PROCRIT) injection 10,000 Units, 10,000 Units, Subcutaneous, Once per day on Monday Wednesday Friday, Nelson Ramirez MD    furosemide (LASIX) tablet 40 mg, 40 mg, Oral, BID Diuretics, Yuniel Del Rosario MD    gabapentin (NEURONTIN) capsule 300 mg, 300 mg, Oral, Q8H, Nelson Ramirez MD    glucagon (GLUCAGEN) injection 1 mg, 1 mg, Subcutaneous, Q15 Min PRN, Nelson Ramirez MD    ipratropium-albuterol (DUO-NEB) nebulizer solution 3 mL, 3 mL, Nebulization, Q6H PRN, Nelson Ramirez MD    latanoprost (XALATAN) 0.005 % ophthalmic solution 1 drop, 1 drop, Both Eyes, Nightly, Nelson Ramirez MD    magnesium oxide (MAG-OX) tablet 400 mg, 400 mg, Oral, Daily, Nelson Ramirez MD    metoprolol tartrate (LOPRESSOR) tablet 25 mg, 25 mg, Oral, Q12H, Nelson Ramirez MD    midodrine (PROAMATINE) tablet 5 mg, 5 mg, Oral, BID AC, Brennon Gamez MD    pantoprazole (PROTONIX) EC tablet 40 mg, 40 mg, Oral, Q AM, Nelson Ramirez MD    polyethylene glycol (MIRALAX) packet 17 g, 17 g, Oral, Daily PRN, Cris Puentes APRN    potassium chloride (KLOR-CON M10) CR tablet 10 mEq, 10 mEq, Oral, Daily,  "Nelson Ramirez MD    rosuvastatin (CRESTOR) tablet 5 mg, 5 mg, Oral, Nightly, Nelson Ramirez MD    sennosides-docusate (PERICOLACE) 8.6-50 MG per tablet 2 tablet, 2 tablet, Oral, Daily PRN, Cris Puentes RSARAH    tamsulosin (FLOMAX) 24 hr capsule 0.4 mg, 0.4 mg, Oral, Nightly, Nelson Ramirez MD    timolol (TIMOPTIC) 0.5 % ophthalmic solution 1 drop, 1 drop, Both Eyes, Daily, Nelson Ramirez MD    vitamin D (ERGOCALCIFEROL) capsule 50,000 Units, 50,000 Units, Oral, Q7 Days, Dusty Sapp MD    Data:     Code Status:    There are no questions and answers to display.       No family history on file.    Social History     Socioeconomic History    Marital status: Unknown       Vitals:  Ht 157.5 cm (62.01\")   Wt 63.9 kg (140 lb 12.8 oz)   BMI 25.75 kg/m²   T 98.1 P 95 R 18 /78 Sp02 94% (room air)          I/O (24Hr):  No intake or output data in the 24 hours ending 05/21/25 1135    Labs and imaging:      Recent Results (from the past 12 hours)   Basic Metabolic Panel    Collection Time: 05/21/25  7:57 AM    Specimen: Blood   Result Value Ref Range    Glucose 93 65 - 99 mg/dL    BUN 82 (H) 8 - 23 mg/dL    Creatinine 1.98 (H) 0.57 - 1.00 mg/dL    Sodium 134 (L) 136 - 145 mmol/L    Potassium 5.0 3.5 - 5.2 mmol/L    Chloride 100 98 - 107 mmol/L    CO2 23.0 22.0 - 29.0 mmol/L    Calcium 7.8 (L) 8.6 - 10.5 mg/dL    BUN/Creatinine Ratio 41.4 (H) 7.0 - 25.0    Anion Gap 11.0 5.0 - 15.0 mmol/L    eGFR 24.4 (L) >60.0 mL/min/1.73                                     Physical Examination:        Physical Exam  Vitals and nursing note reviewed.   Constitutional:       Appearance: Normal appearance.   HENT:      Head: Normocephalic and atraumatic.      Right Ear: External ear normal.      Left Ear: External ear normal.      Nose: Nose normal.      Mouth/Throat:      Mouth: Mucous membranes are dry.      Pharynx: Oropharynx is clear.   Eyes:      Extraocular Movements: Extraocular " movements intact.      Conjunctiva/sclera: Conjunctivae normal.      Pupils: Pupils are equal, round, and reactive to light.   Cardiovascular:      Rate and Rhythm: Normal rate and regular rhythm.      Pulses: Normal pulses.      Heart sounds: Normal heart sounds.   Pulmonary:      Effort: Pulmonary effort is normal.      Breath sounds: Normal breath sounds.   Abdominal:      General: Bowel sounds are normal.      Palpations: Abdomen is soft.   Musculoskeletal:      Cervical back: Normal range of motion and neck supple.      Comments: Generalized weakness   Skin:     General: Skin is warm and dry.   Neurological:      Mental Status: She is alert and oriented to person, place, and time.      Comments: Intermittent confusion at times   Psychiatric:         Mood and Affect: Mood normal.         Behavior: Behavior normal.           Assessment:            Severe protein-calorie malnutrition    No past medical history on file.     Plan:        Bilateral pleural effusions  CKD4  PAF  Chronic anticoagulation  Chronic diastolic CHF  Solitary kidney s/p left nephrectomy  Urinary retention  Multifactorial anemia  Severe protein calorie malnutrition     Continue current treatment. Monitor counts. Increase activity. Labs in am.  Appreciate nephrology assistance. Aggressive therapies as tolerated. Maintain patient safety. Monitor renal function closely.       Electronically signed by SARAH Santiago on 5/21/2025 at 11:35 CDT     I have discussed the care of Genevieve Cerda, including pertinent history and exam findings, with the nurse practitioner.    I have seen and examined the patient and the key elements of all parts of the encounter have been performed by me.  I agree with the assessment, plan and orders as documented by SARAH Montejo, after I modified the exam findings and the plan of treatments and the final version is my approved version of the assessment.        Electronically signed by Nelson Mclaughlin  MD James on 5/21/2025 at 17:25 CDT

## 2025-05-21 NOTE — PROGRESS NOTES
Adult Nutrition  Assessment/PES    Patient Name:  Genevieve Cerda  YOB: 1940  MRN: [id number]  Admit Date:  5/7/2025    Assessment Date:  5/21/2025       Nutrition/Diet History       Row Name 05/21/25 1232          Nutrition/Diet History    Typical Intake (Food/Fluid/EN/PN) Nutrition follow up. Pt receiving Regular diet,thin liquids. PO intake avg. 35.7% per seven meals,po fluid intake 653 ml over the past three days;fluids availalbe at bedside. Magic cup with lunch in mixed berry. Boost Original with all meals. At time of RDN visit pt had consumed all of her Boost and magic cup with some of her lunch consumed. Pertinenet labs Na+134 mmol/L,BUN 82 mg/dl,Cr 1.98 mg/dl. Pertienet medications lasix,protonix,cholecalciferol 50,000 IU every 7 days. Pt with fatigue,generalized weakness,on room air,andrew score 16. Weight stable over the past week from 139.3lbs (5/13) and current weight 140 lbs. Encouraged oral intake and oral supplements. Cont to follow for plan of care.     Supplemental Drinks/Foods/Additives Magic cup BID,Boost Original TID     Factors Affecting Nutritional Intake other (see comments)  appetite good per pt             Electronically signed by:  Cori Campos RDN, STACEY  05/21/25 12:39 CDT

## 2025-05-21 NOTE — PROGRESS NOTES
Nephrology ([facility]) Progress Note      Patient:  Genevieve Cerda  YOB: 1940  Date of Service: 5/21/2025  MRN: [id number]   Acct: [id number]   Primary Care Physician: Provider, No Known  Advance Directive:   There are no questions and answers to display.     Admit Date: 5/7/2025       Hospital Day: 0  Referring Provider: Nelson Ramirez MD      Patient personally seen and examined.  Complete chart including Consults, Notes, Operative Reports, Labs, Cardiology, and Radiology studies reviewed as able.        Subjective:  Genevieve Cerda is a 84 y.o. female for whom we were consulted for evaluation and treatment of acute kidney injury with chronic kidney disease stage IIIb. Patient is followed Dr. Gamez in the office was last seen in January of this year. Baseline creatinine around 1.6. More recently she was treated at [facility] for hypoxemic respiratory failure with pleural effusion, MICHOACANO, acidosis, hyponatremia and hyperkalemia. She was treated with diuretics, antibiotics, dopamine and ultimately had chest tube placements. Seen with family initially and also reviewed with primary service. Patient denied chest pain, nausea vomiting. Denied dysuria or hematuria. Family noted she has frequent urinary tract infections.     Today, no overnight events.  Seen with family.  Denies new complaints upon questioning.  Current labs reviewed with family.  Up in chair.    Temp- 98.1  Pulse- 95  Resp- 18  BP- 123/78  O2%- 94      Allergies:  Codeine    Home Meds:  No medications prior to admission.       Medicines:  Current Facility-Administered Medications   Medication Dose Route Frequency Provider Last Rate Last Admin    acetaminophen (TYLENOL) tablet 650 mg  650 mg Oral Q6H PRN Nelson Ramirez MD        allopurinol (ZYLOPRIM) tablet 100 mg  100 mg Oral Daily Nelson Ramirez MD        apixaban (ELIQUIS) tablet 2.5 mg  2.5 mg Oral Q12H James  Nelson Mclaughlin MD        aspirin EC tablet 81 mg  81 mg Oral Daily Nelson Ramirez MD        citalopram (CeleXA) tablet 10 mg  10 mg Oral Daily Cande Andino APRN        dextrose (D50W) (25 g/50 mL) IV injection 25 g  25 g Intravenous Q15 Min PRN Nelson Ramirez MD        dextrose (GLUTOSE) oral gel 15 g  15 g Oral Q15 Min PRN Nelson Ramirez MD        [Held by provider] epoetin louis (EPOGEN,PROCRIT) injection 10,000 Units  10,000 Units Subcutaneous Once per day on Monday Wednesday Friday Nelson Ramirez MD        furosemide (LASIX) tablet 40 mg  40 mg Oral BID Diuretics Yuniel Del Rosario MD        gabapentin (NEURONTIN) capsule 300 mg  300 mg Oral Q8H Nelson Ramirez MD        glucagon (GLUCAGEN) injection 1 mg  1 mg Subcutaneous Q15 Min PRN Nelson Ramirez MD        ipratropium-albuterol (DUO-NEB) nebulizer solution 3 mL  3 mL Nebulization Q6H PRN Nelson Ramirez MD        latanoprost (XALATAN) 0.005 % ophthalmic solution 1 drop  1 drop Both Eyes Nightly Nelson Ramirez MD        magnesium oxide (MAG-OX) tablet 400 mg  400 mg Oral Daily Nelson Ramirez MD        metoprolol tartrate (LOPRESSOR) tablet 25 mg  25 mg Oral Q12H Nelson Ramirez MD        midodrine (PROAMATINE) tablet 5 mg  5 mg Oral BID AC Brennon Gamez MD        pantoprazole (PROTONIX) EC tablet 40 mg  40 mg Oral Q AM Nelson Ramirez MD        polyethylene glycol (MIRALAX) packet 17 g  17 g Oral Daily PRN Cris Puentes R, APRN        potassium chloride (KLOR-CON M10) CR tablet 10 mEq  10 mEq Oral Daily Nelson Ramirez MD        rosuvastatin (CRESTOR) tablet 5 mg  5 mg Oral Nightly Nelson Ramirez MD        sennosides-docusate (PERICOLACE) 8.6-50 MG per tablet 2 tablet  2 tablet Oral Daily PRN Cris Puentes R, APRN        tamsulosin (FLOMAX) 24 hr capsule 0.4 mg  0.4 mg Oral Nightly Nelson Ramirez MD        timolol (TIMOPTIC) 0.5 %  ophthalmic solution 1 drop  1 drop Both Eyes Daily Nelson Ramirez MD        vitamin D (ERGOCALCIFEROL) capsule 50,000 Units  50,000 Units Oral Q7 Days Dusty Sapp MD           Past Medical History:  No past medical history on file.    Past Surgical History:  No past surgical history on file.    Family History  No family history on file.    Social History  Social History     Socioeconomic History    Marital status: Unknown       Review of Systems:  History obtained from chart review and the patient  General ROS: No fever or chills  Respiratory ROS: No cough, shortness of breath, wheezing  Cardiovascular ROS: No chest pain or palpitations  Gastrointestinal ROS: No abdominal pain or melena  Genito-Urinary ROS: No dysuria or hematuria  Psych ROS: No anxiety and depression  14 point ROS reviewed with the patient and negative except as noted above and in the HPI unless unable to obtain.    Objective:  No data found.    No intake or output data in the 24 hours ending 05/21/25 1613  General: awake/alert   Chest:  clear to auscultation bilaterally without respiratory distress  CVS: regular rate and rhythm  Abdominal: soft, nontender, positive bowel sounds  Extremities: ble edema  Skin: warm and dry without rash      Labs:  Results from last 7 days   Lab Units 05/19/25  0522 05/15/25  0728   WBC 10*3/mm3 4.80 8.15   HEMOGLOBIN g/dL 9.4* 9.7*   HEMATOCRIT % 31.3* 31.7*   PLATELETS 10*3/mm3 261 277         Results from last 7 days   Lab Units 05/21/25  0757 05/20/25  0604 05/19/25  0522   SODIUM mmol/L 134* 135* 136   POTASSIUM mmol/L 5.0 4.7 4.6   CHLORIDE mmol/L 100 100 101   CO2 mmol/L 23.0 22.0 23.0   BUN mg/dL 82* 87* 87*   CREATININE mg/dL 1.98* 2.19* 2.16*   CALCIUM mg/dL 7.8* 7.4* 7.4*   EGFR mL/min/1.73 24.4* 21.6* 22.0*   GLUCOSE mg/dL 93 89 91       Radiology:   Imaging Results (Last 72 Hours)       Procedure Component Value Units Date/Time    XR Chest PA & Lateral [[id number]] Collected:  "05/19/25 1241     Updated: 05/19/25 1255    Narrative:      EXAMINATION: XR CHEST PA AND LATERAL- 5/19/2025 12:41 PM     HISTORY: Chest tube removal.     REPORT: Frontal and lateral views of the chest were obtained.     COMPARISON: Chest x-ray 5/18/2025.     The left basilar chest tube has been removed, no pneumothorax is  identified. There are opacities in both lung bases, much of which  probably represent atelectasis, persistent right pleural effusion is  suspected, small in size. The heart is enlarged, and implanted cardiac  monitoring device is noted over the left chest as before. Mild  persistent interstitial prominence.       Impression:      Removal of the left basilar chest tube, no pneumothorax is  seen. Probable trace residual left pleural effusion and there is a small  unchanged right pleural effusion. Moderate volume loss in the lung bases  cardiomegaly with no overt CHF changes.     This report was signed and finalized on 5/19/2025 12:52 PM by Dr. Alex Aguillon MD.               Culture:  No results found for: \"BLOODCX\", \"URINECX\", \"WOUNDCX\", \"MRSACX\", \"RESPCX\", \"STOOLCX\"      Assessment   Acute kidney injury-renal function is fluctuating some  Chronic kidney disease stage IIIb  Bilateral pleural effusion  Paroxysmal atrial fibrillation  Chronic diastolic congestive heart failure  Solitary kidney due to prior left nephrectomy  Urinary retention  Anemia     Plan:  Discussed with patient, nursing, family  Monitor labs daily for now  Diuretics with close clinical & laboratory monitoring, hold kcl for now  Surgical evaluation reviewed his current  Continue midodrine 5 mg bid for pressure support as needed        Dusty Sapp MD  5/21/2025  16:13 CDT      "

## 2025-05-21 NOTE — PROGRESS NOTES
Nephrology ([facility]) Progress Note      Patient:  Genevieve Cerda  YOB: 1940  Date of Service: 5/20/2025  MRN: [id number]   Acct: [id number]   Primary Care Physician: Provider, No Known  Advance Directive:   There are no questions and answers to display.     Admit Date: 5/7/2025       Hospital Day: 0  Referring Provider: Nelson Ramirez MD      Patient personally seen and examined.  Complete chart including Consults, Notes, Operative Reports, Labs, Cardiology, and Radiology studies reviewed as able.        Subjective:  Genevieve Cerda is a 84 y.o. female for whom we were consulted for evaluation and treatment of acute kidney injury with chronic kidney disease stage IIIb. Patient is followed Dr. Gamez in the office was last seen in January of this year. Baseline creatinine around 1.6. More recently she was treated at [facility] for hypoxemic respiratory failure with pleural effusion, MICHOACANO, acidosis, hyponatremia and hyperkalemia. She was treated with diuretics, antibiotics, dopamine and ultimately had chest tube placements. Seen with family initially and also reviewed with primary service. Patient denied chest pain, nausea vomiting. Denied dysuria or hematuria. Family noted she has frequent urinary tract infections.     Today, no overnight events.  Seen with family.  Denies new complaints upon questioning.  Stable labs reviewed with family and family.    Temp- 97.9  Pulse- 73  Resp- 16  BP- 124/81  O2%- 92      Allergies:  Codeine    Home Meds:  No medications prior to admission.       Medicines:  Current Facility-Administered Medications   Medication Dose Route Frequency Provider Last Rate Last Admin    acetaminophen (TYLENOL) tablet 650 mg  650 mg Oral Q6H PRN Nelson Ramirez MD        allopurinol (ZYLOPRIM) tablet 100 mg  100 mg Oral Daily Nelson Ramirez MD        apixaban (ELIQUIS) tablet 2.5 mg  2.5 mg Oral Q12H Nelson Ramirez  MD Arnoldo        aspirin EC tablet 81 mg  81 mg Oral Daily Nelson Ramirez MD        citalopram (CeleXA) tablet 10 mg  10 mg Oral Daily Cande Andino APRN        dextrose (D50W) (25 g/50 mL) IV injection 25 g  25 g Intravenous Q15 Min PRN Nelson Ramirez MD        dextrose (GLUTOSE) oral gel 15 g  15 g Oral Q15 Min PRN Nelson Ramirez MD        [Held by provider] epoetin louis (EPOGEN,PROCRIT) injection 10,000 Units  10,000 Units Subcutaneous Once per day on Monday Wednesday Friday Nelson Ramirez MD        furosemide (LASIX) tablet 40 mg  40 mg Oral BID Diuretics Yuniel Del Rosario MD        gabapentin (NEURONTIN) capsule 300 mg  300 mg Oral Q8H Nelson Ramirez MD        glucagon (GLUCAGEN) injection 1 mg  1 mg Subcutaneous Q15 Min PRN Nelson Ramirez MD        ipratropium-albuterol (DUO-NEB) nebulizer solution 3 mL  3 mL Nebulization Q6H PRN Nelson Ramirez MD        latanoprost (XALATAN) 0.005 % ophthalmic solution 1 drop  1 drop Both Eyes Nightly Nelson Ramirez MD        magnesium oxide (MAG-OX) tablet 400 mg  400 mg Oral Daily Nelson Ramirez MD        metoprolol tartrate (LOPRESSOR) tablet 25 mg  25 mg Oral Q12H Nelson Ramirez MD        midodrine (PROAMATINE) tablet 5 mg  5 mg Oral BID AC Brennon Gamez MD        pantoprazole (PROTONIX) EC tablet 40 mg  40 mg Oral Q AM Nelson Ramirez MD        polyethylene glycol (MIRALAX) packet 17 g  17 g Oral Daily PRN Cris Puentes R, APRKARINA        potassium chloride (KLOR-CON M10) CR tablet 10 mEq  10 mEq Oral Daily Nelson Ramirez MD        rosuvastatin (CRESTOR) tablet 5 mg  5 mg Oral Nightly Nelson Ramirez MD        sennosides-docusate (PERICOLACE) 8.6-50 MG per tablet 2 tablet  2 tablet Oral Daily PRN Cris Puentes, APRKARINA        tamsulosin (FLOMAX) 24 hr capsule 0.4 mg  0.4 mg Oral Nightly Nelson Ramirez MD        timolol (TIMOPTIC) 0.5 % ophthalmic  solution 1 drop  1 drop Both Eyes Daily Nelson Ramirez MD        vitamin D (ERGOCALCIFEROL) capsule 50,000 Units  50,000 Units Oral Q7 Days Dusty Sapp MD           Past Medical History:  No past medical history on file.    Past Surgical History:  No past surgical history on file.    Family History  No family history on file.    Social History  Social History     Socioeconomic History    Marital status: Unknown       Review of Systems:  History obtained from chart review and the patient  General ROS: No fever or chills  Respiratory ROS: No cough, shortness of breath, wheezing  Cardiovascular ROS: No chest pain or palpitations  Gastrointestinal ROS: No abdominal pain or melena  Genito-Urinary ROS: No dysuria or hematuria  Psych ROS: No anxiety and depression  14 point ROS reviewed with the patient and negative except as noted above and in the HPI unless unable to obtain.    Objective:  No data found.    No intake or output data in the 24 hours ending 05/20/25 2201  General: awake/alert   Chest:  clear to auscultation bilaterally without respiratory distress  CVS: regular rate and rhythm  Abdominal: soft, nontender, positive bowel sounds  Extremities: no cyanosis or edema  Skin: warm and dry without rash      Labs:  Results from last 7 days   Lab Units 05/19/25  0522 05/15/25  0728   WBC 10*3/mm3 4.80 8.15   HEMOGLOBIN g/dL 9.4* 9.7*   HEMATOCRIT % 31.3* 31.7*   PLATELETS 10*3/mm3 261 277         Results from last 7 days   Lab Units 05/20/25  0604 05/19/25  0522 05/15/25  0728   SODIUM mmol/L 135* 136 136   POTASSIUM mmol/L 4.7 4.6 4.6   CHLORIDE mmol/L 100 101 103   CO2 mmol/L 22.0 23.0 23.0   BUN mg/dL 87* 87* 84*   CREATININE mg/dL 2.19* 2.16* 1.76*   CALCIUM mg/dL 7.4* 7.4* 7.2*   EGFR mL/min/1.73 21.6* 22.0* 28.1*   GLUCOSE mg/dL 89 91 114*       Radiology:   Imaging Results (Last 72 Hours)       Procedure Component Value Units Date/Time    XR Chest PA & Lateral [[id number]] Collected:  "05/19/25 1241     Updated: 05/19/25 1255    Narrative:      EXAMINATION: XR CHEST PA AND LATERAL- 5/19/2025 12:41 PM     HISTORY: Chest tube removal.     REPORT: Frontal and lateral views of the chest were obtained.     COMPARISON: Chest x-ray 5/18/2025.     The left basilar chest tube has been removed, no pneumothorax is  identified. There are opacities in both lung bases, much of which  probably represent atelectasis, persistent right pleural effusion is  suspected, small in size. The heart is enlarged, and implanted cardiac  monitoring device is noted over the left chest as before. Mild  persistent interstitial prominence.       Impression:      Removal of the left basilar chest tube, no pneumothorax is  seen. Probable trace residual left pleural effusion and there is a small  unchanged right pleural effusion. Moderate volume loss in the lung bases  cardiomegaly with no overt CHF changes.     This report was signed and finalized on 5/19/2025 12:52 PM by Dr. Alex Aguillon MD.       XR Chest 1 View [[id number]] Collected: 05/18/25 0451     Updated: 05/18/25 0456    Narrative:      EXAM/TECHNIQUE: XR CHEST 1 VW-     INDICATION: Chest tube     COMPARISON: Prior day     FINDINGS:     Left basilar chest tube is stable in position. No change in small  residual left-sided pleural effusion and left lower lung parenchymal  opacity. No change in small right-sided pleural effusion with overlying  atelectasis. No evidence of pneumothorax. Cardiac silhouette is enlarged  but stable. Implantable loop recorder device is noted.       Impression:      1.  No change in small residual left pleural effusion with overlying  atelectasis, with left-sided chest tube in place.  2.  No change in small right-sided pleural effusion with overlying  atelectasis.     This report was signed and finalized on 5/18/2025 4:53 AM by Dr. Arnoldo Escobedo MD.               Culture:  No results found for: \"BLOODCX\", \"URINECX\", \"WOUNDCX\", \"MRSACX\", " "\"RESPCX\", \"STOOLCX\"      Assessment   Acute kidney injury-renal function is fluctuating some  Chronic kidney disease stage IIIb  Bilateral pleural effusion  Paroxysmal atrial fibrillation  Chronic diastolic congestive heart failure  Solitary kidney due to prior left nephrectomy  Urinary retention  Anemia     Plan:  Discussed with patient, nursing, family  Monitor labs daily for now  Diuretics with close clinical & laboratory monitoring  Surgical evaluation reviewed his current  Continue midodrine 5 mg bid for pressure support as needed        Dusty Sapp MD  5/20/2025  22:01 CDT      "

## 2025-05-22 LAB
ANION GAP SERPL CALCULATED.3IONS-SCNC: 12 MMOL/L (ref 5–15)
BASOPHILS # BLD AUTO: 0.05 10*3/MM3 (ref 0–0.2)
BASOPHILS NFR BLD AUTO: 0.8 % (ref 0–1.5)
BUN SERPL-MCNC: 83 MG/DL (ref 8–23)
BUN/CREAT SERPL: 41.3 (ref 7–25)
CALCIUM SPEC-SCNC: 7.6 MG/DL (ref 8.6–10.5)
CHLORIDE SERPL-SCNC: 102 MMOL/L (ref 98–107)
CO2 SERPL-SCNC: 23 MMOL/L (ref 22–29)
CREAT SERPL-MCNC: 2.01 MG/DL (ref 0.57–1)
DEPRECATED RDW RBC AUTO: 61.6 FL (ref 37–54)
EGFRCR SERPLBLD CKD-EPI 2021: 23.9 ML/MIN/1.73
EOSINOPHIL # BLD AUTO: 0.44 10*3/MM3 (ref 0–0.4)
EOSINOPHIL NFR BLD AUTO: 7.1 % (ref 0.3–6.2)
ERYTHROCYTE [DISTWIDTH] IN BLOOD BY AUTOMATED COUNT: 19.4 % (ref 12.3–15.4)
GLUCOSE SERPL-MCNC: 99 MG/DL (ref 65–99)
HCT VFR BLD AUTO: 33.2 % (ref 34–46.6)
HGB BLD-MCNC: 9.8 G/DL (ref 12–15.9)
IMM GRANULOCYTES # BLD AUTO: 0.02 10*3/MM3 (ref 0–0.05)
IMM GRANULOCYTES NFR BLD AUTO: 0.3 % (ref 0–0.5)
LYMPHOCYTES # BLD AUTO: 0.98 10*3/MM3 (ref 0.7–3.1)
LYMPHOCYTES NFR BLD AUTO: 15.9 % (ref 19.6–45.3)
MCH RBC QN AUTO: 26 PG (ref 26.6–33)
MCHC RBC AUTO-ENTMCNC: 29.5 G/DL (ref 31.5–35.7)
MCV RBC AUTO: 88.1 FL (ref 79–97)
MONOCYTES # BLD AUTO: 0.41 10*3/MM3 (ref 0.1–0.9)
MONOCYTES NFR BLD AUTO: 6.7 % (ref 5–12)
NEUTROPHILS NFR BLD AUTO: 4.26 10*3/MM3 (ref 1.7–7)
NEUTROPHILS NFR BLD AUTO: 69.2 % (ref 42.7–76)
NRBC BLD AUTO-RTO: 0 /100 WBC (ref 0–0.2)
PLATELET # BLD AUTO: 298 10*3/MM3 (ref 140–450)
PMV BLD AUTO: 10.6 FL (ref 6–12)
POTASSIUM SERPL-SCNC: 4.7 MMOL/L (ref 3.5–5.2)
RBC # BLD AUTO: 3.77 10*6/MM3 (ref 3.77–5.28)
SODIUM SERPL-SCNC: 137 MMOL/L (ref 136–145)
WBC NRBC COR # BLD AUTO: 6.16 10*3/MM3 (ref 3.4–10.8)

## 2025-05-22 PROCEDURE — 92526 ORAL FUNCTION THERAPY: CPT | Performed by: SPEECH-LANGUAGE PATHOLOGIST

## 2025-05-22 PROCEDURE — 97530 THERAPEUTIC ACTIVITIES: CPT | Performed by: PHYSICAL THERAPIST

## 2025-05-22 PROCEDURE — 92507 TX SP LANG VOICE COMM INDIV: CPT | Performed by: SPEECH-LANGUAGE PATHOLOGIST

## 2025-05-22 PROCEDURE — 80048 BASIC METABOLIC PNL TOTAL CA: CPT | Performed by: INTERNAL MEDICINE

## 2025-05-22 PROCEDURE — 97535 SELF CARE MNGMENT TRAINING: CPT | Performed by: OCCUPATIONAL THERAPIST

## 2025-05-22 PROCEDURE — 85025 COMPLETE CBC W/AUTO DIFF WBC: CPT | Performed by: INTERNAL MEDICINE

## 2025-05-22 PROCEDURE — 97168 OT RE-EVAL EST PLAN CARE: CPT | Performed by: OCCUPATIONAL THERAPIST

## 2025-05-22 PROCEDURE — 97164 PT RE-EVAL EST PLAN CARE: CPT | Performed by: PHYSICAL THERAPIST

## 2025-05-22 NOTE — PROGRESS NOTES
JOHNATHON Alcala APRN        Internal Medicine Progress Note    5/22/2025   12:18 CDT    Name:  Genevieve Cerda  MRN:    [id number]     Acct:     [id number]   Room:  [address] [place] [address] Day: 0     Admit Date: 5/7/2025  4:40 PM  PCP: Provider, No Known    Subjective:     C/C: weakness    Interval History: Status:  improved.  Up to chair. Family at bedside. Afebrile. Progressing with therapies. Maintaining adequate 02 sats on room air. Denies pain at present. Counts stable.  Doing well s/p chest tube removal.     Review of Systems   Constitutional: Positive for malaise/fatigue. Negative for chills, decreased appetite, weight gain and weight loss.   HENT:  Negative for congestion, ear discharge, hoarse voice and tinnitus.    Eyes:  Negative for blurred vision, discharge, visual disturbance and visual halos.   Cardiovascular:  Negative for chest pain, claudication, dyspnea on exertion, irregular heartbeat, leg swelling, orthopnea and paroxysmal nocturnal dyspnea.   Respiratory:  Negative for cough, shortness of breath, sputum production and wheezing.    Endocrine: Negative for cold intolerance, heat intolerance and polyuria.   Hematologic/Lymphatic: Negative for adenopathy. Does not bruise/bleed easily.   Skin:  Negative for dry skin, itching and suspicious lesions.   Musculoskeletal:  Negative for arthritis, back pain, falls, joint pain, muscle weakness and myalgias.   Gastrointestinal:  Negative for abdominal pain, constipation, diarrhea, dysphagia and hematemesis.   Genitourinary:  Negative for bladder incontinence, dysuria and frequency.   Neurological:  Positive for weakness. Negative for aphonia, disturbances in coordination and dizziness.   Psychiatric/Behavioral:  Negative for altered mental status, depression, memory loss and substance abuse. The patient does not have insomnia and is not nervous/anxious.          Medications:     Allergies:   Allergies   Allergen Reactions     Codeine Itching       Current Meds:   Current Facility-Administered Medications:     acetaminophen (TYLENOL) tablet 650 mg, 650 mg, Oral, Q6H PRN, Nelson Ramirez MD    allopurinol (ZYLOPRIM) tablet 100 mg, 100 mg, Oral, Daily, Nelson Ramirez MD    apixaban (ELIQUIS) tablet 2.5 mg, 2.5 mg, Oral, Q12H, Nelson Ramirez MD    aspirin EC tablet 81 mg, 81 mg, Oral, Daily, Nelson Ramirez MD    citalopram (CeleXA) tablet 10 mg, 10 mg, Oral, Daily, Cande Andino APRN    dextrose (D50W) (25 g/50 mL) IV injection 25 g, 25 g, Intravenous, Q15 Min PRN, Nelson Ramirez MD    dextrose (GLUTOSE) oral gel 15 g, 15 g, Oral, Q15 Min PRN, Nelson Ramirez MD    [Held by provider] epoetin louis (EPOGEN,PROCRIT) injection 10,000 Units, 10,000 Units, Subcutaneous, Once per day on Monday Wednesday Friday, Nelson Ramirez MD    furosemide (LASIX) tablet 40 mg, 40 mg, Oral, BID Diuretics, Yuniel Del Rosario MD    gabapentin (NEURONTIN) capsule 300 mg, 300 mg, Oral, Q8H, Nelson Ramirez MD    glucagon (GLUCAGEN) injection 1 mg, 1 mg, Subcutaneous, Q15 Min PRN, Nelson Ramirez MD    ipratropium-albuterol (DUO-NEB) nebulizer solution 3 mL, 3 mL, Nebulization, Q6H PRN, Nelson Ramirez MD    latanoprost (XALATAN) 0.005 % ophthalmic solution 1 drop, 1 drop, Both Eyes, Nightly, Nelson Ramirez MD    magnesium oxide (MAG-OX) tablet 400 mg, 400 mg, Oral, Daily, Nelson Ramirez MD    metoprolol tartrate (LOPRESSOR) tablet 25 mg, 25 mg, Oral, Q12H, Nelson Ramirez MD    midodrine (PROAMATINE) tablet 5 mg, 5 mg, Oral, BID AC, Brennon Gamez MD    pantoprazole (PROTONIX) EC tablet 40 mg, 40 mg, Oral, Q AM, Nelson Ramirez MD    polyethylene glycol (MIRALAX) packet 17 g, 17 g, Oral, Daily PRN, Cris Puentes, APRN    rosuvastatin (CRESTOR) tablet 5 mg, 5 mg, Oral, Nightly, Nelson Ramirez MD    sennosides-docusate (PERICOLACE) 8.6-50  "MG per tablet 2 tablet, 2 tablet, Oral, Daily PRN, Cris Puentes R, APRN    tamsulosin (FLOMAX) 24 hr capsule 0.4 mg, 0.4 mg, Oral, Nightly, Nelson Ramirez MD    timolol (TIMOPTIC) 0.5 % ophthalmic solution 1 drop, 1 drop, Both Eyes, Daily, Nelson Ramirez MD    vitamin D (ERGOCALCIFEROL) capsule 50,000 Units, 50,000 Units, Oral, Q7 Days, Dusty Sapp MD    Data:     Code Status:    There are no questions and answers to display.       No family history on file.    Social History     Socioeconomic History    Marital status: Unknown       Vitals:  Ht 157.5 cm (62.01\")   Wt 63.9 kg (140 lb 12.8 oz)   BMI 25.75 kg/m²   T 97.3 P 81 R 16 /81 Sp02 94% (room air)          I/O (24Hr):  No intake or output data in the 24 hours ending 05/22/25 1218    Labs and imaging:      Recent Results (from the past 12 hours)   Basic Metabolic Panel    Collection Time: 05/22/25  3:44 AM    Specimen: Blood   Result Value Ref Range    Glucose 99 65 - 99 mg/dL    BUN 83 (H) 8 - 23 mg/dL    Creatinine 2.01 (H) 0.57 - 1.00 mg/dL    Sodium 137 136 - 145 mmol/L    Potassium 4.7 3.5 - 5.2 mmol/L    Chloride 102 98 - 107 mmol/L    CO2 23.0 22.0 - 29.0 mmol/L    Calcium 7.6 (L) 8.6 - 10.5 mg/dL    BUN/Creatinine Ratio 41.3 (H) 7.0 - 25.0    Anion Gap 12.0 5.0 - 15.0 mmol/L    eGFR 23.9 (L) >60.0 mL/min/1.73   CBC Auto Differential    Collection Time: 05/22/25  3:44 AM    Specimen: Blood   Result Value Ref Range    WBC 6.16 3.40 - 10.80 10*3/mm3    RBC 3.77 3.77 - 5.28 10*6/mm3    Hemoglobin 9.8 (L) 12.0 - 15.9 g/dL    Hematocrit 33.2 (L) 34.0 - 46.6 %    MCV 88.1 79.0 - 97.0 fL    MCH 26.0 (L) 26.6 - 33.0 pg    MCHC 29.5 (L) 31.5 - 35.7 g/dL    RDW 19.4 (H) 12.3 - 15.4 %    RDW-SD 61.6 (H) 37.0 - 54.0 fl    MPV 10.6 6.0 - 12.0 fL    Platelets 298 140 - 450 10*3/mm3    Neutrophil % 69.2 42.7 - 76.0 %    Lymphocyte % 15.9 (L) 19.6 - 45.3 %    Monocyte % 6.7 5.0 - 12.0 %    Eosinophil % 7.1 (H) 0.3 - 6.2 %    " Basophil % 0.8 0.0 - 1.5 %    Immature Grans % 0.3 0.0 - 0.5 %    Neutrophils, Absolute 4.26 1.70 - 7.00 10*3/mm3    Lymphocytes, Absolute 0.98 0.70 - 3.10 10*3/mm3    Monocytes, Absolute 0.41 0.10 - 0.90 10*3/mm3    Eosinophils, Absolute 0.44 (H) 0.00 - 0.40 10*3/mm3    Basophils, Absolute 0.05 0.00 - 0.20 10*3/mm3    Immature Grans, Absolute 0.02 0.00 - 0.05 10*3/mm3    nRBC 0.0 0.0 - 0.2 /100 WBC                                     Physical Examination:        Physical Exam  Vitals and nursing note reviewed.   Constitutional:       Appearance: Normal appearance.   HENT:      Head: Normocephalic and atraumatic.      Right Ear: External ear normal.      Left Ear: External ear normal.      Nose: Nose normal.      Mouth/Throat:      Mouth: Mucous membranes are dry.      Pharynx: Oropharynx is clear.   Eyes:      Extraocular Movements: Extraocular movements intact.      Conjunctiva/sclera: Conjunctivae normal.      Pupils: Pupils are equal, round, and reactive to light.   Cardiovascular:      Rate and Rhythm: Normal rate and regular rhythm.      Pulses: Normal pulses.      Heart sounds: Normal heart sounds.   Pulmonary:      Effort: Pulmonary effort is normal.      Breath sounds: Normal breath sounds.   Abdominal:      General: Bowel sounds are normal.      Palpations: Abdomen is soft.   Musculoskeletal:      Cervical back: Normal range of motion and neck supple.      Comments: Generalized weakness   Skin:     General: Skin is warm and dry.   Neurological:      Mental Status: She is alert and oriented to person, place, and time.      Comments: Intermittent confusion at times   Psychiatric:         Mood and Affect: Mood normal.         Behavior: Behavior normal.           Assessment:            Severe protein-calorie malnutrition    No past medical history on file.     Plan:        Bilateral pleural effusions  CKD4  PAF  Chronic anticoagulation  Chronic diastolic CHF  Solitary kidney s/p left nephrectomy  Urinary  retention  Multifactorial anemia  Severe protein calorie malnutrition     Continue current treatment. Monitor counts. Increase activity. Labs Tuesday.  Appreciate nephrology assistance. Aggressive therapies as tolerated. Maintain patient safety. Monitor renal function closely.       Electronically signed by SARAH Santiago on 5/22/2025 at 12:18 CDT

## 2025-05-23 LAB
ANION GAP SERPL CALCULATED.3IONS-SCNC: 10 MMOL/L (ref 5–15)
BUN SERPL-MCNC: 81 MG/DL (ref 8–23)
BUN/CREAT SERPL: 42.6 (ref 7–25)
CALCIUM SPEC-SCNC: 7.8 MG/DL (ref 8.6–10.5)
CHLORIDE SERPL-SCNC: 101 MMOL/L (ref 98–107)
CO2 SERPL-SCNC: 25 MMOL/L (ref 22–29)
CREAT SERPL-MCNC: 1.9 MG/DL (ref 0.57–1)
EGFRCR SERPLBLD CKD-EPI 2021: 25.6 ML/MIN/1.73
GLUCOSE SERPL-MCNC: 103 MG/DL (ref 65–99)
POTASSIUM SERPL-SCNC: 5.4 MMOL/L (ref 3.5–5.2)
SODIUM SERPL-SCNC: 136 MMOL/L (ref 136–145)

## 2025-05-23 PROCEDURE — 97530 THERAPEUTIC ACTIVITIES: CPT

## 2025-05-23 PROCEDURE — 80048 BASIC METABOLIC PNL TOTAL CA: CPT | Performed by: INTERNAL MEDICINE

## 2025-05-23 NOTE — PROGRESS NOTES
James Ramirez M.D.  SARAH Montejo        Internal Medicine Progress Note    5/23/2025   11:03 CDT    Name:  Genevieve Cerda  MRN:    [id number]     Acct:     [id number]   Room:  [address] [place] [address] Day: 0     Admit Date: 5/7/2025  4:40 PM  PCP: Provider, No Known    Subjective:     C/C: weakness    Interval History: Status:  improved.  Up to chair. Family at bedside. Afebrile. Progressing with therapies. Maintaining adequate 02 sats on room air. Denies pain at present. Potassium elevated. Renal function improved. Counts otherwise stable.  Doing well s/p chest tube removal. Family hopeful for transfer to SNF for continued rehab.     Review of Systems   Constitutional: Positive for malaise/fatigue. Negative for chills, decreased appetite, weight gain and weight loss.   HENT:  Negative for congestion, ear discharge, hoarse voice and tinnitus.    Eyes:  Negative for blurred vision, discharge, visual disturbance and visual halos.   Cardiovascular:  Negative for chest pain, claudication, dyspnea on exertion, irregular heartbeat, leg swelling, orthopnea and paroxysmal nocturnal dyspnea.   Respiratory:  Negative for cough, shortness of breath, sputum production and wheezing.    Endocrine: Negative for cold intolerance, heat intolerance and polyuria.   Hematologic/Lymphatic: Negative for adenopathy. Does not bruise/bleed easily.   Skin:  Negative for dry skin, itching and suspicious lesions.   Musculoskeletal:  Negative for arthritis, back pain, falls, joint pain, muscle weakness and myalgias.   Gastrointestinal:  Negative for abdominal pain, constipation, diarrhea, dysphagia and hematemesis.   Genitourinary:  Negative for bladder incontinence, dysuria and frequency.   Neurological:  Positive for weakness. Negative for aphonia, disturbances in coordination and dizziness.   Psychiatric/Behavioral:  Negative for altered mental status, depression, memory loss and substance abuse. The patient does not have  insomnia and is not nervous/anxious.          Medications:     Allergies:   Allergies   Allergen Reactions    Codeine Itching       Current Meds:   Current Facility-Administered Medications:     acetaminophen (TYLENOL) tablet 650 mg, 650 mg, Oral, Q6H PRN, Nelson Ramirez MD    allopurinol (ZYLOPRIM) tablet 100 mg, 100 mg, Oral, Daily, Nelson Ramirez MD    apixaban (ELIQUIS) tablet 2.5 mg, 2.5 mg, Oral, Q12H, Nelson Ramirez MD    aspirin EC tablet 81 mg, 81 mg, Oral, Daily, Nelson Ramirez MD    citalopram (CeleXA) tablet 10 mg, 10 mg, Oral, Daily, Cande Andino APRN    dextrose (D50W) (25 g/50 mL) IV injection 25 g, 25 g, Intravenous, Q15 Min PRN, Nelson Ramirez MD    dextrose (GLUTOSE) oral gel 15 g, 15 g, Oral, Q15 Min PRN, Nelson Ramirez MD    [Held by provider] epoetin louis (EPOGEN,PROCRIT) injection 10,000 Units, 10,000 Units, Subcutaneous, Once per day on Monday Wednesday Friday, Nelson Ramirez MD    furosemide (LASIX) tablet 40 mg, 40 mg, Oral, BID Diuretics, Yuniel Del Rosario MD    gabapentin (NEURONTIN) capsule 300 mg, 300 mg, Oral, Q8H, Nelson Ramirez MD    glucagon (GLUCAGEN) injection 1 mg, 1 mg, Subcutaneous, Q15 Min PRN, Nelson Ramirez MD    ipratropium-albuterol (DUO-NEB) nebulizer solution 3 mL, 3 mL, Nebulization, Q6H PRN, Nelson Ramirez MD    latanoprost (XALATAN) 0.005 % ophthalmic solution 1 drop, 1 drop, Both Eyes, Nightly, Nelson Ramirez MD    magnesium oxide (MAG-OX) tablet 400 mg, 400 mg, Oral, Daily, Nelson Ramirez MD    metoprolol tartrate (LOPRESSOR) tablet 25 mg, 25 mg, Oral, Q12H, Nelson Ramirez MD    midodrine (PROAMATINE) tablet 5 mg, 5 mg, Oral, BID AC, Brennon Gamez MD    pantoprazole (PROTONIX) EC tablet 40 mg, 40 mg, Oral, Q AM, Nelson Ramirez MD    polyethylene glycol (MIRALAX) packet 17 g, 17 g, Oral, Daily PRN, Cris Puentes, APRN    rosuvastatin  "(CRESTOR) tablet 5 mg, 5 mg, Oral, Nightly, Nelson Ramirez MD    sennosides-docusate (PERICOLACE) 8.6-50 MG per tablet 2 tablet, 2 tablet, Oral, Daily PRN, Cris Puentes APRN    tamsulosin (FLOMAX) 24 hr capsule 0.4 mg, 0.4 mg, Oral, Nightly, Nelson Ramirez MD    timolol (TIMOPTIC) 0.5 % ophthalmic solution 1 drop, 1 drop, Both Eyes, Daily, Nelson Ramirez MD    vitamin D (ERGOCALCIFEROL) capsule 50,000 Units, 50,000 Units, Oral, Q7 Days, Dusty Sapp MD    Data:     Code Status:    There are no questions and answers to display.       No family history on file.    Social History     Socioeconomic History    Marital status: Unknown       Vitals:  Ht 157.5 cm (62.01\")   Wt 63.9 kg (140 lb 12.8 oz)   BMI 25.75 kg/m²   T 98.1 P 85 R 21 /59 Sp02 93% (room air)          I/O (24Hr):  No intake or output data in the 24 hours ending 05/23/25 1103    Labs and imaging:      Recent Results (from the past 12 hours)   Basic Metabolic Panel    Collection Time: 05/23/25  5:10 AM    Specimen: Blood   Result Value Ref Range    Glucose 103 (H) 65 - 99 mg/dL    BUN 81 (H) 8 - 23 mg/dL    Creatinine 1.90 (H) 0.57 - 1.00 mg/dL    Sodium 136 136 - 145 mmol/L    Potassium 5.4 (H) 3.5 - 5.2 mmol/L    Chloride 101 98 - 107 mmol/L    CO2 25.0 22.0 - 29.0 mmol/L    Calcium 7.8 (L) 8.6 - 10.5 mg/dL    BUN/Creatinine Ratio 42.6 (H) 7.0 - 25.0    Anion Gap 10.0 5.0 - 15.0 mmol/L    eGFR 25.6 (L) >60.0 mL/min/1.73                                     Physical Examination:        Physical Exam  Vitals and nursing note reviewed.   Constitutional:       Appearance: Normal appearance.   HENT:      Head: Normocephalic and atraumatic.      Right Ear: External ear normal.      Left Ear: External ear normal.      Nose: Nose normal.      Mouth/Throat:      Mouth: Mucous membranes are dry.      Pharynx: Oropharynx is clear.   Eyes:      Extraocular Movements: Extraocular movements intact.      " Conjunctiva/sclera: Conjunctivae normal.      Pupils: Pupils are equal, round, and reactive to light.   Cardiovascular:      Rate and Rhythm: Normal rate and regular rhythm.      Pulses: Normal pulses.      Heart sounds: Normal heart sounds.   Pulmonary:      Effort: Pulmonary effort is normal.      Breath sounds: Normal breath sounds.   Abdominal:      General: Bowel sounds are normal.      Palpations: Abdomen is soft.   Musculoskeletal:      Cervical back: Normal range of motion and neck supple.      Comments: Generalized weakness   Skin:     General: Skin is warm and dry.   Neurological:      Mental Status: She is alert and oriented to person, place, and time.      Comments: Intermittent confusion at times   Psychiatric:         Mood and Affect: Mood normal.         Behavior: Behavior normal.           Assessment:            Severe protein-calorie malnutrition    No past medical history on file.     Plan:        Bilateral pleural effusions  CKD4  PAF  Chronic anticoagulation  Chronic diastolic CHF  Solitary kidney s/p left nephrectomy  Urinary retention  Multifactorial anemia  Severe protein calorie malnutrition     Continue current treatment. Monitor counts. Increase activity. Labs Tuesday.  Appreciate nephrology assistance. Aggressive therapies as tolerated. Maintain patient safety. Monitor renal function closely. Discharge planning.       Electronically signed by SARAH Santiago on 5/23/2025 at 11:03 CDT

## 2025-05-23 NOTE — PROGRESS NOTES
Nephrology ([facility]) Progress Note      Patient:  Genevieve Cerda  YOB: 1940  Date of Service: 5/23/2025  MRN: [id number]   Acct: [id number]   Primary Care Physician: Provider, No Known  Advance Directive:   There are no questions and answers to display.     Admit Date: 5/7/2025       Hospital Day: 0  Referring Provider: Nelson Ramirez MD      Patient personally seen and examined.  Complete chart including Consults, Notes, Operative Reports, Labs, Cardiology, and Radiology studies reviewed as able.        Subjective:  Genevieve Cerda is a 84 y.o. female for whom we were consulted for evaluation and treatment of acute kidney injury with chronic kidney disease stage IIIb. Patient is followed Dr. Gamez in the office was last seen in January of this year. Baseline creatinine around 1.6. More recently she was treated at [facility] for hypoxemic respiratory failure with pleural effusion, MICHOACANO, acidosis, hyponatremia and hyperkalemia. She was treated with diuretics, antibiotics, dopamine and ultimately had chest tube placements. Seen with family initially and also reviewed with primary service. Patient denied chest pain, nausea vomiting. Denied dysuria or hematuria. Family noted she has frequent urinary tract infections.     Today, no overnight events.  Seen with family.  Denies new complaints upon questioning.  Current labs reviewed with family.  Tolerating diet.    Temp- 98.1  Pulse- 85  Resp- 21  BP- 124/59  O2%- 93      Allergies:  Codeine    Home Meds:  No medications prior to admission.       Medicines:  Current Facility-Administered Medications   Medication Dose Route Frequency Provider Last Rate Last Admin    acetaminophen (TYLENOL) tablet 650 mg  650 mg Oral Q6H PRN Neslon Ramirez MD        allopurinol (ZYLOPRIM) tablet 100 mg  100 mg Oral Daily Nelson Ramirez MD        apixaban (ELIQUIS) tablet 2.5 mg  2.5 mg Oral Q12H James  Nelson Mclaughlin MD        aspirin EC tablet 81 mg  81 mg Oral Daily Nelson aRmirez MD        citalopram (CeleXA) tablet 10 mg  10 mg Oral Daily Cande Andino APRN        dextrose (D50W) (25 g/50 mL) IV injection 25 g  25 g Intravenous Q15 Min PRN Nelson Ramirez MD        dextrose (GLUTOSE) oral gel 15 g  15 g Oral Q15 Min PRN Nelson Ramirez MD        [Held by provider] epoetin louis (EPOGEN,PROCRIT) injection 10,000 Units  10,000 Units Subcutaneous Once per day on Monday Wednesday Friday Nelson Ramirez MD        furosemide (LASIX) tablet 40 mg  40 mg Oral BID Diuretics Yuniel Del Rosario MD        gabapentin (NEURONTIN) capsule 300 mg  300 mg Oral Q8H Nelson Ramirez MD        glucagon (GLUCAGEN) injection 1 mg  1 mg Subcutaneous Q15 Min PRN Nelson Ramirez MD        ipratropium-albuterol (DUO-NEB) nebulizer solution 3 mL  3 mL Nebulization Q6H PRN Nelson Ramirez MD        latanoprost (XALATAN) 0.005 % ophthalmic solution 1 drop  1 drop Both Eyes Nightly Nelson Ramirez MD        magnesium oxide (MAG-OX) tablet 400 mg  400 mg Oral Daily Nelson Ramirez MD        metoprolol tartrate (LOPRESSOR) tablet 25 mg  25 mg Oral Q12H Nelson Ramirez MD        midodrine (PROAMATINE) tablet 5 mg  5 mg Oral BID AC Brennon Gamez MD        pantoprazole (PROTONIX) EC tablet 40 mg  40 mg Oral Q AM Nelson Ramirez MD        polyethylene glycol (MIRALAX) packet 17 g  17 g Oral Daily PRN Cris Puentes, SARAH        rosuvastatin (CRESTOR) tablet 5 mg  5 mg Oral Nightly Nelson Ramirez MD        sennosides-docusate (PERICOLACE) 8.6-50 MG per tablet 2 tablet  2 tablet Oral Daily PRN Cris Puentes APRN        tamsulosin (FLOMAX) 24 hr capsule 0.4 mg  0.4 mg Oral Nightly Nelson Ramirez MD        timolol (TIMOPTIC) 0.5 % ophthalmic solution 1 drop  1 drop Both Eyes Daily Nelson Ramirez MD        vitamin D (ERGOCALCIFEROL)  "capsule 50,000 Units  50,000 Units Oral Q7 Days Dusty Sapp MD           Past Medical History:  No past medical history on file.    Past Surgical History:  No past surgical history on file.    Family History  No family history on file.    Social History  Social History     Socioeconomic History    Marital status: Unknown       Review of Systems:  History obtained from chart review and the patient  General ROS: No fever or chills  Respiratory ROS: No cough, shortness of breath, wheezing  Cardiovascular ROS: No chest pain or palpitations  Gastrointestinal ROS: No abdominal pain or melena  Genito-Urinary ROS: No dysuria or hematuria  Psych ROS: No anxiety and depression  14 point ROS reviewed with the patient and negative except as noted above and in the HPI unless unable to obtain.    Objective:  No data found.    No intake or output data in the 24 hours ending 05/23/25 1040  General: awake/alert   Chest:  clear to auscultation bilaterally without respiratory distress  CVS: regular rate and rhythm  Abdominal: soft, nontender, positive bowel sounds  Extremities: ble edema  Skin: warm and dry without rash      Labs:  Results from last 7 days   Lab Units 05/22/25  0344 05/19/25  0522   WBC 10*3/mm3 6.16 4.80   HEMOGLOBIN g/dL 9.8* 9.4*   HEMATOCRIT % 33.2* 31.3*   PLATELETS 10*3/mm3 298 261         Results from last 7 days   Lab Units 05/23/25  0510 05/22/25  0344 05/21/25  0757   SODIUM mmol/L 136 137 134*   POTASSIUM mmol/L 5.4* 4.7 5.0   CHLORIDE mmol/L 101 102 100   CO2 mmol/L 25.0 23.0 23.0   BUN mg/dL 81* 83* 82*   CREATININE mg/dL 1.90* 2.01* 1.98*   CALCIUM mg/dL 7.8* 7.6* 7.8*   EGFR mL/min/1.73 25.6* 23.9* 24.4*   GLUCOSE mg/dL 103* 99 93       Radiology:   Imaging Results (Last 72 Hours)       ** No results found for the last 72 hours. **            Culture:  No results found for: \"BLOODCX\", \"URINECX\", \"WOUNDCX\", \"MRSACX\", \"RESPCX\", \"STOOLCX\"      Assessment   Acute kidney injury-renal function " is fluctuating some  Chronic kidney disease stage IIIb  Bilateral pleural effusion  Paroxysmal atrial fibrillation  Chronic diastolic congestive heart failure  Solitary kidney due to prior left nephrectomy  Urinary retention  Anemia     Plan:  Discussed with patient, nursing, family  Monitor labs daily for now  Diuretics with close clinical & laboratory monitoring, hold kcl for now, add lokelma for hyperkalemia dosing as needed  Surgical evaluation reviewed as current  Continue midodrine 5 mg bid for pressure support as needed        Dusty Sapp MD  5/23/2025  10:40 CDT

## 2025-05-23 NOTE — PROGRESS NOTES
Nephrology ([facility]) Progress Note      Patient:  Genevieve Cerda  YOB: 1940  Date of Service: 5/22/2025  MRN: [id number]   Acct: [id number]   Primary Care Physician: Provider, No Known  Advance Directive:   There are no questions and answers to display.     Admit Date: 5/7/2025       Hospital Day: 0  Referring Provider: Nelson Ramirez MD      Patient personally seen and examined.  Complete chart including Consults, Notes, Operative Reports, Labs, Cardiology, and Radiology studies reviewed as able.        Subjective:  Genevieve Cerda is a 84 y.o. female for whom we were consulted for evaluation and treatment of acute kidney injury with chronic kidney disease stage IIIb. Patient is followed Dr. Gamez in the office was last seen in January of this year. Baseline creatinine around 1.6. More recently she was treated at [facility] for hypoxemic respiratory failure with pleural effusion, MICHOACANO, acidosis, hyponatremia and hyperkalemia. She was treated with diuretics, antibiotics, dopamine and ultimately had chest tube placements. Seen with family initially and also reviewed with primary service. Patient denied chest pain, nausea vomiting. Denied dysuria or hematuria. Family noted she has frequent urinary tract infections.     Today, no overnight events.  Seen with family.  Denies new complaints upon questioning.  Current labs reviewed with family.  Up in chair.  Tolerating diet.    Temp- 97.3  Pulse- 81  Resp- 16  BP- 124/81  O2%- 94      Allergies:  Codeine    Home Meds:  No medications prior to admission.       Medicines:  Current Facility-Administered Medications   Medication Dose Route Frequency Provider Last Rate Last Admin    acetaminophen (TYLENOL) tablet 650 mg  650 mg Oral Q6H PRN Nelson Ramirez MD        allopurinol (ZYLOPRIM) tablet 100 mg  100 mg Oral Daily Nelson Ramirez MD        apixaban (ELIQUIS) tablet 2.5 mg  2.5 mg Oral  Q12H Nelson Ramirez MD        aspirin EC tablet 81 mg  81 mg Oral Daily Nelson Ramirez MD        citalopram (CeleXA) tablet 10 mg  10 mg Oral Daily Cande Andino APRN        dextrose (D50W) (25 g/50 mL) IV injection 25 g  25 g Intravenous Q15 Min PRN Nelson Ramirez MD        dextrose (GLUTOSE) oral gel 15 g  15 g Oral Q15 Min PRN Nelson Ramirez MD        [Held by provider] epoetin louis (EPOGEN,PROCRIT) injection 10,000 Units  10,000 Units Subcutaneous Once per day on Monday Wednesday Friday Nelson Ramirez MD        furosemide (LASIX) tablet 40 mg  40 mg Oral BID Diuretics Yuniel Del Rosario MD        gabapentin (NEURONTIN) capsule 300 mg  300 mg Oral Q8H Nelson Ramirez MD        glucagon (GLUCAGEN) injection 1 mg  1 mg Subcutaneous Q15 Min PRN Nelson Ramirez MD        ipratropium-albuterol (DUO-NEB) nebulizer solution 3 mL  3 mL Nebulization Q6H PRN Nelson Ramirez MD        latanoprost (XALATAN) 0.005 % ophthalmic solution 1 drop  1 drop Both Eyes Nightly Nelson Ramirez MD        magnesium oxide (MAG-OX) tablet 400 mg  400 mg Oral Daily Nelson Ramirez MD        metoprolol tartrate (LOPRESSOR) tablet 25 mg  25 mg Oral Q12H Nelson Ramirez MD        midodrine (PROAMATINE) tablet 5 mg  5 mg Oral BID AC Brennon Gamez MD        pantoprazole (PROTONIX) EC tablet 40 mg  40 mg Oral Q AM Nelson Ramirez MD        polyethylene glycol (MIRALAX) packet 17 g  17 g Oral Daily PRN Cris Puentes, APRKARINA        rosuvastatin (CRESTOR) tablet 5 mg  5 mg Oral Nightly Nelson Ramirez MD        sennosides-docusate (PERICOLACE) 8.6-50 MG per tablet 2 tablet  2 tablet Oral Daily PRN Cris Puentes, SARAH        tamsulosin (FLOMAX) 24 hr capsule 0.4 mg  0.4 mg Oral Nightly Nelson Ramirez MD        timolol (TIMOPTIC) 0.5 % ophthalmic solution 1 drop  1 drop Both Eyes Daily Nelson Ramirez MD        vitamin D  "(ERGOCALCIFEROL) capsule 50,000 Units  50,000 Units Oral Q7 Days Dusty Sapp MD           Past Medical History:  No past medical history on file.    Past Surgical History:  No past surgical history on file.    Family History  No family history on file.    Social History  Social History     Socioeconomic History    Marital status: Unknown       Review of Systems:  History obtained from chart review and the patient  General ROS: No fever or chills  Respiratory ROS: No cough, shortness of breath, wheezing  Cardiovascular ROS: No chest pain or palpitations  Gastrointestinal ROS: No abdominal pain or melena  Genito-Urinary ROS: No dysuria or hematuria  Psych ROS: No anxiety and depression  14 point ROS reviewed with the patient and negative except as noted above and in the HPI unless unable to obtain.    Objective:  No data found.    No intake or output data in the 24 hours ending 05/22/25 1941  General: awake/alert   Chest:  clear to auscultation bilaterally without respiratory distress  CVS: regular rate and rhythm  Abdominal: soft, nontender, positive bowel sounds  Extremities: ble edema  Skin: warm and dry without rash      Labs:  Results from last 7 days   Lab Units 05/22/25  0344 05/19/25  0522   WBC 10*3/mm3 6.16 4.80   HEMOGLOBIN g/dL 9.8* 9.4*   HEMATOCRIT % 33.2* 31.3*   PLATELETS 10*3/mm3 298 261         Results from last 7 days   Lab Units 05/22/25  0344 05/21/25  0757 05/20/25  0604   SODIUM mmol/L 137 134* 135*   POTASSIUM mmol/L 4.7 5.0 4.7   CHLORIDE mmol/L 102 100 100   CO2 mmol/L 23.0 23.0 22.0   BUN mg/dL 83* 82* 87*   CREATININE mg/dL 2.01* 1.98* 2.19*   CALCIUM mg/dL 7.6* 7.8* 7.4*   EGFR mL/min/1.73 23.9* 24.4* 21.6*   GLUCOSE mg/dL 99 93 89       Radiology:   Imaging Results (Last 72 Hours)       ** No results found for the last 72 hours. 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

## 2025-05-24 LAB
ANION GAP SERPL CALCULATED.3IONS-SCNC: 10 MMOL/L (ref 5–15)
BUN SERPL-MCNC: 79 MG/DL (ref 8–23)
BUN/CREAT SERPL: 41.8 (ref 7–25)
CALCIUM SPEC-SCNC: 7.7 MG/DL (ref 8.6–10.5)
CHLORIDE SERPL-SCNC: 100 MMOL/L (ref 98–107)
CO2 SERPL-SCNC: 25 MMOL/L (ref 22–29)
CREAT SERPL-MCNC: 1.89 MG/DL (ref 0.57–1)
EGFRCR SERPLBLD CKD-EPI 2021: 25.8 ML/MIN/1.73
GLUCOSE SERPL-MCNC: 100 MG/DL (ref 65–99)
POTASSIUM SERPL-SCNC: 4.2 MMOL/L (ref 3.5–5.2)
SODIUM SERPL-SCNC: 135 MMOL/L (ref 136–145)

## 2025-05-24 PROCEDURE — 80048 BASIC METABOLIC PNL TOTAL CA: CPT | Performed by: INTERNAL MEDICINE

## 2025-05-24 NOTE — PROGRESS NOTES
JOHNATHON Alcala APRN        Internal Medicine Progress Note    5/24/2025   05:14 CDT    Name:  Genevieve Cerda  MRN:    [id number]     Acct:     [id number]   Room:  [address] [place] [address] Day: 0     Admit Date: 5/7/2025  4:40 PM  PCP: Provider, No Known    Subjective:     C/C: weakness    Interval History: Status:  improved.  Sleeping in bed, appears to be resting comfortably. Pt progressing with therapy. Daughter called yesterday and would like patient transferred to SNF next week for more cognitive therapy.  Pt afebrile. Counts stable.     Review of Systems   Constitutional: Positive for malaise/fatigue. Negative for chills, decreased appetite, weight gain and weight loss.   HENT:  Negative for congestion, ear discharge, hoarse voice and tinnitus.    Eyes:  Negative for blurred vision, discharge, visual disturbance and visual halos.   Cardiovascular:  Negative for chest pain, claudication, dyspnea on exertion, irregular heartbeat, leg swelling, orthopnea and paroxysmal nocturnal dyspnea.   Respiratory:  Negative for cough, shortness of breath, sputum production and wheezing.    Endocrine: Negative for cold intolerance, heat intolerance and polyuria.   Hematologic/Lymphatic: Negative for adenopathy. Does not bruise/bleed easily.   Skin:  Negative for dry skin, itching and suspicious lesions.   Musculoskeletal:  Negative for arthritis, back pain, falls, joint pain, muscle weakness and myalgias.   Gastrointestinal:  Negative for abdominal pain, constipation, diarrhea, dysphagia and hematemesis.   Genitourinary:  Negative for bladder incontinence, dysuria and frequency.   Neurological:  Positive for weakness. Negative for aphonia, disturbances in coordination and dizziness.   Psychiatric/Behavioral:  Negative for altered mental status, depression, memory loss and substance abuse. The patient does not have insomnia and is not nervous/anxious.          Medications:     Allergies:   Allergies    Allergen Reactions    Codeine Itching       Current Meds:   Current Facility-Administered Medications:     acetaminophen (TYLENOL) tablet 650 mg, 650 mg, Oral, Q6H PRN, Nelson Ramirez MD    allopurinol (ZYLOPRIM) tablet 100 mg, 100 mg, Oral, Daily, Nelson Ramirez MD    apixaban (ELIQUIS) tablet 2.5 mg, 2.5 mg, Oral, Q12H, Nelson Ramirez MD    aspirin EC tablet 81 mg, 81 mg, Oral, Daily, Nelson Ramirez MD    citalopram (CeleXA) tablet 10 mg, 10 mg, Oral, Daily, Cande Andino APRN    dextrose (D50W) (25 g/50 mL) IV injection 25 g, 25 g, Intravenous, Q15 Min PRN, Nelson Ramirez MD    dextrose (GLUTOSE) oral gel 15 g, 15 g, Oral, Q15 Min PRN, Nelson Ramirez MD    [Held by provider] epoetin louis (EPOGEN,PROCRIT) injection 10,000 Units, 10,000 Units, Subcutaneous, Once per day on Monday Wednesday Friday, Nelson Ramirez MD    furosemide (LASIX) tablet 40 mg, 40 mg, Oral, BID Diuretics, Yuniel Del Rosario MD    gabapentin (NEURONTIN) capsule 300 mg, 300 mg, Oral, Q8H, Nelson Ramirez MD    glucagon (GLUCAGEN) injection 1 mg, 1 mg, Subcutaneous, Q15 Min PRN, Nelson Ramirez MD    ipratropium-albuterol (DUO-NEB) nebulizer solution 3 mL, 3 mL, Nebulization, Q6H PRN, Nelson Ramirez MD    latanoprost (XALATAN) 0.005 % ophthalmic solution 1 drop, 1 drop, Both Eyes, Nightly, Nelson Ramirez MD    magnesium oxide (MAG-OX) tablet 400 mg, 400 mg, Oral, Daily, Nelson Ramirez MD    metoprolol tartrate (LOPRESSOR) tablet 25 mg, 25 mg, Oral, Q12H, Nelson Ramirez MD    midodrine (PROAMATINE) tablet 5 mg, 5 mg, Oral, BID AC, Brennon Gamez MD    pantoprazole (PROTONIX) EC tablet 40 mg, 40 mg, Oral, Q AM, Nelson Ramirez MD    polyethylene glycol (MIRALAX) packet 17 g, 17 g, Oral, Daily PRN, Cris Puentes, APRN    rosuvastatin (CRESTOR) tablet 5 mg, 5 mg, Oral, Nightly, Nelson Ramirez MD     "sennosides-docusate (PERICOLACE) 8.6-50 MG per tablet 2 tablet, 2 tablet, Oral, Daily PRN, Cris Puentes, SARAH    tamsulosin (FLOMAX) 24 hr capsule 0.4 mg, 0.4 mg, Oral, Nightly, Nelson Ramirez MD    timolol (TIMOPTIC) 0.5 % ophthalmic solution 1 drop, 1 drop, Both Eyes, Daily, Nelson Ramirez MD    vitamin D (ERGOCALCIFEROL) capsule 50,000 Units, 50,000 Units, Oral, Q7 Days, Dusty Sapp MD    Data:     Code Status:    There are no questions and answers to display.       No family history on file.    Social History     Socioeconomic History    Marital status: Unknown       Vitals:  Ht 157.5 cm (62.01\")   Wt 63.9 kg (140 lb 12.8 oz)   BMI 25.75 kg/m²   T 98.1 P 85 R 21 /59 Sp02 93% (room air)          I/O (24Hr):  No intake or output data in the 24 hours ending 05/24/25 0514    Labs and imaging:      Recent Results (from the past 12 hours)   Basic Metabolic Panel    Collection Time: 05/24/25  4:06 AM    Specimen: Blood   Result Value Ref Range    Glucose 100 (H) 65 - 99 mg/dL    BUN 79 (H) 8 - 23 mg/dL    Creatinine 1.89 (H) 0.57 - 1.00 mg/dL    Sodium 135 (L) 136 - 145 mmol/L    Potassium 4.2 3.5 - 5.2 mmol/L    Chloride 100 98 - 107 mmol/L    CO2 25.0 22.0 - 29.0 mmol/L    Calcium 7.7 (L) 8.6 - 10.5 mg/dL    BUN/Creatinine Ratio 41.8 (H) 7.0 - 25.0    Anion Gap 10.0 5.0 - 15.0 mmol/L    eGFR 25.8 (L) >60.0 mL/min/1.73                                     Physical Examination:        Physical Exam  Vitals and nursing note reviewed.   Constitutional:       Appearance: Normal appearance.   HENT:      Head: Normocephalic and atraumatic.      Right Ear: External ear normal.      Left Ear: External ear normal.      Nose: Nose normal.      Mouth/Throat:      Mouth: Mucous membranes are dry.      Pharynx: Oropharynx is clear.   Eyes:      Extraocular Movements: Extraocular movements intact.      Conjunctiva/sclera: Conjunctivae normal.      Pupils: Pupils are equal, round, and " reactive to light.   Cardiovascular:      Rate and Rhythm: Normal rate and regular rhythm.      Pulses: Normal pulses.      Heart sounds: Normal heart sounds.   Pulmonary:      Effort: Pulmonary effort is normal.      Breath sounds: Normal breath sounds.   Abdominal:      General: Bowel sounds are normal.      Palpations: Abdomen is soft.   Musculoskeletal:      Cervical back: Normal range of motion and neck supple.      Comments: Generalized weakness   Skin:     General: Skin is warm and dry.   Neurological:      Mental Status: She is alert and oriented to person, place, and time.      Comments: Intermittent confusion at times   Psychiatric:         Mood and Affect: Mood normal.         Behavior: Behavior normal.           Assessment:            Severe protein-calorie malnutrition    No past medical history on file.     Plan:        Bilateral pleural effusions  CKD4  PAF  Chronic anticoagulation  Chronic diastolic CHF  Solitary kidney s/p left nephrectomy  Urinary retention  Multifactorial anemia  Severe protein calorie malnutrition     Continue current treatment. Monitor counts. Increase activity. Labs Tuesday.  Appreciate nephrology assistance. Aggressive therapies as tolerated. Maintain patient safety. Monitor renal function closely. Discharge planning.       Electronically signed by SARAH Mcneil on 5/24/2025 at 05:14 CDT     I have discussed the care of Genevieve Cerda, including pertinent history and exam findings, with the nurse practitioner.    I have seen and examined the patient and the key elements of all parts of the encounter have been performed by me.  I agree with the assessment, plan and orders as documented by SARAH Mackey, after I modified the exam findings and the plan of treatments and the final version is my approved version of the assessment.        Electronically signed by Nelson Ramirez MD on 5/24/2025 at 07:47 CDT

## 2025-05-24 NOTE — PROGRESS NOTES
Nephrology ([facility]) Progress Note      Patient:  Genevieve Cerda  YOB: 1940  Date of Service: 5/24/2025  MRN: [id number]   Acct: [id number]   Primary Care Physician: Provider, No Known  Advance Directive:   There are no questions and answers to display.     Admit Date: 5/7/2025       Hospital Day: 0  Referring Provider: Nelson Ramirez MD      Patient personally seen and examined.  Complete chart including Consults, Notes, Operative Reports, Labs, Cardiology, and Radiology studies reviewed as able.        Subjective:  Genevieve Cerda is a 84 y.o. female for whom we were consulted for evaluation and treatment of acute kidney injury with chronic kidney disease stage IIIb. Patient is followed Dr. Gamez in the office was last seen in January of this year. Baseline creatinine around 1.6. More recently she was treated at [facility] for hypoxemic respiratory failure with pleural effusion, MICHOACANO, acidosis, hyponatremia and hyperkalemia. She was treated with diuretics, antibiotics, dopamine and ultimately had chest tube placements. Seen with family initially and also reviewed with primary service. Patient denied chest pain, nausea vomiting. Denied dysuria or hematuria. Family noted she has frequent urinary tract infections.     Today, no overnight events.  Seen with family.  Denies new complaints upon questioning.  Current labs reviewed with family.  Tolerating diet.    Temp- 98.0  Pulse- 99  Resp- 14  BP- 115/73  O2%- 96      Allergies:  Codeine    Home Meds:  No medications prior to admission.       Medicines:  Current Facility-Administered Medications   Medication Dose Route Frequency Provider Last Rate Last Admin    acetaminophen (TYLENOL) tablet 650 mg  650 mg Oral Q6H PRN Nelson Ramirez MD        allopurinol (ZYLOPRIM) tablet 100 mg  100 mg Oral Daily Nelson Ramirez MD        apixaban (ELIQUIS) tablet 2.5 mg  2.5 mg Oral Q12H James  Nelson Mclaughlin MD        aspirin EC tablet 81 mg  81 mg Oral Daily Nelson Ramirez MD        citalopram (CeleXA) tablet 10 mg  10 mg Oral Daily Cande Andino APRN        dextrose (D50W) (25 g/50 mL) IV injection 25 g  25 g Intravenous Q15 Min PRN Nelson Ramirez MD        dextrose (GLUTOSE) oral gel 15 g  15 g Oral Q15 Min PRN Nelson Ramirez MD        [Held by provider] epoetin louis (EPOGEN,PROCRIT) injection 10,000 Units  10,000 Units Subcutaneous Once per day on Monday Wednesday Friday Nelson Ramirez MD        furosemide (LASIX) tablet 40 mg  40 mg Oral BID Diuretics Yuniel Del Rosario MD        gabapentin (NEURONTIN) capsule 300 mg  300 mg Oral Q8H Nelson Ramirez MD        glucagon (GLUCAGEN) injection 1 mg  1 mg Subcutaneous Q15 Min PRN Nelson Ramirez MD        ipratropium-albuterol (DUO-NEB) nebulizer solution 3 mL  3 mL Nebulization Q6H PRN Nelson Ramirez MD        latanoprost (XALATAN) 0.005 % ophthalmic solution 1 drop  1 drop Both Eyes Nightly Nelson Ramirez MD        magnesium oxide (MAG-OX) tablet 400 mg  400 mg Oral Daily Nelson Ramirez MD        metoprolol tartrate (LOPRESSOR) tablet 25 mg  25 mg Oral Q12H Nelson Ramirez MD        midodrine (PROAMATINE) tablet 5 mg  5 mg Oral BID AC Brennon Gamez MD        pantoprazole (PROTONIX) EC tablet 40 mg  40 mg Oral Q AM Nelson Ramirez MD        polyethylene glycol (MIRALAX) packet 17 g  17 g Oral Daily PRN Cris Puentes, SARAH        rosuvastatin (CRESTOR) tablet 5 mg  5 mg Oral Nightly Nelson Ramirez MD        sennosides-docusate (PERICOLACE) 8.6-50 MG per tablet 2 tablet  2 tablet Oral Daily PRN Cris Puentes APRN        tamsulosin (FLOMAX) 24 hr capsule 0.4 mg  0.4 mg Oral Nightly Nelson Ramirez MD        timolol (TIMOPTIC) 0.5 % ophthalmic solution 1 drop  1 drop Both Eyes Daily Nelson Ramirez MD        vitamin D (ERGOCALCIFEROL)  "capsule 50,000 Units  50,000 Units Oral Q7 Days Dusty Sapp MD           Past Medical History:  No past medical history on file.    Past Surgical History:  No past surgical history on file.    Family History  No family history on file.    Social History  Social History     Socioeconomic History    Marital status: Unknown       Review of Systems:  History obtained from chart review and the patient  General ROS: No fever or chills  Respiratory ROS: No cough, shortness of breath, wheezing  Cardiovascular ROS: No chest pain or palpitations  Gastrointestinal ROS: No abdominal pain or melena  Genito-Urinary ROS: No dysuria or hematuria  Psych ROS: No anxiety and depression  14 point ROS reviewed with the patient and negative except as noted above and in the HPI unless unable to obtain.    Objective:  No data found.    No intake or output data in the 24 hours ending 05/24/25 1359  General: awake/alert   Chest:  clear to auscultation bilaterally without respiratory distress  CVS: regular rate and rhythm  Abdominal: soft, nontender, positive bowel sounds  Extremities: ble edema  Skin: warm and dry without rash      Labs:  Results from last 7 days   Lab Units 05/22/25  0344 05/19/25  0522   WBC 10*3/mm3 6.16 4.80   HEMOGLOBIN g/dL 9.8* 9.4*   HEMATOCRIT % 33.2* 31.3*   PLATELETS 10*3/mm3 298 261         Results from last 7 days   Lab Units 05/24/25  0406 05/23/25  0510 05/22/25  0344   SODIUM mmol/L 135* 136 137   POTASSIUM mmol/L 4.2 5.4* 4.7   CHLORIDE mmol/L 100 101 102   CO2 mmol/L 25.0 25.0 23.0   BUN mg/dL 79* 81* 83*   CREATININE mg/dL 1.89* 1.90* 2.01*   CALCIUM mg/dL 7.7* 7.8* 7.6*   EGFR mL/min/1.73 25.8* 25.6* 23.9*   GLUCOSE mg/dL 100* 103* 99       Radiology:   Imaging Results (Last 72 Hours)       ** No results found for the last 72 hours. **            Culture:  No results found for: \"BLOODCX\", \"URINECX\", \"WOUNDCX\", \"MRSACX\", \"RESPCX\", \"STOOLCX\"      Assessment   Acute kidney injury-  Chronic " kidney disease stage IIIb  Bilateral pleural effusion  Paroxysmal atrial fibrillation  Chronic diastolic congestive heart failure  Solitary kidney due to prior left nephrectomy  Urinary retention  Anemia     Plan:  Discussed with patient, nursing, family  Monitor labs daily for now  Diuretics with close clinical & laboratory monitoring, holding scheduled kcl for now, add lokelma for hyperkalemia dosing as needed  Surgical evaluation reviewed as current  Continue midodrine 5 mg bid for pressure support as needed        Dusty Sapp MD  5/24/2025  13:59 CDT

## 2025-05-25 LAB
ANION GAP SERPL CALCULATED.3IONS-SCNC: 11 MMOL/L (ref 5–15)
BUN SERPL-MCNC: 76 MG/DL (ref 8–23)
BUN/CREAT SERPL: 42 (ref 7–25)
CALCIUM SPEC-SCNC: 7.7 MG/DL (ref 8.6–10.5)
CHLORIDE SERPL-SCNC: 100 MMOL/L (ref 98–107)
CO2 SERPL-SCNC: 24 MMOL/L (ref 22–29)
CREAT SERPL-MCNC: 1.81 MG/DL (ref 0.57–1)
EGFRCR SERPLBLD CKD-EPI 2021: 27.1 ML/MIN/1.73
GLUCOSE SERPL-MCNC: 94 MG/DL (ref 65–99)
POTASSIUM SERPL-SCNC: 4.5 MMOL/L (ref 3.5–5.2)
SODIUM SERPL-SCNC: 135 MMOL/L (ref 136–145)

## 2025-05-25 PROCEDURE — 97110 THERAPEUTIC EXERCISES: CPT

## 2025-05-25 PROCEDURE — 97530 THERAPEUTIC ACTIVITIES: CPT

## 2025-05-25 PROCEDURE — 80048 BASIC METABOLIC PNL TOTAL CA: CPT | Performed by: INTERNAL MEDICINE

## 2025-05-25 NOTE — PROGRESS NOTES
JOHNATHON Alcala APRN        Internal Medicine Progress Note    5/25/2025   15:43 CDT    Name:  Genevieve Cerda  MRN:    [id number]     Acct:     [id number]   Room:  [address] [place] [address] Day: 0     Admit Date: 5/7/2025  4:40 PM  PCP: Provider, No Known    Subjective:     C/C: weakness    Interval History: Status:  improved.  Resting in bed. No family at bedside. Afebrile.  Denies needs at present. Discharge planning to SNF next week.     Review of Systems   Constitutional: Positive for malaise/fatigue. Negative for chills, decreased appetite, weight gain and weight loss.   HENT:  Negative for congestion, ear discharge, hoarse voice and tinnitus.    Eyes:  Negative for blurred vision, discharge, visual disturbance and visual halos.   Cardiovascular:  Negative for chest pain, claudication, dyspnea on exertion, irregular heartbeat, leg swelling, orthopnea and paroxysmal nocturnal dyspnea.   Respiratory:  Negative for cough, shortness of breath, sputum production and wheezing.    Endocrine: Negative for cold intolerance, heat intolerance and polyuria.   Hematologic/Lymphatic: Negative for adenopathy. Does not bruise/bleed easily.   Skin:  Negative for dry skin, itching and suspicious lesions.   Musculoskeletal:  Negative for arthritis, back pain, falls, joint pain, muscle weakness and myalgias.   Gastrointestinal:  Negative for abdominal pain, constipation, diarrhea, dysphagia and hematemesis.   Genitourinary:  Negative for bladder incontinence, dysuria and frequency.   Neurological:  Positive for weakness. Negative for aphonia, disturbances in coordination and dizziness.   Psychiatric/Behavioral:  Negative for altered mental status, depression, memory loss and substance abuse. The patient does not have insomnia and is not nervous/anxious.          Medications:     Allergies:   Allergies   Allergen Reactions    Codeine Itching       Current Meds:   Current Facility-Administered Medications:      acetaminophen (TYLENOL) tablet 650 mg, 650 mg, Oral, Q6H PRN, Nelson Ramirez MD    allopurinol (ZYLOPRIM) tablet 100 mg, 100 mg, Oral, Daily, Nelson Ramirez MD    apixaban (ELIQUIS) tablet 2.5 mg, 2.5 mg, Oral, Q12H, Nelson Ramirez MD    aspirin EC tablet 81 mg, 81 mg, Oral, Daily, Nelson Ramirez MD    citalopram (CeleXA) tablet 10 mg, 10 mg, Oral, Daily, Cande Andino APRN    dextrose (D50W) (25 g/50 mL) IV injection 25 g, 25 g, Intravenous, Q15 Min PRN, Nelson Ramirez MD    dextrose (GLUTOSE) oral gel 15 g, 15 g, Oral, Q15 Min PRN, Nelson Ramirez MD    [Held by provider] epoetin louis (EPOGEN,PROCRIT) injection 10,000 Units, 10,000 Units, Subcutaneous, Once per day on Monday Wednesday Friday, Nelson Ramirez MD    furosemide (LASIX) tablet 40 mg, 40 mg, Oral, BID Diuretics, Yuniel Del Rosario MD    gabapentin (NEURONTIN) capsule 300 mg, 300 mg, Oral, Q8H, Nelson Ramirez MD    glucagon (GLUCAGEN) injection 1 mg, 1 mg, Subcutaneous, Q15 Min PRN, Nelson Ramirez MD    ipratropium-albuterol (DUO-NEB) nebulizer solution 3 mL, 3 mL, Nebulization, Q6H PRN, Nelson Ramirez MD    latanoprost (XALATAN) 0.005 % ophthalmic solution 1 drop, 1 drop, Both Eyes, Nightly, Nelson Ramirez MD    magnesium oxide (MAG-OX) tablet 400 mg, 400 mg, Oral, Daily, Nelson Ramirez MD    metoprolol tartrate (LOPRESSOR) tablet 25 mg, 25 mg, Oral, Q12H, Nelson Ramirez MD    midodrine (PROAMATINE) tablet 5 mg, 5 mg, Oral, BID AC, Brennon Gamez MD    pantoprazole (PROTONIX) EC tablet 40 mg, 40 mg, Oral, Q AM, Nelson Ramirez MD    polyethylene glycol (MIRALAX) packet 17 g, 17 g, Oral, Daily PRN, Cris Puentes, APRN    rosuvastatin (CRESTOR) tablet 5 mg, 5 mg, Oral, Nightly, Nelson Ramirez MD    sennosides-docusate (PERICOLACE) 8.6-50 MG per tablet 2 tablet, 2 tablet, Oral, Daily PRN, Cris Puentes, APRN     "tamsulosin (FLOMAX) 24 hr capsule 0.4 mg, 0.4 mg, Oral, Nightly, Nelson Ramirez MD    timolol (TIMOPTIC) 0.5 % ophthalmic solution 1 drop, 1 drop, Both Eyes, Daily, Nelson Ramirez MD    vitamin D (ERGOCALCIFEROL) capsule 50,000 Units, 50,000 Units, Oral, Q7 Days, Dusty Sapp MD    Data:     Code Status:    There are no questions and answers to display.       No family history on file.    Social History     Socioeconomic History    Marital status: Unknown       Vitals:  Ht 157.5 cm (62.01\")   Wt 63.9 kg (140 lb 12.8 oz)   BMI 25.75 kg/m²   T 98.1 P 85 R 21 /59 Sp02 93% (room air)          I/O (24Hr):  No intake or output data in the 24 hours ending 05/25/25 1543    Labs and imaging:      Recent Results (from the past 12 hours)   Basic Metabolic Panel    Collection Time: 05/25/25  9:34 AM    Specimen: Blood   Result Value Ref Range    Glucose 94 65 - 99 mg/dL    BUN 76 (H) 8 - 23 mg/dL    Creatinine 1.81 (H) 0.57 - 1.00 mg/dL    Sodium 135 (L) 136 - 145 mmol/L    Potassium 4.5 3.5 - 5.2 mmol/L    Chloride 100 98 - 107 mmol/L    CO2 24.0 22.0 - 29.0 mmol/L    Calcium 7.7 (L) 8.6 - 10.5 mg/dL    BUN/Creatinine Ratio 42.0 (H) 7.0 - 25.0    Anion Gap 11.0 5.0 - 15.0 mmol/L    eGFR 27.1 (L) >60.0 mL/min/1.73                                     Physical Examination:        Physical Exam  Vitals and nursing note reviewed.   Constitutional:       Appearance: Normal appearance.   HENT:      Head: Normocephalic and atraumatic.      Right Ear: External ear normal.      Left Ear: External ear normal.      Nose: Nose normal.      Mouth/Throat:      Mouth: Mucous membranes are dry.      Pharynx: Oropharynx is clear.   Eyes:      Extraocular Movements: Extraocular movements intact.      Conjunctiva/sclera: Conjunctivae normal.      Pupils: Pupils are equal, round, and reactive to light.   Cardiovascular:      Rate and Rhythm: Normal rate and regular rhythm.      Pulses: Normal pulses.      " Heart sounds: Normal heart sounds.   Pulmonary:      Effort: Pulmonary effort is normal.      Breath sounds: Normal breath sounds.   Abdominal:      General: Bowel sounds are normal.      Palpations: Abdomen is soft.   Musculoskeletal:      Cervical back: Normal range of motion and neck supple.      Comments: Generalized weakness   Skin:     General: Skin is warm and dry.   Neurological:      Mental Status: She is alert and oriented to person, place, and time.      Comments: Intermittent confusion at times   Psychiatric:         Mood and Affect: Mood normal.         Behavior: Behavior normal.           Assessment:            Severe protein-calorie malnutrition    No past medical history on file.     Plan:        Bilateral pleural effusions  CKD4  PAF  Chronic anticoagulation  Chronic diastolic CHF  Solitary kidney s/p left nephrectomy  Urinary retention  Multifactorial anemia  Severe protein calorie malnutrition     Continue current treatment. Monitor counts. Increase activity. Labs Tuesday.  Appreciate nephrology assistance. Aggressive therapies as tolerated. Maintain patient safety. Monitor renal function closely. Discharge planning.       Electronically signed by SARAH Mcneil on 5/25/2025 at 15:43 CDT

## 2025-05-25 NOTE — PROGRESS NOTES
Nephrology ([facility]) Progress Note      Patient:  Genevieve Cerda  YOB: 1940  Date of Service: 5/25/2025  MRN: [id number]   Acct: [id number]   Primary Care Physician: Provider, No Known  Advance Directive:   There are no questions and answers to display.     Admit Date: 5/7/2025       Hospital Day: 0  Referring Provider: Nelson Ramirez MD      Patient personally seen and examined.  Complete chart including Consults, Notes, Operative Reports, Labs, Cardiology, and Radiology studies reviewed as able.        Subjective:  Genevieve Cerda is a 84 y.o. female for whom we were consulted for evaluation and treatment of acute kidney injury with chronic kidney disease stage IIIb. Patient is followed Dr. Gamez in the office was last seen in January of this year. Baseline creatinine around 1.6. More recently she was treated at [facility] for hypoxemic respiratory failure with pleural effusion, MICHOACANO, acidosis, hyponatremia and hyperkalemia. She was treated with diuretics, antibiotics, dopamine and ultimately had chest tube placements. Seen with family initially and also reviewed with primary service. Patient denied chest pain, nausea vomiting. Denied dysuria or hematuria. Family noted she has frequent urinary tract infections.     Today, no overnight events.  Seen with family.  Denies new complaints upon questioning.  Current labs reviewed with family.  Tolerating diet.  Family with questions about edema as well.    Temp- 98.1  Pulse- 85  Resp- 21  BP- 124/59  O2%- 93      Allergies:  Codeine    Home Meds:  No medications prior to admission.       Medicines:  Current Facility-Administered Medications   Medication Dose Route Frequency Provider Last Rate Last Admin    acetaminophen (TYLENOL) tablet 650 mg  650 mg Oral Q6H PRN Nelson Ramirez MD        allopurinol (ZYLOPRIM) tablet 100 mg  100 mg Oral Daily Nelson Ramirez MD        apixaban  (ELIQUIS) tablet 2.5 mg  2.5 mg Oral Q12H Nelson Ramirez MD        aspirin EC tablet 81 mg  81 mg Oral Daily Nelson Ramirez MD        citalopram (CeleXA) tablet 10 mg  10 mg Oral Daily Cande Andino APRN        dextrose (D50W) (25 g/50 mL) IV injection 25 g  25 g Intravenous Q15 Min PRN Nelson Ramirez MD        dextrose (GLUTOSE) oral gel 15 g  15 g Oral Q15 Min PRN Nelson Ramirez MD        [Held by provider] epoetin louis (EPOGEN,PROCRIT) injection 10,000 Units  10,000 Units Subcutaneous Once per day on Monday Wednesday Friday Nelson Ramirez MD        furosemide (LASIX) tablet 40 mg  40 mg Oral BID Diuretics Yuniel Del Rosario MD        gabapentin (NEURONTIN) capsule 300 mg  300 mg Oral Q8H Nelson Ramirez MD        glucagon (GLUCAGEN) injection 1 mg  1 mg Subcutaneous Q15 Min PRN Nelson Ramirez MD        ipratropium-albuterol (DUO-NEB) nebulizer solution 3 mL  3 mL Nebulization Q6H PRN Nelson Ramirez MD        latanoprost (XALATAN) 0.005 % ophthalmic solution 1 drop  1 drop Both Eyes Nightly Nelson Ramirez MD        magnesium oxide (MAG-OX) tablet 400 mg  400 mg Oral Daily Nelson Ramirez MD        metoprolol tartrate (LOPRESSOR) tablet 25 mg  25 mg Oral Q12H Nelson Ramirez MD        midodrine (PROAMATINE) tablet 5 mg  5 mg Oral BID AC Brennon Gamez MD        pantoprazole (PROTONIX) EC tablet 40 mg  40 mg Oral Q AM Nelson Ramirez MD        polyethylene glycol (MIRALAX) packet 17 g  17 g Oral Daily PRN Cris Puentes, SARAH        rosuvastatin (CRESTOR) tablet 5 mg  5 mg Oral Nightly Nelson Ramirez MD        sennosides-docusate (PERICOLACE) 8.6-50 MG per tablet 2 tablet  2 tablet Oral Daily PRN Cris Puentes, APRKARINA        tamsulosin (FLOMAX) 24 hr capsule 0.4 mg  0.4 mg Oral Nightly Nelson Ramirez MD        timolol (TIMOPTIC) 0.5 % ophthalmic solution 1 drop  1 drop Both Eyes Daily James  "Nelson Mclaughlin MD        vitamin D (ERGOCALCIFEROL) capsule 50,000 Units  50,000 Units Oral Q7 Days Dusty Sapp MD           Past Medical History:  No past medical history on file.    Past Surgical History:  No past surgical history on file.    Family History  No family history on file.    Social History  Social History     Socioeconomic History    Marital status: Unknown       Review of Systems:  History obtained from chart review and the patient  General ROS: No fever or chills  Respiratory ROS: No cough, shortness of breath, wheezing  Cardiovascular ROS: No chest pain or palpitations  Gastrointestinal ROS: No abdominal pain or melena  Genito-Urinary ROS: No dysuria or hematuria  Psych ROS: No anxiety and depression  14 point ROS reviewed with the patient and negative except as noted above and in the HPI unless unable to obtain.    Objective:  No data found.    No intake or output data in the 24 hours ending 05/25/25 1330  General: awake/alert   Chest:  clear to auscultation bilaterally without respiratory distress  CVS: regular rate and rhythm  Abdominal: soft, nontender, positive bowel sounds  Extremities: ble edema  Skin: warm and dry without rash      Labs:  Results from last 7 days   Lab Units 05/22/25  0344 05/19/25  0522   WBC 10*3/mm3 6.16 4.80   HEMOGLOBIN g/dL 9.8* 9.4*   HEMATOCRIT % 33.2* 31.3*   PLATELETS 10*3/mm3 298 261         Results from last 7 days   Lab Units 05/25/25  0934 05/24/25  0406 05/23/25  0510   SODIUM mmol/L 135* 135* 136   POTASSIUM mmol/L 4.5 4.2 5.4*   CHLORIDE mmol/L 100 100 101   CO2 mmol/L 24.0 25.0 25.0   BUN mg/dL 76* 79* 81*   CREATININE mg/dL 1.81* 1.89* 1.90*   CALCIUM mg/dL 7.7* 7.7* 7.8*   EGFR mL/min/1.73 27.1* 25.8* 25.6*   GLUCOSE mg/dL 94 100* 103*       Radiology:   Imaging Results (Last 72 Hours)       ** No results found for the last 72 hours. **            Culture:  No results found for: \"BLOODCX\", \"URINECX\", \"WOUNDCX\", \"MRSACX\", \"RESPCX\", " "\"STOOLCX\"      Assessment   Acute kidney injury-  Chronic kidney disease stage IIIb  Bilateral pleural effusion  Paroxysmal atrial fibrillation  Chronic diastolic congestive heart failure  Solitary kidney due to prior left nephrectomy  Urinary retention  Anemia     Plan:  Discussed with patient, nursing, family  Monitor labs daily for now  Diuretics with close clinical & laboratory monitoring, holding scheduled kcl for now, add lokelma for hyperkalemia dosing as needed  Surgical evaluation reviewed as current  Continue midodrine 5 mg bid for pressure support as needed        Dusty Sapp MD  5/25/2025  13:30 CDT      "

## 2025-05-26 VITALS — WEIGHT: 140.9 LBS | BODY MASS INDEX: 25.93 KG/M2 | HEIGHT: 62 IN

## 2025-05-26 LAB
ANION GAP SERPL CALCULATED.3IONS-SCNC: 12 MMOL/L (ref 5–15)
BASOPHILS # BLD AUTO: 0.05 10*3/MM3 (ref 0–0.2)
BASOPHILS NFR BLD AUTO: 0.9 % (ref 0–1.5)
BUN SERPL-MCNC: 77 MG/DL (ref 8–23)
BUN/CREAT SERPL: 41.6 (ref 7–25)
CALCIUM SPEC-SCNC: 7.8 MG/DL (ref 8.6–10.5)
CHLORIDE SERPL-SCNC: 102 MMOL/L (ref 98–107)
CO2 SERPL-SCNC: 24 MMOL/L (ref 22–29)
CREAT SERPL-MCNC: 1.85 MG/DL (ref 0.57–1)
DEPRECATED RDW RBC AUTO: 59.4 FL (ref 37–54)
EGFRCR SERPLBLD CKD-EPI 2021: 26.4 ML/MIN/1.73
EOSINOPHIL # BLD AUTO: 0.51 10*3/MM3 (ref 0–0.4)
EOSINOPHIL NFR BLD AUTO: 9.4 % (ref 0.3–6.2)
ERYTHROCYTE [DISTWIDTH] IN BLOOD BY AUTOMATED COUNT: 19.6 % (ref 12.3–15.4)
GLUCOSE SERPL-MCNC: 94 MG/DL (ref 65–99)
HCT VFR BLD AUTO: 32.9 % (ref 34–46.6)
HGB BLD-MCNC: 10.1 G/DL (ref 12–15.9)
IMM GRANULOCYTES # BLD AUTO: 0.07 10*3/MM3 (ref 0–0.05)
IMM GRANULOCYTES NFR BLD AUTO: 1.3 % (ref 0–0.5)
LYMPHOCYTES # BLD AUTO: 1.35 10*3/MM3 (ref 0.7–3.1)
LYMPHOCYTES NFR BLD AUTO: 24.9 % (ref 19.6–45.3)
MCH RBC QN AUTO: 26.3 PG (ref 26.6–33)
MCHC RBC AUTO-ENTMCNC: 30.7 G/DL (ref 31.5–35.7)
MCV RBC AUTO: 85.7 FL (ref 79–97)
MONOCYTES # BLD AUTO: 0.68 10*3/MM3 (ref 0.1–0.9)
MONOCYTES NFR BLD AUTO: 12.5 % (ref 5–12)
NEUTROPHILS NFR BLD AUTO: 2.76 10*3/MM3 (ref 1.7–7)
NEUTROPHILS NFR BLD AUTO: 51 % (ref 42.7–76)
NRBC BLD AUTO-RTO: 0 /100 WBC (ref 0–0.2)
NT-PROBNP SERPL-MCNC: 8122 PG/ML (ref 0–1800)
PLATELET # BLD AUTO: 319 10*3/MM3 (ref 140–450)
PMV BLD AUTO: 10.6 FL (ref 6–12)
POTASSIUM SERPL-SCNC: 3.9 MMOL/L (ref 3.5–5.2)
RBC # BLD AUTO: 3.84 10*6/MM3 (ref 3.77–5.28)
SODIUM SERPL-SCNC: 138 MMOL/L (ref 136–145)
WBC NRBC COR # BLD AUTO: 5.42 10*3/MM3 (ref 3.4–10.8)

## 2025-05-26 PROCEDURE — 85025 COMPLETE CBC W/AUTO DIFF WBC: CPT | Performed by: INTERNAL MEDICINE

## 2025-05-26 PROCEDURE — 80048 BASIC METABOLIC PNL TOTAL CA: CPT | Performed by: INTERNAL MEDICINE

## 2025-05-26 PROCEDURE — 97530 THERAPEUTIC ACTIVITIES: CPT

## 2025-05-26 PROCEDURE — 83880 ASSAY OF NATRIURETIC PEPTIDE: CPT | Performed by: INTERNAL MEDICINE

## 2025-05-26 PROCEDURE — 97535 SELF CARE MNGMENT TRAINING: CPT

## 2025-05-26 NOTE — PROGRESS NOTES
Nephrology ([facility]) Progress Note      Patient:  Genevieve Cerda  YOB: 1940  Date of Service: 5/26/2025  MRN: [id number]   Acct: [id number]   Primary Care Physician: Provider, No Known  Advance Directive:   There are no questions and answers to display.     Admit Date: 5/7/2025       Hospital Day: 0  Referring Provider: Nelson Ramirez MD      Patient personally seen and examined.  Complete chart including Consults, Notes, Operative Reports, Labs, Cardiology, and Radiology studies reviewed as able.        Subjective:  Genevieve Cerda is a 84 y.o. female for whom we were consulted for evaluation and treatment of acute kidney injury with chronic kidney disease stage IIIb. Patient is followed Dr. Gamez in the office was last seen in January of this year. Baseline creatinine around 1.6. More recently she was treated at [facility] for hypoxemic respiratory failure with pleural effusion, MICHOACANO, acidosis, hyponatremia and hyperkalemia. She was treated with diuretics, antibiotics, dopamine and ultimately had chest tube placements. Seen with family initially and also reviewed with primary service. Patient denied chest pain, nausea vomiting. Denied dysuria or hematuria. Family noted she has frequent urinary tract infections.     Today, no overnight events.  Seen without family.  Denies new complaints upon questioning.   Tolerating diet.  Up in chair.    Temp- 98.0  Pulse- 78  Resp- 21  BP- 145/76  O2%- 95      Allergies:  Codeine    Home Meds:  No medications prior to admission.       Medicines:  Current Facility-Administered Medications   Medication Dose Route Frequency Provider Last Rate Last Admin    acetaminophen (TYLENOL) tablet 650 mg  650 mg Oral Q6H PRN Nelson Ramirez MD        allopurinol (ZYLOPRIM) tablet 100 mg  100 mg Oral Daily Nelson Ramirez MD        apixaban (ELIQUIS) tablet 2.5 mg  2.5 mg Oral Q12H Nelson Ramirez MD         aspirin EC tablet 81 mg  81 mg Oral Daily Nelson Ramirez MD        citalopram (CeleXA) tablet 10 mg  10 mg Oral Daily Cande Andino APRN        dextrose (D50W) (25 g/50 mL) IV injection 25 g  25 g Intravenous Q15 Min PRN Nelson Ramirez MD        dextrose (GLUTOSE) oral gel 15 g  15 g Oral Q15 Min PRN Nelson Ramirez MD        [Held by provider] epoetin louis (EPOGEN,PROCRIT) injection 10,000 Units  10,000 Units Subcutaneous Once per day on Monday Wednesday Friday Nelson Ramirez MD        furosemide (LASIX) tablet 40 mg  40 mg Oral BID Diuretics Yuniel Del Rosario MD        gabapentin (NEURONTIN) capsule 300 mg  300 mg Oral Q8H Nelson Ramirez MD        glucagon (GLUCAGEN) injection 1 mg  1 mg Subcutaneous Q15 Min PRN Nelson Ramirez MD        ipratropium-albuterol (DUO-NEB) nebulizer solution 3 mL  3 mL Nebulization Q6H PRN Nelson Ramirez MD        latanoprost (XALATAN) 0.005 % ophthalmic solution 1 drop  1 drop Both Eyes Nightly Nelson Ramirez MD        magnesium oxide (MAG-OX) tablet 400 mg  400 mg Oral Daily Nelson Ramirez MD        metoprolol tartrate (LOPRESSOR) tablet 25 mg  25 mg Oral Q12H Nelson Ramirez MD        midodrine (PROAMATINE) tablet 5 mg  5 mg Oral BID AC Brennon Gamez MD        pantoprazole (PROTONIX) EC tablet 40 mg  40 mg Oral Q AM Nelson Ramirez MD        polyethylene glycol (MIRALAX) packet 17 g  17 g Oral Daily PRN Cris Puentes APRN        rosuvastatin (CRESTOR) tablet 5 mg  5 mg Oral Nightly Nelson Ramirez MD        sennosides-docusate (PERICOLACE) 8.6-50 MG per tablet 2 tablet  2 tablet Oral Daily PRN Cris Puentes APRN        tamsulosin (FLOMAX) 24 hr capsule 0.4 mg  0.4 mg Oral Nightly Nelson Ramirez MD        timolol (TIMOPTIC) 0.5 % ophthalmic solution 1 drop  1 drop Both Eyes Daily Nelson Ramirez MD        vitamin D (ERGOCALCIFEROL) capsule 50,000  "Units  50,000 Units Oral Q7 Days Dusty Sapp MD           Past Medical History:  No past medical history on file.    Past Surgical History:  No past surgical history on file.    Family History  No family history on file.    Social History  Social History     Socioeconomic History    Marital status: Unknown       Review of Systems:  History obtained from chart review and the patient  General ROS: No fever or chills  Respiratory ROS: No cough, shortness of breath, wheezing  Cardiovascular ROS: No chest pain or palpitations  Gastrointestinal ROS: No abdominal pain or melena  Genito-Urinary ROS: No dysuria or hematuria  Psych ROS: No anxiety and depression  14 point ROS reviewed with the patient and negative except as noted above and in the HPI unless unable to obtain.    Objective:  Patient Vitals for the past 24 hrs:   Weight   05/26/25 0100 63.9 kg (140 lb 14.4 oz)       No intake or output data in the 24 hours ending 05/26/25 1825  General: awake/alert   Chest:  clear to auscultation bilaterally without respiratory distress  CVS: regular rate and rhythm  Abdominal: soft, nontender, positive bowel sounds  Extremities: ble edema  Skin: warm and dry without rash      Labs:  Results from last 7 days   Lab Units 05/26/25  0639 05/22/25  0344   WBC 10*3/mm3 5.42 6.16   HEMOGLOBIN g/dL 10.1* 9.8*   HEMATOCRIT % 32.9* 33.2*   PLATELETS 10*3/mm3 319 298         Results from last 7 days   Lab Units 05/26/25  0639 05/25/25  0934 05/24/25  0406   SODIUM mmol/L 138 135* 135*   POTASSIUM mmol/L 3.9 4.5 4.2   CHLORIDE mmol/L 102 100 100   CO2 mmol/L 24.0 24.0 25.0   BUN mg/dL 77* 76* 79*   CREATININE mg/dL 1.85* 1.81* 1.89*   CALCIUM mg/dL 7.8* 7.7* 7.7*   EGFR mL/min/1.73 26.4* 27.1* 25.8*   GLUCOSE mg/dL 94 94 100*       Radiology:   Imaging Results (Last 72 Hours)       ** No results found for the last 72 hours. 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

## 2025-05-26 NOTE — PROGRESS NOTES
JOHNATHON Alcala APRN        Internal Medicine Progress Note    5/26/2025   14:10 CDT    Name:  Genevieve Cerda  MRN:    [id number]     Acct:     [id number]   Room:  [address] [place] [address] Day: 0     Admit Date: 5/7/2025  4:40 PM  PCP: Provider, No Known    Subjective:     C/C: weakness    Interval History: Status:  improved.  Up to chair. Family at bedside. Afebrile. Maintaining adequate 02 sats on room air. Progressing with therapies. Tolerating treatment thus far. Denies pain. Counts stable. Anxious for discharge to SNF for continued therapies.     Review of Systems   Constitutional: Positive for malaise/fatigue. Negative for chills, decreased appetite, weight gain and weight loss.   HENT:  Negative for congestion, ear discharge, hoarse voice and tinnitus.    Eyes:  Negative for blurred vision, discharge, visual disturbance and visual halos.   Cardiovascular:  Negative for chest pain, claudication, dyspnea on exertion, irregular heartbeat, leg swelling, orthopnea and paroxysmal nocturnal dyspnea.   Respiratory:  Negative for cough, shortness of breath, sputum production and wheezing.    Endocrine: Negative for cold intolerance, heat intolerance and polyuria.   Hematologic/Lymphatic: Negative for adenopathy. Does not bruise/bleed easily.   Skin:  Negative for dry skin, itching and suspicious lesions.   Musculoskeletal:  Negative for arthritis, back pain, falls, joint pain, muscle weakness and myalgias.   Gastrointestinal:  Negative for abdominal pain, constipation, diarrhea, dysphagia and hematemesis.   Genitourinary:  Negative for bladder incontinence, dysuria and frequency.   Neurological:  Positive for weakness. Negative for aphonia, disturbances in coordination and dizziness.   Psychiatric/Behavioral:  Negative for altered mental status, depression, memory loss and substance abuse. The patient does not have insomnia and is not nervous/anxious.          Medications:     Allergies:    Allergies   Allergen Reactions    Codeine Itching       Current Meds:   Current Facility-Administered Medications:     acetaminophen (TYLENOL) tablet 650 mg, 650 mg, Oral, Q6H PRN, Nelson Ramirez MD    allopurinol (ZYLOPRIM) tablet 100 mg, 100 mg, Oral, Daily, Nelson Ramirez MD    apixaban (ELIQUIS) tablet 2.5 mg, 2.5 mg, Oral, Q12H, Nelson Ramirez MD    aspirin EC tablet 81 mg, 81 mg, Oral, Daily, Nelson Ramirez MD    citalopram (CeleXA) tablet 10 mg, 10 mg, Oral, Daily, Cande Andino APRN    dextrose (D50W) (25 g/50 mL) IV injection 25 g, 25 g, Intravenous, Q15 Min PRN, Nelson Ramirez MD    dextrose (GLUTOSE) oral gel 15 g, 15 g, Oral, Q15 Min PRN, Nelson Ramirez MD    [Held by provider] epoetin louis (EPOGEN,PROCRIT) injection 10,000 Units, 10,000 Units, Subcutaneous, Once per day on Monday Wednesday Friday, Nelson Ramirez MD    furosemide (LASIX) tablet 40 mg, 40 mg, Oral, BID Diuretics, Yuniel Del Rosario MD    gabapentin (NEURONTIN) capsule 300 mg, 300 mg, Oral, Q8H, Nelson Ramirez MD    glucagon (GLUCAGEN) injection 1 mg, 1 mg, Subcutaneous, Q15 Min PRN, Nelson Ramirez MD    ipratropium-albuterol (DUO-NEB) nebulizer solution 3 mL, 3 mL, Nebulization, Q6H PRN, Nelson Ramirez MD    latanoprost (XALATAN) 0.005 % ophthalmic solution 1 drop, 1 drop, Both Eyes, Nightly, Nelson Ramirez MD    magnesium oxide (MAG-OX) tablet 400 mg, 400 mg, Oral, Daily, Nelson Ramirez MD    metoprolol tartrate (LOPRESSOR) tablet 25 mg, 25 mg, Oral, Q12H, Nelson Ramirez MD    midodrine (PROAMATINE) tablet 5 mg, 5 mg, Oral, BID AC, Brennon Gamez MD    pantoprazole (PROTONIX) EC tablet 40 mg, 40 mg, Oral, Q AM, Nelson Ramirez MD    polyethylene glycol (MIRALAX) packet 17 g, 17 g, Oral, Daily PRN, Cris Puentes APRN    rosuvastatin (CRESTOR) tablet 5 mg, 5 mg, Oral, Nightly, Nelson Ramirez MD     "sennosides-docusate (PERICOLACE) 8.6-50 MG per tablet 2 tablet, 2 tablet, Oral, Daily PRN, Cris Puentes APRN    tamsulosin (FLOMAX) 24 hr capsule 0.4 mg, 0.4 mg, Oral, Nightly, Nelson Ramirez MD    timolol (TIMOPTIC) 0.5 % ophthalmic solution 1 drop, 1 drop, Both Eyes, Daily, Nelson Ramirez MD    vitamin D (ERGOCALCIFEROL) capsule 50,000 Units, 50,000 Units, Oral, Q7 Days, Dusty Sapp MD    Data:     Code Status:    There are no questions and answers to display.       No family history on file.    Social History     Socioeconomic History    Marital status: Unknown       Vitals:  Ht 157.5 cm (62.01\")   Wt 63.9 kg (140 lb 14.4 oz)   BMI 25.76 kg/m²   T 98 P 78 R 21 /76 Sp02 95% (room air)          I/O (24Hr):  No intake or output data in the 24 hours ending 05/26/25 1410    Labs and imaging:      Recent Results (from the past 12 hours)   Basic Metabolic Panel    Collection Time: 05/26/25  6:39 AM    Specimen: Blood   Result Value Ref Range    Glucose 94 65 - 99 mg/dL    BUN 77 (H) 8 - 23 mg/dL    Creatinine 1.85 (H) 0.57 - 1.00 mg/dL    Sodium 138 136 - 145 mmol/L    Potassium 3.9 3.5 - 5.2 mmol/L    Chloride 102 98 - 107 mmol/L    CO2 24.0 22.0 - 29.0 mmol/L    Calcium 7.8 (L) 8.6 - 10.5 mg/dL    BUN/Creatinine Ratio 41.6 (H) 7.0 - 25.0    Anion Gap 12.0 5.0 - 15.0 mmol/L    eGFR 26.4 (L) >60.0 mL/min/1.73   BNP    Collection Time: 05/26/25  6:39 AM    Specimen: Blood   Result Value Ref Range    proBNP 8,122.0 (H) 0.0 - 1,800.0 pg/mL   CBC Auto Differential    Collection Time: 05/26/25  6:39 AM    Specimen: Blood   Result Value Ref Range    WBC 5.42 3.40 - 10.80 10*3/mm3    RBC 3.84 3.77 - 5.28 10*6/mm3    Hemoglobin 10.1 (L) 12.0 - 15.9 g/dL    Hematocrit 32.9 (L) 34.0 - 46.6 %    MCV 85.7 79.0 - 97.0 fL    MCH 26.3 (L) 26.6 - 33.0 pg    MCHC 30.7 (L) 31.5 - 35.7 g/dL    RDW 19.6 (H) 12.3 - 15.4 %    RDW-SD 59.4 (H) 37.0 - 54.0 fl    MPV 10.6 6.0 - 12.0 fL    Platelets 319 " 140 - 450 10*3/mm3    Neutrophil % 51.0 42.7 - 76.0 %    Lymphocyte % 24.9 19.6 - 45.3 %    Monocyte % 12.5 (H) 5.0 - 12.0 %    Eosinophil % 9.4 (H) 0.3 - 6.2 %    Basophil % 0.9 0.0 - 1.5 %    Immature Grans % 1.3 (H) 0.0 - 0.5 %    Neutrophils, Absolute 2.76 1.70 - 7.00 10*3/mm3    Lymphocytes, Absolute 1.35 0.70 - 3.10 10*3/mm3    Monocytes, Absolute 0.68 0.10 - 0.90 10*3/mm3    Eosinophils, Absolute 0.51 (H) 0.00 - 0.40 10*3/mm3    Basophils, Absolute 0.05 0.00 - 0.20 10*3/mm3    Immature Grans, Absolute 0.07 (H) 0.00 - 0.05 10*3/mm3    nRBC 0.0 0.0 - 0.2 /100 WBC                                     Physical Examination:        Physical Exam  Vitals and nursing note reviewed.   Constitutional:       Appearance: Normal appearance.   HENT:      Head: Normocephalic and atraumatic.      Right Ear: External ear normal.      Left Ear: External ear normal.      Nose: Nose normal.      Mouth/Throat:      Mouth: Mucous membranes are dry.      Pharynx: Oropharynx is clear.   Eyes:      Extraocular Movements: Extraocular movements intact.      Conjunctiva/sclera: Conjunctivae normal.      Pupils: Pupils are equal, round, and reactive to light.   Cardiovascular:      Rate and Rhythm: Normal rate and regular rhythm.      Pulses: Normal pulses.      Heart sounds: Normal heart sounds.   Pulmonary:      Effort: Pulmonary effort is normal.      Breath sounds: Normal breath sounds.   Abdominal:      General: Bowel sounds are normal.      Palpations: Abdomen is soft.   Musculoskeletal:      Cervical back: Normal range of motion and neck supple.      Comments: Generalized weakness   Skin:     General: Skin is warm and dry.   Neurological:      Mental Status: She is alert and oriented to person, place, and time.      Comments: Intermittent confusion at times   Psychiatric:         Mood and Affect: Mood normal.         Behavior: Behavior normal.           Assessment:            Severe protein-calorie malnutrition    No past medical  history on file.     Plan:        Bilateral pleural effusions  CKD4  PAF  Chronic anticoagulation  Chronic diastolic CHF  Solitary kidney s/p left nephrectomy  Urinary retention  Multifactorial anemia  Severe protein calorie malnutrition     Continue current treatment. Monitor counts. Increase activity. Labs Thursday.  Appreciate nephrology assistance. Aggressive therapies as tolerated. Maintain patient safety. Monitor renal function closely. Discharge planning.       Electronically signed by SARAH Santiago on 5/26/2025 at 14:10 CDT

## 2025-05-27 PROCEDURE — 97530 THERAPEUTIC ACTIVITIES: CPT

## 2025-05-27 PROCEDURE — 97535 SELF CARE MNGMENT TRAINING: CPT

## 2025-05-27 NOTE — PROGRESS NOTES
Nephrology ([facility]) Progress Note      Patient:  Genevieve Cerda  YOB: 1940  Date of Service: 5/27/2025  MRN: [id number]   Acct: [id number]   Primary Care Physician: Provider, No Known  Advance Directive:   There are no questions and answers to display.     Admit Date: 5/7/2025       Hospital Day: 0  Referring Provider: Nelson Ramirez MD      Patient personally seen and examined.  Complete chart including Consults, Notes, Operative Reports, Labs, Cardiology, and Radiology studies reviewed as able.        Subjective:  Genevieve Cerda is a 84 y.o. female for whom we were consulted for evaluation and treatment of acute kidney injury with chronic kidney disease stage IIIb. Patient is followed Dr. Gamez in the office was last seen in January of this year. Baseline creatinine around 1.6. More recently she was treated at [facility] for hypoxemic respiratory failure with pleural effusion, MICHOACANO, acidosis, hyponatremia and hyperkalemia. She was treated with diuretics, antibiotics, dopamine and ultimately had chest tube placements. Seen with family initially and also reviewed with primary service. Patient denied chest pain, nausea vomiting. Denied dysuria or hematuria. Family noted she has frequent urinary tract infections.     Today, no overnight events.  Seen with family.  Denies new complaints upon questioning.   Tolerating diet.  Up in chair.    Temp- 98.0  Pulse- 89  Resp- 21  BP- 127/81  O2%- 95      Allergies:  Codeine    Home Meds:  No medications prior to admission.       Medicines:  Current Facility-Administered Medications   Medication Dose Route Frequency Provider Last Rate Last Admin    acetaminophen (TYLENOL) tablet 650 mg  650 mg Oral Q6H PRN Nelson Ramirez MD        allopurinol (ZYLOPRIM) tablet 100 mg  100 mg Oral Daily Nelson Ramirez MD        apixaban (ELIQUIS) tablet 2.5 mg  2.5 mg Oral Q12H Nelson Ramirez MD         aspirin EC tablet 81 mg  81 mg Oral Daily Nelson Ramirez MD        citalopram (CeleXA) tablet 10 mg  10 mg Oral Daily Cande Andino APRN        dextrose (D50W) (25 g/50 mL) IV injection 25 g  25 g Intravenous Q15 Min PRN Nelson Ramirez MD        dextrose (GLUTOSE) oral gel 15 g  15 g Oral Q15 Min PRN Nelson Ramirez MD        furosemide (LASIX) tablet 40 mg  40 mg Oral BID Diuretics Yuniel Del Rosario MD        gabapentin (NEURONTIN) capsule 300 mg  300 mg Oral Q8H Nelson Ramirez MD        glucagon (GLUCAGEN) injection 1 mg  1 mg Subcutaneous Q15 Min PRN Nelson Ramirez MD        ipratropium-albuterol (DUO-NEB) nebulizer solution 3 mL  3 mL Nebulization Q6H PRN Nelson Ramirez MD        latanoprost (XALATAN) 0.005 % ophthalmic solution 1 drop  1 drop Both Eyes Nightly Nelson Ramirez MD        magnesium oxide (MAG-OX) tablet 400 mg  400 mg Oral Daily Nelson Ramirez MD        metoprolol tartrate (LOPRESSOR) tablet 25 mg  25 mg Oral Q12H Nelson Ramirez MD        midodrine (PROAMATINE) tablet 5 mg  5 mg Oral BID AC Brennon Gamez MD        pantoprazole (PROTONIX) EC tablet 40 mg  40 mg Oral Q AM Nelson Ramirez MD        polyethylene glycol (MIRALAX) packet 17 g  17 g Oral Daily PRN Cris Puentes R, APRN        rosuvastatin (CRESTOR) tablet 5 mg  5 mg Oral Nightly Nelson Ramirez MD        sennosides-docusate (PERICOLACE) 8.6-50 MG per tablet 2 tablet  2 tablet Oral Daily PRN Cris Puentes R, APRKARINA        tamsulosin (FLOMAX) 24 hr capsule 0.4 mg  0.4 mg Oral Nightly Nelson Ramirez MD        timolol (TIMOPTIC) 0.5 % ophthalmic solution 1 drop  1 drop Both Eyes Daily Nelson Ramirez MD        vitamin D (ERGOCALCIFEROL) capsule 50,000 Units  50,000 Units Oral Q7 Days Dusty Sapp MD           Past Medical History:  No past medical history on file.    Past Surgical History:  No past surgical  "history on file.    Family History  No family history on file.    Social History  Social History     Socioeconomic History    Marital status: Unknown       Review of Systems:  History obtained from chart review and the patient  General ROS: No fever or chills  Respiratory ROS: No cough, shortness of breath, wheezing  Cardiovascular ROS: No chest pain or palpitations  Gastrointestinal ROS: No abdominal pain or melena  Genito-Urinary ROS: No dysuria or hematuria  Psych ROS: No anxiety and depression  14 point ROS reviewed with the patient and negative except as noted above and in the HPI unless unable to obtain.    Objective:  No data found.      No intake or output data in the 24 hours ending 05/27/25 1349  General: awake/alert   Chest:  clear to auscultation bilaterally without respiratory distress  CVS: regular rate and rhythm  Abdominal: soft, nontender, positive bowel sounds  Extremities: ble edema  Skin: warm and dry without rash      Labs:  Results from last 7 days   Lab Units 05/26/25  0639 05/22/25  0344   WBC 10*3/mm3 5.42 6.16   HEMOGLOBIN g/dL 10.1* 9.8*   HEMATOCRIT % 32.9* 33.2*   PLATELETS 10*3/mm3 319 298         Results from last 7 days   Lab Units 05/26/25  0639 05/25/25  0934 05/24/25  0406   SODIUM mmol/L 138 135* 135*   POTASSIUM mmol/L 3.9 4.5 4.2   CHLORIDE mmol/L 102 100 100   CO2 mmol/L 24.0 24.0 25.0   BUN mg/dL 77* 76* 79*   CREATININE mg/dL 1.85* 1.81* 1.89*   CALCIUM mg/dL 7.8* 7.7* 7.7*   EGFR mL/min/1.73 26.4* 27.1* 25.8*   GLUCOSE mg/dL 94 94 100*       Radiology:   Imaging Results (Last 72 Hours)       ** No results found for the last 72 hours. **            Culture:  No results found for: \"BLOODCX\", \"URINECX\", \"WOUNDCX\", \"MRSACX\", \"RESPCX\", \"STOOLCX\"      Assessment   Acute kidney injury-  Chronic kidney disease stage IIIb  Bilateral pleural effusion  Paroxysmal atrial fibrillation  Chronic diastolic congestive heart failure  Solitary kidney due to prior left nephrectomy  Urinary " retention  Anemia     Plan:  Discussed with patient, family  Monitor labs daily for now  Diuretics with close clinical & laboratory monitoring  Surgical evaluation reviewed as current  Continue midodrine 5 mg bid for pressure support as needed    Brennon Gamez MD  5/27/2025  13:49 CDT

## 2025-05-27 NOTE — DISCHARGE SUMMARY
JOHNATHON Alcala APRN      Internal Medicine Discharge Summary    Patient ID: Genevieve Cerda  MRN: [id number]     Acct:  [id number]       Patient's PCP: Provider, No Known    Admit Date: 5/7/2025   Discharge Date:   5/27/25    Admitting Physician: Nelson Ramirez MD    Discharge Physician: SARAH Santiago     Active Discharge Diagnoses:  Bilateral pleural effusions  CKD4  PAF  Chronic anticoagulation  Chronic diastolic CHF  Solitary kidney s/p left nephrectomy  Urinary retention  Multifactorial anemia  Severe protein calorie malnutrition     Hospital Problems    Severe protein-calorie malnutrition   No past medical history on file.    The patient was seen and examined on the day of discharge and this discharge summary is in conjunction with any daily progress note from day of discharge.    Code Status:    There are no questions and answers to display.       Hospital Course: Genevieve Cerda is a  84 y.o.  female who presents with need for continued chest tube management and rehabilitation efforts following a recent acute care stay. The patient had been in her usual state of health undergoing rehabilitation efforts at a local SNF when she developed altered mental status and hypoxia. She was transferred to an outlying ER for evaluation and treatment. She was noted ot have Sp02 75% and was placed on bipap.  Workup revealed significant hyponatremia, hyperkalemia, elevated creatinine and evidence of acidemia. CT head was negative and chest imaging revealed large bilateral pleural effusions. She was aggressively diuresed, treated with IV antibiotics and started on IV dopamine due to bradycardia. She was further noted to have evidence of urinary retention. She was transitioned to hydration with hypertonic saline and was seen in consultation by nephrology. She was then aggressively diuresed alternating with fluid resuscitation. Renal function and electrolyte  derangements have improved/stabilized. She was evaluated by pulmonology and treated with IV antibiotics. She received 9 days of Merrem with negative cultures. She underwent bilateral thoracenteses on 4/29 and has had persistent high volume output (moreso on the left). She transferred to our facility for continued chest tube management and rehabilitation efforts.    While at our facility, she was seen in consultation by cardiothoracic surgery for chest tube management and nephrology for continued monitoring of renal function with ongoing diuresis. She maintained adequate 02 sats on room air. Despite aggressive diuresis, she continued with high volume chest tube output. Right chest tube was discontinued on 5/12. She underwent left side talc pleurodesis on 5/14. Left side chest tube was discontinued on 5/18. She was initially slow to participate/progress with therapies. Her participation and progress has increased, but she remains quite generally weak. She is now felt stable for discharge to SNF for continued rehabilitation efforts prior to her planned return to home.     Consults:  Dr. Sapp (nephrology)  Dr. Del Rosario (cardiothoracic surgery)    Disposition: SNF    Physical Exam  Vitals and nursing note reviewed.   Constitutional:       Appearance: Normal appearance.   HENT:      Head: Normocephalic and atraumatic.      Right Ear: External ear normal.      Left Ear: External ear normal.      Nose: Nose normal.      Mouth/Throat:      Mouth: Mucous membranes are dry.      Pharynx: Oropharynx is clear.   Eyes:      Extraocular Movements: Extraocular movements intact.      Conjunctiva/sclera: Conjunctivae normal.      Pupils: Pupils are equal, round, and reactive to light.   Cardiovascular:      Rate and Rhythm: Normal rate and regular rhythm.      Pulses: Normal pulses.      Heart sounds: Normal heart sounds.   Pulmonary:      Effort: Pulmonary effort is normal.      Breath sounds: Normal breath sounds.   Abdominal:       General: Bowel sounds are normal.      Palpations: Abdomen is soft.   Musculoskeletal:      Cervical back: Normal range of motion and neck supple.      Comments: Generalized weakness   Skin:     General: Skin is warm and dry.   Neurological:      Mental Status: She is alert and oriented to person, place, and time.      Comments: Intermittent confusion at times   Psychiatric:         Mood and Affect: Mood normal.         Behavior: Behavior normal.     Discharged Condition: Stable    Follow Up: Aura Craft APRN  [address] [facility] [address]  [place] [id number] [phone]    Follow up in 1 month(s)  with chest xray      Diet: Diet: Regular/House; Texture: Regular (IDDSI 7); Fluid Consistency: Thin (IDDSI 0)    Discharge Medications:   See computer generated medication reconciliation form        Time Spent on discharge is  32 minutes in patient examination, evaluation, patient/family counseling as well as medication reconciliation, prescriptions for required medications, discharge plan and follow up.     Electronically signed by SARAH Santiago on 5/27/2025 at 12:43 CDT     I have discussed the care of Genevieve Cerda, including pertinent history and exam findings, with the nurse practitioner.    I have seen and examined the patient and the key elements of all parts of the encounter have been performed by me.  I agree with the assessment, plan and orders as documented by SARAH Montejo, after I modified the exam findings and the plan of treatments and the final version is my approved version of the assessment.        Electronically signed by Nelson Ramirez MD on 5/27/2025 at 22:03 CDT

## 2025-05-27 NOTE — PROGRESS NOTES
JOHNATHON Alcala APRN        Internal Medicine Progress Note    5/27/2025   12:41 CDT    Name:  Genevieve Cerda  MRN:    [id number]     Acct:     [id number]   Room:  [address] [place] [address] Day: 0     Admit Date: 5/7/2025  4:40 PM  PCP: Provider, No Known    Subjective:     C/C: weakness    Interval History: Status:  improved.  Up to chair. Family at bedside. Afebrile. Maintaining adequate 02 sats on room air. Progressing with therapies. Tolerating treatment thus far. Denies pain.  Anxious for discharge to SNF for continued therapies.     Review of Systems   Constitutional: Positive for malaise/fatigue. Negative for chills, decreased appetite, weight gain and weight loss.   HENT:  Negative for congestion, ear discharge, hoarse voice and tinnitus.    Eyes:  Negative for blurred vision, discharge, visual disturbance and visual halos.   Cardiovascular:  Negative for chest pain, claudication, dyspnea on exertion, irregular heartbeat, leg swelling, orthopnea and paroxysmal nocturnal dyspnea.   Respiratory:  Negative for cough, shortness of breath, sputum production and wheezing.    Endocrine: Negative for cold intolerance, heat intolerance and polyuria.   Hematologic/Lymphatic: Negative for adenopathy. Does not bruise/bleed easily.   Skin:  Negative for dry skin, itching and suspicious lesions.   Musculoskeletal:  Negative for arthritis, back pain, falls, joint pain, muscle weakness and myalgias.   Gastrointestinal:  Negative for abdominal pain, constipation, diarrhea, dysphagia and hematemesis.   Genitourinary:  Negative for bladder incontinence, dysuria and frequency.   Neurological:  Positive for weakness. Negative for aphonia, disturbances in coordination and dizziness.   Psychiatric/Behavioral:  Negative for altered mental status, depression, memory loss and substance abuse. The patient does not have insomnia and is not nervous/anxious.          Medications:     Allergies:   Allergies    Allergen Reactions    Codeine Itching       Current Meds:   Current Facility-Administered Medications:     acetaminophen (TYLENOL) tablet 650 mg, 650 mg, Oral, Q6H PRN, Nelson Ramirez MD    allopurinol (ZYLOPRIM) tablet 100 mg, 100 mg, Oral, Daily, Nelson Ramirez MD    apixaban (ELIQUIS) tablet 2.5 mg, 2.5 mg, Oral, Q12H, Nelson Ramirez MD    aspirin EC tablet 81 mg, 81 mg, Oral, Daily, Nelson Ramirez MD    citalopram (CeleXA) tablet 10 mg, 10 mg, Oral, Daily, Cande Andino APRN    dextrose (D50W) (25 g/50 mL) IV injection 25 g, 25 g, Intravenous, Q15 Min PRN, Nelson Ramirez MD    dextrose (GLUTOSE) oral gel 15 g, 15 g, Oral, Q15 Min PRN, Nelson Ramirez MD    furosemide (LASIX) tablet 40 mg, 40 mg, Oral, BID Diuretics, Yuniel Del Rosario MD    gabapentin (NEURONTIN) capsule 300 mg, 300 mg, Oral, Q8H, Nelson Ramirez MD    glucagon (GLUCAGEN) injection 1 mg, 1 mg, Subcutaneous, Q15 Min PRN, Nelson Ramirez MD    ipratropium-albuterol (DUO-NEB) nebulizer solution 3 mL, 3 mL, Nebulization, Q6H PRN, Nelson Ramirez MD    latanoprost (XALATAN) 0.005 % ophthalmic solution 1 drop, 1 drop, Both Eyes, Nightly, Nelson Ramirez MD    magnesium oxide (MAG-OX) tablet 400 mg, 400 mg, Oral, Daily, Nelson Ramirez MD    metoprolol tartrate (LOPRESSOR) tablet 25 mg, 25 mg, Oral, Q12H, Nelson Ramirez MD    midodrine (PROAMATINE) tablet 5 mg, 5 mg, Oral, BID AC, Brennon Gamez MD    pantoprazole (PROTONIX) EC tablet 40 mg, 40 mg, Oral, Q AM, Nelson Ramirez MD    polyethylene glycol (MIRALAX) packet 17 g, 17 g, Oral, Daily PRN, Cris Puentes APRN    rosuvastatin (CRESTOR) tablet 5 mg, 5 mg, Oral, Nightly, Nelson Ramirez MD    sennosides-docusate (PERICOLACE) 8.6-50 MG per tablet 2 tablet, 2 tablet, Oral, Daily PRN, Cris Puentes, SARAH    tamsulosin (FLOMAX) 24 hr capsule 0.4 mg, 0.4 mg, Oral, Nightly, James  "Nelson Mclaughlin MD    timolol (TIMOPTIC) 0.5 % ophthalmic solution 1 drop, 1 drop, Both Eyes, Daily, Nelson Ramirez MD    vitamin D (ERGOCALCIFEROL) capsule 50,000 Units, 50,000 Units, Oral, Q7 Days, Dusty Sapp MD    Data:     Code Status:    There are no questions and answers to display.       No family history on file.    Social History     Socioeconomic History    Marital status: Unknown       Vitals:  Ht 157.5 cm (62.01\")   Wt 63.9 kg (140 lb 14.4 oz)   BMI 25.76 kg/m²   T 98.0 P 89 R 20 /81 Sp02 94% (room air)          I/O (24Hr):  No intake or output data in the 24 hours ending 05/27/25 1241    Labs and imaging:      No results found for this or any previous visit (from the past 12 hours).                                    Physical Examination:        Physical Exam  Vitals and nursing note reviewed.   Constitutional:       Appearance: Normal appearance.   HENT:      Head: Normocephalic and atraumatic.      Right Ear: External ear normal.      Left Ear: External ear normal.      Nose: Nose normal.      Mouth/Throat:      Mouth: Mucous membranes are dry.      Pharynx: Oropharynx is clear.   Eyes:      Extraocular Movements: Extraocular movements intact.      Conjunctiva/sclera: Conjunctivae normal.      Pupils: Pupils are equal, round, and reactive to light.   Cardiovascular:      Rate and Rhythm: Normal rate and regular rhythm.      Pulses: Normal pulses.      Heart sounds: Normal heart sounds.   Pulmonary:      Effort: Pulmonary effort is normal.      Breath sounds: Normal breath sounds.   Abdominal:      General: Bowel sounds are normal.      Palpations: Abdomen is soft.   Musculoskeletal:      Cervical back: Normal range of motion and neck supple.      Comments: Generalized weakness   Skin:     General: Skin is warm and dry.   Neurological:      Mental Status: She is alert and oriented to person, place, and time.      Comments: Intermittent confusion at times   Psychiatric:    "      Mood and Affect: Mood normal.         Behavior: Behavior normal.           Assessment:            Severe protein-calorie malnutrition    No past medical history on file.     Plan:        Bilateral pleural effusions  CKD4  PAF  Chronic anticoagulation  Chronic diastolic CHF  Solitary kidney s/p left nephrectomy  Urinary retention  Multifactorial anemia  Severe protein calorie malnutrition     Continue current treatment. Monitor counts. Increase activity. Labs Thursday.  Appreciate nephrology assistance. Aggressive therapies as tolerated. Maintain patient safety. Monitor renal function closely. Discharge planning with discharge to SNF when arrangements finalized.       Electronically signed by SARAH Santiago on 5/27/2025 at 12:41 CDT

## 2025-06-06 ENCOUNTER — TELEPHONE (OUTPATIENT)
Dept: CARDIAC SURGERY | Facility: CLINIC | Age: 85
End: 2025-06-06
Payer: MEDICARE

## 2025-06-06 ENCOUNTER — HOSPITAL ENCOUNTER (OUTPATIENT)
Dept: CARDIOLOGY | Facility: HOSPITAL | Age: 85
Discharge: HOME | End: 2025-06-06
Payer: MEDICARE

## 2025-06-06 VITALS
SYSTOLIC BLOOD PRESSURE: 114 MMHG | OXYGEN SATURATION: 84 % | WEIGHT: 131.2 LBS | BODY MASS INDEX: 23.99 KG/M2 | HEART RATE: 64 BPM | DIASTOLIC BLOOD PRESSURE: 71 MMHG

## 2025-06-06 DIAGNOSIS — I48.91 ATRIAL FIBRILLATION, UNSPECIFIED TYPE: ICD-10-CM

## 2025-06-06 DIAGNOSIS — J90 PLEURAL EFFUSION: Primary | ICD-10-CM

## 2025-06-06 DIAGNOSIS — I50.33 ACUTE ON CHRONIC DIASTOLIC CONGESTIVE HEART FAILURE: Primary | ICD-10-CM

## 2025-06-06 DIAGNOSIS — J90 PLEURAL EFFUSION: ICD-10-CM

## 2025-06-06 LAB
ABSOLUTE LUNG FLUID CONTENT: 56 % (ref 20–35)
ANION GAP SERPL CALCULATED.3IONS-SCNC: 11 MMOL/L (ref 5–15)
BUN SERPL-MCNC: 69.6 MG/DL (ref 8–23)
BUN/CREAT SERPL: 35.2 (ref 7–25)
CALCIUM SPEC-SCNC: 8.1 MG/DL (ref 8.6–10.5)
CHLORIDE SERPL-SCNC: 95 MMOL/L (ref 98–107)
CO2 SERPL-SCNC: 25 MMOL/L (ref 22–29)
CREAT SERPL-MCNC: 1.98 MG/DL (ref 0.57–1)
EGFRCR SERPLBLD CKD-EPI 2021: 24.2 ML/MIN/1.73
GLUCOSE SERPL-MCNC: 102 MG/DL (ref 65–99)
POTASSIUM SERPL-SCNC: 4.6 MMOL/L (ref 3.5–5.2)
QT INTERVAL: 410 MS
QTC INTERVAL: 457 MS
SODIUM SERPL-SCNC: 131 MMOL/L (ref 136–145)

## 2025-06-06 PROCEDURE — 80048 BASIC METABOLIC PNL TOTAL CA: CPT | Performed by: HOSPITALIST

## 2025-06-06 PROCEDURE — 93005 ELECTROCARDIOGRAM TRACING: CPT | Performed by: HOSPITALIST

## 2025-06-06 RX ORDER — FUROSEMIDE 40 MG/1
40 TABLET ORAL 2 TIMES DAILY
COMMUNITY

## 2025-06-06 RX ORDER — MIDODRINE HYDROCHLORIDE 5 MG/1
5 TABLET ORAL 2 TIMES DAILY
COMMUNITY

## 2025-06-06 RX ORDER — IPRATROPIUM BROMIDE AND ALBUTEROL SULFATE 2.5; .5 MG/3ML; MG/3ML
3 SOLUTION RESPIRATORY (INHALATION) 4 TIMES DAILY PRN
COMMUNITY
End: 2025-06-11

## 2025-06-06 RX ORDER — TAMSULOSIN HYDROCHLORIDE 0.4 MG/1
1 CAPSULE ORAL DAILY
COMMUNITY

## 2025-06-06 RX ORDER — ERGOCALCIFEROL 1.25 MG/1
50000 CAPSULE, LIQUID FILLED ORAL
COMMUNITY

## 2025-06-06 RX ORDER — LATANOPROST 50 UG/ML
1 SOLUTION/ DROPS OPHTHALMIC DAILY
COMMUNITY

## 2025-06-06 RX ORDER — CITALOPRAM HYDROBROMIDE 10 MG/1
10 TABLET ORAL DAILY
COMMUNITY

## 2025-06-06 RX ORDER — SENNOSIDES 8.6 MG/1
2 TABLET ORAL DAILY PRN
COMMUNITY

## 2025-06-06 RX ORDER — ZINC SULFATE 50(220)MG
220 CAPSULE ORAL DAILY
COMMUNITY

## 2025-06-06 RX ORDER — GABAPENTIN 300 MG/1
300 CAPSULE ORAL EVERY 8 HOURS
COMMUNITY

## 2025-06-06 RX ORDER — METOPROLOL TARTRATE 25 MG/1
25 TABLET, FILM COATED ORAL 2 TIMES DAILY
COMMUNITY

## 2025-06-06 RX ORDER — METOLAZONE 2.5 MG/1
2.5 TABLET ORAL DAILY
COMMUNITY
End: 2025-06-08

## 2025-06-06 RX ORDER — ROSUVASTATIN CALCIUM 5 MG/1
5 TABLET, COATED ORAL DAILY
COMMUNITY

## 2025-06-06 RX ORDER — ALLOPURINOL 100 MG/1
100 TABLET ORAL DAILY
COMMUNITY

## 2025-06-06 RX ORDER — POLYETHYLENE GLYCOL 3350 17 G/17G
17 POWDER, FOR SOLUTION ORAL DAILY
COMMUNITY

## 2025-06-06 RX ORDER — ASPIRIN 81 MG/1
81 TABLET ORAL DAILY
COMMUNITY

## 2025-06-06 RX ORDER — PANTOPRAZOLE SODIUM 40 MG/1
40 TABLET, DELAYED RELEASE ORAL DAILY
COMMUNITY

## 2025-06-06 RX ORDER — SENNOSIDES 8.6 MG
650 CAPSULE ORAL EVERY 6 HOURS PRN
COMMUNITY

## 2025-06-06 RX ORDER — MAGNESIUM OXIDE 400 MG/1
400 TABLET ORAL DAILY
COMMUNITY

## 2025-06-06 RX ORDER — TIMOLOL MALEATE 5 MG/ML
1 SOLUTION/ DROPS OPHTHALMIC DAILY
COMMUNITY

## 2025-06-06 RX ORDER — LANOLIN ALCOHOL/MO/W.PET/CERES
1 CREAM (GRAM) TOPICAL DAILY
COMMUNITY

## 2025-06-06 NOTE — PROGRESS NOTES
Reason For Visit:  CHF    Subjective        Genevieve Cerda is a 85 y.o. female with the below pertinent PMH who is referred for CHF.    On record review, it appears that the patient follows with [facility] cardiology and was seen in clinic 3/31/2025 at which time loop recorder interrogation revealed paroxysmal atrial fibrillation up to 5 hours despite her sotalol.  Plan was to stop sotalol and start amiodarone.    The patient was recently admitted to LTAC at [facility] 5/7 - 5/27/2025 for continued chest tube management following an acute care stay.  Notes indicate that her full clinical course had involved issues with bilateral pleural effusions contributing to hypoxia, hyponatremia, worsening renal function, and hyperkalemia.  She had been treated with diuresis, IV antibiotics, and at some point IV dopamine for bradycardia.  At some point the patient received hypertonic saline and was alternating between diuresis and fluid resuscitation to help balance electrolytes and volume.  She did undergo bilateral thoracenteses 4/29 but ended up in LTAC due to persistent high volume output.  While at LTAC, she continues to have high-volume chest tube output despite aggressive diuresis.  Right chest tube was discontinued on 5/12, and she underwent left-sided talc pleurodesis on 5/14.  Left-sided chest tube was then discontinued 5/18.    Today, the patient presents for an acute visit from her skilled nursing facility due to increased shortness of breath and being placed on oxygen overnight.  She reports that since discharge for the most part she has done okay other than increased shortness of breath in the last couple of days.  Her daughter is with her and states that she thinks her leg swelling has gotten a little worse.  The patient otherwise denies cardiac symptoms including chest pain, lightheadedness, or palpitations.  It appears that at some point she was put back on metoprolol 25 mg twice daily but remains in atrial fibrillation.   She has been getting Lasix 40 mg twice daily along with metolazone 2.5 mg daily.  She denies a significant cough.  She had a CXR done yesterday that reported suspected moderate pulmonary edema/CHF or pneumonia in the setting of infection.  There was also bilateral pleural effusions although severity not reported.    The patient states that she is scheduled to see her nephrologist on Monday or Tuesday and is scheduled to see her cardiologist at the end of this month.    ROS: Pertinent findings are included above.     Cardiac Studies  Echo/28/25: LVEF 60%, G2 DD, mild LVH, normal RV size/function, LA mildly to moderately dilated, RA mildly dilated, mild to moderate AI, moderate MR, mild to moderate TR, severe pulmonary hypertension (RVSP 63)    Pertinent PMH  Chronic diastolic CHF  Paroxysmal atrial fibrillation  Hypertension  Hyperlipidemia  Left renal artery aneurysm  CKD IV with prior left nephrectomy    Pertinent past medical, surgical, family, and social history were reviewed and updated in the EMR.      Current Outpatient Medications:     acetaminophen (TYLENOL) 650 MG 8 hr tablet, Take 1 tablet by mouth Every 6 (Six) Hours As Needed for Mild Pain., Disp: , Rfl:     allopurinol (ZYLOPRIM) 100 MG tablet, Take 1 tablet by mouth Daily., Disp: , Rfl:     apixaban (ELIQUIS) 2.5 MG tablet tablet, Take 1 tablet by mouth 2 (Two) Times a Day., Disp: , Rfl:     aspirin 81 MG EC tablet, Take 1 tablet by mouth Daily., Disp: , Rfl:     citalopram (CeleXA) 10 MG tablet, Take 1 tablet by mouth Daily., Disp: , Rfl:     furosemide (LASIX) 40 MG tablet, Take 1 tablet by mouth 2 (Two) Times a Day., Disp: , Rfl:     gabapentin (NEURONTIN) 300 MG capsule, Take 1 capsule by mouth Every 8 (Eight) Hours., Disp: , Rfl:     ipratropium-albuterol (DUO-NEB) 0.5-2.5 mg/3 ml nebulizer, Take 3 mL by nebulization 4 (Four) Times a Day As Needed for Wheezing., Disp: , Rfl:     latanoprost (XALATAN) 0.005 % ophthalmic solution, Administer 1 drop  "to both eyes Daily., Disp: , Rfl:     magnesium oxide (MAG-OX) 400 MG tablet, Take 1 tablet by mouth Daily., Disp: , Rfl:     metOLazone (ZAROXOLYN) 2.5 MG tablet, Take 1 tablet by mouth Daily., Disp: , Rfl:     metoprolol tartrate (LOPRESSOR) 25 MG tablet, Take 1 tablet by mouth 2 (Two) Times a Day. Hold for systolic blood pressure <100 or pulse <60, Disp: , Rfl:     midodrine (PROAMATINE) 5 MG tablet, Take 1 tablet by mouth 2 (Two) Times a Day. Hold if systolic blood pressure is >100, Disp: , Rfl:     pantoprazole (PROTONIX) 40 MG EC tablet, Take 1 tablet by mouth Daily., Disp: , Rfl:     polyethylene glycol (MiraLax) 17 g packet, Take 17 g by mouth Daily., Disp: , Rfl:     rosuvastatin (CRESTOR) 5 MG tablet, Take 1 tablet by mouth Daily., Disp: , Rfl:     senna 8.6 MG tablet, Take 2 tablets by mouth Daily As Needed for Constipation., Disp: , Rfl:     Skin Protectants, Misc. (Eucerin) cream, Apply 1 Application topically to the appropriate area as directed Daily. Apply to bilateral upper and lower extremities for dryness once daily and prn, Disp: , Rfl:     tamsulosin (FLOMAX) 0.4 MG capsule 24 hr capsule, Take 1 capsule by mouth Daily., Disp: , Rfl:     timolol (TIMOPTIC) 0.5 % ophthalmic solution, Administer 1 drop to both eyes Daily., Disp: , Rfl:     vitamin D (ERGOCALCIFEROL) 1.25 MG (95803 UT) capsule capsule, Take 1 capsule by mouth Every 7 (Seven) Days. Takes on Monday, Disp: , Rfl:     zinc sulfate (ZINCATE) 220 (50 Zn) MG capsule, Take 1 capsule by mouth Daily., Disp: , Rfl:      Objective   Vital Signs:  /71 (BP Location: Left arm, Patient Position: Sitting)   Pulse 64   Wt 59.5 kg (131 lb 3.2 oz)   SpO2 (!) 84%   BMI 23.99 kg/m²   Estimated body mass index is 23.99 kg/m² as calculated from the following:    Height as of 5/7/25: 157.5 cm (62.01\").    Weight as of this encounter: 59.5 kg (131 lb 3.2 oz).      Constitutional:       Appearance: Not in distress.   Pulmonary:      Comments: " Decreased breath sounds about residential to two thirds of the right lung.  Decreased basilar sounds in the left lung with some occasional underlying crackles in the left lower lobe.  Cardiovascular:      Normal rate. Irregularly irregular rhythm.      Murmurs: There is a grade 2/6 systolic murmur, radiating to the apex. There is also a grade 2/6 systolic murmur at the URSB.      No gallop.  No click. No rub.   Edema:     Comments: 1-2+ bilateral pretibial edema slightly worse on the left  Abdominal:      General: There is no distension.      Palpations: Abdomen is soft.      Tenderness: There is no abdominal tenderness.   Skin:     General: Skin is warm and dry.   Neurological:      Mental Status: Alert and oriented to person, place and time.        Result Review :  The following data was reviewed by: Rancho Yanez MD on 06/06/2025:  BMP   BMP          5/25/2025    09:34 5/26/2025    06:39 6/6/2025    13:45   BMP   BUN 76  77  69.6    Creatinine 1.81  1.85  1.98    Sodium 135  138  131    Potassium 4.5  3.9  4.6    Chloride 100  102  95    CO2 24.0  24.0  25.0    Calcium 7.7  7.8  8.1      ReDS   Lab Results   Component Value Date    ABSOLUTELUNG 56 (A) 06/06/2025        Assessment and Plan   Diagnoses and all orders for this visit:    1. Acute on chronic diastolic congestive heart failure (Primary)  2. Pleural effusion  3. Atrial fibrillation, unspecified type  -The patient does appear volume up with peripheral edema, exam evidence of a likely at least moderate right sided pleural effusion, and elevated ReDS reading.  Plan for IV Lasix 80 mg today along with increasing p.o. Lasix to 60 mg twice daily.  Continue metolazone 2.5 mg daily.  Will need to monitor sodium and potassium closely along with renal function, so I am requesting a BMP to be checked on Sunday, and she has follow-up in place with her nephrologist early next week.  I am reaching out to CT surgery to see if they can facilitate seeing her sooner than  her currently scheduled appointment to reevaluate her pleural effusion.  Notes indicate that she maintained decent chest tube output despite diuresis while at LTAC, so this picture is concerning that she may ultimately require drainage and potentially a talc pleurodesis for the right lung as well if not improving significantly with some increased diuretics.  - I do think that some of her CHF management may be better optimized if she were able to maintain sinus rhythm.  Amiodarone was previously discontinued in the hospital due to concerns about bradycardia, but she now seems to be tolerating metoprolol 25 mg BID well.  I did discuss with her the possibility of restarting amiodarone and stopping metoprolol along with plan for cardioversion if her loop recorder confirms that she has been in persistent atrial fibrillation.  We agreed to hold off on making these adjustments today to allow for her to follow-up with her primary cardiologist to have more discussions about this possibility.  - Continue Eliquis 2.5  - She remains on aspirin 81 mg daily, but it may be reasonable for her to discontinue this given that she is on Eliquis.  I will defer this decision ultimately to her primary cardiologist.    Follow Up   Return in about 10 days (around 6/16/2025).  Patient was given instructions and counseling regarding her condition or for health maintenance advice. Please see specific information pulled into the AVS if appropriate.     I spent 65 minutes caring for Genevieve on this date of service. This time includes time spent by me in the following activities:preparing for the visit, reviewing tests, performing a medically appropriate examination and/or evaluation , counseling and educating the patient/family/caregiver, ordering medications, tests, or procedures, documenting information in the medical record, and care coordination  Time spent on the separately reported service of ECG and ReDS interpretation/documentation is not  included in the time used to support the E/M service also reported today.    Part of this note may be an electronic transcription/translation of spoken language to printed text using the Dragon Dictation System.

## 2025-06-06 NOTE — TELEPHONE ENCOUNTER
Attempted to call pt.  No answer at H#.  Called cell# and spoke with Karli Moran.  She reports pt is currently residing at [facility] and she would have to arrange any appts with them.  Offered to sched appt for Monday afternoon and she could call back Monday morning if this appt could not be worked out with them.  She declined stating she was out of town and the time and they were just going to have Dr Moran address this./cale

## 2025-06-06 NOTE — PROGRESS NOTES
Heart Failure Clinic    Date:  06/06/25     Vitals:    06/06/25 1330   BP: 114/71   Pulse: 64   SpO2: (!) 84%        Indication:  Heart Failure    Procedure:  ReDS device sensor unit applied to right side of chest and right side of back.  Appropriate positioning confirmed based off of the unit's calculation.  Chest measurement obtained with the chest size ruler.  Measurement session performed over 45 seconds.      Results:  ReDS Value=56    Interpretation:  >46% - markedly elevated lung fluid content    Kimi Gallegos RN 06/06/25 13:50 CDT

## 2025-06-06 NOTE — TELEPHONE ENCOUNTER
----- Message from Rancho Yanez sent at 6/6/2025  3:44 PM CDT -----  I saw this patient for an acute visit today because she became more short of breath and was put on oxygen.  She had a CXR done at [facility] that reportedly showed bilateral pleural effusions and bilateral opacities favored to represent CHF.  On exam it did sound like she had at least a moderate right sided pleural effusion.  I am adjusting diuretics, but concerning that she may need drainage of the right lung and potentially talc pleurodesis on that side.  Are you able to get her into see you sooner in clinic than when she is currently scheduled?  Thanks!

## 2025-06-06 NOTE — TELEPHONE ENCOUNTER
Could see her in the office on Monday afternoon when Del Rosario is also in clinic.  She will need to have a chest x-ray prior to the visit.

## 2025-06-09 ENCOUNTER — HOSPITAL ENCOUNTER (OUTPATIENT)
Dept: GENERAL RADIOLOGY | Facility: HOSPITAL | Age: 85
Discharge: HOME | End: 2025-06-09
Admitting: NURSE PRACTITIONER
Payer: MEDICARE

## 2025-06-09 ENCOUNTER — OFFICE VISIT (OUTPATIENT)
Dept: CARDIAC SURGERY | Facility: CLINIC | Age: 85
End: 2025-06-09
Payer: MEDICARE

## 2025-06-09 VITALS
BODY MASS INDEX: 23.99 KG/M2 | OXYGEN SATURATION: 93 % | HEART RATE: 68 BPM | DIASTOLIC BLOOD PRESSURE: 62 MMHG | HEIGHT: 62 IN | SYSTOLIC BLOOD PRESSURE: 109 MMHG

## 2025-06-09 DIAGNOSIS — J90 PLEURAL EFFUSION: Primary | ICD-10-CM

## 2025-06-09 DIAGNOSIS — I50.9 CHRONIC CONGESTIVE HEART FAILURE, UNSPECIFIED HEART FAILURE TYPE: ICD-10-CM

## 2025-06-09 DIAGNOSIS — J90 PLEURAL EFFUSION: ICD-10-CM

## 2025-06-09 PROCEDURE — 71046 X-RAY EXAM CHEST 2 VIEWS: CPT

## 2025-06-09 NOTE — PROGRESS NOTES
Subjective   Chief Complaint   Patient presents with    Pleural Effusion     Patient is here for hospital follow up w/CXR       Patient ID: Genevieve Cerda is a 85 y.o. female who is here for follow-up having had left talc pleurodesis at bedside on 5/14/2025.    History of Present Illness  Ms. Cerda is an 85-year-old female who was transferred to [facility] several weeks ago with bilateral pigtail catheters in place due to bilateral pleural effusions secondary to heart failure.  The right pigtail catheter was removed and she ultimately underwent talc pleurodesis at the bedside through the left pigtail catheter.  The left pigtail catheter was ultimately removed and she met criteria for discharge home on 5/27/2025 with plans for 1 month follow-up in our office with chest x-ray.  She was evaluated by Dr. Yanez on 6/6/2025 and was found to be volume up with peripheral edema and concern for moderate right-sided pleural effusion.  She was given Lasix 80 mg IV and was advised to take Lasix 60 mg p.o. twice daily at home.  She is also on metolazone.  Dr. Yanez reached out to us to see if patient could be worked in sooner due to concern for right sided pleural effusion.  Of note, she remains in atrial fibrillation and plans are in place to follow-up with her primary cardiologist regarding plan moving forward.  She is on Eliquis 2.5 mg twice daily.  She does reside at [facility] and rehab.  Today she is on 2 L nasal cannula and she reports ongoing significant lower extremity edema and weeping.  Breathing is overall unchanged.    The following portions of the patient's history were reviewed and updated as appropriate: allergies, current medications, past family history, past medical history, past social history, past surgical history and problem list.    Review of Systems   Constitutional:  Negative for chills, diaphoresis, fatigue and fever.   HENT:  Negative for trouble swallowing and voice change.   "  Eyes:  Negative for visual disturbance.   Respiratory:  Positive for shortness of breath. Negative for chest tightness.    Cardiovascular:  Positive for palpitations and leg swelling. Negative for chest pain.   Gastrointestinal:  Negative for abdominal pain, diarrhea, nausea and vomiting.   Genitourinary:  Negative for difficulty urinating, dysuria and hematuria.   Musculoskeletal:  Negative for arthralgias and myalgias.   Skin:  Negative for color change, pallor, rash and wound.   Allergic/Immunologic: Negative for immunocompromised state.   Neurological:  Negative for dizziness, syncope and light-headedness.   Psychiatric/Behavioral:  Negative for agitation, confusion and sleep disturbance.        Objective   Visit Vitals  /62   Pulse 68   Ht 157.5 cm (62.01\")   SpO2 93%   BMI 23.99 kg/m²       Physical Exam  Vitals reviewed.   Constitutional:       Appearance: She is ill-appearing.   HENT:      Head: Normocephalic.   Eyes:      Pupils: Pupils are equal, round, and reactive to light.   Cardiovascular:      Rate and Rhythm: Normal rate. Rhythm irregular.      Heart sounds: Normal heart sounds. No murmur heard.  Pulmonary:      Effort: Pulmonary effort is normal.      Comments: Diminished breath sounds on the right.  Abdominal:      General: There is no distension.      Palpations: Abdomen is soft.      Tenderness: There is no abdominal tenderness.   Musculoskeletal:         General: Swelling present.      Right lower leg: Edema (Pitting and weeping) present.      Left lower leg: Edema (Pitting and weeping) present.   Skin:     General: Skin is warm and dry.   Neurological:      General: No focal deficit present.      Mental Status: She is alert and oriented to person, place, and time.   Psychiatric:         Mood and Affect: Mood normal.         Behavior: Behavior normal.         Thought Content: Thought content normal.         Judgment: Judgment normal.           Assessment & Plan     Independent Review of " Radiographic Studies:    CXR: Ongoing right-sided pleural effusion and atelectasis.  No pneumothorax.  Only trace left basilar pleural fluid s/p talc pleurodesis.    Diagnoses and all orders for this visit:    1. Pleural effusion (Primary)    2. Chronic congestive heart failure, unspecified heart failure type           Overall, Genevieve Cerda is doing okay.  Chest x-ray reviewed with Dr. Del Rosario.  She does have an ongoing right sided moderate pleural effusion that may be slightly worse since discharge from LTAC.  She is on 2 L supplemental O2 today.  She does have significant pitting and weeping lower extremity edema and is being followed in the heart failure clinic.  Given ongoing fluctuations in volume status, recommend continued heart failure optimization.  If patient becomes euvolemic and right pleural effusion persist, can consider admission with pigtail catheter placement and talc pleurodesis through chest tube at bedside on the right.  Provided support and encouragement. All questions have been answered to the best of my ability.  Patient has been instructed to contact our office with any questions or concerns should they arise prior to the next office visit.  She has scheduled follow-up with SARAH Glass on 5/30/2025 with repeat chest x-ray and I have advised her to keep this appointment and we will reevaluate volume status and pleural effusion at that time.  Keep scheduled follow-up with heart failure clinic on 6/16/2025.  Call sooner with any issues.  Patient and family verbalized understanding and are agreeable to this plan.    BMI is within normal parameters. No other follow-up for BMI required.    Genevieve Cerda is a non-smoker and therefore does not need tobacco cessation education/counseling.      Advance Care Planning   ACP discussion was held with the patient during this visit. Patient does not have an advance directive, declines further assistance.

## 2025-06-18 ENCOUNTER — TELEPHONE (OUTPATIENT)
Dept: CARDIAC SURGERY | Facility: CLINIC | Age: 85
End: 2025-06-18
Payer: MEDICARE

## 2025-06-18 NOTE — TELEPHONE ENCOUNTER
Yes.  I had not seen this message and the [facility] called me stating they were on the line wanting to move pt's appt up and I gave the OK to do this/kahm

## 2025-06-18 NOTE — TELEPHONE ENCOUNTER
I received a message from Laura Méndez stating Yolanda with [facility] in [place] called regarding this patient.  Returned call, [phone], and had to leave a voicemail message with Yolanda to call back if needed. I left our office number.  Patient has follow-up with Aura Craft this Friday, 6/20/2025

## 2025-06-18 NOTE — TELEPHONE ENCOUNTER
----- Message from Suzie DUARTE sent at 6/18/2025 10:52 AM CDT -----  Got a call from [facility] and [facility] in [place] about this pt requesting a call back. The caller was Yolanda and she said you guys could reach her at [phone]. Thanks!

## 2025-06-19 ENCOUNTER — APPOINTMENT (OUTPATIENT)
Dept: NUCLEAR MEDICINE | Facility: HOSPITAL | Age: 85
End: 2025-06-19
Payer: MEDICARE

## 2025-06-19 ENCOUNTER — APPOINTMENT (OUTPATIENT)
Dept: CT IMAGING | Facility: HOSPITAL | Age: 85
End: 2025-06-19
Payer: MEDICARE

## 2025-06-19 ENCOUNTER — HOSPITAL ENCOUNTER (EMERGENCY)
Facility: HOSPITAL | Age: 85
Discharge: HOME | End: 2025-06-19
Attending: FAMILY MEDICINE
Payer: MEDICARE

## 2025-06-19 ENCOUNTER — HOSPITAL ENCOUNTER (OUTPATIENT)
Dept: CARDIOLOGY | Facility: HOSPITAL | Age: 85
Discharge: HOME | End: 2025-06-19

## 2025-06-19 ENCOUNTER — APPOINTMENT (OUTPATIENT)
Dept: GENERAL RADIOLOGY | Facility: HOSPITAL | Age: 85
End: 2025-06-19
Payer: MEDICARE

## 2025-06-19 VITALS
DIASTOLIC BLOOD PRESSURE: 52 MMHG | HEART RATE: 93 BPM | HEIGHT: 62 IN | OXYGEN SATURATION: 100 % | SYSTOLIC BLOOD PRESSURE: 95 MMHG | BODY MASS INDEX: 23.9 KG/M2 | RESPIRATION RATE: 18 BRPM | WEIGHT: 129.9 LBS | TEMPERATURE: 98 F

## 2025-06-19 DIAGNOSIS — I50.31 ACUTE HEART FAILURE WITH PRESERVED EJECTION FRACTION (HFPEF): Primary | ICD-10-CM

## 2025-06-19 DIAGNOSIS — J18.9 PNEUMONIA DUE TO INFECTIOUS ORGANISM, UNSPECIFIED LATERALITY, UNSPECIFIED PART OF LUNG: ICD-10-CM

## 2025-06-19 DIAGNOSIS — R06.02 SHORTNESS OF BREATH: Primary | ICD-10-CM

## 2025-06-19 LAB
ANION GAP SERPL CALCULATED.3IONS-SCNC: 15 MMOL/L (ref 5–15)
APTT PPP: 23.3 SECONDS (ref 24.5–36)
B PARAPERT DNA SPEC QL NAA+PROBE: NOT DETECTED
B PERT DNA SPEC QL NAA+PROBE: NOT DETECTED
BASOPHILS # BLD AUTO: 0.07 10*3/MM3 (ref 0–0.2)
BASOPHILS NFR BLD AUTO: 0.7 % (ref 0–1.5)
BUN SERPL-MCNC: 79.5 MG/DL (ref 8–23)
BUN/CREAT SERPL: 42.1 (ref 7–25)
C PNEUM DNA NPH QL NAA+NON-PROBE: NOT DETECTED
CALCIUM SPEC-SCNC: 8.2 MG/DL (ref 8.6–10.5)
CHLORIDE SERPL-SCNC: 91 MMOL/L (ref 98–107)
CO2 SERPL-SCNC: 23 MMOL/L (ref 22–29)
CREAT SERPL-MCNC: 1.89 MG/DL (ref 0.57–1)
D DIMER PPP FEU-MCNC: 8.84 MCGFEU/ML (ref 0–0.86)
DEPRECATED RDW RBC AUTO: 56.4 FL (ref 37–54)
EGFRCR SERPLBLD CKD-EPI 2021: 25.6 ML/MIN/1.73
EOSINOPHIL # BLD AUTO: 0.12 10*3/MM3 (ref 0–0.4)
EOSINOPHIL NFR BLD AUTO: 1.2 % (ref 0.3–6.2)
ERYTHROCYTE [DISTWIDTH] IN BLOOD BY AUTOMATED COUNT: 19 % (ref 12.3–15.4)
FLUAV SUBTYP SPEC NAA+PROBE: NOT DETECTED
FLUBV RNA ISLT QL NAA+PROBE: NOT DETECTED
GEN 5 1HR TROPONIN T REFLEX: 57 NG/L
GLUCOSE SERPL-MCNC: 144 MG/DL (ref 65–99)
HADV DNA SPEC NAA+PROBE: NOT DETECTED
HCOV 229E RNA SPEC QL NAA+PROBE: NOT DETECTED
HCOV HKU1 RNA SPEC QL NAA+PROBE: NOT DETECTED
HCOV NL63 RNA SPEC QL NAA+PROBE: NOT DETECTED
HCOV OC43 RNA SPEC QL NAA+PROBE: NOT DETECTED
HCT VFR BLD AUTO: 30.4 % (ref 34–46.6)
HGB BLD-MCNC: 9.5 G/DL (ref 12–15.9)
HMPV RNA NPH QL NAA+NON-PROBE: NOT DETECTED
HPIV1 RNA ISLT QL NAA+PROBE: NOT DETECTED
HPIV2 RNA SPEC QL NAA+PROBE: NOT DETECTED
HPIV3 RNA NPH QL NAA+PROBE: NOT DETECTED
HPIV4 P GENE NPH QL NAA+PROBE: NOT DETECTED
IMM GRANULOCYTES # BLD AUTO: 0.1 10*3/MM3 (ref 0–0.05)
IMM GRANULOCYTES NFR BLD AUTO: 1 % (ref 0–0.5)
INR PPP: 1.3 (ref 0.91–1.09)
LYMPHOCYTES # BLD AUTO: 1.1 10*3/MM3 (ref 0.7–3.1)
LYMPHOCYTES NFR BLD AUTO: 11.4 % (ref 19.6–45.3)
M PNEUMO IGG SER IA-ACNC: NOT DETECTED
MAGNESIUM SERPL-MCNC: 2 MG/DL (ref 1.6–2.4)
MCH RBC QN AUTO: 25.5 PG (ref 26.6–33)
MCHC RBC AUTO-ENTMCNC: 31.3 G/DL (ref 31.5–35.7)
MCV RBC AUTO: 81.5 FL (ref 79–97)
MONOCYTES # BLD AUTO: 0.82 10*3/MM3 (ref 0.1–0.9)
MONOCYTES NFR BLD AUTO: 8.5 % (ref 5–12)
NEUTROPHILS NFR BLD AUTO: 7.41 10*3/MM3 (ref 1.7–7)
NEUTROPHILS NFR BLD AUTO: 77.2 % (ref 42.7–76)
NRBC BLD AUTO-RTO: 0 /100 WBC (ref 0–0.2)
NT-PROBNP SERPL-MCNC: 9103 PG/ML (ref 0–1800)
PLATELET # BLD AUTO: 320 10*3/MM3 (ref 140–450)
PMV BLD AUTO: 10.2 FL (ref 6–12)
POTASSIUM SERPL-SCNC: 5.3 MMOL/L (ref 3.5–5.2)
PROTHROMBIN TIME: 16.9 SECONDS (ref 11.8–14.8)
RBC # BLD AUTO: 3.73 10*6/MM3 (ref 3.77–5.28)
RHINOVIRUS RNA SPEC NAA+PROBE: NOT DETECTED
RSV RNA NPH QL NAA+NON-PROBE: NOT DETECTED
SARS-COV-2 RNA RESP QL NAA+PROBE: NOT DETECTED
SODIUM SERPL-SCNC: 129 MMOL/L (ref 136–145)
TROPONIN T % DELTA: -5
TROPONIN T NUMERIC DELTA: -3 NG/L
TROPONIN T SERPL HS-MCNC: 60 NG/L
WBC NRBC COR # BLD AUTO: 9.62 10*3/MM3 (ref 3.4–10.8)

## 2025-06-19 PROCEDURE — 85025 COMPLETE CBC W/AUTO DIFF WBC: CPT | Performed by: FAMILY MEDICINE

## 2025-06-19 PROCEDURE — 78580 LUNG PERFUSION IMAGING: CPT

## 2025-06-19 PROCEDURE — 85379 FIBRIN DEGRADATION QUANT: CPT | Performed by: FAMILY MEDICINE

## 2025-06-19 PROCEDURE — 71045 X-RAY EXAM CHEST 1 VIEW: CPT

## 2025-06-19 PROCEDURE — A9540 TC99M MAA: HCPCS | Performed by: FAMILY MEDICINE

## 2025-06-19 PROCEDURE — 83880 ASSAY OF NATRIURETIC PEPTIDE: CPT | Performed by: FAMILY MEDICINE

## 2025-06-19 PROCEDURE — 99284 EMERGENCY DEPT VISIT MOD MDM: CPT | Performed by: FAMILY MEDICINE

## 2025-06-19 PROCEDURE — 0202U NFCT DS 22 TRGT SARS-COV-2: CPT | Performed by: FAMILY MEDICINE

## 2025-06-19 PROCEDURE — 36415 COLL VENOUS BLD VENIPUNCTURE: CPT | Performed by: FAMILY MEDICINE

## 2025-06-19 PROCEDURE — 93010 ELECTROCARDIOGRAM REPORT: CPT | Performed by: HOSPITALIST

## 2025-06-19 PROCEDURE — 84484 ASSAY OF TROPONIN QUANT: CPT | Performed by: FAMILY MEDICINE

## 2025-06-19 PROCEDURE — 80048 BASIC METABOLIC PNL TOTAL CA: CPT | Performed by: FAMILY MEDICINE

## 2025-06-19 PROCEDURE — 83735 ASSAY OF MAGNESIUM: CPT | Performed by: FAMILY MEDICINE

## 2025-06-19 PROCEDURE — 93005 ELECTROCARDIOGRAM TRACING: CPT | Performed by: FAMILY MEDICINE

## 2025-06-19 PROCEDURE — 96374 THER/PROPH/DIAG INJ IV PUSH: CPT

## 2025-06-19 PROCEDURE — 34310000005 TECHNETIUM ALBUMIN AGGREGATED: Performed by: FAMILY MEDICINE

## 2025-06-19 PROCEDURE — 85730 THROMBOPLASTIN TIME PARTIAL: CPT | Performed by: FAMILY MEDICINE

## 2025-06-19 PROCEDURE — 85610 PROTHROMBIN TIME: CPT | Performed by: FAMILY MEDICINE

## 2025-06-19 RX ORDER — SODIUM CHLORIDE 0.9 % (FLUSH) 0.9 %
10 SYRINGE (ML) INJECTION AS NEEDED
Status: DISCONTINUED | OUTPATIENT
Start: 2025-06-19 | End: 2025-06-19 | Stop reason: HOSPADM

## 2025-06-19 RX ORDER — CEFDINIR 300 MG/1
300 CAPSULE ORAL 2 TIMES DAILY
Qty: 14 CAPSULE | Refills: 0 | Status: ON HOLD | OUTPATIENT
Start: 2025-06-19 | End: 2025-06-26

## 2025-06-19 RX ORDER — DILTIAZEM HYDROCHLORIDE 5 MG/ML
10 INJECTION INTRAVENOUS ONCE
Status: COMPLETED | OUTPATIENT
Start: 2025-06-19 | End: 2025-06-19

## 2025-06-19 RX ADMIN — DILTIAZEM HYDROCHLORIDE 10 MG: 5 INJECTION, SOLUTION INTRAVENOUS at 10:50

## 2025-06-19 RX ADMIN — KIT FOR THE PREPARATION OF TECHNETIUM TC 99M ALBUMIN AGGREGATED 1 DOSE: 2.5 INJECTION, POWDER, FOR SOLUTION INTRAVENOUS at 13:22

## 2025-06-19 NOTE — TELEPHONE ENCOUNTER
Daughter informed and voiced understanding.  Tx'd call to Dr Yanez's office to discuss these issues per her request.  I will call [facility] to notify them of cx'd appt/cale   Diabetes

## 2025-06-19 NOTE — TELEPHONE ENCOUNTER
Called [facility] and spoke with oJhana stating I needed to cx appt for tomorrow.  She tx'd my call to another individual but no answer.  Detailed vm message left cx'ng the appt and resched this for 7-7-25 with CXR at 1pm and office appt at 1:30/kahm

## 2025-06-19 NOTE — ED PROVIDER NOTES
Subjective   History of Present Illness  85-year-old female from nursing home.  Is a poor historian.  Comes the emergency room with shortness of breath.  Shortness of breath began shortly after she got out of shower today.  She denies chest pain.  Denies productive cough.  She does use 2 L of oxygen at baseline.  She denies any other symptoms at this time.          Review of Systems   Respiratory:  Positive for shortness of breath.    All other systems reviewed and are negative.      History reviewed. No pertinent past medical history.    Allergies   Allergen Reactions    Codeine Itching       History reviewed. No pertinent surgical history.    History reviewed. No pertinent family history.    Social History     Socioeconomic History    Marital status:    Tobacco Use    Smoking status: Never    Smokeless tobacco: Never   Vaping Use    Vaping status: Never Used   Substance and Sexual Activity    Alcohol use: Yes     Comment: rarely    Drug use: Never    Sexual activity: Defer           Objective   Physical Exam  Vitals and nursing note reviewed.   Constitutional:       Appearance: She is well-developed.   HENT:      Head: Normocephalic and atraumatic.      Mouth/Throat:      Mouth: Mucous membranes are moist.   Eyes:      Extraocular Movements: Extraocular movements intact.      Pupils: Pupils are equal, round, and reactive to light.   Cardiovascular:      Rate and Rhythm: Tachycardia present. Rhythm irregular.      Heart sounds: Normal heart sounds.   Pulmonary:      Effort: Pulmonary effort is normal.      Breath sounds: Normal breath sounds.   Abdominal:      General: Bowel sounds are normal.      Palpations: Abdomen is soft.      Tenderness: There is no abdominal tenderness.   Skin:     General: Skin is warm and dry.   Neurological:      General: No focal deficit present.      Mental Status: She is alert and oriented to person, place, and time.   Psychiatric:         Mood and Affect: Mood normal.          Behavior: Behavior normal.         Procedures           ED Course  ED Course as of 06/19/25 1506   Thu Jun 19, 2025   1015 EKG rate 122  Atrial fibrillation with RVR [RP]      ED Course User Index  [RP] Luke Tena MD                                                     Lab Results (last 24 hours)       Procedure Component Value Units Date/Time    CBC & Differential [[id number]]  (Abnormal) Collected: 06/19/25 1035    Specimen: Blood Updated: 06/19/25 1046    Narrative:      The following orders were created for panel order CBC & Differential.  Procedure                               Abnormality         Status                     ---------                               -----------         ------                     CBC Auto Differential[[id number]]        Abnormal            Final result                 Please view results for these tests on the individual orders.    Basic Metabolic Panel [[id number]]  (Abnormal) Collected: 06/19/25 1035    Specimen: Blood Updated: 06/19/25 1112     Glucose 144 mg/dL      BUN 79.5 mg/dL      Creatinine 1.89 mg/dL      Sodium 129 mmol/L      Potassium 5.3 mmol/L      Chloride 91 mmol/L      CO2 23.0 mmol/L      Calcium 8.2 mg/dL      BUN/Creatinine Ratio 42.1     Anion Gap 15.0 mmol/L      eGFR 25.6 mL/min/1.73     Narrative:      GFR Categories in Chronic Kidney Disease (CKD)              GFR Category          GFR (mL/min/1.73)    Interpretation  G1                    90 or greater        Normal or high (1)  G2                    60-89                Mild decrease (1)  G3a                   45-59                Mild to moderate decrease  G3b                   30-44                Moderate to severe decrease  G4                    15-29                Severe decrease  G5                    14 or less           Kidney failure    (1)In the absence of evidence of kidney disease, neither GFR category G1 or G2 fulfill the criteria for CKD.    eGFR calculation 2021 CKD-EPI creatinine  equation, which does not include race as a factor    Protime-INR [[id number]]  (Abnormal) Collected: 06/19/25 1035    Specimen: Blood Updated: 06/19/25 1106     Protime 16.9 Seconds      INR 1.30    aPTT [[id number]]  (Abnormal) Collected: 06/19/25 1035    Specimen: Blood Updated: 06/19/25 1106     PTT 23.3 seconds     Narrative:      PTT = The equivalent PTT values for the therapeutic range of heparin levels at 0.3 to 0.7 U/ml are 77 - 99 seconds.    High Sensitivity Troponin T [[id number]]  (Abnormal) Collected: 06/19/25 1035    Specimen: Blood Updated: 06/19/25 1117     HS Troponin T 60 ng/L     Narrative:      High Sensitive Troponin T Reference Range:  <14.0 ng/L- Negative Female for AMI  <22.0 ng/L- Negative Male for AMI  >=14 - Abnormal Female indicating possible myocardial injury.  >=22 - Abnormal Male indicating possible myocardial injury.   Clinicians would have to utilize clinical acumen, EKG, Troponin, and serial changes to determine if it is an Acute Myocardial Infarction or myocardial injury due to an underlying chronic condition.         BNP [[id number]]  (Abnormal) Collected: 06/19/25 1035    Specimen: Blood Updated: 06/19/25 1112     proBNP 9,103.0 pg/mL     Narrative:      This assay is used as an aid in the diagnosis of individuals suspected of having heart failure. It can be used as an aid in the diagnosis of acute decompensated heart failure (ADHF) in patients presenting with signs and symptoms of ADHF to the emergency department (ED). In addition, NT-proBNP of <300 pg/mL indicates ADHF is not likely.    Age Range Result Interpretation  NT-proBNP Concentration (pg/mL:      <50             Positive            >450                   Gray                 300-450                    Negative             <300    50-75           Positive            >900                  Gray                300-900                  Negative            <300      >75             Positive            >1800                  Mandujano  "               300-1800                  Negative            <300    Magnesium [[id number]]  (Normal) Collected: 06/19/25 1035    Specimen: Blood Updated: 06/19/25 1112     Magnesium 2.0 mg/dL     D-dimer, Quantitative [[id number]]  (Abnormal) Collected: 06/19/25 1035    Specimen: Blood Updated: 06/19/25 1106     D-Dimer, Quantitative 8.84 MCGFEU/mL     Narrative:      According to the assay 's published package insert, a normal (<0.50 MCGFEU/mL) D-dimer result in conjunction with a non-high clinical probability assessment, excludes deep vein thrombosis (DVT) and pulmonary embolism (PE) with high sensitivity.    D-dimer values increase with age and this can make VTE exclusion of an older population difficult. To address this, the [employer], based on best available evidence and recent guidelines, recommends that clinicians use age-adjusted D-dimer thresholds in patients greater than 50 years of age with: a) a low probability of PE who do not meet all Pulmonary Embolism Rule Out Criteria, or b) in those with intermediate probability of PE.   The formula for an age-adjusted D-dimer cut-off is \"age/100\".  For example, a 60 year old patient would have an age-adjusted cut-off of 0.60 MCGFEU/mL and an 80 year old 0.80 MCGFEU/mL.    CBC Auto Differential [[id number]]  (Abnormal) Collected: 06/19/25 1035    Specimen: Blood Updated: 06/19/25 1046     WBC 9.62 10*3/mm3      RBC 3.73 10*6/mm3      Hemoglobin 9.5 g/dL      Hematocrit 30.4 %      MCV 81.5 fL      MCH 25.5 pg      MCHC 31.3 g/dL      RDW 19.0 %      RDW-SD 56.4 fl      MPV 10.2 fL      Platelets 320 10*3/mm3      Neutrophil % 77.2 %      Lymphocyte % 11.4 %      Monocyte % 8.5 %      Eosinophil % 1.2 %      Basophil % 0.7 %      Immature Grans % 1.0 %      Neutrophils, Absolute 7.41 10*3/mm3      Lymphocytes, Absolute 1.10 10*3/mm3      Monocytes, Absolute 0.82 10*3/mm3      Eosinophils, Absolute 0.12 10*3/mm3      Basophils, Absolute " 0.07 10*3/mm3      Immature Grans, Absolute 0.10 10*3/mm3      nRBC 0.0 /100 WBC     Respiratory Panel PCR w/COVID-19(SARS-CoV-2) SHERI/KEKE/STEVE/PAD/COR/DANIELLE In-House, NP Swab in UTM/VTM, 2 HR TAT - Swab, Nasopharynx [[id number]]  (Normal) Collected: 06/19/25 1036    Specimen: Swab from Nasopharynx Updated: 06/19/25 1133     ADENOVIRUS, PCR Not Detected     Coronavirus 229E Not Detected     Coronavirus HKU1 Not Detected     Coronavirus NL63 Not Detected     Coronavirus OC43 Not Detected     COVID19 Not Detected     Human Metapneumovirus Not Detected     Human Rhinovirus/Enterovirus Not Detected     Influenza A PCR Not Detected     Influenza B PCR Not Detected     Parainfluenza Virus 1 Not Detected     Parainfluenza Virus 2 Not Detected     Parainfluenza Virus 3 Not Detected     Parainfluenza Virus 4 Not Detected     RSV, PCR Not Detected     Bordetella pertussis pcr Not Detected     Bordetella parapertussis PCR Not Detected     Chlamydophila pneumoniae PCR Not Detected     Mycoplasma pneumo by PCR Not Detected    Narrative:      In the setting of a positive respiratory panel with a viral infection PLUS a negative procalcitonin without other underlying concern for bacterial infection, consider observing off antibiotics or discontinuation of antibiotics and continue supportive care. If the respiratory panel is positive for atypical bacterial infection (Bordetella pertussis, Chlamydophila pneumoniae, or Mycoplasma pneumoniae), consider antibiotic de-escalation to target atypical bacterial infection.    High Sensitivity Troponin T 1Hr [[id number]]  (Abnormal) Collected: 06/19/25 1345    Specimen: Blood Updated: 06/19/25 1421     HS Troponin T 57 ng/L      Troponin T Numeric Delta -3 ng/L      Troponin T % Delta -5    Narrative:      High Sensitive Troponin T Reference Range:  <14.0 ng/L- Negative Female for AMI  <22.0 ng/L- Negative Male for AMI  >=14 - Abnormal Female indicating possible myocardial injury.  >=22 - Abnormal  Male indicating possible myocardial injury.   Clinicians would have to utilize clinical acumen, EKG, Troponin, and serial changes to determine if it is an Acute Myocardial Infarction or myocardial injury due to an underlying chronic condition.               NM Lung Scan Perfusion Particulate   Final Result   1. Low probability of pulmonary bullae.   2. Pleural effusions accounting for diminished activity within the lung   bases.               This report was signed and finalized on 6/19/2025 1:35 PM by Dr. Mahi Moffett MD.          XR Chest 1 View   Final Result       1. Probable stable moderate right pleural effusion and associated   opacity may represent atelectasis or pneumonia.   2. Increasing linear opacity on the left worrisome for worsening   atelectasis.       This report was signed and finalized on 6/19/2025 10:49 AM by Ulysses Nolen.            Medications   sodium chloride 0.9 % flush 10 mL (has no administration in time range)   dilTIAZem (CARDIZEM) injection 10 mg (10 mg Intravenous Given 6/19/25 1050)   technetium albumin aggregated (MAA) solution 1 dose (1 dose Intravenous Given 6/19/25 1322)       Medical Decision Making  I had a discussion with the patient/family regarding diagnosis, diagnostic results, treatment plan, and medications.  The patient/family indicated understanding of these instructions.  I spent adequate time at the bedside prior to discharge necessary to discuss the aftercare instructions, giving patient education, providing explanations of the results of our evaluations/findings, and my decision making to assure that the patient/family understand the plan of care.  Time was allotted to answer questions at that time and throughout the ED course.  Emphasis was placed on timely follow-up after discharge.  I also discussed the potential for the development of an acute emergent condition requiring further evaluation, return to the ER, admission, or even surgical intervention. I  discussed that we found nothing during the visit today indicating the need for further ER workup at this time, admission to the hospital, or the presence of an acute unstable medical condition.  I encouraged the patient to return to the emergency department immediately for ANY concerns, worsening, new complaints, or if symptoms persist and unable to seek follow-up in a timely fashion.  The patient/family expressed understanding and agreement with this plan.      Problems Addressed:  Pneumonia due to infectious organism, unspecified laterality, unspecified part of lung: complicated acute illness or injury  Shortness of breath: complicated acute illness or injury    Amount and/or Complexity of Data Reviewed  Labs: ordered. Decision-making details documented in ED Course.  Radiology: ordered. Decision-making details documented in ED Course.  ECG/medicine tests: ordered. Decision-making details documented in ED Course.    Risk  Prescription drug management.        Final diagnoses:   Shortness of breath   Pneumonia due to infectious organism, unspecified laterality, unspecified part of lung       ED Disposition  ED Disposition       ED Disposition   Discharge    Condition   Stable    Comment   --               Germaine Diego, APRN  [address]  [address]  [place] [id number] [phone]    Schedule an appointment as soon as possible for a visit       [facility] EMERGENCY DEPARTMENT  [address] [address] [phone]    As needed, If symptoms worsen         Medication List        New Prescriptions      cefdinir 300 MG capsule  Commonly known as: OMNICEF  Take 1 capsule by mouth 2 (Two) Times a Day for 7 days.               Where to Get Your Medications        These medications were sent to [id number] - [place], [place] - [address] AT [address] - [phone] [facility] [phone]   [address] [place] [id number]      Phone:  [phone]   cefdinir 300 MG capsule            Luke Tena MD  06/19/25 4197

## 2025-06-19 NOTE — TELEPHONE ENCOUNTER
Please let the patient's daughter know that I reviewed ER visit from 6/19/2025.  The patient was in A-fib with RVR with a rate 120s.  It appears she was treated with Cardizem.  She was also diagnosed with pneumonia and prescribed antibiotics.  She has been discharged back to nursing home.  I reviewed chest x-ray which shows stable right-sided pleural effusion.  Lab work in regard to heart failure was noted to be elevated.  I do not feel that the patient would benefit from appointment with myself tomorrow at this time.  She does need to follow-up with her PCP in 1 week in regards to the diagnosis of pneumonia.  I also recommend follow-up with cardiology as soon as possible given A-fib with RVR burden and optimization of heart failure.     Follow up with CT surgery in a few weeks when Dr. Del Rosario is also available.

## 2025-06-20 ENCOUNTER — HOSPITAL ENCOUNTER (OUTPATIENT)
Dept: CARDIOLOGY | Facility: HOSPITAL | Age: 85
Discharge: HOME | End: 2025-06-20
Payer: MEDICARE

## 2025-06-20 VITALS
BODY MASS INDEX: 30.35 KG/M2 | DIASTOLIC BLOOD PRESSURE: 57 MMHG | OXYGEN SATURATION: 83 % | HEART RATE: 76 BPM | SYSTOLIC BLOOD PRESSURE: 96 MMHG | WEIGHT: 166 LBS

## 2025-06-20 DIAGNOSIS — I48.19 ATRIAL FIBRILLATION, PERSISTENT: ICD-10-CM

## 2025-06-20 DIAGNOSIS — J90 PLEURAL EFFUSION: ICD-10-CM

## 2025-06-20 DIAGNOSIS — E87.5 HYPERKALEMIA: ICD-10-CM

## 2025-06-20 DIAGNOSIS — I50.33 ACUTE ON CHRONIC HEART FAILURE WITH PRESERVED EJECTION FRACTION (HFPEF): Primary | ICD-10-CM

## 2025-06-20 DIAGNOSIS — E87.1 HYPONATREMIA: ICD-10-CM

## 2025-06-20 LAB
QT INTERVAL: 278 MS
QT INTERVAL: 298 MS
QTC INTERVAL: 386 MS
QTC INTERVAL: 396 MS

## 2025-06-20 PROCEDURE — G0463 HOSPITAL OUTPT CLINIC VISIT: HCPCS | Performed by: HOSPITALIST

## 2025-06-20 PROCEDURE — 94726 PLETHYSMOGRAPHY LUNG VOLUMES: CPT | Performed by: HOSPITALIST

## 2025-06-20 PROCEDURE — 93005 ELECTROCARDIOGRAM TRACING: CPT | Performed by: HOSPITALIST

## 2025-06-20 RX ORDER — TORSEMIDE 20 MG/1
80 TABLET ORAL 2 TIMES DAILY
Qty: 240 TABLET | Refills: 11 | Status: ON HOLD | OUTPATIENT
Start: 2025-06-20

## 2025-06-20 RX ORDER — FUROSEMIDE 10 MG/ML
80 INJECTION INTRAMUSCULAR; INTRAVENOUS ONCE
Status: DISCONTINUED | OUTPATIENT
Start: 2025-06-20 | End: 2025-06-20

## 2025-06-20 RX ORDER — AMIODARONE HYDROCHLORIDE 200 MG/1
TABLET ORAL
Qty: 58 TABLET | Refills: 11 | Status: ON HOLD | OUTPATIENT
Start: 2025-06-20 | End: 2025-06-23 | Stop reason: DRUGHIGH

## 2025-06-20 NOTE — ADDENDUM NOTE
Encounter addended by: Rancho Yanez MD on: 6/20/2025 3:20 PM   Actions taken: Order list changed, Clinical Note Signed

## 2025-06-20 NOTE — PROGRESS NOTES
Reason For Visit:  CHF    Subjective        Genevieve Cerda is a 85 y.o. female with the below pertinent PMH who presents for follow-up of CHF.    Genevieve Cerda was most recently seen by me in clinic 6/6/2025 for initial evaluation due to increased shortness of breath.  She appeared volume up with exam evidence of a likely at least moderate right sided pleural effusion.  She was given 60 mg of IV Lasix along with increasing p.o. Lasix to 60 mg twice daily.  I reached out to CT surgery about getting her a sooner follow-up.  We discussed potential options for rhythm control of her atrial fibrillation, but this ultimately was deferred to her primary cardiologist.    She was seen in CT surgery clinic 6/9 at which time she did have a slightly worsened, moderate right pleural effusion although also.  Overall hypervolemic.  Recommendation was to continue to try to optimize her volume status and then address pleural effusion if she continued to have a persistent effusion despite more euvolemia.    She was seen in the ED yesterday with shortness of breath.  Troponin was mildly elevated but decreasing.  Respiratory viral panel was negative.  proBNP was slightly up compared to her last check.  VQ scan was low probability for PE.  CXR showed probably stable, moderate right pleural effusion and associated opacity that may represent atelectasis or pneumonia.  She was discharged home on cefdinir.    Today, the patient reports that she has had some progressive worsening shortness of breath in recent weeks along with significant increase in her lower extremity swelling.  For the last at least week she has noted some weeping from her legs.  She denies chest pain, palpitations, lightheadedness.  Review of her weight log from her care facility demonstrates that she has gained at least 22 pounds since late May.  She does continue to require oxygen.    ROS: Pertinent findings are included above.    Cardiac Studies  Echo/28/25: LVEF 60%,  G2 DD, mild LVH, normal RV size/function, LA mildly to moderately dilated, RA mildly dilated, mild to moderate AI, moderate MR, mild to moderate TR, severe pulmonary hypertension (RVSP 63)     Pertinent PMH  Chronic diastolic CHF  Paroxysmal atrial fibrillation  Hypertension  Hyperlipidemia  Left renal artery aneurysm  CKD IV with prior left nephrectomy    Pertinent past medical, surgical, family, and social history were reviewed.      Current Outpatient Medications:     acetaminophen (TYLENOL) 650 MG 8 hr tablet, Take 1 tablet by mouth Every 6 (Six) Hours As Needed for Mild Pain., Disp: , Rfl:     allopurinol (ZYLOPRIM) 100 MG tablet, Take 1 tablet by mouth Daily., Disp: , Rfl:     apixaban (ELIQUIS) 2.5 MG tablet tablet, Take 1 tablet by mouth 2 (Two) Times a Day., Disp: , Rfl:     aspirin 81 MG EC tablet, Take 1 tablet by mouth Daily., Disp: , Rfl:     cefdinir (OMNICEF) 300 MG capsule, Take 1 capsule by mouth 2 (Two) Times a Day for 7 days., Disp: 14 capsule, Rfl: 0    citalopram (CeleXA) 10 MG tablet, Take 1 tablet by mouth Daily., Disp: , Rfl:     furosemide (LASIX) 40 MG tablet, Take 1 tablet by mouth 2 (Two) Times a Day., Disp: , Rfl:     gabapentin (NEURONTIN) 300 MG capsule, Take 1 capsule by mouth Every 8 (Eight) Hours., Disp: , Rfl:     latanoprost (XALATAN) 0.005 % ophthalmic solution, Administer 1 drop to both eyes Daily., Disp: , Rfl:     magnesium oxide (MAG-OX) 400 MG tablet, Take 1 tablet by mouth Daily., Disp: , Rfl:     metOLazone (ZAROXOLYN) 2.5 MG tablet, Take 1 tablet by mouth Daily., Disp: , Rfl:     metoprolol tartrate (LOPRESSOR) 25 MG tablet, Take 1 tablet by mouth 2 (Two) Times a Day. Hold for systolic blood pressure <100 or pulse <60, Disp: , Rfl:     midodrine (PROAMATINE) 5 MG tablet, Take 1 tablet by mouth 2 (Two) Times a Day. Hold if systolic blood pressure is >100, Disp: , Rfl:     pantoprazole (PROTONIX) 40 MG EC tablet, Take 1 tablet by mouth Daily., Disp: , Rfl:     polyethylene  "glycol (MiraLax) 17 g packet, Take 17 g by mouth Daily., Disp: , Rfl:     rosuvastatin (CRESTOR) 5 MG tablet, Take 1 tablet by mouth Daily., Disp: , Rfl:     senna 8.6 MG tablet, Take 2 tablets by mouth Daily As Needed for Constipation., Disp: , Rfl:     Skin Protectants, Misc. (Eucerin) cream, Apply 1 Application topically to the appropriate area as directed Daily. Apply to bilateral upper and lower extremities for dryness once daily and prn, Disp: , Rfl:     tamsulosin (FLOMAX) 0.4 MG capsule 24 hr capsule, Take 1 capsule by mouth Daily., Disp: , Rfl:     timolol (TIMOPTIC) 0.5 % ophthalmic solution, Administer 1 drop to both eyes Daily., Disp: , Rfl:     vitamin D (ERGOCALCIFEROL) 1.25 MG (81536 UT) capsule capsule, Take 1 capsule by mouth Every 7 (Seven) Days. Takes on Monday, Disp: , Rfl:     zinc sulfate (ZINCATE) 220 (50 Zn) MG capsule, Take 1 capsule by mouth Daily., Disp: , Rfl:   No current facility-administered medications for this encounter.     Objective   Vital Signs:  BP 96/57 (BP Location: Left arm, Patient Position: Sitting)   Pulse 76   Wt 75.3 kg (166 lb) Comment: weight taken by SNF staff  SpO2 (!) 83%   BMI 30.35 kg/m²   Estimated body mass index is 30.35 kg/m² as calculated from the following:    Height as of 6/19/25: 157.5 cm (62.01\").    Weight as of this encounter: 75.3 kg (166 lb).      Constitutional:       Appearance: Not in distress.   Neck:      Vascular: JVD elevated.   Pulmonary:      Comments: Decreased right lower breath sounds but otherwise CTAB with normal work of breathing  Cardiovascular:      Borderline tachycardic Irregularly irregular rhythm.      Murmurs: There is no murmur.      No gallop.  No click. No rub.   Edema:     Comments: 2-3+ bilateral pretibial edema  Abdominal:      General: There is no distension.      Palpations: Abdomen is soft.      Tenderness: There is no abdominal tenderness.   Skin:     General: Skin is warm and dry.   Neurological:      Mental " Status: Alert and oriented to person, place and time.        Result Review :  The following data was reviewed by: aRncho Yanez MD on 06/20/2025:  BMP   BMP          5/26/2025    06:39 6/6/2025    13:45 6/19/2025    10:35   BMP   BUN 77  69.6  79.5    Creatinine 1.85  1.98  1.89    Sodium 138  131  129    Potassium 3.9  4.6  5.3    Chloride 102  95  91    CO2 24.0  25.0  23.0    Calcium 7.8  8.1  8.2      Magnesium   Magnesium   Date/Time Value Ref Range Status   06/19/2025 1035 2.0 1.6 - 2.4 mg/dL Final     CBC   CBC          5/22/2025    03:44 5/26/2025    06:39 6/19/2025    10:35   CBC   WBC 6.16  5.42  9.62    RBC 3.77  3.84  3.73    Hemoglobin 9.8  10.1  9.5    Hematocrit 33.2  32.9  30.4    MCV 88.1  85.7  81.5    MCH 26.0  26.3  25.5    MCHC 29.5  30.7  31.3    RDW 19.4  19.6  19.0    Platelets 298  319  320      Pro-BNP       Lab 06/19/25  1035   PROBNP 9,103.0*     Troponin   HS Troponin T   Date/Time Value Ref Range Status   06/19/2025 1345 57 (C) <14 ng/L Final   06/19/2025 1035 60 (C) <14 ng/L Final        Assessment and Plan   Diagnoses and all orders for this visit:    1. Acute on chronic heart failure with preserved ejection fraction (HFpEF) (Primary)  2. Atrial fibrillation, persistent  3. Pleural effusion  4. Hyponatremia-hypervolemic  5. Hyperkalemia  -Significant weight gain and volume overload.  I do suspect that more of her shortness of breath is related to hypervolemia although not unreasonable to treat her for possible pneumonia as well.  She certainly could meet criteria for admission given over 20 pounds of weight gain but would prefer to try to manage as outpatient initially if able.  - Plan for Furoscix today followed by transitioning from Lasix to torsemide 80 mg twice daily starting tomorrow  - Close follow-up in CHF clinic on Monday for lab reassessment and reassessment of volume status.  If not clinically improving may require admission.  - Lokelma 10 g in clinic today for  hyperkalemia  - At least 2 L fluid restriction given hyponatremia and decompensated CHF  - Start amiodarone 400 mg twice daily for 1 week followed by 200 mg daily with plan for eventual cardioversion (timing to be determined at follow-up after reassessment)  - Continue Eliquis 2.5 mg twice daily  - Continue metoprolol 25 mg twice daily  - Continue midodrine 5 mg twice daily    Follow Up   Return in about 3 days (around 6/23/2025).  Patient was given instructions and counseling regarding her condition or for health maintenance advice. Please see specific information pulled into the AVS if appropriate.     I spent 55 minutes caring for Genevieve on this date of service. This time includes time spent by me in the following activities:preparing for the visit, reviewing tests, performing a medically appropriate examination and/or evaluation , counseling and educating the patient/family/caregiver, ordering medications, tests, or procedures, documenting information in the medical record, and care coordination  Time spent on the separately reported service of ECG interpretation/documentation is not included in the time used to support the E/M service also reported today.    Part of this note may be an electronic transcription/translation of spoken language to printed text using the Dragon Dictation System.

## 2025-06-20 NOTE — TELEPHONE ENCOUNTER
Called [facility] (#[phone]) and verified they had gotten the message about appt today having been kemal/cale

## 2025-06-20 NOTE — PROGRESS NOTES
O2 reading of 83% recorded. Multiple attempts to get higher reading made, including warming patient's hands. Family and patient report difficulty with getting accurate oxygen readings in the hospital and at SNF. Patient's skin is warm, dry, pink in color. Patient is using oxygen via nasal cannula at 3LPM. She does not appear to be in any respiratory distress and no breathing difficulty noted. Poor waveform noted on pulse oximeter.

## 2025-06-20 NOTE — ADDENDUM NOTE
Encounter addended by: Haase, Mallory L, RN on: 6/20/2025 4:27 PM   Actions taken: Clinical Note Signed

## 2025-06-20 NOTE — PATIENT INSTRUCTIONS
Stop Lasix  Start Torsemide 80 mg twice daily  Limit fluid intake to <2L per day  Start amiodarone 400 mg twice daily for 7 days and then transition to 200 mg once daily  Return for follow-up on Monday

## 2025-06-23 ENCOUNTER — HOSPITAL ENCOUNTER (OUTPATIENT)
Dept: CARDIOLOGY | Facility: HOSPITAL | Age: 85
Discharge: HOME | End: 2025-06-23
Admitting: NURSE PRACTITIONER
Payer: MEDICARE

## 2025-06-23 ENCOUNTER — HOSPITAL ENCOUNTER (INPATIENT)
Facility: HOSPITAL | Age: 85
LOS: 11 days | Discharge: NURSING, REHAB OR LONG-TERM CARE | End: 2025-07-04
Attending: EMERGENCY MEDICINE | Admitting: HOSPITALIST
Payer: MEDICARE

## 2025-06-23 VITALS
DIASTOLIC BLOOD PRESSURE: 60 MMHG | BODY MASS INDEX: 30.35 KG/M2 | WEIGHT: 166 LBS | OXYGEN SATURATION: 87 % | HEART RATE: 75 BPM | SYSTOLIC BLOOD PRESSURE: 108 MMHG

## 2025-06-23 DIAGNOSIS — E87.1 HYPONATREMIA: ICD-10-CM

## 2025-06-23 DIAGNOSIS — N18.9 CHRONIC KIDNEY DISEASE, UNSPECIFIED CKD STAGE: ICD-10-CM

## 2025-06-23 DIAGNOSIS — I50.33 ACUTE ON CHRONIC DIASTOLIC CONGESTIVE HEART FAILURE: Primary | ICD-10-CM

## 2025-06-23 DIAGNOSIS — I50.9 CONGESTIVE HEART FAILURE, UNSPECIFIED HF CHRONICITY, UNSPECIFIED HEART FAILURE TYPE: Primary | ICD-10-CM

## 2025-06-23 DIAGNOSIS — N28.9 ACUTE ON CHRONIC RENAL INSUFFICIENCY: ICD-10-CM

## 2025-06-23 DIAGNOSIS — J90 PLEURAL EFFUSION: ICD-10-CM

## 2025-06-23 DIAGNOSIS — Z78.9 DECREASED ACTIVITIES OF DAILY LIVING (ADL): ICD-10-CM

## 2025-06-23 DIAGNOSIS — I48.19 ATRIAL FIBRILLATION, PERSISTENT: ICD-10-CM

## 2025-06-23 DIAGNOSIS — N18.9 ACUTE ON CHRONIC RENAL INSUFFICIENCY: ICD-10-CM

## 2025-06-23 DIAGNOSIS — I50.31 ACUTE HEART FAILURE WITH PRESERVED EJECTION FRACTION (HFPEF): ICD-10-CM

## 2025-06-23 DIAGNOSIS — Z74.09 IMPAIRED FUNCTIONAL MOBILITY AND ACTIVITY TOLERANCE: ICD-10-CM

## 2025-06-23 PROBLEM — E78.00 HYPERCHOLESTEROLEMIA: Status: ACTIVE | Noted: 2025-06-23

## 2025-06-23 PROBLEM — Z79.01 CURRENT USE OF LONG TERM ANTICOAGULATION: Status: ACTIVE | Noted: 2025-06-23

## 2025-06-23 LAB
ABSOLUTE LUNG FLUID CONTENT: 48 % (ref 20–35)
ALBUMIN SERPL-MCNC: 2.5 G/DL (ref 3.5–5.2)
ALBUMIN/GLOB SERPL: 0.8 G/DL
ALP SERPL-CCNC: 83 U/L (ref 39–117)
ALT SERPL W P-5'-P-CCNC: 14 U/L (ref 1–33)
ANION GAP SERPL CALCULATED.3IONS-SCNC: 12 MMOL/L (ref 5–15)
ANION GAP SERPL CALCULATED.3IONS-SCNC: 14 MMOL/L (ref 5–15)
AST SERPL-CCNC: 16 U/L (ref 1–32)
BASOPHILS # BLD AUTO: 0.09 10*3/MM3 (ref 0–0.2)
BASOPHILS NFR BLD AUTO: 1.1 % (ref 0–1.5)
BILIRUB SERPL-MCNC: <0.2 MG/DL (ref 0–1.2)
BUN SERPL-MCNC: 89.4 MG/DL (ref 8–23)
BUN SERPL-MCNC: 89.4 MG/DL (ref 8–23)
BUN/CREAT SERPL: 41 (ref 7–25)
BUN/CREAT SERPL: 42.6 (ref 7–25)
CALCIUM SPEC-SCNC: 8.2 MG/DL (ref 8.6–10.5)
CALCIUM SPEC-SCNC: 8.3 MG/DL (ref 8.6–10.5)
CHLORIDE SERPL-SCNC: 89 MMOL/L (ref 98–107)
CHLORIDE SERPL-SCNC: 89 MMOL/L (ref 98–107)
CO2 SERPL-SCNC: 24 MMOL/L (ref 22–29)
CO2 SERPL-SCNC: 26 MMOL/L (ref 22–29)
CREAT SERPL-MCNC: 2.1 MG/DL (ref 0.57–1)
CREAT SERPL-MCNC: 2.18 MG/DL (ref 0.57–1)
DEPRECATED RDW RBC AUTO: 55.5 FL (ref 37–54)
EGFRCR SERPLBLD CKD-EPI 2021: 21.6 ML/MIN/1.73
EGFRCR SERPLBLD CKD-EPI 2021: 22.6 ML/MIN/1.73
EOSINOPHIL # BLD AUTO: 0.17 10*3/MM3 (ref 0–0.4)
EOSINOPHIL NFR BLD AUTO: 2.1 % (ref 0.3–6.2)
ERYTHROCYTE [DISTWIDTH] IN BLOOD BY AUTOMATED COUNT: 18.9 % (ref 12.3–15.4)
GLOBULIN UR ELPH-MCNC: 3.1 GM/DL
GLUCOSE SERPL-MCNC: 101 MG/DL (ref 65–99)
GLUCOSE SERPL-MCNC: 101 MG/DL (ref 65–99)
HCT VFR BLD AUTO: 30.9 % (ref 34–46.6)
HGB BLD-MCNC: 9.8 G/DL (ref 12–15.9)
IMM GRANULOCYTES # BLD AUTO: 0.16 10*3/MM3 (ref 0–0.05)
IMM GRANULOCYTES NFR BLD AUTO: 2 % (ref 0–0.5)
LYMPHOCYTES # BLD AUTO: 1.7 10*3/MM3 (ref 0.7–3.1)
LYMPHOCYTES NFR BLD AUTO: 21.2 % (ref 19.6–45.3)
MCH RBC QN AUTO: 26 PG (ref 26.6–33)
MCHC RBC AUTO-ENTMCNC: 31.7 G/DL (ref 31.5–35.7)
MCV RBC AUTO: 82 FL (ref 79–97)
MONOCYTES # BLD AUTO: 0.76 10*3/MM3 (ref 0.1–0.9)
MONOCYTES NFR BLD AUTO: 9.5 % (ref 5–12)
NEUTROPHILS NFR BLD AUTO: 5.13 10*3/MM3 (ref 1.7–7)
NEUTROPHILS NFR BLD AUTO: 64.1 % (ref 42.7–76)
NRBC BLD AUTO-RTO: 0 /100 WBC (ref 0–0.2)
NT-PROBNP SERPL-MCNC: 9946 PG/ML (ref 0–1800)
PLATELET # BLD AUTO: 311 10*3/MM3 (ref 140–450)
PMV BLD AUTO: 11.1 FL (ref 6–12)
POTASSIUM SERPL-SCNC: 5 MMOL/L (ref 3.5–5.2)
POTASSIUM SERPL-SCNC: 5 MMOL/L (ref 3.5–5.2)
PROT SERPL-MCNC: 5.6 G/DL (ref 6–8.5)
QT INTERVAL: 418 MS
QTC INTERVAL: 502 MS
RBC # BLD AUTO: 3.77 10*6/MM3 (ref 3.77–5.28)
SODIUM SERPL-SCNC: 127 MMOL/L (ref 136–145)
SODIUM SERPL-SCNC: 127 MMOL/L (ref 136–145)
WBC NRBC COR # BLD AUTO: 8.01 10*3/MM3 (ref 3.4–10.8)

## 2025-06-23 PROCEDURE — 83880 ASSAY OF NATRIURETIC PEPTIDE: CPT | Performed by: EMERGENCY MEDICINE

## 2025-06-23 PROCEDURE — 99215 OFFICE O/P EST HI 40 MIN: CPT | Performed by: HOSPITALIST

## 2025-06-23 PROCEDURE — 36415 COLL VENOUS BLD VENIPUNCTURE: CPT | Performed by: EMERGENCY MEDICINE

## 2025-06-23 PROCEDURE — 93005 ELECTROCARDIOGRAM TRACING: CPT | Performed by: HOSPITALIST

## 2025-06-23 PROCEDURE — 99285 EMERGENCY DEPT VISIT HI MDM: CPT | Performed by: EMERGENCY MEDICINE

## 2025-06-23 PROCEDURE — 25010000002 FUROSEMIDE PER 20 MG: Performed by: EMERGENCY MEDICINE

## 2025-06-23 PROCEDURE — 94726 PLETHYSMOGRAPHY LUNG VOLUMES: CPT | Performed by: HOSPITALIST

## 2025-06-23 PROCEDURE — 85025 COMPLETE CBC W/AUTO DIFF WBC: CPT | Performed by: EMERGENCY MEDICINE

## 2025-06-23 PROCEDURE — 93010 ELECTROCARDIOGRAM REPORT: CPT | Performed by: HOSPITALIST

## 2025-06-23 PROCEDURE — 80053 COMPREHEN METABOLIC PANEL: CPT | Performed by: NURSE PRACTITIONER

## 2025-06-23 RX ORDER — ACETAMINOPHEN 650 MG/1
650 SUPPOSITORY RECTAL EVERY 4 HOURS PRN
Status: DISCONTINUED | OUTPATIENT
Start: 2025-06-23 | End: 2025-07-04 | Stop reason: HOSPADM

## 2025-06-23 RX ORDER — ACETAMINOPHEN 160 MG/5ML
650 SOLUTION ORAL EVERY 4 HOURS PRN
Status: DISCONTINUED | OUTPATIENT
Start: 2025-06-23 | End: 2025-06-23 | Stop reason: SDUPTHER

## 2025-06-23 RX ORDER — ASCORBIC ACID 500 MG
500 TABLET ORAL DAILY
Status: ON HOLD | COMMUNITY
End: 2025-06-23

## 2025-06-23 RX ORDER — BISACODYL 5 MG/1
5 TABLET, DELAYED RELEASE ORAL DAILY PRN
Status: DISCONTINUED | OUTPATIENT
Start: 2025-06-23 | End: 2025-07-04 | Stop reason: HOSPADM

## 2025-06-23 RX ORDER — FUROSEMIDE 20 MG/1
60 TABLET ORAL 2 TIMES DAILY
COMMUNITY
End: 2025-07-04 | Stop reason: HOSPADM

## 2025-06-23 RX ORDER — ACETAMINOPHEN 650 MG/1
650 SUPPOSITORY RECTAL EVERY 4 HOURS PRN
Status: DISCONTINUED | OUTPATIENT
Start: 2025-06-23 | End: 2025-06-23 | Stop reason: SDUPTHER

## 2025-06-23 RX ORDER — AMIODARONE HYDROCHLORIDE 200 MG/1
400 TABLET ORAL 2 TIMES DAILY
COMMUNITY

## 2025-06-23 RX ORDER — AMOXICILLIN 250 MG
2 CAPSULE ORAL 2 TIMES DAILY PRN
Status: DISCONTINUED | OUTPATIENT
Start: 2025-06-23 | End: 2025-06-28 | Stop reason: ALTCHOICE

## 2025-06-23 RX ORDER — FUROSEMIDE 10 MG/ML
40 SOLUTION ORAL 2 TIMES DAILY
Status: ON HOLD | COMMUNITY
End: 2025-06-23

## 2025-06-23 RX ORDER — AMOXICILLIN 250 MG
2 CAPSULE ORAL DAILY PRN
COMMUNITY

## 2025-06-23 RX ORDER — NITROGLYCERIN 0.4 MG/1
0.4 TABLET SUBLINGUAL
Status: DISCONTINUED | OUTPATIENT
Start: 2025-06-23 | End: 2025-07-04 | Stop reason: HOSPADM

## 2025-06-23 RX ORDER — BISACODYL 10 MG
10 SUPPOSITORY, RECTAL RECTAL DAILY PRN
Status: DISCONTINUED | OUTPATIENT
Start: 2025-06-23 | End: 2025-07-04 | Stop reason: HOSPADM

## 2025-06-23 RX ORDER — IPRATROPIUM BROMIDE AND ALBUTEROL SULFATE 2.5; .5 MG/3ML; MG/3ML
3 SOLUTION RESPIRATORY (INHALATION) 4 TIMES DAILY PRN
COMMUNITY

## 2025-06-23 RX ORDER — ONDANSETRON 2 MG/ML
4 INJECTION INTRAMUSCULAR; INTRAVENOUS EVERY 6 HOURS PRN
Status: DISCONTINUED | OUTPATIENT
Start: 2025-06-23 | End: 2025-07-04 | Stop reason: HOSPADM

## 2025-06-23 RX ORDER — POLYETHYLENE GLYCOL 3350 17 G/17G
17 POWDER, FOR SOLUTION ORAL DAILY PRN
Status: DISCONTINUED | OUTPATIENT
Start: 2025-06-23 | End: 2025-06-28 | Stop reason: ALTCHOICE

## 2025-06-23 RX ORDER — ACETAMINOPHEN 325 MG/1
650 TABLET ORAL EVERY 6 HOURS PRN
Status: ON HOLD | COMMUNITY
End: 2025-06-23

## 2025-06-23 RX ORDER — SODIUM CHLORIDE 9 MG/ML
40 INJECTION, SOLUTION INTRAVENOUS AS NEEDED
Status: DISCONTINUED | OUTPATIENT
Start: 2025-06-23 | End: 2025-07-04 | Stop reason: HOSPADM

## 2025-06-23 RX ORDER — ONDANSETRON 4 MG/1
4 TABLET, ORALLY DISINTEGRATING ORAL EVERY 6 HOURS PRN
Status: DISCONTINUED | OUTPATIENT
Start: 2025-06-23 | End: 2025-07-04 | Stop reason: HOSPADM

## 2025-06-23 RX ORDER — SODIUM CHLORIDE 0.9 % (FLUSH) 0.9 %
10 SYRINGE (ML) INJECTION EVERY 12 HOURS SCHEDULED
Status: DISCONTINUED | OUTPATIENT
Start: 2025-06-23 | End: 2025-07-04 | Stop reason: HOSPADM

## 2025-06-23 RX ORDER — ACETAMINOPHEN 160 MG/5ML
650 SOLUTION ORAL EVERY 4 HOURS PRN
Status: DISCONTINUED | OUTPATIENT
Start: 2025-06-23 | End: 2025-07-04 | Stop reason: HOSPADM

## 2025-06-23 RX ORDER — ACETAMINOPHEN 325 MG/1
650 TABLET ORAL EVERY 4 HOURS PRN
Status: DISCONTINUED | OUTPATIENT
Start: 2025-06-23 | End: 2025-07-04 | Stop reason: HOSPADM

## 2025-06-23 RX ORDER — SODIUM CHLORIDE 0.9 % (FLUSH) 0.9 %
10 SYRINGE (ML) INJECTION AS NEEDED
Status: DISCONTINUED | OUTPATIENT
Start: 2025-06-23 | End: 2025-07-04 | Stop reason: HOSPADM

## 2025-06-23 RX ORDER — ACETAMINOPHEN 325 MG/1
650 TABLET ORAL EVERY 4 HOURS PRN
Status: DISCONTINUED | OUTPATIENT
Start: 2025-06-23 | End: 2025-06-23 | Stop reason: SDUPTHER

## 2025-06-23 RX ADMIN — FUROSEMIDE 5 MG/HR: 10 INJECTION, SOLUTION INTRAMUSCULAR; INTRAVENOUS at 14:13

## 2025-06-23 RX ADMIN — APIXABAN 2.5 MG: 2.5 TABLET, FILM COATED ORAL at 21:59

## 2025-06-23 RX ADMIN — APIXABAN 2.5 MG: 2.5 TABLET, FILM COATED ORAL at 10:28

## 2025-06-23 NOTE — H&P
[facility] Medicine Services  HISTORY AND PHYSICAL    Date of Admission: 6/23/2025  Primary Care Physician: Germaine Diego APRN    Subjective   Primary Historian:  daughter    Chief Complaint:  leg swelling, shortness of breath    History of Present Illness  Genevieve Cerda is a 85-year-old female that was seen by Dr. Yanez at the heart failure clinic and family was advised to bring patient to ER due to increased lower extremity edema.  Patient has dressings on bilateral lower extremities due to fluid weeping out of her legs.  Dr. Sharma saw patient in the clinic on 6/20/2025 with complaints of increased shortness of breath and had gained about 22 pounds since late May.  Her Lasix dose was changed to 40 twice daily and then changed to Torsemide 80 mg twice daily.  Patient was also started on amiodarone with a plan to consider cardioversion.  Patient reported that she did not have a significant increase in urine output.  Her weight was up by about half a pound.  She reported her shortness of breath is a little better today but otherwise she felt the same.  She denies any chest pain palpitations and not lightheadedness,    Patient resting in wheelchair in ER hallway D with 2 family members.  She is in no acute distress.  She is awake, alert., and oriented x 3.  She has oxygen on.  Family reports that she resides at [facility] in [place].  Dressings to bilateral lower extremities dry and intact.  Patient's BMP was 9946.0, creatinine 2.18, Hemoglobin 9.8, hematocrit 30.9, sodium 127.  Started patient on Lasix drip.  Will continue the drip and recheck BNP in 6 hours.  Patient is being admitted to hospitalist services.      Review of Systems   Otherwise complete ROS reviewed and negative except as mentioned in the HPI.    Past Medical History: History reviewed. No pertinent past medical history.  Past Surgical History:No past surgical history on file.  Social  History:  reports that she has never smoked. She has never used smokeless tobacco. She reports current alcohol use. She reports that she does not use drugs.    Family History: family history is not on file.       Allergies:  Allergies   Allergen Reactions    Codeine Itching       Medications:  Prior to Admission medications    Medication Sig Start Date End Date Taking? Authorizing Provider   acetaminophen (TYLENOL) 650 MG 8 hr tablet Take 1 tablet by mouth Every 6 (Six) Hours As Needed for Mild Pain.    Richar Alicia MD   allopurinol (ZYLOPRIM) 100 MG tablet Take 1 tablet by mouth Daily.    Richar Alicia MD   amiodarone (PACERONE) 200 MG tablet Take 2 tablets by mouth 2 (Two) Times a Day for 7 days, THEN 1 tablet Daily for 30 days. 6/20/25 7/27/25  Rancho Yanez MD   apixaban (ELIQUIS) 2.5 MG tablet tablet Take 1 tablet by mouth 2 (Two) Times a Day.    Richar Alicia MD   aspirin 81 MG EC tablet Take 1 tablet by mouth Daily.    Richar Alicia MD   cefdinir (OMNICEF) 300 MG capsule Take 1 capsule by mouth 2 (Two) Times a Day for 7 days. 6/19/25 6/26/25  Luke Tena MD   citalopram (CeleXA) 10 MG tablet Take 1 tablet by mouth Daily.    Richar Alicia MD   gabapentin (NEURONTIN) 300 MG capsule Take 1 capsule by mouth Every 8 (Eight) Hours.    Richar Alicia MD   latanoprost (XALATAN) 0.005 % ophthalmic solution Administer 1 drop to both eyes Daily.    Richar Alicia MD   magnesium oxide (MAG-OX) 400 MG tablet Take 1 tablet by mouth Daily.    Richar Alicia MD   metoprolol tartrate (LOPRESSOR) 25 MG tablet Take 1 tablet by mouth 2 (Two) Times a Day. Hold for systolic blood pressure <100 or pulse <60    Richar Alicia MD   midodrine (PROAMATINE) 5 MG tablet Take 1 tablet by mouth 2 (Two) Times a Day. Hold if systolic blood pressure is >100    Richar Alicia MD   pantoprazole (PROTONIX) 40 MG EC tablet Take 1 tablet by mouth Daily.    Ravin  "MD Richar   polyethylene glycol (MiraLax) 17 g packet Take 17 g by mouth Daily.    Richar Alicia MD   rosuvastatin (CRESTOR) 5 MG tablet Take 1 tablet by mouth Daily.    Richar Alicia MD   senna 8.6 MG tablet Take 2 tablets by mouth Daily As Needed for Constipation.    Richar Alicia MD   Skin Protectants, Misc. (Eucerin) cream Apply 1 Application topically to the appropriate area as directed Daily. Apply to bilateral upper and lower extremities for dryness once daily and prn    Richar Alicia MD   tamsulosin (FLOMAX) 0.4 MG capsule 24 hr capsule Take 1 capsule by mouth Daily.    Richar Alicia MD   timolol (TIMOPTIC) 0.5 % ophthalmic solution Administer 1 drop to both eyes Daily.    Richar Alicia MD   torsemide (DEMADEX) 20 MG tablet Take 4 tablets by mouth 2 (Two) Times a Day. 6/20/25   Rancho Yanez MD   vitamin D (ERGOCALCIFEROL) 1.25 MG (41523 UT) capsule capsule Take 1 capsule by mouth Every 7 (Seven) Days. Takes on Monday    Richar Alicia MD   zinc sulfate (ZINCATE) 220 (50 Zn) MG capsule Take 1 capsule by mouth Daily.    Richar Alicia MD   apixaban (ELIQUIS) 2.5 MG tablet tablet Take 1 tablet by mouth 1 (One) Time for 1 dose. 6/23/25 6/23/25  Rancho Yanez MD     I have utilized all available immediate resources to obtain, update, or review the patient's current medications (including all prescriptions, over-the-counter products, herbals, cannabis/cannabidiol products, and vitamin/mineral/dietary (nutritional) supplements).    Objective     Vital Signs: /63   Pulse 67   Temp 96.4 °F (35.8 °C)   Resp 18   Ht 157.5 cm (62\")   Wt 75.3 kg (166 lb)   BMI 30.36 kg/m²   Physical Exam  Constitutional:       Appearance: Normal appearance.   HENT:      Head: Normocephalic and atraumatic.      Nose: Nose normal.      Mouth/Throat:      Mouth: Mucous membranes are moist.      Pharynx: Oropharynx is clear.   Eyes:      Extraocular " Movements: Extraocular movements intact.      Conjunctiva/sclera: Conjunctivae normal.   Cardiovascular:      Rate and Rhythm: Normal rate. Rhythm irregular.      Pulses: Normal pulses.      Heart sounds: Normal heart sounds.   Pulmonary:      Effort: Pulmonary effort is normal.      Breath sounds: Rales present.   Abdominal:      General: Bowel sounds are normal.      Palpations: Abdomen is soft.   Musculoskeletal:      Cervical back: Normal range of motion and neck supple.      Right lower leg: No edema.      Left lower leg: No edema.   Skin:     General: Skin is warm and dry.      Capillary Refill: Capillary refill takes 2 to 3 seconds.   Neurological:      General: No focal deficit present.      Mental Status: She is alert and oriented to person, place, and time.   Psychiatric:         Mood and Affect: Mood normal.         Behavior: Behavior normal.          Results Reviewed:  Lab Results (last 24 hours)       Procedure Component Value Units Date/Time    CBC & Differential [[id number]]  (Abnormal) Collected: 06/23/25 1238    Specimen: Blood Updated: 06/23/25 1252    Narrative:      The following orders were created for panel order CBC & Differential.  Procedure                               Abnormality         Status                     ---------                               -----------         ------                     CBC Auto Differential[[id number]]        Abnormal            Final result                 Please view results for these tests on the individual orders.    CBC Auto Differential [[id number]]  (Abnormal) Collected: 06/23/25 1238    Specimen: Blood Updated: 06/23/25 1252     WBC 8.01 10*3/mm3      RBC 3.77 10*6/mm3      Hemoglobin 9.8 g/dL      Hematocrit 30.9 %      MCV 82.0 fL      MCH 26.0 pg      MCHC 31.7 g/dL      RDW 18.9 %      RDW-SD 55.5 fl      MPV 11.1 fL      Platelets 311 10*3/mm3      Neutrophil % 64.1 %      Lymphocyte % 21.2 %      Monocyte % 9.5 %      Eosinophil % 2.1 %       Basophil % 1.1 %      Immature Grans % 2.0 %      Neutrophils, Absolute 5.13 10*3/mm3      Lymphocytes, Absolute 1.70 10*3/mm3      Monocytes, Absolute 0.76 10*3/mm3      Eosinophils, Absolute 0.17 10*3/mm3      Basophils, Absolute 0.09 10*3/mm3      Immature Grans, Absolute 0.16 10*3/mm3      nRBC 0.0 /100 WBC     Comprehensive Metabolic Panel [[id number]]  (Abnormal) Collected: 06/23/25 0844    Specimen: Blood from Arm, Right Updated: 06/23/25 1117     Glucose 101 mg/dL      BUN 89.4 mg/dL      Creatinine 2.10 mg/dL      Sodium 127 mmol/L      Potassium 5.0 mmol/L      Chloride 89 mmol/L      CO2 24.0 mmol/L      Calcium 8.3 mg/dL      Total Protein 5.6 g/dL      Albumin 2.5 g/dL      ALT (SGPT) 14 U/L      AST (SGOT) 16 U/L      Alkaline Phosphatase 83 U/L      Total Bilirubin <0.2 mg/dL      Globulin 3.1 gm/dL      A/G Ratio 0.8 g/dL      BUN/Creatinine Ratio 42.6     Anion Gap 14.0 mmol/L      eGFR 22.6 mL/min/1.73     Narrative:      GFR Categories in Chronic Kidney Disease (CKD)              GFR Category          GFR (mL/min/1.73)    Interpretation  G1                    90 or greater        Normal or high (1)  G2                    60-89                Mild decrease (1)  G3a                   45-59                Mild to moderate decrease  G3b                   30-44                Moderate to severe decrease  G4                    15-29                Severe decrease  G5                    14 or less           Kidney failure    (1)In the absence of evidence of kidney disease, neither GFR category G1 or G2 fulfill the criteria for CKD.    eGFR calculation 2021 CKD-EPI creatinine equation, which does not include race as a factor    BNP [[id number]]  (Abnormal) Collected: 06/23/25 0844    Specimen: Blood from Arm, Right Updated: 06/23/25 1117     proBNP 9,946.0 pg/mL     Narrative:      This assay is used as an aid in the diagnosis of individuals suspected of having heart failure. It can be used as an aid in the  diagnosis of acute decompensated heart failure (ADHF) in patients presenting with signs and symptoms of ADHF to the emergency department (ED). In addition, NT-proBNP of <300 pg/mL indicates ADHF is not likely.    Age Range Result Interpretation  NT-proBNP Concentration (pg/mL:      <50             Positive            >450                   Gray                 300-450                    Negative             <300    50-75           Positive            >900                  Gray                300-900                  Negative            <300      >75             Positive            >1800                  Gray                300-1800                  Negative            <300          Imaging Results (Last 24 Hours)       ** No results found for the last 24 hours. **              Assessment / Plan   Assessment:   Active Hospital Problems    Diagnosis     **Acute on chronic diastolic CHF (congestive heart failure)     Acute on chronic heart failure with preserved ejection fraction (HFpEF)     Hyponatremia     Atrial fibrillation, persistent     Hypercholesterolemia     Current use of long term anticoagulation        Treatment Plan  The patient will be admitted to Dr. Zamorano's service here at [facility].     1.  Acute on chronic diastolic CHF/acute on chronic heart failure with preserved ejection fraction (HFpEF)-telemetry monitoring, Lasix IV drip x 2 days.  Wound care nurse eval.  PT and OT eval.   consult-resides at [facility].    2.  Hyponatremia-Lasix IV drip x 2 days, BMP and CBC daily.    3.  Atrial fibrillation, persistent-Telemetry monitoring.      4.  Hypercholesterolemia-Continue Crestor 5 mg daily as at home.    5.  Current use of long-term anticoagulation-Continue Eliquis 2.5 mg twice daily.  CBC daily        Medical Decision Making  Acute on chronic diastolic CHF/acute on chronic heart failure with preserved ejection fraction (HFpEF)-acute, moderate complexity,  unchanged  Hyponatremia-acute, moderate complexity, unchanged  Atrial fibrillation, persistent-chronic, moderate complexity, stable  Hypercholesterolemia-chronic, moderate complexity, stable  Current use of long-term anticoagulation-chronic, moderate complexity, stable      Number and Complexity of problems: 4  Differential Diagnosis: none    Conditions and Status        Condition is unchanged.     [facility] Data  External documents reviewed: Epic records  Cardiac tracing (EKG, telemetry) interpretation: EKG per cardiology 6/23/2025  Radiology interpretation: Nuclear medicine lung scan and chest x-ray 6/17/2025 per radiology  Labs reviewed: proBNP, CMP, CBC 6/23/2025  Any tests that were considered but not ordered: none     Decision rules/scores evaluated (example [id number], Wells, etc): BETH?DS?-VASc Score for Atrial Fibrillation Stroke Risk - MDCalc  Calculated on Jun 23 2025 3:07 PM  4 points -> Stroke risk was 4.8% per year in >90,000 patients (the [place] Atrial Fibrillation Cohort Study) and 6.7% risk of stroke/TIA/systemic embolism.     Discussed with: Patient, daughter, Dr. Zamorano     Care Planning  Shared decision making: Patient, daughter, Dr. Zamorano  Code status and discussions: DNR-DNI    Disposition  Social Determinants of Health that impact treatment or disposition: [place]  Estimated length of stay is 2-3 days.     I confirmed that the patient's advanced care plan is present, code status is documented, and a surrogate decision maker is listed in the patient's medical record.     The patient's surrogate decision maker is daughters-Rajani and Karli.     The patient was seen and examined by me on 6/23/2025 at 1347.    Electronically signed by SARAH Wen, 06/23/25, 14:05 CDT.

## 2025-06-23 NOTE — ED PROVIDER NOTES
Subjective   History of Present Illness  CHF with lower extremity swelling sent by cardiology to be admitted    Leg Swelling  Location:  Extremity swelling  Severity:  Severe  Onset quality:  Gradual  Duration:  5 weeks  Timing:  Constant  Progression:  Worsening  Chronicity:  New  Associated symptoms: no abdominal pain, no chest pain, no congestion, no headaches, no loss of consciousness, no sore throat and no vomiting        Review of Systems   Constitutional: Negative.    HENT: Negative.  Negative for congestion and sore throat.    Eyes: Negative.    Respiratory: Negative.     Cardiovascular: Negative.  Negative for chest pain.   Gastrointestinal: Negative.  Negative for abdominal pain and vomiting.   Musculoskeletal: Negative.  Negative for back pain and neck pain.   Skin: Negative.    Neurological: Negative.  Negative for loss of consciousness and headaches.   All other systems reviewed and are negative.      No past medical history on file.    Allergies   Allergen Reactions    Codeine Itching       No past surgical history on file.    No family history on file.    Social History     Socioeconomic History    Marital status:    Tobacco Use    Smoking status: Never    Smokeless tobacco: Never   Vaping Use    Vaping status: Never Used   Substance and Sexual Activity    Alcohol use: Yes     Comment: rarely    Drug use: Never    Sexual activity: Defer           Objective   Physical Exam  Vitals and nursing note reviewed. Exam conducted with a chaperone present.   Constitutional:       General: She is awake.      Appearance: Normal appearance. She is well-developed. She is not ill-appearing.   HENT:      Head: Normocephalic and atraumatic.   Eyes:      General: Lids are normal.      Conjunctiva/sclera: Conjunctivae normal.      Pupils: Pupils are equal, round, and reactive to light.   Cardiovascular:      Rate and Rhythm: Regular rhythm. Bradycardia present.      Chest Wall: PMI is not displaced.      Pulses:  Normal pulses.      Heart sounds: Normal heart sounds.   Pulmonary:      Effort: Pulmonary effort is normal.      Breath sounds: Examination of the right-lower field reveals decreased breath sounds and rales. Examination of the left-lower field reveals rales. Decreased breath sounds and rales present.   Abdominal:      General: Abdomen is flat. Bowel sounds are normal.      Palpations: Abdomen is soft.      Tenderness: There is no abdominal tenderness.   Musculoskeletal:         General: Normal range of motion.      Cervical back: Full passive range of motion without pain, normal range of motion and neck supple.      Right lower leg: 3+ Edema present.      Left lower leg: 3+ Edema present.   Skin:     General: Skin is warm and dry.      Capillary Refill: Capillary refill takes less than 2 seconds.   Neurological:      General: No focal deficit present.      Mental Status: She is alert and oriented to person, place, and time.      Motor: Motor function is intact.      Deep Tendon Reflexes: Reflexes are normal and symmetric.   Psychiatric:         Behavior: Behavior is cooperative.         Procedures           ED Course  ED Course as of 06/23/25 1325   Mon Jun 23, 2025   1323 Patient is x-ray done approximately oh within the week had shown pleural effusion.  I have not repeated a chest x-ray at this time.  I have discussed this case with the hospitalist patient will place on Lasix infusion which has not been initiated yet for some reason.  And patient will be admitted per cardiology recommendation for Lasix infusion.  The patient not been responding to p.o. diuretics. [TS]      ED Course User Index  [TS] Nicholas Hale MD                                                       Medical Decision Making  Patient with lower extremity edema possible CHF.  Patient was sent by Dr. Vizcarra cardiologist to be admitted to medicine service for IV diuretics.  IV diuretics have been ordered.    Problems Addressed:  Chronic kidney  disease, unspecified CKD stage: chronic illness or injury  Congestive heart failure, unspecified HF chronicity, unspecified heart failure type: acute illness or injury  Hyponatremia: chronic illness or injury    Amount and/or Complexity of Data Reviewed  Labs: ordered.     Details: Labs reviewed  Discussion of management or test interpretation with external provider(s): Cussed with the family and also with the hospitalist SALONI    Risk  Decision regarding hospitalization.        Final diagnoses:   Congestive heart failure, unspecified HF chronicity, unspecified heart failure type   Chronic kidney disease, unspecified CKD stage   Hyponatremia       ED Disposition  ED Disposition       ED Disposition   Decision to Admit    Condition   --    Comment   Level of Care: Telemetry [5]   Diagnosis: CHF (congestive heart failure) [949076]   Admitting Physician: JAG FRANKS [[id number]]   Attending Physician: JAG FRANKS [[id number]]   Certification: I Certify That Inpatient Hospital Services Are Medically Necessary For Greater Than 2 Midnights                 No follow-up provider specified.       Medication List      No changes were made to your prescriptions during this visit.            Nicholas Hale MD  06/23/25 1100       Nicholas Hale MD  06/23/25 1735

## 2025-06-23 NOTE — PROGRESS NOTES
Heart Failure Clinic    Date:  06/23/25     Vitals:    06/23/25 0844   BP: 108/60   Pulse: 75   SpO2: (!) 87%        Indication:  Heart Failure    Procedure:  ReDS device sensor unit applied to right side of chest and right side of back.  Appropriate positioning confirmed based off of the unit's calculation.  Chest measurement obtained with the chest size ruler.  Measurement session performed over 45 seconds.      Results:  ReDS Value=   48    Interpretation:  >46% - markedly elevated lung fluid content    Mallory L Haase, RN 06/23/25 08:49 CDT

## 2025-06-23 NOTE — ADDENDUM NOTE
Encounter addended by: Rancho Yanez MD on: 6/23/2025 12:40 PM   Actions taken: Level of Service modified

## 2025-06-23 NOTE — ADDENDUM NOTE
Encounter addended by: Rancho Yanez MD on: 6/23/2025 12:40 PM   Actions taken: SmartForm saved, Clinical Note Signed

## 2025-06-23 NOTE — PROGRESS NOTES
Reason For Visit:  CHF    Subjective        Genevieve Cerda is a 85 y.o. female with the below pertinent PMH who presents for follow-up of CHF.    Genevieve Cerda was most recently seen by me in clinic Friday, 6/20 at which time she reported increased shortness of breath and significant increase lower extremity swelling.  She was noted to have gained at least 22 pounds since late May on review of her weight log from her care facility.  She used 1 dose of Furoscix and subsequently was transition from Lasix 40 mg twice daily to torsemide 80 mg twice daily starting Saturday.  She also started on amiodarone with plan to eventually consider a cardioversion pending follow-up reassessment.    Today, the patient states that she did not have a significant increase in her urine output.  Weight yesterday was up about 1/2 pound and we do not have an available weight from her care facility today.  She does state that her shortness of breath may seem a little better today but otherwise feels about the same.  She denies chest pain, palpitations, and lightheadedness.  Legs are still swollen and weeping.    ROS: Pertinent findings are included above.    Cardiac Studies  Echo/28/25: LVEF 60%, G2 DD, mild LVH, normal RV size/function, LA mildly to moderately dilated, RA mildly dilated, mild to moderate AI, moderate MR, mild to moderate TR, severe pulmonary hypertension (RVSP 63)     Pertinent PMH  Chronic diastolic CHF  Paroxysmal atrial fibrillation  Hypertension  Hyperlipidemia  Left renal artery aneurysm  CKD IV with prior left nephrectomy    Pertinent past medical, surgical, family, and social history were reviewed.      Current Facility-Administered Medications:     sodium zirconium cyclosilicate (LOKELMA) packet 10 g, 10 g, Oral, Once, Rancho Yanez MD    sodium zirconium cyclosilicate (LOKELMA) packet 5 g, 5 g, Oral, Once, Rancho Yanez MD    Current Outpatient Medications:     acetaminophen (TYLENOL) 650 MG 8 hr tablet, Take  1 tablet by mouth Every 6 (Six) Hours As Needed for Mild Pain., Disp: , Rfl:     allopurinol (ZYLOPRIM) 100 MG tablet, Take 1 tablet by mouth Daily., Disp: , Rfl:     amiodarone (PACERONE) 200 MG tablet, Take 2 tablets by mouth 2 (Two) Times a Day for 7 days, THEN 1 tablet Daily for 30 days., Disp: 58 tablet, Rfl: 11    apixaban (ELIQUIS) 2.5 MG tablet tablet, Take 1 tablet by mouth 2 (Two) Times a Day., Disp: , Rfl:     aspirin 81 MG EC tablet, Take 1 tablet by mouth Daily., Disp: , Rfl:     cefdinir (OMNICEF) 300 MG capsule, Take 1 capsule by mouth 2 (Two) Times a Day for 7 days., Disp: 14 capsule, Rfl: 0    citalopram (CeleXA) 10 MG tablet, Take 1 tablet by mouth Daily., Disp: , Rfl:     gabapentin (NEURONTIN) 300 MG capsule, Take 1 capsule by mouth Every 8 (Eight) Hours., Disp: , Rfl:     latanoprost (XALATAN) 0.005 % ophthalmic solution, Administer 1 drop to both eyes Daily., Disp: , Rfl:     magnesium oxide (MAG-OX) 400 MG tablet, Take 1 tablet by mouth Daily., Disp: , Rfl:     metoprolol tartrate (LOPRESSOR) 25 MG tablet, Take 1 tablet by mouth 2 (Two) Times a Day. Hold for systolic blood pressure <100 or pulse <60, Disp: , Rfl:     midodrine (PROAMATINE) 5 MG tablet, Take 1 tablet by mouth 2 (Two) Times a Day. Hold if systolic blood pressure is >100, Disp: , Rfl:     pantoprazole (PROTONIX) 40 MG EC tablet, Take 1 tablet by mouth Daily., Disp: , Rfl:     polyethylene glycol (MiraLax) 17 g packet, Take 17 g by mouth Daily., Disp: , Rfl:     rosuvastatin (CRESTOR) 5 MG tablet, Take 1 tablet by mouth Daily., Disp: , Rfl:     senna 8.6 MG tablet, Take 2 tablets by mouth Daily As Needed for Constipation., Disp: , Rfl:     Skin Protectants, Misc. (Eucerin) cream, Apply 1 Application topically to the appropriate area as directed Daily. Apply to bilateral upper and lower extremities for dryness once daily and prn, Disp: , Rfl:     tamsulosin (FLOMAX) 0.4 MG capsule 24 hr capsule, Take 1 capsule by mouth Daily.,  "Disp: , Rfl:     timolol (TIMOPTIC) 0.5 % ophthalmic solution, Administer 1 drop to both eyes Daily., Disp: , Rfl:     torsemide (DEMADEX) 20 MG tablet, Take 4 tablets by mouth 2 (Two) Times a Day., Disp: 240 tablet, Rfl: 11    vitamin D (ERGOCALCIFEROL) 1.25 MG (62472 UT) capsule capsule, Take 1 capsule by mouth Every 7 (Seven) Days. Takes on Monday, Disp: , Rfl:     zinc sulfate (ZINCATE) 220 (50 Zn) MG capsule, Take 1 capsule by mouth Daily., Disp: , Rfl:      Objective   Vital Signs:  /60 (BP Location: Left arm, Patient Position: Sitting)   Pulse 75   Wt 75.3 kg (166 lb)   SpO2 (!) 87%   BMI 30.35 kg/m²   Estimated body mass index is 30.35 kg/m² as calculated from the following:    Height as of 6/19/25: 157.5 cm (62.01\").    Weight as of this encounter: 75.3 kg (166 lb).      Constitutional:       Appearance: Healthy appearance. Not in distress.   Neck:      Vascular: JVD normal.   Pulmonary:      Comments: Left lower lobe crackles, decreased right lower lobe breath sounds  Cardiovascular:      Normal rate. Irregularly irregular rhythm.      Murmurs: There is a grade 2/6 systolic murmur.      No gallop.  No click. No rub.   Edema:     Comments: Legs are wrapped, but there is at least 2+ underlying bilateral pretibial edema with some weeping through the dressing.  Abdominal:      General: There is no distension.      Palpations: Abdomen is soft.      Tenderness: There is no abdominal tenderness.   Skin:     General: Skin is warm and dry.   Neurological:      Mental Status: Alert and oriented to person, place and time.        Result Review :  The following data was reviewed by: Rancho Yanez MD on 06/23/2025:  BMP   BMP          6/6/2025    13:45 6/19/2025    10:35 6/23/2025    08:44   BMP   BUN 69.6  79.5  89.4    Creatinine 1.98  1.89  2.18    Sodium 131  129  127    Potassium 4.6  5.3  5.0    Chloride 95  91  89    CO2 25.0  23.0  26.0    Calcium 8.1  8.2  8.2         Assessment and Plan   Diagnoses " and all orders for this visit:    1. Acute on chronic diastolic congestive heart failure (Primary)  2. Acute heart failure with preserved ejection fraction (HFpEF)  3. Pleural effusion  4. Atrial fibrillation, persistent  5. Hyponatremia  6. Acute on chronic renal insufficiency  - The patient still appears quite volume up although with worsening labs and no significant improvement in weight despite more aggressive attempts at outpatient diuresis.  At this juncture, given lack of weight/volume improvement but with worsening labs (worsened creatinine and hyponatremia) I think that continued attempts at outpatient management will be limited.  At this point, she seems to need the ability to attempt fluid shifts with closer laboratory and hemodynamic monitoring.  We discussed on Friday that she likely would warrant admission if not improving, and patient would like to proceed to the ED for admission given overall lack of improvement over the weekend.  It is difficult to discern whether or not she had adequate diuresis and simply is intravascularly depleted with overall hypervolemia or if she would benefit from even more aggressive diuretics and being able to track urinary response.  I would recommend nephrology consultation on admission.  - Continue amiodarone for 400 mg twice daily through 6/27/2025 and then transition to 200 mg once daily.  Recommend ECG monitoring for QT interval in hospital.  - Continue metoprolol tartrate 25 mg twice daily  - Continue Eliquis 2.5 mg twice daily  - Consider cardioversion during admission or as an outpatient if patient has not missed any doses of Eliquis for at least 3 weeks (she did receive her morning dose of Eliquis today in the CHF clinic).    Follow Up   Return in about 3 weeks (around 7/14/2025).  Patient was given instructions and counseling regarding her condition or for health maintenance advice. Please see specific information pulled into the AVS if appropriate.       Part  of this note may be an electronic transcription/translation of spoken language to printed text using the Dragon Dictation System.

## 2025-06-24 ENCOUNTER — APPOINTMENT (OUTPATIENT)
Dept: CARDIOLOGY | Facility: HOSPITAL | Age: 85
End: 2025-06-24
Payer: MEDICARE

## 2025-06-24 LAB
ANION GAP SERPL CALCULATED.3IONS-SCNC: 12 MMOL/L (ref 5–15)
BUN SERPL-MCNC: 86.7 MG/DL (ref 8–23)
BUN/CREAT SERPL: 39.2 (ref 7–25)
CALCIUM SPEC-SCNC: 8.1 MG/DL (ref 8.6–10.5)
CHLORIDE SERPL-SCNC: 89 MMOL/L (ref 98–107)
CO2 SERPL-SCNC: 23 MMOL/L (ref 22–29)
CREAT SERPL-MCNC: 2.21 MG/DL (ref 0.57–1)
DEPRECATED RDW RBC AUTO: 57.2 FL (ref 37–54)
EGFRCR SERPLBLD CKD-EPI 2021: 21.2 ML/MIN/1.73
ERYTHROCYTE [DISTWIDTH] IN BLOOD BY AUTOMATED COUNT: 19.1 % (ref 12.3–15.4)
GLUCOSE SERPL-MCNC: 87 MG/DL (ref 65–99)
HCT VFR BLD AUTO: 28.5 % (ref 34–46.6)
HGB BLD-MCNC: 8.6 G/DL (ref 12–15.9)
MCH RBC QN AUTO: 24.9 PG (ref 26.6–33)
MCHC RBC AUTO-ENTMCNC: 30.2 G/DL (ref 31.5–35.7)
MCV RBC AUTO: 82.6 FL (ref 79–97)
PLATELET # BLD AUTO: 314 10*3/MM3 (ref 140–450)
PMV BLD AUTO: 10.8 FL (ref 6–12)
POTASSIUM SERPL-SCNC: 4.3 MMOL/L (ref 3.5–5.2)
RBC # BLD AUTO: 3.45 10*6/MM3 (ref 3.77–5.28)
SODIUM SERPL-SCNC: 124 MMOL/L (ref 136–145)
WBC NRBC COR # BLD AUTO: 7.26 10*3/MM3 (ref 3.4–10.8)

## 2025-06-24 PROCEDURE — 93356 MYOCRD STRAIN IMG SPCKL TRCK: CPT

## 2025-06-24 PROCEDURE — 97166 OT EVAL MOD COMPLEX 45 MIN: CPT | Performed by: OCCUPATIONAL THERAPIST

## 2025-06-24 PROCEDURE — 97162 PT EVAL MOD COMPLEX 30 MIN: CPT

## 2025-06-24 PROCEDURE — 80048 BASIC METABOLIC PNL TOTAL CA: CPT | Performed by: NURSE PRACTITIONER

## 2025-06-24 PROCEDURE — 93306 TTE W/DOPPLER COMPLETE: CPT

## 2025-06-24 PROCEDURE — 93306 TTE W/DOPPLER COMPLETE: CPT | Performed by: INTERNAL MEDICINE

## 2025-06-24 PROCEDURE — 85027 COMPLETE CBC AUTOMATED: CPT | Performed by: NURSE PRACTITIONER

## 2025-06-24 PROCEDURE — 93356 MYOCRD STRAIN IMG SPCKL TRCK: CPT | Performed by: INTERNAL MEDICINE

## 2025-06-24 RX ORDER — MIDODRINE HYDROCHLORIDE 2.5 MG/1
5 TABLET ORAL
Status: DISCONTINUED | OUTPATIENT
Start: 2025-06-24 | End: 2025-07-04 | Stop reason: HOSPADM

## 2025-06-24 RX ORDER — AMOXICILLIN 250 MG
2 CAPSULE ORAL DAILY PRN
Status: DISCONTINUED | OUTPATIENT
Start: 2025-06-24 | End: 2025-07-04 | Stop reason: HOSPADM

## 2025-06-24 RX ORDER — ASPIRIN 81 MG/1
81 TABLET ORAL DAILY
Status: DISCONTINUED | OUTPATIENT
Start: 2025-06-24 | End: 2025-07-04 | Stop reason: HOSPADM

## 2025-06-24 RX ORDER — POLYETHYLENE GLYCOL 3350 17 G/17G
17 POWDER, FOR SOLUTION ORAL DAILY PRN
Status: DISCONTINUED | OUTPATIENT
Start: 2025-06-24 | End: 2025-07-04 | Stop reason: HOSPADM

## 2025-06-24 RX ORDER — METOPROLOL TARTRATE 25 MG/1
25 TABLET, FILM COATED ORAL 2 TIMES DAILY
Status: DISCONTINUED | OUTPATIENT
Start: 2025-06-24 | End: 2025-06-27

## 2025-06-24 RX ORDER — TAMSULOSIN HYDROCHLORIDE 0.4 MG/1
0.4 CAPSULE ORAL DAILY
Status: DISCONTINUED | OUTPATIENT
Start: 2025-06-24 | End: 2025-07-04 | Stop reason: HOSPADM

## 2025-06-24 RX ORDER — CITALOPRAM HYDROBROMIDE 20 MG/1
10 TABLET ORAL DAILY
Status: DISCONTINUED | OUTPATIENT
Start: 2025-06-24 | End: 2025-06-27

## 2025-06-24 RX ORDER — GABAPENTIN 300 MG/1
300 CAPSULE ORAL EVERY 8 HOURS SCHEDULED
Status: DISCONTINUED | OUTPATIENT
Start: 2025-06-24 | End: 2025-07-04 | Stop reason: HOSPADM

## 2025-06-24 RX ORDER — ALLOPURINOL 100 MG/1
100 TABLET ORAL DAILY
Status: DISCONTINUED | OUTPATIENT
Start: 2025-06-24 | End: 2025-07-04 | Stop reason: HOSPADM

## 2025-06-24 RX ORDER — ROSUVASTATIN CALCIUM 10 MG/1
5 TABLET, COATED ORAL DAILY
Status: DISCONTINUED | OUTPATIENT
Start: 2025-06-24 | End: 2025-07-04 | Stop reason: HOSPADM

## 2025-06-24 RX ORDER — CHOLECALCIFEROL (VITAMIN D3) 25 MCG
5000 TABLET ORAL DAILY
Status: DISCONTINUED | OUTPATIENT
Start: 2025-06-24 | End: 2025-07-04 | Stop reason: HOSPADM

## 2025-06-24 RX ORDER — TIMOLOL MALEATE 5 MG/ML
1 SOLUTION/ DROPS OPHTHALMIC DAILY
Status: DISCONTINUED | OUTPATIENT
Start: 2025-06-24 | End: 2025-07-04 | Stop reason: HOSPADM

## 2025-06-24 RX ORDER — PANTOPRAZOLE SODIUM 40 MG/1
40 TABLET, DELAYED RELEASE ORAL DAILY
Status: DISCONTINUED | OUTPATIENT
Start: 2025-06-24 | End: 2025-07-04 | Stop reason: HOSPADM

## 2025-06-24 RX ORDER — LATANOPROST 50 UG/ML
1 SOLUTION/ DROPS OPHTHALMIC NIGHTLY
Status: DISCONTINUED | OUTPATIENT
Start: 2025-06-24 | End: 2025-07-04 | Stop reason: HOSPADM

## 2025-06-24 RX ORDER — ZINC SULFATE 50(220)MG
220 CAPSULE ORAL DAILY
Status: DISCONTINUED | OUTPATIENT
Start: 2025-06-24 | End: 2025-07-04 | Stop reason: HOSPADM

## 2025-06-24 RX ORDER — IPRATROPIUM BROMIDE AND ALBUTEROL SULFATE 2.5; .5 MG/3ML; MG/3ML
3 SOLUTION RESPIRATORY (INHALATION) 4 TIMES DAILY PRN
Status: DISCONTINUED | OUTPATIENT
Start: 2025-06-24 | End: 2025-07-04 | Stop reason: HOSPADM

## 2025-06-24 RX ORDER — AMIODARONE HYDROCHLORIDE 200 MG/1
400 TABLET ORAL 2 TIMES DAILY
Status: DISCONTINUED | OUTPATIENT
Start: 2025-06-24 | End: 2025-06-27

## 2025-06-24 RX ADMIN — Medication 10 ML: at 21:22

## 2025-06-24 RX ADMIN — Medication 5000 UNITS: at 10:01

## 2025-06-24 RX ADMIN — APIXABAN 2.5 MG: 2.5 TABLET, FILM COATED ORAL at 21:22

## 2025-06-24 RX ADMIN — GABAPENTIN 300 MG: 300 CAPSULE ORAL at 21:21

## 2025-06-24 RX ADMIN — MIDODRINE HYDROCHLORIDE 5 MG: 2.5 TABLET ORAL at 17:00

## 2025-06-24 RX ADMIN — AMIODARONE HYDROCHLORIDE 400 MG: 200 TABLET ORAL at 21:21

## 2025-06-24 RX ADMIN — METOPROLOL TARTRATE 25 MG: 25 TABLET, FILM COATED ORAL at 10:01

## 2025-06-24 RX ADMIN — GABAPENTIN 300 MG: 300 CAPSULE ORAL at 17:00

## 2025-06-24 RX ADMIN — APIXABAN 2.5 MG: 2.5 TABLET, FILM COATED ORAL at 08:12

## 2025-06-24 RX ADMIN — TAMSULOSIN HYDROCHLORIDE 0.4 MG: 0.4 CAPSULE ORAL at 10:00

## 2025-06-24 RX ADMIN — TIMOLOL MALEATE 1 DROP: 5 SOLUTION/ DROPS OPHTHALMIC at 10:05

## 2025-06-24 RX ADMIN — LATANOPROST 1 DROP: 50 SOLUTION OPHTHALMIC at 21:23

## 2025-06-24 RX ADMIN — ASPIRIN 81 MG: 81 TABLET, COATED ORAL at 10:02

## 2025-06-24 RX ADMIN — AMIODARONE HYDROCHLORIDE 400 MG: 200 TABLET ORAL at 10:00

## 2025-06-24 RX ADMIN — Medication 220 MG: at 10:05

## 2025-06-24 RX ADMIN — METOPROLOL TARTRATE 25 MG: 25 TABLET, FILM COATED ORAL at 21:22

## 2025-06-24 RX ADMIN — CITALOPRAM 10 MG: 20 TABLET, FILM COATED ORAL at 10:01

## 2025-06-24 RX ADMIN — MIDODRINE HYDROCHLORIDE 5 MG: 2.5 TABLET ORAL at 10:01

## 2025-06-24 RX ADMIN — ALLOPURINOL 100 MG: 100 TABLET ORAL at 10:01

## 2025-06-24 RX ADMIN — Medication 10 ML: at 08:12

## 2025-06-24 RX ADMIN — Medication 400 MG: at 10:00

## 2025-06-24 RX ADMIN — ROSUVASTATIN 5 MG: 10 TABLET, FILM COATED ORAL at 10:02

## 2025-06-24 RX ADMIN — GABAPENTIN 300 MG: 300 CAPSULE ORAL at 10:01

## 2025-06-24 RX ADMIN — PANTOPRAZOLE SODIUM 40 MG: 40 TABLET, DELAYED RELEASE ORAL at 10:01

## 2025-06-24 NOTE — THERAPY EVALUATION
Patient Name: Genevieve Cerda  : 1940    MRN: [id number]                              Today's Date: 2025       Admit Date: 2025    Visit Dx:     ICD-10-CM ICD-9-CM   1. Congestive heart failure, unspecified HF chronicity, unspecified heart failure type  I50.9 428.0   2. Chronic kidney disease, unspecified CKD stage  N18.9 585.9   3. Hyponatremia  E87.1 276.1   4. Impaired functional mobility and activity tolerance [Z74.09]  Z74.09 V49.89     Patient Active Problem List   Diagnosis    Severe protein-calorie malnutrition    Pleural effusion    Chronic congestive heart failure    Acute on chronic diastolic CHF (congestive heart failure)    CHF (congestive heart failure)    Acute on chronic heart failure with preserved ejection fraction (HFpEF)    Hyponatremia    Atrial fibrillation, persistent    Hypercholesterolemia    Current use of long term anticoagulation     History reviewed. No pertinent past medical history.  No past surgical history on file.   General Information       Row Name 25 1500          Physical Therapy Time and Intention    Document Type evaluation;other (see comments)  see MAR  -JE     Mode of Treatment physical therapy  -       Row Name 25 1500          General Information    Patient Profile Reviewed yes  -JE     Prior Level of Function --  pt has been in the hospital or SNF over the last 2-3 mth; most recently at [facility] w/ limited mobility  -CARLOS ALBERTO     Existing Precautions/Restrictions fall;oxygen therapy device and L/min  reports she has used oxygen only the last couple of weeks, since the fluid overload started per dtr  -CARLOS ALBERTO     Barriers to Rehab medically complex;previous functional deficit  -       Row Name 25 1500          Living Environment    Current Living Arrangements extended care facility  -CARLOS ALBERTO     People in Home facility resident  -CARLOS ALBERTO     Name(s) of People in Home has been at [facility] for the last 3 wks  -CARLOS ALBERTO       Row Name 25 1500           Home Main Entrance    Number of Stairs, Main Entrance none  -       Row Name 06/24/25 1500          Stairs Within Home, Primary    Number of Stairs, Within Home, Primary none  -       Row Name 06/24/25 1500          Cognition    Orientation Status (Cognition) oriented to;person;place;other (see comments)  situation and time not formally assessed  -       Row Name 06/24/25 1500          Safety Issues/Impairments Affecting Functional Mobility    Safety Issues Affecting Function (Mobility) at risk behavior observed;friction/shear risk;safety precaution awareness  -     Impairments Affecting Function (Mobility) balance;endurance/activity tolerance;shortness of breath;postural/trunk control;strength  -               User Key  (r) = Recorded By, (t) = Taken By, (c) = Cosigned By      Initials Name Provider Type    Melania Villareal, PT Physical Therapist                   Mobility       Row Name 06/24/25 1500          Bed Mobility    Bed Mobility rolling right;sidelying-sit;sit-sidelying;scooting/bridging  -CARLOS ALBERTO     Rolling Right [place] (Bed Mobility) minimum assist (75% patient effort);verbal cues  -CARLOS ALBERTO     Scooting/Bridging [place] (Bed Mobility) dependent (less than 25% patient effort);verbal cues  -CARLOS ALBERTO     Sidelying-Sit [place] (Bed Mobility) moderate assist (50% patient effort);verbal cues  -CARLOS ALBERTO     Sit-Sidelying [place] (Bed Mobility) minimum assist (75% patient effort);moderate assist (50% patient effort);verbal cues  -CARLOS ALBERTO     Assistive Device (Bed Mobility) bed rails;head of bed elevated;repositioning sheet  -       Row Name 06/24/25 1500          Sit-Stand Transfer    Sit-Stand [place] (Transfers) minimum assist (75% patient effort);moderate assist (50% patient effort);verbal cues  -       Row Name 06/24/25 1500          Gait/Stairs (Locomotion)    Distance in Feet (Gait) 0  -CARLOS ALBERTO     Comment, (Gait/Stairs) stood w/ posterior lean, difficulty transferrng wt anteriorly; unable to  shift wt to take steps; stood X 2; required UE support to maintain partial upright  -               User Key  (r) = Recorded By, (t) = Taken By, (c) = Cosigned By      Initials Name Provider Type    Melania Villareal, PT Physical Therapist                   Obj/Interventions       Row Name 06/24/25 1612          Range of Motion Comprehensive    Comment, General Range of Motion AROM all 4 extremities WFLs  -       Row Name 06/24/25 1612          Strength Comprehensive (MMT)    Comment, General Manual Muscle Testing (MMT) Assessment UEs grossly 4 to 4+/5, B LEs grossly 4/5 except B hip flexors 4-/5;  decreased B  -       Row Name 06/24/25 1612          Balance    Balance Assessment sitting static balance;sitting dynamic balance;standing static balance  -     Static Sitting Balance standby assist  initially w/ posterior lean w/out loss of balance  -     Dynamic Sitting Balance standby assist;verbal cues  -     Position, Sitting Balance supported;unsupported;sitting edge of bed  -     Static Standing Balance moderate assist;maximum assist;verbal cues  -     Comment, Balance pt w/ posterior lean in standing requiring constant tactile and verbal cues to correct; unable to shift wt in order to take steps; will benefit from rwx  -       Row Name 06/24/25 1612          Sensory Assessment (Somatosensory)    Sensory Assessment (Somatosensory) sensation intact;other (see comments)  reports somewhat different due to edema  -               User Key  (r) = Recorded By, (t) = Taken By, (c) = Cosigned By      Initials Name Provider Type    Melania Villareal, PT Physical Therapist                   Goals/Plan       Row Name 06/24/25 1500          Bed Mobility Goal 1 (PT)    Activity/Assistive Device (Bed Mobility Goal 1, PT) rolling to left;rolling to right;bridging;scooting;sit to supine/supine to sit;sidelying to sit/sit to sidelying  -     Door Level/Cues Needed (Bed Mobility Goal 1, PT)  standby assist  -JE     Time Frame (Bed Mobility Goal 1, PT) long term goal (LTG);10 days  -JE     Progress/Outcomes (Bed Mobility Goal 1, PT) goal ongoing  -[place]       Row Name 06/24/25 1500          Transfer Goal 1 (PT)    Activity/Assistive Device (Transfer Goal 1, PT) sit-to-stand/stand-to-sit;bed-to-chair/chair-to-bed;other (see comments)  rwx for bed to/from chair tfer  -     [place] Level/Cues Needed (Transfer Goal 1, PT) contact guard required  -JE     Time Frame (Transfer Goal 1, PT) long term goal (LTG);10 days  -JE     Progress/Outcome (Transfer Goal 1, PT) goal ongoing  -[place]       Row Name 06/24/25 1500          Gait Training Goal 1 (PT)    Activity/Assistive Device (Gait Training Goal 1, PT) gait (walking locomotion);assistive device use;decrease fall risk;diminish gait deviation;improve balance and speed;increase endurance/gait distance;walker, rolling  -     [place] Level (Gait Training Goal 1, PT) contact guard required  -     Distance (Gait Training Goal 1, PT) 15-20 ft  -     Time Frame (Gait Training Goal 1, PT) long term goal (LTG);10 days  -JE     Progress/Outcome (Gait Training Goal 1, PT) goal ongoing  -"[employer]" Name 06/24/25 1500          Balance Goal 1 (PT)    Activity/Assistive Device (Balance Goal) standing static balance;walker, rolling  -     [place] Level/Cues Needed (Balance Goal 1, PT) contact guard required;supervision required  -     Time Frame (Balance Goal 1, PT) long-term goal (LTG);other (see comments)  10 days  -JE     Progress/Outcomes (Balance Goal 1, PT) goal ongoing  -"[employer]" Name 06/24/25 1500          Therapy Assessment/Plan (PT)    Planned Therapy Interventions (PT) balance training;bed mobility training;gait training;home exercise program;patient/family education;ROM (range of motion);strengthening;transfer training;other (see comments)  safety/falls prevention, breathing ex, energy conservation tech, edema mgmt, skin protection/pressure  relief  -               User Key  (r) = Recorded By, (t) = Taken By, (c) = Cosigned By      Initials Name Provider Type    Melania Villareal, PT Physical Therapist                   Clinical Impression       Row Name 06/24/25 1500          Pain    Pretreatment Pain Rating 0/10 - no pain  -CARLOS ALBERTO     Posttreatment Pain Rating 0/10 - no pain  -CARLOS ALBERTO       Row Name 06/24/25 1500          Plan of Care Review    Plan of Care Reviewed With patient;family  -CARLOS ALBERTO     Progress no change  -CARLOS ALBERTO     Outcome Evaluation PT eval completed.  Pt pleasant and agreeable to therapy.  Oriented to person and place, situation and time not formally assessed.  Pt just returned to bed w/ staff assist.  Currently O2 dependent and family reports she has only used O2 over the last couple of weeks due to increase fluid.  Pt w/ edema B LEs as well as noted at B UEs.  Pt denies pain and N/T, however does report her LEs do feel different than they normally do when there is no swelling.  Pt demonstrates generalized weakness w/ strength grossly 4 to 4+/5 except B hip flexors 4-/5.  Pt performed bed mobility w/ min to mod assist except dependent to scoot up in bed.  Pt was able to stand X 2 w/ min to mod assist, however noted significant posterior lean and unable to shift wt in order to initiate taking steps.  Pt returned to bed and repositioned w/ LEs elevated.  Education for gentle ROM of the LEs in bed and for improved tech w/ deep breathing and to perform deep breathing every hour awake w/ return demo of technique.  Pt will benefit from continued PT services to improve activity tolerance, overall strength, balance, safety awareness, to assist w/ edema mgmt, to assist w/ skin protecition/pressure relief and to progress functional mobility w/ less need for assist.  Anticipate pt will need continued skilled care at discharge and would recommend SNF.  Pt was [facility] at [facility].  Will follow for progress and needs.  -CARLOS ALBERTO       Row Name 06/24/25 1500           Therapy Assessment/Plan (PT)    Patient/Family Therapy Goals Statement (PT) to progress mobility and regain more I  -JE     Rehab Potential (PT) good  -JE     Criteria for Skilled Interventions Met (PT) yes;meets criteria;skilled treatment is necessary  -     Therapy Frequency (PT) 2 times/day  -JE     Predicted Duration of Therapy Intervention (PT) until discharge or goals achieved  -       Row Name 06/24/25 1500          Vital Signs    O2 Delivery Pre Treatment nasal cannula  -JE     O2 Delivery Intra Treatment nasal cannula  -JE     O2 Delivery Post Treatment nasal cannula  -JE     Pre Patient Position Supine  -JE     Intra Patient Position Standing  -JE     Post Patient Position Supine  -JE       Row Name 06/24/25 1500          Positioning and Restraints    Pre-Treatment Position in bed  -JE     Post Treatment Position bed  -JE     In Bed fowlers;call light within reach;encouraged to call for assist;with family/caregiver;side rails up x2;legs elevated  -               User Key  (r) = Recorded By, (t) = Taken By, (c) = Cosigned By      Initials Name Provider Type    Melania Villareal, PT Physical Therapist                   Outcome Measures       Row Name 06/24/25 1500 06/24/25 0800       How much help from another person do you currently need...    Turning from your back to your side while in flat bed without using bedrails? 3  -JE 3  -TB    Moving from lying on back to sitting on the side of a flat bed without bedrails? 2  -JE 2  -TB    Moving to and from a bed to a chair (including a wheelchair)? 2  -JE 2  -TB    Standing up from a chair using your arms (e.g., wheelchair, bedside chair)? 2  -JE 2  -TB    Climbing 3-5 steps with a railing? 1  -JE 1  -TB    To walk in hospital room? 2  -JE 1  -TB    AM-PAC 6 Clicks Score (PT) 12  -JE 11  -TB    Highest Level of Mobility Goal Move to Chair/Commode-4  -JE Move to Chair/Commode-4  -TB      Row Name 06/24/25 1500 06/24/25 1305       Functional  Assessment    Outcome Measure Options AM-PAC 6 Clicks Basic Mobility (PT)  -CARLOS ALBERTO AM-PAC 6 Clicks Daily Activity (OT)  -ARLETTE (r) LB (t) ARLETTE (c)              User Key  (r) = Recorded By, (t) = Taken By, (c) = Cosigned By      Initials Name Provider Type    Melania Villareal, PT Physical Therapist    Mahi Laughlin, OTR/L, CSRS Occupational Therapist    Car Tineo, RN Registered Nurse    Nadine Mahoney, OT Student OT Student                                 Physical Therapy Education       Title: PT OT SLP Therapies (Done)       Topic: Physical Therapy (Done)       Point: Mobility training (Done)       Learning Progress Summary            Patient Acceptance, E,TB,D, VU,NR by  at 6/24/2025 1604    Comment: Education re: purpose of PT/importance of activitiy, safety/falls prevention, increase awareness of upright, improved deep breathing tech, upright posture, LE ROM and positioning for edema mgmt; pacing activity allowing time for rest   Family Acceptance, E,TB,D, VU,NR by  at 6/24/2025 1604    Comment: Education re: purpose of PT/importance of activitiy, safety/falls prevention, increase awareness of upright, improved deep breathing tech, upright posture, LE ROM and positioning for edema mgmt; pacing activity allowing time for rest                      Point: Home exercise program (Done)       Learning Progress Summary            Patient Acceptance, E,TB,D, VU,NR by  at 6/24/2025 1604    Comment: Education re: purpose of PT/importance of activitiy, safety/falls prevention, increase awareness of upright, improved deep breathing tech, upright posture, LE ROM and positioning for edema mgmt; pacing activity allowing time for rest   Family Acceptance, E,TB,D, VU,NR by  at 6/24/2025 1604    Comment: Education re: purpose of PT/importance of activitiy, safety/falls prevention, increase awareness of upright, improved deep breathing tech, upright posture, LE ROM and positioning for edema mgmt; pacing  activity allowing time for rest                      Point: Precautions (Done)       Learning Progress Summary            Patient Acceptance, E,TB,D, VU,NR by  at 6/24/2025 1604    Comment: Education re: purpose of PT/importance of activitiy, safety/falls prevention, increase awareness of upright, improved deep breathing tech, upright posture, LE ROM and positioning for edema mgmt; pacing activity allowing time for rest   Family Acceptance, E,TB,D, VU,NR by CARLOS ALBERTO at 6/24/2025 1604    Comment: Education re: purpose of PT/importance of activitiy, safety/falls prevention, increase awareness of upright, improved deep breathing tech, upright posture, LE ROM and positioning for edema mgmt; pacing activity allowing time for rest                                      User Key       Initials Effective Dates Name Provider Type Discipline    CARLOS ALBERTO 08/02/18 -  Melania Orozco, PT Physical Therapist PT                  PT Recommendation and Plan  Planned Therapy Interventions (PT): balance training, bed mobility training, gait training, home exercise program, patient/family education, ROM (range of motion), strengthening, transfer training, other (see comments) (safety/falls prevention, breathing ex, energy conservation tech, edema mgmt, skin protection/pressure relief)  Progress: no change  Outcome Evaluation: PT eval completed.  Pt pleasant and agreeable to therapy.  Oriented to person and place, situation and time not formally assessed.  Pt just returned to bed w/ staff assist.  Currently O2 dependent and family reports she has only used O2 over the last couple of weeks due to increase fluid.  Pt w/ edema B LEs as well as noted at B UEs.  Pt denies pain and N/T, however does report her LEs do feel different than they normally do when there is no swelling.  Pt demonstrates generalized weakness w/ strength grossly 4 to 4+/5 except B hip flexors 4-/5.  Pt performed bed mobility w/ min to mod assist except dependent to scoot up in  bed.  Pt was able to stand X 2 w/ min to mod assist, however noted significant posterior lean and unable to shift wt in order to initiate taking steps.  Pt returned to bed and repositioned w/ LEs elevated.  Education for gentle ROM of the LEs in bed and for improved tech w/ deep breathing and to perform deep breathing every hour awake w/ return demo of technique.  Pt will benefit from continued PT services to improve activity tolerance, overall strength, balance, safety awareness, to assist w/ edema mgmt, to assist w/ skin protecition/pressure relief and to progress functional mobility w/ less need for assist.  Anticipate pt will need continued skilled care at discharge and would recommend SNF.  Pt was previously at [facility].  Will follow for progress and needs.     Time Calculation:         PT Charges       Row Name 06/24/25 1630             Time Calculation    Start Time 1500  -      Stop Time 1554  -      Time Calculation (min) 54 min  -      PT Received On 06/24/25  -      PT Goal Re-Cert Due Date 07/04/25  -                User Key  (r) = Recorded By, (t) = Taken By, (c) = Cosigned By      Initials Name Provider Type    Melania Villareal, PT Physical Therapist                  Therapy Charges for Today       Code Description Service Date Service Provider Modifiers Qty    22594720891 HC PT EVAL MOD COMPLEXITY 4 6/24/2025 Melania Orozco, PT GP 1            PT G-Codes  Outcome Measure Options: AM-PAC 6 Clicks Basic Mobility (PT)  AM-PAC 6 Clicks Score (PT): 12  AM-PAC 6 Clicks Score (OT): 11  PT Discharge Summary  Anticipated Discharge Disposition (PT): skilled nursing facility    Melania Orozco PT  6/24/2025

## 2025-06-24 NOTE — CASE MANAGEMENT/SOCIAL WORK
Continued Stay Note  [facility]     Patient Name: Genevieve Cerda  MRN: [id number]  Today's Date: 6/24/2025    Admit Date: 6/23/2025    Plan: [facility]   Discharge Plan       Row Name 06/24/25 0913       Plan    Plan [facility]    Patient/Family in Agreement with Plan yes    Plan Comments Pt resides at [facility]; no bed hold but they can accept back.  No precert. required. Pharmacy has been changed in [place].  Phone:   [phone] Fax: [phone].    Final Discharge Disposition Code 03 - skilled nursing facility (SNF)                   Discharge Codes    No documentation.                       STEPH Arias

## 2025-06-24 NOTE — THERAPY EVALUATION
Patient Name: Genevieve Cerda  : 1940    MRN: [id number]                              Today's Date: 2025       Admit Date: 2025    Visit Dx:     ICD-10-CM ICD-9-CM   1. Congestive heart failure, unspecified HF chronicity, unspecified heart failure type  I50.9 428.0   2. Chronic kidney disease, unspecified CKD stage  N18.9 585.9   3. Hyponatremia  E87.1 276.1     Patient Active Problem List   Diagnosis    Severe protein-calorie malnutrition    Pleural effusion    Chronic congestive heart failure    Acute on chronic diastolic CHF (congestive heart failure)    CHF (congestive heart failure)    Acute on chronic heart failure with preserved ejection fraction (HFpEF)    Hyponatremia    Atrial fibrillation, persistent    Hypercholesterolemia    Current use of long term anticoagulation     History reviewed. No pertinent past medical history.  No past surgical history on file.   General Information       Row Name 25 1305 25 1138       OT Time and Intention    Subjective Information complains of;dyspnea  -JW (r) LB (t) JW (c) --    Document Type evaluation  Pt. reported to ED on  due to increased swelling in LE, SOB, and recommendation per cardiology. Dx: CHF, unspecified HF chronicity, unspecified heart failure type, CKD, unspecified CKD stage, hyponatremia. Transthoracic ECHO completed .  -JW (r) LB (t) JW (c) --  -JW (r) LB (t) JW (c)    Mode of Treatment occupational therapy  Pt PMH: Chronic diastolic CHF, paroxysmal atrial fibrillation, HTN, hyperlipidemia, L renal artery aneurysm, CKD IV with prior left nephrectomy.  -JW (r) LB (t) JW (c) --  -JW (r) LB (t) JW (c)    Patient Effort good  -JW (r) LB (t) JW (c) --    Symptoms Noted During/After Treatment nausea;shortness of breath  -JW (r) LB (t) JW (c) --      Row Name 25 1305 25 [id number]       General Information    Patient Profile Reviewed yes  -JW (r) LB (t) JW (c) --  -JW (r) LB (t) JW (c)    Prior Level of Function max  assist:;all household mobility;transfer;ADL's  -JW (r) LB (t) JW (c) --    Existing Precautions/Restrictions fall  -JW (r) LB (t) JW (c) --    Barriers to Rehab none identified  -JW (r) LB (t) JW (c) --      Row Name 06/24/25 1305          Occupational Profile    Environmental Supports and Barriers (Occupational Profile) pt primarily utilizes w/c for mobility, resident at [facility]  -JW (r) LB (t) JW (c)       Row Name 06/24/25 1305          Living Environment    Current Living Arrangements extended [facility] facility  -JW (r) LB (t) JW (c)     People in Home facility resident  -JW (r) LB (t) JW (c)       Row Name 06/24/25 1305          Home Main Entrance    Number of Stairs, Main Entrance none  -JW (r) LB (t) JW (c)     Stair Railings, Main Entrance none  -JW (r) LB (t) JW (c)       Row Name 06/24/25 1305          Stairs Within Home, Primary    Number of Stairs, Within Home, Primary none  -JW (r) LB (t) JW (c)     Stair Railings, Within Home, Primary none  -JW (r) LB (t) JW (c)       Row Name 06/24/25 1305          Cognition    Orientation Status (Cognition) oriented to;person;place;situation;disoriented to;time  -JW (r) LB (t) JW (c)       Row Name 06/24/25 1305          Safety Issues/Impairments Affecting Functional Mobility    Safety Issues Affecting Function (Mobility) awareness of need for assistance;friction/shear risk;insight into deficits/self-awareness;safety precaution awareness;judgment  -JW (r) LB (t) JW (c)     Impairments Affecting Function (Mobility) balance;endurance/activity tolerance;shortness of breath  -JW (r) LB (t) JW (c)               User Key  (r) = Recorded By, (t) = Taken By, (c) = Cosigned By      Initials Name Provider Type    Mahi Laughlin OTRAPHAEL/L, CSRS Occupational Therapist    Nadine Mahoney OT Student OT Student                     Mobility/ADL's       Row Name 06/24/25 1305          Bed Mobility    Bed Mobility rolling right;rolling left;supine-sit   -JW (r) LB (t) JW (c)     Rolling Left [place] (Bed Mobility) minimum assist (75% patient effort);verbal cues  utilization of bed rails  -JW     Rolling Right [place] (Bed Mobility) verbal cues;minimum assist (75% patient effort)  -JW     Supine-Sit [place] (Bed Mobility) maximum assist (25% patient effort);1 person assist  -JW (r) LB (t) JW (c)     Bed Mobility, Safety Issues decreased use of arms for pushing/pulling;decreased use of legs for bridging/pushing;impaired trunk control for bed mobility  -JW (r) LB (t) JW (c)     Assistive Device (Bed Mobility) bed rails;head of bed elevated;repositioning sheet  -JW (r) LB (t) JW (c)       Row Name 06/24/25 1305          Transfers    Transfers bed-chair transfer  -JW (r) LB (t) JW (c)     Comment, (Transfers) bed<>w/c  -JW (r) LB (t) JW (c)       Row Name 06/24/25 1305          Bed-Chair Transfer    Bed-Chair [place] (Transfers) maximum assist (25% patient effort);2 person assist  -JW (r) LB (t) JW (c)     Assistive Device (Bed-Chair Transfers) wheelchair  -JW (r) LB (t) JW (c)       Row Name 06/24/25 1305          Functional Mobility    Functional Mobility- Ind. Level not appropriate to assess  -JW (r) LB (t) JW (c)     Functional Mobility- Comment Pt. was unable to ambulate due to excessive weakness.  -JW (r) LB (t) JW (c)     Patient was able to Ambulate no, other medical factors prevent ambulation  -JW (r) LB (t) JW (c)     Reason Patient was unable to Ambulate Excessive Weakness  -JW (r) LB (t) JW (c)       Row Name 06/24/25 1305          Activities of Daily Living    BADL Assessment/Intervention toileting  -JW (r) LB (t) JW (c)       Row Name 06/24/25 1305          Toileting Assessment/Training    [place] Level (Toileting) toileting skills;dependent (less than 25% patient effort)  -JW (r) LB (t) JW (c)     Assistive Devices (Toileting) bedpan  -JW (r) LB (t) JW (c)     Position (Toileting) supine  -JW (r) LB (t) JW (c)               User  Key  (r) = Recorded By, (t) = Taken By, (c) = Cosigned By      Initials Name Provider Type    ARLETTE Mahi Kramer OTR/L, CSRS Occupational Therapist    Nadine Mahoney, OT Student OT Student                   Obj/Interventions       Row Name 06/24/25 1305          Sensory Assessment (Somatosensory)    Sensory Assessment (Somatosensory) sensation intact  -JW (r) LB (t) JW (c)       Row Name 06/24/25 1305          Vision Assessment/Intervention    Visual Impairment/Limitations corrective lenses full-time  -JW (r) LB (t) JW (c)       Row Name 06/24/25 1305          Range of Motion Comprehensive    General Range of Motion bilateral upper extremity ROM WFL  -JW (r) LB (t) JW (c)     Comment, General Range of Motion Pt demonstrated BUE ROM WFL.  -JW (r) LB (t) JW (c)       Row Name 06/24/25 1305          Strength Comprehensive (MMT)    General Manual Muscle Testing (MMT) Assessment no strength deficits identified  -JW (r) LB (t) JW (c)     Comment, General Manual Muscle Testing (MMT) Assessment Pt demonstrated B UE shoulder 3/5; elbow 4/5  -JW (r) LB (t) JW (c)       Row Name 06/24/25 1305          Balance    Balance Assessment sitting static balance;sitting dynamic balance  -JW (r) LB (t) JW (c)     Static Sitting Balance 1-person assist;moderate assist  -JW (r) LB (t) JW (c)     Dynamic Sitting Balance moderate assist;1-person assist  -JW (r) LB (t) JW (c)     Position, Sitting Balance unsupported;sitting edge of bed  -JW (r) LB (t) JW (c)     Balance Interventions sitting;supported;static;dynamic;occupation based/functional task  -JW (r) LB (t) JW (c)               User Key  (r) = Recorded By, (t) = Taken By, (c) = Cosigned By      Initials Name Provider Type    ARLETTE Mahi Kramer OTR/L, CSRS Occupational Therapist    Nadine Mahoney, OT Student OT Student                   Goals/Plan       Row Name 06/24/25 1305          Transfer Goal 1 (OT)    Activity/Assistive Device (Transfer Goal 1, OT) wheelchair  transfer  -JW (r) LB (t) JW (c)     [place] Level/Cues Needed (Transfer Goal 1, OT) moderate assist (50-74% patient effort)  -JW (r) LB (t) JW (c)     Time Frame (Transfer Goal 1, OT) long term goal (LTG);by discharge  -JW (r) LB (t) JW (c)     Progress/Outcome (Transfer Goal 1, OT) new goal  -JW (r) LB (t) JW (c)       Row Name 06/24/25 1305          Dressing Goal 1 (OT)    Activity/Device (Dressing Goal 1, OT) upper body dressing  -JW (r) LB (t) JW (c)     [place]/Cues Needed (Dressing Goal 1, OT) moderate assist (50-74% patient effort)  -JW (r) LB (t) JW (c)     Time Frame (Dressing Goal 1, OT) long term goal (LTG);by discharge  -JW (r) LB (t) JW (c)     Progress/Outcome (Dressing Goal 1, OT) new goal  -JW (r) LB (t) JW (c)       Row Name 06/24/25 1308          Problem Specific Goal 1 (OT)    Problem Specific Goal 1 (OT) Pt will independently implement one energy conservation technique to improve functional adl performance.  -JW (r) LB (t) JW (c)     Time Frame (Problem Specific Goal 1, OT) long term goal (LTG);by discharge  -JW (r) LB (t) JW (c)     Progress/Outcome (Problem Specific Goal 1, OT) new goal  -JW (r) LB (t) JW (c)       Row Name 06/24/25 1304          Therapy Assessment/Plan (OT)    Planned Therapy Interventions (OT) transfer/mobility retraining;strengthening exercise;patient/caregiver education/training;occupation/activity based interventions;functional balance retraining;activity tolerance training;BADL retraining;adaptive equipment training;IADL retraining;ROM/therapeutic exercise  -JW (r) LB (t) JW (c)               User Key  (r) = Recorded By, (t) = Taken By, (c) = Cosigned By      Initials Name Provider Type    Mahi Laughlin, OTR/L, CSRS Occupational Therapist    Nadine Mahoney, OT Student OT Student                   Clinical Impression       Row Name 06/24/25 1989          Pain Assessment    Pretreatment Pain Rating 0/10 - no pain  -ARLETTE (r) ISREAL (t) ARLETTE (c)      "Posttreatment Pain Rating 0/10 - no pain  -ARLETTE (r) ISREAL (t) ARLETTE (c)       Row Name 06/24/25 4431          Plan of Care Review    Plan of Care Reviewed With patient;daughter  -ARLETTE (r) ISREAL (t) ARLETTE (c)     Progress no change  -JW (r) LB (t) ARLETTE (c)     Outcome Evaluation OT eval completed on this date. Pt. presents in bed, recently back from [place], with daughters at bedside. Upon entry, pt. wounds were been inspected and pt. was requesting to sit in the w/c. Pt. is a resident at [facility] for the past 4 weeks. At [facility], pt. is MaxA for dressing, bathing, and dependent in toileting. Pt. utilizes a w/c and requires transfers to get from bed<>w/c. Pt. was A&Ox3, being disoriented to time. While talking with pt., pt. alerted OT to needing to have a BM, being unable to control BM and voiding on the pad in bed. Pt. was dependent for performing perineal hygiene, requiring [place] for rolling L and R during hygiene. Once cleaned, pt. was able to sit EOB with MaxA x1, with use of the repositioning sheet. Pt. demonstrated decreased use of arms and legs for bed mobility. Ms. Cerda demonstrates a posterior lean while in static and dynamic sitting, which she states had been occurring for several weeks. Pt. demonstrated B UE ROM WFL and B UE MMT 3/5 for shoulder and 4/5 for other UE joints. OT attempted sit<>stand transition, with Ms. Cerda requiring MaxA x2 and was unable to come to a full stand due to posterior lean. Pt. was educated on body mechanics and the importance of \"nose over toes\", with demonstration, when attempting to stand. Pt. requried a squat-pivot transfer of MaxAx2 to transition from sitting EOB <> w/c. Ms. Cerda was left in the wheelchair, legs elevated, daughters at bedside, and with call light neaby. Pt. would benefit from skilled OT services addressing ax cem, energy conservation, body mechanics, and strategies for improving functional performance of BADLs. Recommend d/c back to " [facility].  -JW (r) LB (t) JW (c)       Row Name 06/24/25 1305          Therapy Assessment/Plan (OT)    Rehab Potential (OT) fair  -JW (r) LB (t) JW (c)     Criteria for Skilled Therapeutic Interventions Met (OT) yes;skilled treatment is necessary  -JW (r) LB (t) JW (c)     Therapy Frequency (OT) 5 times/wk  -JW (r) LB (t) JW (c)       Row Name 06/24/25 1305          Therapy Plan Review/Discharge Plan (OT)    Anticipated Discharge Disposition (OT) [facility]  -JW (r) LB (t) JW (c)       Row Name 06/24/25 1305          Vital Signs    O2 Delivery Pre Treatment nasal cannula  -JW (r) LB (t) JW (c)     O2 Delivery Intra Treatment nasal cannula  -JW (r) LB (t) JW (c)     O2 Delivery Post Treatment nasal cannula  -JW (r) LB (t) JW (c)     Pre Patient Position Supine  -JW (r) LB (t) JW (c)     Intra Patient Position Sitting  -JW (r) LB (t) JW (c)     Post Patient Position Sitting  -JW (r) LB (t) JW (c)       Row Name 06/24/25 1305          Positioning and Restraints    Pre-Treatment Position in bed  -JW (r) LB (t) JW (c)     Post Treatment Position wheelchair  -JW (r) LB (t) JW (c)     In Wheelchair notified nsg;sitting;call light within reach;encouraged to call for assist;with family/caregiver;legs elevated  -JW (r) LB (t) JW (c)               User Key  (r) = Recorded By, (t) = Taken By, (c) = Cosigned By      Initials Name Provider Type    Mahi Laughlin, OTR/L, CSRS Occupational Therapist    Nadine Mahoney, OT Student OT Student                   Outcome Measures       Row Name 06/24/25 1305          How much help from another is currently needed...    Putting on and taking off regular lower body clothing? 1  -JW (r) LB (t) JW (c)     Bathing (including washing, rinsing, and drying) 2  -JW (r) LB (t) JW (c)     Toileting (which includes using toilet bed pan or urinal) 1  -JW (r) LB (t) JW (c)     Putting on and taking off regular upper body clothing 2  -JW (r) LB (t) JW  (c)     Taking care of personal grooming (such as brushing teeth) 2  -JW (r) LB (t) JW (c)     Eating meals 3  -JW (r) LB (t) JW (c)     AM-PAC 6 Clicks Score (OT) 11  -JW (r) LB (t)       Row Name 06/24/25 1305          Functional Assessment    Outcome Measure Options AM-PAC 6 Clicks Daily Activity (OT)  -JW (r) LB (t) JW (c)               User Key  (r) = Recorded By, (t) = Taken By, (c) = Cosigned By      Initials Name Provider Type     Mahi Kramer, OTR/L, CSRS Occupational Therapist    Nadine Mahoney, OT Student OT Student                    Occupational Therapy Education       Title: PT OT SLP Therapies (Done)       Topic: Occupational Therapy (Done)       Point: ADL training (Done)       Learning Progress Summary            Patient Acceptance, E,TB,D, DU,NR by LB at 6/24/2025 1305    Comment: Pt. educated on importance of body mechanics for sit<>stand   Family Acceptance, E,TB,D, DU,NR by LB at 6/24/2025 1305    Comment: Pt. educated on importance of body mechanics for sit<>stand                      Point: Body mechanics (Done)       Learning Progress Summary            Patient Acceptance, E,TB,D, DU,NR by LB at 6/24/2025 1305    Comment: Pt. educated on importance of body mechanics for sit<>stand   Family Acceptance, E,TB,D, DU,NR by LB at 6/24/2025 1305    Comment: Pt. educated on importance of body mechanics for sit<>stand                                      User Key       Initials Effective Dates Name Provider Type [facility] 05/12/25 -  Nadine Everett, OT Student OT Student OT                  OT Recommendation and Plan  Planned Therapy Interventions (OT): transfer/mobility retraining, strengthening exercise, patient/caregiver education/training, occupation/activity based interventions, functional balance retraining, activity tolerance training, BADL retraining, adaptive equipment training, IADL retraining, ROM/therapeutic exercise  Therapy Frequency (OT): 5 times/wk  Plan of Care  "Review  Plan of Care Reviewed With: patient, daughter  Progress: no change  Outcome Evaluation: OT eval completed on this date. Pt. presents in bed, recently back from [place], with daughters at bedside. Upon entry, pt. wounds were been inspected and pt. was requesting to sit in the w/c. Pt. is a resident at [facility] for the past 4 weeks. At [facility], pt. is MaxA for dressing, bathing, and dependent in toileting. Pt. utilizes a w/c and requires transfers to get from bed<>w/c. Pt. was A&Ox3, being disoriented to time. While talking with pt., pt. alerted OT to needing to have a BM, being unable to control BM and voiding on the pad in bed. Pt. was dependent for performing perineal hygiene, requiring [place] for rolling L and R during hygiene. Once cleaned, pt. was able to sit EOB with MaxA x1, with use of the repositioning sheet. Pt. demonstrated decreased use of arms and legs for bed mobility. Ms. Cerda demonstrates a posterior lean while in static and dynamic sitting, which she states had been occurring for several weeks. Pt. demonstrated B UE ROM WFL and B UE MMT 3/5 for shoulder and 4/5 for other UE joints. OT attempted sit<>stand transition, with Ms. Cerda requiring MaxA x2 and was unable to come to a full stand due to posterior lean. Pt. was educated on body mechanics and the importance of \"nose over toes\", with demonstration, when attempting to stand. Pt. requried a squat-pivot transfer of MaxAx2 to transition from sitting EOB <> w/c. Ms. Cerda was left in the wheelchair, legs elevated, daughters at bedside, and with call light neaby. Pt. would benefit from skilled OT services addressing ax cem, energy conservation, body mechanics, and strategies for improving functional performance of BADLs. Recommend d/c back to [facility] care facility, [facility].     Time Calculation:         Time Calculation- OT       Row Name 06/24/25 4681             Time Calculation- OT    OT Start Time 1305  " 12 minutes for chart review  -JW (r) LB (t) JW (c)      OT Stop Time 1348  -JW (r) LB (t) JW (c)      OT Time Calculation (min) 43 min  -JW (r) LB (t)      Total Timed Code Minutes- OT 55 minute(s)  -JW (r) LB (t) JW (c)      OT Received On 06/24/25  -JW (r) LB (t) JW (c)      OT Goal Re-Cert Due Date 07/04/25  -JW (r) LB (t) JW (c)         Untimed Charges    OT Eval/Re-eval Minutes 55  -JW (r) LB (t) JW (c)         Total Minutes    Untimed Charges Total Minutes 55  -JW (r) LB (t)       Total Minutes 55  -JW (r) LB (t)                User Key  (r) = Recorded By, (t) = Taken By, (c) = Cosigned By      Initials Name Provider Type    Mahi Laughlin, OTR/L, CSRS Occupational Therapist    Nadine Mahoney, OT Student OT Student                           Nadine Everett, OT Student  6/24/2025

## 2025-06-24 NOTE — PROGRESS NOTES
[facility] Services  INPATIENT PROGRESS NOTE    Patient Name: Genevieve Cerda  Date of Admission: 6/23/2025  Today's Date: 06/24/25  Length of Stay: 1  Primary Care Physician: Germaine Diego APRN    Subjective   Chief Complaint: Leg swelling    Patient has no new complaint this morning, said that she is feeling a lot better.  Discussed diagnosis and plan of care with patient and daughter at bedside.  All      Review of Systems   All pertinent negatives and positives are as above. All other systems have been reviewed and are negative unless otherwise stated.     Objective    Temp:  [95.9 °F (35.5 °C)-98.6 °F (37 °C)] 97.7 °F (36.5 °C)  Heart Rate:  [] 89  Resp:  [16-18] 16  BP: ()/(54-76) 99/59  Physical Exam  Constitutional:       Appearance: Normal appearance.   HENT:      Head: Normocephalic and atraumatic.      Nose: Nose normal.      Mouth/Throat:      Mouth: Mucous membranes are moist.      Pharynx: Oropharynx is clear.   Eyes:      Extraocular Movements: Extraocular movements intact.      Conjunctiva/sclera: Conjunctivae normal.   Cardiovascular:      Rate and Rhythm: Normal rate. Rhythm irregular.      Pulses: Normal pulses.      Heart sounds: Normal heart sounds.   Pulmonary:      Effort: Pulmonary effort is normal.      Breath sounds: Rales present.   Abdominal:      General: Bowel sounds are normal.      Palpations: Abdomen is soft.   Musculoskeletal:      Cervical back: Normal range of motion and neck supple.      Right lower leg: edema.      Left lower leg:  edema.   Skin:     General: Skin is warm and dry.      Capillary Refill: Capillary refill takes 2 to 3 seconds.   Neurological:      General: No focal deficit present.      Mental Status: She is alert and oriented to person, place, and time.   Psychiatric:         Mood and Affect: Mood normal.         Behavior: Behavior normal.       Results Review:  I have reviewed the labs, radiology  "results, and diagnostic studies.    Laboratory Data:   Results from last 7 days   Lab Units 06/24/25  0531 06/23/25  1238 06/19/25  1035   WBC 10*3/mm3 7.26 8.01 9.62   HEMOGLOBIN g/dL 8.6* 9.8* 9.5*   HEMATOCRIT % 28.5* 30.9* 30.4*   PLATELETS 10*3/mm3 314 311 320        Results from last 7 days   Lab Units 06/24/25  0531 06/23/25  0844 06/19/25  1035   SODIUM mmol/L 124* 127*  127* 129*   POTASSIUM mmol/L 4.3 5.0  5.0 5.3*   CHLORIDE mmol/L 89* 89*  89* 91*   CO2 mmol/L 23.0 24.0  26.0 23.0   BUN mg/dL 86.7* 89.4*  89.4* 79.5*   CREATININE mg/dL 2.21* 2.10*  2.18* 1.89*   CALCIUM mg/dL 8.1* 8.3*  8.2* 8.2*   BILIRUBIN mg/dL  --  <0.2  --    ALK PHOS U/L  --  83  --    ALT (SGPT) U/L  --  14  --    AST (SGOT) U/L  --  16  --    GLUCOSE mg/dL 87 101*  101* 144*       Culture Data:   No results found for: \"BLOODCX\", \"URINECX\", \"WOUNDCX\", \"MRSACX\", \"RESPCX\", \"STOOLCX\"    Radiology Data:   Imaging Results (Last 24 Hours)       ** No results found for the last 24 hours. **            I have reviewed the patient's current medications.     Assessment/Plan   Assessment  Active Hospital Problems    Diagnosis     **Acute on chronic diastolic CHF (congestive heart failure)     Acute on chronic heart failure with preserved ejection fraction (HFpEF)     Hyponatremia     Atrial fibrillation, persistent     Hypercholesterolemia     Current use of long term anticoagulation        Treatment Plan    - Acute on chronic diastolic congestive heart failure  Patient failed outpatient treatment and was sent to the emergency room by her PCP.  Currently she is being diuresed with furosemide drip and is making clinical improvement.  We will continue furosemide drip and reevaluate in the morning.  Will restart all her guideline directed medications.    - Hyponatremia  Patient's hyponatremia is worsening and is likely related to the ongoing diuresis.  Will reevaluate in the morning and if no improvement, will start patient on sodium " tablets.    - Chronic atrial fibrillation  We will restart patient's amiodarone, metoprolol and apixaban.    DVT prophylaxis-apixaban    Disposition-continue current management and reevaluate diuresis in the morning      Electronically signed by Indra Zamorano MD, 06/24/25, 16:04 CDT.

## 2025-06-24 NOTE — PROGRESS NOTES
This patient is a member of the [facility] for [facility] [id number].  I visited her yesterday in the ER.  I will visit her again today and contact her , if she is agreeable.  I will follow her progress.

## 2025-06-24 NOTE — NURSING NOTE
[facility]  INPATIENT WOUND & OSTOMY CARE    Today's Date: 06/24/25    Patient Name: Genevieve Cerda  MRN: [id number]  CSN: [id number]  PCP: Germaine Diego APRN  Attending Provider: Indra Zamorano MD  Length of Stay: 1    Wound care consulted for wound to sacrum and left lower leg. Nursing has uploaded picture to chart. Patient was admitted with acute on chronic congestive heart failure on 6/23/2025. Patient is currently lying in bed with no family at bedside.     Patient is at high risk of further breakdown due to limited mobility. Pressure prevention orders placed.       Wound: DTI to sacrum POA  Base: purple and non blanchable  Periwound: dry and intact  Edges: irregular  Drainage: none     Wound: non pressure full thickness wound to left lower leg  Base: slough, subcutaneous tissue  Periwound: dry and intact  Edges: open and attached  Drainage:  small and serous    Wound RN Orders (last 12 hours) (12h ago, onward)       Start     Ordered    06/25/25 0600  Wound Care  Daily      Question Answer Comment   Wound Locations left lower leg    Wound Care Instructions Clean wound with NS. Apply Opticell AG to wound bed. Cover with dry dressing. Soak dressing with normal saline prior to removal if dressing appears to be adhered to wound bed.    Cleanse Normal Saline    Intervention Other    Other Opticell AG        06/24/25 1719    06/24/25 1720  Wound Care  Daily      Question Answer Comment   Wound Locations sacrum    Wound Care Instructions cleanse with normal saline and apply a silicone bordered foam    Cleanse Normal Saline    Dressing: Silicone Border Dressing        06/24/25 1719    06/24/25 1719  Specialty Bed Dolphin FIS Mattress  Once        Comments: For Dolphin FIS Mattress, call [employer] to order a [place] bed frame.  If bariatric/bariatric dolphin, [employer] provides frame, mattress, pump, and trapeze (if needed).  Nurse or HUC is to call [employer], the rental company, to order the  equipment, provide patient's name, room number, and if in isolation. [phone].   Question:  Specialty Bed needed  Answer:  Tata [facility] Mattress    06/24/25 1719 06/24/25 1718  Turn Patient  Now Then Every 2 Hours         06/24/25 1719 06/24/25 1718  Elevate Heels Off of Bed  Until Discontinued         06/24/25 1719 06/24/25 1718  Use Seat Cushion When Up In Chair  Continuous         06/24/25 1719 06/24/25 1718  Silicone Border Dressing to Bony Prominences  Per Order Details        Comments: Apply silicone border foam dressing per protocol to bilateral heels for protection. Nursing to change dressing every 3 days and PRN if soiled. Nursing is to peel back dressing with every assessment to assess skin underneath dressing. No barrier cream under dressing.    06/24/25 1719 06/24/25 1718  Use Repositioning Wedge to Position Patient  Continuous         06/24/25 1719    Unscheduled  Wound Care  As Needed      Question:  Wound Care Instructions  Answer:  Apply Moisture Barrier After Any Incontinence    06/24/25 1719             Inpatient wound care will continue to follow during hospital stay.  Please contact if any issues or concerns arise.     This document has been electronically signed by Johanna Kelsey RN on 6/24/2025 17:19 CDT

## 2025-06-24 NOTE — PLAN OF CARE
Goal Outcome Evaluation:  Plan of Care Reviewed With: patient, family        Progress: no change  Outcome Evaluation: PT eval completed.  Pt pleasant and agreeable to therapy.  Oriented to person and place, situation and time not formally assessed.  Pt just returned to bed w/ staff assist.  Currently O2 dependent and family reports she has only used O2 over the last couple of weeks due to increase fluid.  Pt w/ edema B LEs as well as noted at B UEs.  Pt denies pain and N/T, however does report her LEs do feel different than they normally do when there is no swelling.  Pt demonstrates generalized weakness w/ strength grossly 4 to 4+/5 except B hip flexors 4-/5.  Pt performed bed mobility w/ min to mod assist except dependent to scoot up in bed.  Pt was able to stand X 2 w/ min to mod assist, however noted significant posterior lean and unable to shift wt in order to initiate taking steps.  Pt returned to bed and repositioned w/ LEs elevated.  Education for gentle ROM of the LEs in bed and for improved tech w/ deep breathing and to perform deep breathing every hour awake w/ return demo of technique.  Pt will benefit from continued PT services to improve activity tolerance, overall strength, balance, safety awareness, to assist w/ edema mgmt, to assist w/ skin protecition/pressure relief and to progress functional mobility w/ less need for assist.  Anticipate pt will need continued skilled care at discharge and would recommend SNF.  Pt was previously at [facility].  Will follow for progress and needs.    Anticipated Discharge Disposition (PT): skilled nursing facility

## 2025-06-24 NOTE — PLAN OF CARE
Problem: Heart Failure  Goal: Optimal Cardiac Output and Blood Flow  Outcome: Not Progressing  Intervention: Optimize Cardiac Output  Recent Flowsheet Documentation  Taken 6/23/2025 2100 by Aminta Velásquez RN  Environmental Support: calm environment promoted  Goal: Stable Heart Rate and Rhythm  Outcome: Not Progressing  Goal: Fluid and Electrolyte Balance  Outcome: Not Progressing  Intervention: Monitor and Manage Fluid and Electrolyte Balance  Recent Flowsheet Documentation  Taken 6/23/2025 2100 by Aminta Velásquez RN  Fluid/Electrolyte Management: (electrolyte protocol) other (see comments)  Goal: Optimal Functional Ability  Outcome: Not Progressing  Goal: Improved Oral Intake  Outcome: Not Progressing  Goal: Effective Oxygenation and Ventilation  Outcome: Not Progressing  Intervention: Promote Airway Secretion Clearance  Recent Flowsheet Documentation  Taken 6/23/2025 2100 by Aminta Velásquez RN  Cough And Deep Breathing: done with encouragement  Intervention: Optimize Oxygenation and Ventilation  Recent Flowsheet Documentation  Taken 6/23/2025 2100 by Aminta Velásquez RN  Airway/Ventilation Management: oxygen therapy provided  Goal: Effective Breathing Pattern During Sleep  Outcome: Not Progressing     Problem: Skin Injury Risk Increased  Goal: Skin Health and Integrity  Outcome: Not Progressing  Intervention: Optimize Skin Protection  Recent Flowsheet Documentation  Taken 6/23/2025 2100 by Aminta Velásquez RN  Pressure Reduction Techniques: weight shift assistance provided  Pressure Reduction Devices: pressure-redistributing mattress utilized  Skin Protection: incontinence pads utilized     Problem: Comorbidity Management  Goal: Maintenance of Heart Failure Symptom Control  Outcome: Not Progressing  Goal: Blood Pressure in Desired Range  Outcome: Not Progressing   Goal Outcome Evaluation:  Plan of Care Reviewed With: patient        Progress: no change  Outcome Evaluation: Patient has slept well through the night; BLE remain  wrapped; Wound consult today.  Lasix gtt continued.

## 2025-06-24 NOTE — PLAN OF CARE
Problem: Adult Inpatient Plan of Care  Goal: Plan of Care Review  Outcome: Progressing  Flowsheets (Taken 6/23/2025 1916)  Progress: no change  Outcome Evaluation: Pt arrived to the floor from ED at about 16:30. BLE +4 edema. Weeping. Pictures taken of wounds to right shin and non-blanchable discoloration on sacrum. BLE rewrapped. WOC consult. PT/OT consult d/t recent decline in mobility. Lasix gtt infusing @ 2.5 mg/hr.  Plan of Care Reviewed With: patient

## 2025-06-24 NOTE — PLAN OF CARE
Problem: Adult Inpatient Plan of Care  Goal: Plan of Care Review  Outcome: Progressing  Flowsheets (Taken 6/24/2025 1650)  Progress: improving  Outcome Evaluation: Pt a/o x4. VSS. Telemetry afib 81-96. Maintaining SpO2 on 2L N/C. Continuing to diurese with lasix gtt. Home meds restarted.  Plan of Care Reviewed With: patient

## 2025-06-24 NOTE — PLAN OF CARE
"Goal Outcome Evaluation:  Plan of Care Reviewed With: patient, daughter        Progress: no change  Outcome Evaluation: OT eval completed on this date. Pt. presents in bed, recently back from [place], with daughters at bedside. Upon entry, pt. wounds were been inspected and pt. was requesting to sit in the w/c. Pt. is a resident at [facility] for the past 4 weeks. At [facility], pt. is MaxA for dressing, bathing, and dependent in toileting. Pt. utilizes a w/c and requires transfers to get from bed<>w/c. Pt. was A&Ox3, being disoriented to time. While talking with pt., pt. alerted OT to needing to have a BM, being unable to control BM and voiding on the pad in bed. Pt. was dependent for performing perineal hygiene, requiring [place] for rolling L and R during hygiene. Once cleaned, pt. was able to sit EOB with MaxA x1, with use of the repositioning sheet. Pt. demonstrated decreased use of arms and legs for bed mobility. Ms. Cerda demonstrates a posterior lean while in static and dynamic sitting, which she states had been occurring for several weeks. Pt. demonstrated B UE ROM WFL and B UE MMT 3/5 for shoulder and 4/5 for other UE joints. OT attempted sit<>stand transition, with Ms. eCrda requiring MaxA x2 and was unable to come to a full stand due to posterior lean. Pt. was educated on body mechanics and the importance of \"nose over toes\", with demonstration, when attempting to stand. Pt. requried a squat-pivot transfer of MaxAx2 to transition from sitting EOB <> w/c. Ms. Cerda was left in the wheelchair, legs elevated, daughters at bedside, and with call light neaby. Pt. would benefit from skilled OT services addressing ax cem, energy conservation, body mechanics, and strategies for improving functional performance of BADLs. Recommend d/c back to extended care facility, [facility].    Anticipated Discharge Disposition (OT): skilled nursing facility                        "

## 2025-06-25 LAB
ANION GAP SERPL CALCULATED.3IONS-SCNC: 9 MMOL/L (ref 5–15)
AORTIC DIMENSIONLESS INDEX: 0.54 (DI)
AV MEAN PRESS GRAD SYS DOP V1V2: 6 MMHG
AV VMAX SYS DOP: 155 CM/SEC
BH CV ECHO LEFT VENTRICLE GLOBAL LONGITUDINAL STRAIN: -5.1 %
BH CV ECHO MEAS - AI P1/2T: 339 MSEC
BH CV ECHO MEAS - AO MAX PG: 9.6 MMHG
BH CV ECHO MEAS - AO ROOT DIAM: 4.5 CM
BH CV ECHO MEAS - AO V2 VTI: 27.4 CM
BH CV ECHO MEAS - AVA(I,D): 1.88 CM2
BH CV ECHO MEAS - EDV(CUBED): 33.4 ML
BH CV ECHO MEAS - EDV(MOD-SP2): 38.1 ML
BH CV ECHO MEAS - EDV(MOD-SP4): 33.4 ML
BH CV ECHO MEAS - EF(MOD-SP2): 54.9 %
BH CV ECHO MEAS - EF(MOD-SP4): 54.8 %
BH CV ECHO MEAS - ESV(CUBED): 12.6 ML
BH CV ECHO MEAS - ESV(MOD-SP2): 17.2 ML
BH CV ECHO MEAS - ESV(MOD-SP4): 15.1 ML
BH CV ECHO MEAS - FS: 27.6 %
BH CV ECHO MEAS - IVS/LVPW: 1.22 CM
BH CV ECHO MEAS - IVSD: 1.63 CM
BH CV ECHO MEAS - LA DIMENSION: 4.4 CM
BH CV ECHO MEAS - LAT PEAK E' VEL: 16.3 CM/SEC
BH CV ECHO MEAS - LV DIASTOLIC VOL/BSA (35-75): 19.9 CM2
BH CV ECHO MEAS - LV MASS(C)D: 170.2 GRAMS
BH CV ECHO MEAS - LV MAX PG: 3 MMHG
BH CV ECHO MEAS - LV MEAN PG: 2 MMHG
BH CV ECHO MEAS - LV SYSTOLIC VOL/BSA (12-30): 9 CM2
BH CV ECHO MEAS - LV V1 MAX: 86.7 CM/SEC
BH CV ECHO MEAS - LV V1 VTI: 14.9 CM
BH CV ECHO MEAS - LVIDD: 3.2 CM
BH CV ECHO MEAS - LVIDS: 2.33 CM
BH CV ECHO MEAS - LVOT AREA: 3.5 CM2
BH CV ECHO MEAS - LVOT DIAM: 2.1 CM
BH CV ECHO MEAS - LVPWD: 1.34 CM
BH CV ECHO MEAS - MED PEAK E' VEL: 10.2 CM/SEC
BH CV ECHO MEAS - MR MAX PG: 98.8 MMHG
BH CV ECHO MEAS - MR MAX VEL: 497 CM/SEC
BH CV ECHO MEAS - MV DEC SLOPE: 774 CM/SEC2
BH CV ECHO MEAS - MV E MAX VEL: 139 CM/SEC
BH CV ECHO MEAS - MV P1/2T: 51.5 MSEC
BH CV ECHO MEAS - MVA(P1/2T): 4.3 CM2
BH CV ECHO MEAS - PA V2 MAX: 68.4 CM/SEC
BH CV ECHO MEAS - RV MAX PG: 1 MMHG
BH CV ECHO MEAS - RV V1 MAX: 50.1 CM/SEC
BH CV ECHO MEAS - SV(LVOT): 51.6 ML
BH CV ECHO MEAS - SV(MOD-SP2): 20.9 ML
BH CV ECHO MEAS - SV(MOD-SP4): 18.3 ML
BH CV ECHO MEAS - SVI(LVOT): 30.7 ML/M2
BH CV ECHO MEAS - SVI(MOD-SP2): 12.4 ML/M2
BH CV ECHO MEAS - SVI(MOD-SP4): 10.9 ML/M2
BH CV ECHO MEAS - TAPSE (>1.6): 1.54 CM
BH CV ECHO MEAS - TR MAX PG: 32 MMHG
BH CV ECHO MEAS - TR MAX VEL: 283 CM/SEC
BH CV ECHO MEASUREMENTS AVERAGE E/E' RATIO: 10.49
BH CV XLRA - RV BASE: 3.1 CM
BUN SERPL-MCNC: 90.3 MG/DL (ref 8–23)
BUN/CREAT SERPL: 44.9 (ref 7–25)
CALCIUM SPEC-SCNC: 8.4 MG/DL (ref 8.6–10.5)
CHLORIDE SERPL-SCNC: 91 MMOL/L (ref 98–107)
CO2 SERPL-SCNC: 27 MMOL/L (ref 22–29)
CREAT SERPL-MCNC: 2.01 MG/DL (ref 0.57–1)
DEPRECATED RDW RBC AUTO: 57.1 FL (ref 37–54)
EGFRCR SERPLBLD CKD-EPI 2021: 23.8 ML/MIN/1.73
ERYTHROCYTE [DISTWIDTH] IN BLOOD BY AUTOMATED COUNT: 19.2 % (ref 12.3–15.4)
GLUCOSE SERPL-MCNC: 104 MG/DL (ref 65–99)
HCT VFR BLD AUTO: 28.7 % (ref 34–46.6)
HGB BLD-MCNC: 8.7 G/DL (ref 12–15.9)
LEFT ATRIUM VOLUME INDEX: 75 ML/M2
LEFT ATRIUM VOLUME: 126 ML
LV EF BIPLANE MOD: 54.8 %
MCH RBC QN AUTO: 25.1 PG (ref 26.6–33)
MCHC RBC AUTO-ENTMCNC: 30.3 G/DL (ref 31.5–35.7)
MCV RBC AUTO: 82.7 FL (ref 79–97)
PLATELET # BLD AUTO: 304 10*3/MM3 (ref 140–450)
PMV BLD AUTO: 10.2 FL (ref 6–12)
POTASSIUM SERPL-SCNC: 4.2 MMOL/L (ref 3.5–5.2)
RBC # BLD AUTO: 3.47 10*6/MM3 (ref 3.77–5.28)
SODIUM SERPL-SCNC: 127 MMOL/L (ref 136–145)
STJ: 3.5 CM
WBC NRBC COR # BLD AUTO: 6.26 10*3/MM3 (ref 3.4–10.8)

## 2025-06-25 PROCEDURE — 80048 BASIC METABOLIC PNL TOTAL CA: CPT | Performed by: NURSE PRACTITIONER

## 2025-06-25 PROCEDURE — 25010000002 FUROSEMIDE PER 20 MG: Performed by: EMERGENCY MEDICINE

## 2025-06-25 PROCEDURE — 85027 COMPLETE CBC AUTOMATED: CPT | Performed by: NURSE PRACTITIONER

## 2025-06-25 PROCEDURE — 97530 THERAPEUTIC ACTIVITIES: CPT

## 2025-06-25 RX ADMIN — Medication 10 ML: at 09:05

## 2025-06-25 RX ADMIN — Medication 10 ML: at 21:00

## 2025-06-25 RX ADMIN — TAMSULOSIN HYDROCHLORIDE 0.4 MG: 0.4 CAPSULE ORAL at 09:05

## 2025-06-25 RX ADMIN — GABAPENTIN 300 MG: 300 CAPSULE ORAL at 13:55

## 2025-06-25 RX ADMIN — FUROSEMIDE 5 MG/HR: 10 INJECTION, SOLUTION INTRAMUSCULAR; INTRAVENOUS at 01:46

## 2025-06-25 RX ADMIN — GABAPENTIN 300 MG: 300 CAPSULE ORAL at 06:28

## 2025-06-25 RX ADMIN — TIMOLOL MALEATE 1 DROP: 5 SOLUTION/ DROPS OPHTHALMIC at 09:03

## 2025-06-25 RX ADMIN — Medication 220 MG: at 09:04

## 2025-06-25 RX ADMIN — PANTOPRAZOLE SODIUM 40 MG: 40 TABLET, DELAYED RELEASE ORAL at 09:04

## 2025-06-25 RX ADMIN — Medication 5000 UNITS: at 09:05

## 2025-06-25 RX ADMIN — LATANOPROST 1 DROP: 50 SOLUTION OPHTHALMIC at 20:59

## 2025-06-25 RX ADMIN — Medication 400 MG: at 09:05

## 2025-06-25 RX ADMIN — ALLOPURINOL 100 MG: 100 TABLET ORAL at 09:04

## 2025-06-25 RX ADMIN — AMIODARONE HYDROCHLORIDE 400 MG: 200 TABLET ORAL at 09:04

## 2025-06-25 RX ADMIN — CITALOPRAM 10 MG: 20 TABLET, FILM COATED ORAL at 09:03

## 2025-06-25 RX ADMIN — AMIODARONE HYDROCHLORIDE 400 MG: 200 TABLET ORAL at 20:56

## 2025-06-25 RX ADMIN — APIXABAN 2.5 MG: 2.5 TABLET, FILM COATED ORAL at 20:56

## 2025-06-25 RX ADMIN — GABAPENTIN 300 MG: 300 CAPSULE ORAL at 21:00

## 2025-06-25 RX ADMIN — MIDODRINE HYDROCHLORIDE 5 MG: 2.5 TABLET ORAL at 16:33

## 2025-06-25 RX ADMIN — MIDODRINE HYDROCHLORIDE 5 MG: 2.5 TABLET ORAL at 06:30

## 2025-06-25 RX ADMIN — APIXABAN 2.5 MG: 2.5 TABLET, FILM COATED ORAL at 09:05

## 2025-06-25 RX ADMIN — ASPIRIN 81 MG: 81 TABLET, COATED ORAL at 09:04

## 2025-06-25 RX ADMIN — ROSUVASTATIN 5 MG: 10 TABLET, FILM COATED ORAL at 09:05

## 2025-06-25 NOTE — PROGRESS NOTES
Adult Nutrition  Assessment/PES    Patient Name:  Genevieve Cerda  YOB: 1940  MRN: [id number]  Admit Date:  6/23/2025    Assessment Date:  6/25/2025       Reason for Assessment       Row Name 06/25/25 2667          Reason for Assessment    Reason For Assessment identified at risk by screening criteria     Diagnosis cardiac disease;fluid status;metabolic state     Identified At Risk by Screening Criteria large or nonhealing wound, burn or pressure injury                    Nutrition/Diet History       Row Name 06/25/25 8886          Nutrition/Diet History    Typical Intake (Food/Fluid/EN/PN) Met with patient this day who was identified at risk by screening criteria r/t DTI to sacrum.  Pt is also noted to have a non-pressure full thickness wound to left lower leg.  She was up in recliner with legs elevated.  Sister in the room visiting. She is a SNF resident. Pt admitted r/t acute on chronic CHF, HFpEF, hyponatremia, a-fib, hypercholesterolemia, and chronic long term use of anticoagulation.  Jose Antonio 16.  Pt noted to have B/L edema to both legs resulting in a 27.3#, or 19.9%, wt gain x one month.  Pt is on a CCHO diet and receiving Boost Glucose Control tid to aid in wound healing. Pt does have increased nutrient needs d/t wound healing and edema.  Pt consuming 480 cc/day with meals.  Reports a healthy appetite. No documented intake to assess at this time.  Pt does not have a dx of DM.  Will remove diet restriction and change ONS to Boost Original.  No chewing or swallowing problems or reported N/V.  Pt is on 3L NC.  Last BM 6/24.  ~1125 cc/day UOP plus 2 unmeasured.  NFPE limited.  Conistent with previous MSA findings on 5/8/25.  Pt is malnourished in the context of chronic illness.  Will continue to monitor and f/u as needed.     Food Intolerance(s) NKFA               Malnutrition Severity Assessment       Row Name 06/25/25 5209          Malnutrition Severity Assessment    Malnutrition Type Chronic  Disease - Related Malnutrition        Muscle Loss    Loss of Muscle Mass Findings Severe     [place]az[place] Region Severe - deep hollowing/scooping, lack of muscle to touch, facial bones well defined     Clavicle Bone Region Severe - protruding prominent bone     Dorsal Hand Region Severe - prominent depression        Fat Loss    Subcutaneous Fat Loss Findings Severe     Orbital Region  Severe - pronounced hollowness/depression, dark circles, loose saggy skin     Upper Arm Region Severe - mostly skin, very little space between folds, fingers touch        Fluid Accumulation (Edema)    Fluid Acumulation Findings Severe     Fluid Accumulation  Severe equals 3+ or 4+ pitting edema        Criteria Met (Must meet criteria for severity in at least 2 of these categories: M Wasting, Fat Loss, Fluid, Secondary Signs, Wt. Status, Intake)    Patient meets criteria for  Severe Malnutrition  In the context of chronic disease                    Labs/Tests/Procedures/Meds       Row Name 06/25/25 1414          Labs/Procedures/Meds    Lab Results Reviewed reviewed        Diagnostic Tests/Procedures    Diagnostic Test/Procedure Reviewed reviewed        Medications    Pertinent Medications Reviewed reviewed     Pertinent Medications Comments See MAR                    Physical Findings       Row Name 06/25/25 1414          Physical Findings    Overall Physical Appearance Pt pleasant and up in chair with legs elevated. Last BM 6/24. NC 3L.  Jose Antonio 16.                    Estimated/Assessed Needs - Anthropometrics       Row Name 06/25/25 1414          Anthropometrics    Weight 76.3 kg (168 lb 3.2 oz)     Calculation Weight 50 kg (110 lb 3.7 oz)     Additional Documentation Ideal Body Weight (IBW) (Group)        Ideal Body Weight (IBW)    Ideal Body Weight 110#        Estimated/Assessed Needs    Additional Documentation Estimated Calorie Needs (Group);Fluid Requirements (Group);KCAL/KG (Group);Protein Requirements (Group)        KCAL/KG    KCAL/KG  25 Kcal/Kg (kcal);30 Kcal/Kg (kcal)     25 Kcal/Kg (kcal) 1250     30 Kcal/Kg (kcal) 1500        Protein Requirements    Weight Used For Protein Calculations 50 kg (110 lb 3.7 oz)     Est Protein Requirement Amount (gms/kg) 1.2 gm protein     Estimated Protein Requirements (gms/day) 60        Fluid Requirements    Fluid Requirements (mL/day) 1500                    Nutrition Prescription Ordered       Row Name 06/25/25 1424          Nutrition Prescription PO    Current PO Diet Regular     Supplement Boost Glucose Control (Glucerna Shake)     Supplement Frequency 3 times a day     Common Modifiers Cardiac;Consistent Carbohydrate                    Evaluation of Received Nutrient/Fluid Intake       Row Name 06/25/25 1424          Fluid Intake Evaluation    Oral Fluid (mL) 480        PO Evaluation    Number of Days PO Intake Evaluated --  No documented intake                            Problem/Interventions:   Problem 1       Row Name 06/25/25 1426          Nutrition Diagnoses Problem 1    Problem 1 Malnutrition     Etiology (related to) Medical Diagnosis     Cardiac CHF;Hypercholesterolemia     Fluid Status Edema     Metabolic/ICU Hyponatremia     Skin Pressure injury;Skin breakdown                          Intervention Goal       Row Name 06/25/25 1427          Intervention Goal    General Reduce/improve symptoms;Meet nutritional needs for age/condition;Disease management/therapy     PO Meet estimated needs     Weight Appropriate weight loss                    Nutrition Intervention       Row Name 06/25/25 1427          Nutrition Intervention    RD/Tech Action Follow Tx progress;Care plan reviewd;Adjusted supplement;Recommend/ordered     Recommended/Ordered Diet;Supplement                    Nutrition Prescription       Row Name 06/25/25 1428          Nutrition Prescription PO    PO Prescription Begin/change diet;Begin/change supplement     Begin/Change Diet to Regular     Supplement Boost     Supplement Frequency 3  times a day  Boost Origina Vanilla     Common Modifiers Cardiac     New PO Prescription Ordered? Yes                    Education/Evaluation       Row Name 06/25/25 1428          Education    Education No discharge needs identified at this time        Monitor/Evaluation    Monitor Per protocol                     Electronically signed by:  Janes Villagomez RD  06/25/25 14:41 CDT

## 2025-06-25 NOTE — PROGRESS NOTES
[facility]  INPATIENT PROGRESS NOTE    Patient Name: Genevieve eCrda  Date of Admission: 6/23/2025  Today's Date: 06/25/25  Length of Stay: 2  Primary Care Physician: Germaine Diego APRN    Subjective   Chief Complaint: Leg swelling    Patient has no new complaint this morning, said that she is feeling a lot better.  Discussed diagnosis and plan of care with patient and daughter at bedside.   Patient has improved, discussed discharge planning for tomorrow with patient and daughter at bedside      Review of Systems   All pertinent negatives and positives are as above. All other systems have been reviewed and are negative unless otherwise stated.     Objective    Temp:  [97.5 °F (36.4 °C)-98.2 °F (36.8 °C)] 97.5 °F (36.4 °C)  Heart Rate:  [64-92] 92  Resp:  [18] 18  BP: ()/(49-63) 97/62  Physical Exam  Constitutional:       Appearance: Normal appearance.   HENT:      Head: Normocephalic and atraumatic.      Nose: Nose normal.      Mouth/Throat:      Mouth: Mucous membranes are moist.      Pharynx: Oropharynx is clear.   Eyes:      Extraocular Movements: Extraocular movements intact.      Conjunctiva/sclera: Conjunctivae normal.   Cardiovascular:      Rate and Rhythm: Normal rate. Rhythm irregular.      Pulses: Normal pulses.      Heart sounds: Normal heart sounds.   Pulmonary:      Effort: Pulmonary effort is normal.      Breath sounds: Rales present.   Abdominal:      General: Bowel sounds are normal.      Palpations: Abdomen is soft.   Musculoskeletal:      Cervical back: Normal range of motion and neck supple.      Right lower leg: edema.      Left lower leg:  edema.   Skin:     General: Skin is warm and dry.      Capillary Refill: Capillary refill takes 2 to 3 seconds.   Neurological:      General: No focal deficit present.      Mental Status: She is alert and oriented to person, place, and time.   Psychiatric:         Mood and Affect: Mood  "normal.         Behavior: Behavior normal.       Results Review:  I have reviewed the labs, radiology results, and diagnostic studies.    Laboratory Data:   Results from last 7 days   Lab Units 06/25/25  0620 06/24/25  0531 06/23/25  1238   WBC 10*3/mm3 6.26 7.26 8.01   HEMOGLOBIN g/dL 8.7* 8.6* 9.8*   HEMATOCRIT % 28.7* 28.5* 30.9*   PLATELETS 10*3/mm3 304 314 311        Results from last 7 days   Lab Units 06/25/25  0620 06/24/25  0531 06/23/25  0844   SODIUM mmol/L 127* 124* 127*  127*   POTASSIUM mmol/L 4.2 4.3 5.0  5.0   CHLORIDE mmol/L 91* 89* 89*  89*   CO2 mmol/L 27.0 23.0 24.0  26.0   BUN mg/dL 90.3* 86.7* 89.4*  89.4*   CREATININE mg/dL 2.01* 2.21* 2.10*  2.18*   CALCIUM mg/dL 8.4* 8.1* 8.3*  8.2*   BILIRUBIN mg/dL  --   --  <0.2   ALK PHOS U/L  --   --  83   ALT (SGPT) U/L  --   --  14   AST (SGOT) U/L  --   --  16   GLUCOSE mg/dL 104* 87 101*  101*       Culture Data:   No results found for: \"BLOODCX\", \"URINECX\", \"WOUNDCX\", \"MRSACX\", \"RESPCX\", \"STOOLCX\"    Radiology Data:   Imaging Results (Last 24 Hours)       ** No results found for the last 24 hours. **            I have reviewed the patient's current medications.     Assessment/Plan   Assessment  Active Hospital Problems    Diagnosis     **Acute on chronic diastolic CHF (congestive heart failure)     Acute on chronic heart failure with preserved ejection fraction (HFpEF)     Hyponatremia     Atrial fibrillation, persistent     Hypercholesterolemia     Current use of long term anticoagulation        Treatment Plan    - Acute on chronic diastolic congestive heart failure  Patient failed outpatient treatment and was sent to the emergency room by her PCP.  Currently she is being diuresed with furosemide drip and is making clinical improvement.  We will continue furosemide drip and reevaluate in the morning.  Will restart all her guideline directed medications.    - Hyponatremia  Patient's hyponatremia is improving and is likely related to the " ongoing diuresis.  Will reevaluate in the morning and if no improvement, will start patient on sodium tablets.    - Chronic atrial fibrillation  We will restart patient's amiodarone, metoprolol and apixaban.    DVT prophylaxis-apixaban    Disposition-possible discharge tomorrow if patient continues to improve.      Electronically signed by Indra Zamorano MD, 06/25/25, 18:15 CDT.

## 2025-06-25 NOTE — THERAPY TREATMENT NOTE
Acute Care - Physical Therapy Treatment Note  [facility]     Patient Name: Genevieve Cerda  : 1940  MRN: [id number]  Today's Date: 2025      Visit Dx:     ICD-10-CM ICD-9-CM   1. Congestive heart failure, unspecified HF chronicity, unspecified heart failure type  I50.9 428.0   2. Chronic kidney disease, unspecified CKD stage  N18.9 585.9   3. Hyponatremia  E87.1 276.1   4. Impaired functional mobility and activity tolerance [Z74.09]  Z74.09 V49.89   5. Decreased activities of daily living (ADL)  Z78.9 V49.89     Patient Active Problem List   Diagnosis    Severe protein-calorie malnutrition    Pleural effusion    Chronic congestive heart failure    Acute on chronic diastolic CHF (congestive heart failure)    CHF (congestive heart failure)    Acute on chronic heart failure with preserved ejection fraction (HFpEF)    Hyponatremia    Atrial fibrillation, persistent    Hypercholesterolemia    Current use of long term anticoagulation     History reviewed. No pertinent past medical history.  No past surgical history on file.  PT Assessment (Last 12 Hours)       PT Evaluation and Treatment       Row Name 25 0958          Physical Therapy Time and Intention    Subjective Information complains of;dyspnea  -TB     Document Type therapy note (daily note)  -TB     Mode of Treatment physical therapy  -TB       Row Name 25 0958          General Information    Existing Precautions/Restrictions fall;oxygen therapy device and L/min  -TB       Row Name 25 0958          Pain    Pretreatment Pain Rating 0/10 - no pain  -TB     Posttreatment Pain Rating 0/10 - no pain  -TB       Row Name 25 0958          Bed Mobility    Bed Mobility scooting/bridging;supine-sit  -TB     Scooting/Bridging [place] (Bed Mobility) minimum assist (75% patient effort);verbal cues  -TB     Supine-Sit [place] (Bed Mobility) minimum assist (75% patient effort);verbal cues  -TB     Assistive Device (Bed Mobility) bed  rails;head of bed elevated;repositioning sheet  -TB       Row Name 06/25/25 0958          Transfers    Transfers sit-stand transfer;stand-sit transfer;bed-chair transfer  -TB       Row Name 06/25/25 0958          Bed-Chair Transfer    Bed-Chair [place] (Transfers) moderate assist (50% patient effort);2 person assist;verbal cues  -TB       Row Name 06/25/25 0958          Sit-Stand Transfer    Sit-Stand [place] (Transfers) minimum assist (75% patient effort);moderate assist (50% patient effort);2 person assist;verbal cues  -TB       Row Name 06/25/25 0958          Stand-Sit Transfer    Stand-Sit [place] (Transfers) moderate assist (50% patient effort);2 person assist;verbal cues  -TB       Row Name 06/25/25 0958          Balance    Balance Assessment sitting static balance;sitting dynamic balance  -TB     Static Sitting Balance standby assist  -TB     Dynamic Sitting Balance standby assist  -TB     Position, Sitting Balance sitting edge of bed  -TB       Row Name             Wound 06/23/25 1658 Left gluteal Pressure Injury    Wound - Properties Group Placement Date: 06/23/25  -TBA Placement Time: 1658  -TBA Present on Original Admission: Y  -TBA Side: Left  -TBA Location: gluteal  -TBA Primary Wound Type: Pressure Inj  -TBA    Retired Wound - Properties Group Placement Date: 06/23/25  -TBA Placement Time: 1658  -TBA Present on Original Admission: Y  -TBA Side: Left  -TBA Location: gluteal  -TBA    Retired Wound - Properties Group Placement Date: 06/23/25  -TBA Placement Time: 1658  -TBA Present on Original Admission: Y  -TBA Side: Left  -TBA Location: gluteal  -TBA    Retired Wound - Properties Group Date first assessed: 06/23/25  -TBA Time first assessed: 1658  -TBA Present on Original Admission: Y  -TBA Side: Left  -TBA Location: gluteal  -TBA      Row Name             Wound 06/23/25 1659 Left lower leg Other (Comments)    Wound - Properties Group Placement Date: 06/23/25  -TBA Placement Time: 1659   -TBA Present on Original Admission: Y  -TBA Side: Left  -TBA Orientation: lower  -TBA Location: leg  -TBA Primary Wound Type: Other  -TBA    Retired Wound - Properties Group Placement Date: 06/23/25  -TBA Placement Time: 1659 -TBA Present on Original Admission: Y  -TBA Side: Left  -TBA Orientation: lower  -TBA Location: leg  -TBA    Retired Wound - Properties Group Placement Date: 06/23/25  -TBA Placement Time: 1659 -TBA Present on Original Admission: Y  -TBA Side: Left  -TBA Orientation: lower  -TBA Location: leg  -TBA    Retired Wound - Properties Group Date first assessed: 06/23/25  -TBA Time first assessed: 1659 -TBA Present on Original Admission: Y  -TBA Side: Left  -TBA Location: leg  -TBA      Row Name 06/25/25 0958          Plan of Care Review    Plan of Care Reviewed With patient  -TB     Progress no change  -TB     Outcome Evaluation PT tx complete. Pt c/o SOA with O2 on 4L. Bed mob Min A to EOB with HOB elevated. Tolerated sitting EOB ~12mins. Stood and tf to chair Mod x2 with increased time and cues. Pt still c/o SOA. Not able to get an accurate reading. Nursing aware.  -TB       Row Name 06/25/25 0958          Positioning and Restraints    Pre-Treatment Position in bed  -TB     Post Treatment Position chair  -TB     In Chair notified nsg;reclined;sitting;call light within reach;encouraged to call for assist;with family/caregiver;legs elevated  -TB               User Key  (r) = Recorded By, (t) = Taken By, (c) = Cosigned By      Initials Name Provider Type    TB Don Barahona, PTA Physical Therapist Assistant    Car Cervantes, RN Registered Nurse                    Physical Therapy Education       Title: PT OT SLP Therapies (Done)       Topic: Physical Therapy (Done)       Point: Mobility training (Done)       Learning Progress Summary            Patient Acceptance, E,TB,D, VU,NR by CARLOS ALBERTO at 6/24/2025 6281    Comment: Education re: purpose of PT/importance of activitiy, safety/falls  prevention, increase awareness of upright, improved deep breathing tech, upright posture, LE ROM and positioning for edema mgmt; pacing activity allowing time for rest   Family Acceptance, E,TB,D, VU,NR by  at 6/24/2025 1604    Comment: Education re: purpose of PT/importance of activitiy, safety/falls prevention, increase awareness of upright, improved deep breathing tech, upright posture, LE ROM and positioning for edema mgmt; pacing activity allowing time for rest                      Point: Home exercise program (Done)       Learning Progress Summary            Patient Acceptance, E,TB,D, VU,NR by  at 6/24/2025 1604    Comment: Education re: purpose of PT/importance of activitiy, safety/falls prevention, increase awareness of upright, improved deep breathing tech, upright posture, LE ROM and positioning for edema mgmt; pacing activity allowing time for rest   Family Acceptance, E,TB,D, VU,NR by  at 6/24/2025 1604    Comment: Education re: purpose of PT/importance of activitiy, safety/falls prevention, increase awareness of upright, improved deep breathing tech, upright posture, LE ROM and positioning for edema mgmt; pacing activity allowing time for rest                      Point: Precautions (Done)       Learning Progress Summary            Patient Acceptance, E,TB,D, VU,NR by  at 6/24/2025 1604    Comment: Education re: purpose of PT/importance of activitiy, safety/falls prevention, increase awareness of upright, improved deep breathing tech, upright posture, LE ROM and positioning for edema mgmt; pacing activity allowing time for rest   Family Acceptance, E,TB,D, VU,NR by  at 6/24/2025 1604    Comment: Education re: purpose of PT/importance of activitiy, safety/falls prevention, increase awareness of upright, improved deep breathing tech, upright posture, LE ROM and positioning for edema mgmt; pacing activity allowing time for rest                                      User Key       Initials  Effective Dates Name Provider Type Discipline     08/02/18 -  Melania Orozco, PT Physical Therapist PT                  PT Recommendation and Plan     Plan of Care Reviewed With: patient  Progress: no change  Outcome Evaluation: PT tx complete. Pt c/o SOA with O2 on 4L. Bed mob Min A to EOB with HOB elevated. Tolerated sitting EOB ~12mins. Stood and tf to chair Mod x2 with increased time and cues. Pt still c/o SOA. Not able to get an accurate reading. Nursing aware.   Outcome Measures       Row Name 06/25/25 1100             How much help from another is currently needed...    Putting on and taking off regular lower body clothing? 1  -LS      Bathing (including washing, rinsing, and drying) 2  -LS      Toileting (which includes using toilet bed pan or urinal) 1  -LS      Putting on and taking off regular upper body clothing 2  -LS      Taking care of personal grooming (such as brushing teeth) 2  -LS      Eating meals 4  -LS      AM-PAC 6 Clicks Score (OT) 12  -LS         Functional Assessment    Outcome Measure Options AM-PAC 6 Clicks Daily Activity (OT)  -LS                User Key  (r) = Recorded By, (t) = Taken By, (c) = Cosigned By      Initials Name Provider Type    LS Agueda Bowles COTA Occupational Therapist Assistant                     Time Calculation:    PT Charges       Row Name 06/25/25 1600             Time Calculation    Start Time 0958  -TB      Stop Time 1023  -TB      Time Calculation (min) 25 min  -TB      PT Received On 06/25/25  -TB         Time Calculation- PT    Total Timed Code Minutes- PT 25 minute(s)  -TB                User Key  (r) = Recorded By, (t) = Taken By, (c) = Cosigned By      Initials Name Provider Type    TB Don Barahona PTA Physical Therapist Assistant                  Therapy Charges for Today       Code Description Service Date Service Provider Modifiers Qty    37923206205 HC PT THERAPEUTIC ACT EA 15 MIN 6/25/2025 Don Barahona PTA GP 2            PT  G-Codes  Outcome Measure Options: AM-PAC 6 Clicks Daily Activity (OT)  AM-PAC 6 Clicks Score (PT): 11  AM-PAC 6 Clicks Score (OT): 12    Don Barahona, PTA  6/25/2025

## 2025-06-25 NOTE — PLAN OF CARE
Problem: Adult Inpatient Plan of Care  Goal: Plan of Care Review  Outcome: Progressing  Flowsheets (Taken 6/25/2025 0434)  Progress: no change  Outcome Evaluation: Patient afib on cardiac monitor 67-94 HR.  Patient has had no complaints of pain.  Lasix gtt continued. Wounds dressed per order.  Plan of Care Reviewed With: patient  Goal: Optimal Comfort and Wellbeing  Outcome: Progressing  Goal: Readiness for Transition of Care  Outcome: Progressing     Problem: Heart Failure  Goal: Optimal Coping  Outcome: Progressing  Intervention: Support Psychosocial Response  Recent Flowsheet Documentation  Taken 6/24/2025 2200 by Aminta Velásquez RN  Supportive Measures: active listening utilized  Goal: Effective Oxygenation and Ventilation  Outcome: Progressing  Goal: Effective Breathing Pattern During Sleep  Outcome: Progressing     Problem: Skin Injury Risk Increased  Goal: Skin Health and Integrity  Outcome: Progressing  Intervention: Optimize Skin Protection  Recent Flowsheet Documentation  Taken 6/24/2025 2200 by Aminta Velásquez RN  Pressure Reduction Techniques:   frequent weight shift encouraged   weight shift assistance provided  Pressure Reduction Devices: pressure-redistributing mattress utilized  Skin Protection: incontinence pads utilized     Problem: Comorbidity Management  Goal: Maintenance of Heart Failure Symptom Control  Outcome: Progressing  Goal: Blood Pressure in Desired Range  Outcome: Progressing     Problem: Fall Injury Risk  Goal: Absence of Fall and Fall-Related Injury  Outcome: Progressing  Intervention: Identify and Manage Contributors  Recent Flowsheet Documentation  Taken 6/24/2025 2200 by Aminta Velásquez RN  Self-Care Promotion: independence encouraged  Intervention: Promote Injury-Free Environment  Recent Flowsheet Documentation  Taken 6/24/2025 2200 by Aminta Velásquez RN  Safety Promotion/Fall Prevention:   clutter free environment maintained   lighting adjusted   safety round/check completed   Goal Outcome  Evaluation:  Plan of Care Reviewed With: patient        Progress: no change  Outcome Evaluation: Patient afib on cardiac monitor 67-94 HR.  Patient has had no complaints of pain.  Lasix gtt continued. Wounds dressed per order.

## 2025-06-25 NOTE — PLAN OF CARE
Problem: Adult Inpatient Plan of Care  Goal: Plan of Care Review  Outcome: Progressing  Flowsheets (Taken 6/25/2025 1825)  Progress: improving  Outcome Evaluation: Pt a/o x4. Forgetful at times. VSS. Telemetry a-fib 68-76. Maintaining SpO2 on 3L N/C this am. Increased to 4L N/C d/t soa. Continuing to diurese with lasix gtt. Purewick in place. Pt up in chair most of day per pt wishes.  Plan of Care Reviewed With: patient

## 2025-06-25 NOTE — PLAN OF CARE
Goal Outcome Evaluation:  Plan of Care Reviewed With: patient, sibling        Progress: improving  Outcome Evaluation: OT tx completed on this date. Pt up in recliner on 4L/NC. Pt A&Ox4 with sister present. Pt c/o SOA with minimal activity. Pt participated in BUE exercises while reclined in chair with good endurance and frequent RBs,  significant edema noted to BLEs and pt noted to have decreased awareness of medical state, hx, etc. Pt left up in recliner with lunch tray. OT POC to continue.    Anticipated Discharge Disposition (OT): skilled nursing facility

## 2025-06-25 NOTE — THERAPY TREATMENT NOTE
Acute Care - Occupational Therapy Treatment Note  [facility]     Patient Name: Genevieve Cerda  : 1940  MRN: [id number]  Today's Date: 2025             Admit Date: 2025       ICD-10-CM ICD-9-CM   1. Congestive heart failure, unspecified HF chronicity, unspecified heart failure type  I50.9 428.0   2. Chronic kidney disease, unspecified CKD stage  N18.9 585.9   3. Hyponatremia  E87.1 276.1   4. Impaired functional mobility and activity tolerance [Z74.09]  Z74.09 V49.89   5. Decreased activities of daily living (ADL)  Z78.9 V49.89     Patient Active Problem List   Diagnosis    Severe protein-calorie malnutrition    Pleural effusion    Chronic congestive heart failure    Acute on chronic diastolic CHF (congestive heart failure)    CHF (congestive heart failure)    Acute on chronic heart failure with preserved ejection fraction (HFpEF)    Hyponatremia    Atrial fibrillation, persistent    Hypercholesterolemia    Current use of long term anticoagulation     History reviewed. No pertinent past medical history.  No past surgical history on file.      OT ASSESSMENT FLOWSHEET (Last 12 Hours)       OT Evaluation and Treatment       Row Name 25 1120                   OT Time and Intention    Subjective Information complains of  SOA  -LS        Document Type therapy note (daily note)  -LS        Mode of Treatment occupational therapy  -LS        Patient Effort good  -LS           General Information    Patient Profile Reviewed yes  -LS        Existing Precautions/Restrictions fall;oxygen therapy device and L/min  -LS        Barriers to Rehab medically complex  -LS           Cognition    Orientation Status (Cognition) oriented x 3  -LS           Wound 25 Left gluteal Pressure Injury    Wound - Properties Group Placement Date: 25  -TB Placement Time:   -TB Present on Original Admission: Y  -TB Side: Left  -TB Location: gluteal  -TB Primary Wound Type: Pressure Inj  -TB    Retired Wound -  Properties Group Placement Date: 06/23/25  -TB Placement Time: 1658  -TB Present on Original Admission: Y  -TB Side: Left  -TB Location: gluteal  -TB    Retired Wound - Properties Group Placement Date: 06/23/25  -TB Placement Time: 1658  -TB Present on Original Admission: Y  -TB Side: Left  -TB Location: gluteal  -TB    Retired Wound - Properties Group Date first assessed: 06/23/25  -TB Time first assessed: 1658  -TB Present on Original Admission: Y  -TB Side: Left  -TB Location: gluteal  -TB       Wound 06/23/25 1659 Left lower leg Other (Comments)    Wound - Properties Group Placement Date: 06/23/25  -TB Placement Time: 1659  -TB Present on Original Admission: Y  -TB Side: Left  -TB Orientation: lower  -TB Location: leg  -TB Primary Wound Type: Other  -TB    Retired Wound - Properties Group Placement Date: 06/23/25  -TB Placement Time: 1659  -TB Present on Original Admission: Y  -TB Side: Left  -TB Orientation: lower  -TB Location: leg  -TB    Retired Wound - Properties Group Placement Date: 06/23/25  -TB Placement Time: 1659  -TB Present on Original Admission: Y  -TB Side: Left  -TB Orientation: lower  -TB Location: leg  -TB    Retired Wound - Properties Group Date first assessed: 06/23/25  -TB Time first assessed: 1659  -TB Present on Original Admission: Y  -TB Side: Left  -TB Location: leg  -TB       Coping    Observed Emotional State calm;cooperative;pleasant  -LS        Verbalized Emotional State acceptance  -LS           Plan of Care Review    Plan of Care Reviewed With patient;sibling  -LS        Progress improving  -LS        Outcome Evaluation OT tx completed on this date. Pt up in recliner on 4L/NC. Pt A&Ox4 with sister present. Pt c/o SOA with minimal activity. Pt participated in BUE exercises while reclined in chair with good endurance and frequent RBs,  significant edema noted to BLEs and pt noted to have decreased awareness of medical state, hx, etc. Pt left up in recliner with lunch tray. OT POC to  continue.  -LS           Positioning and Restraints    Pre-Treatment Position sitting in chair/recliner  -LS        Post Treatment Position chair  -LS        In Chair reclined;call light within reach;encouraged to call for assist  -LS           Therapy Plan Review/Discharge Plan (OT)    Anticipated Discharge Disposition (OT) [facility] nursing [facility]  -LS                  User Key  (r) = Recorded By, (t) = Taken By, (c) = Cosigned By      Initials Name Effective Dates    TB Car José RN 02/21/24 -     LS Agueda Bowles COTA 09/22/22 -                      Occupational Therapy Education       Title: PT OT SLP Therapies (Done)       Topic: Occupational Therapy (Done)       Point: ADL training (Done)       Learning Progress Summary            Patient Acceptance, E,TB,D, DU,NR by LB at 6/24/2025 1305    Comment: Pt. educated on importance of body mechanics for sit<>stand   Family Acceptance, E,TB,D, DU,NR by LB at 6/24/2025 1305    Comment: Pt. educated on importance of body mechanics for sit<>stand                      Point: Body mechanics (Done)       Learning Progress Summary            Patient Acceptance, E,TB,D, DU,NR by LB at 6/24/2025 1305    Comment: Pt. educated on importance of body mechanics for sit<>stand   Family Acceptance, E,TB,D, DU,NR by LB at 6/24/2025 1305    Comment: Pt. educated on importance of body mechanics for sit<>stand                                      User Key       Initials Effective Dates Name Provider Type Discipline     05/12/25 -  Nadine Everett, KECIA Student OT Student OT                      OT Recommendation and Plan     Plan of Care Review  Plan of Care Reviewed With: patient, sibling  Progress: improving  Outcome Evaluation: OT tx completed on this date. Pt up in recliner on 4L/NC. Pt A&Ox4 with sister present. Pt c/o SOA with minimal activity. Pt participated in BUE exercises while reclined in chair with good endurance and frequent RBs,  significant edema noted to BLEs  and pt noted to have decreased awareness of medical state, hx, etc. Pt left up in recliner with lunch tray. OT POC to continue.  Plan of Care Reviewed With: patient, sibling  Outcome Evaluation: OT tx completed on this date. Pt up in recliner on 4L/NC. Pt A&Ox4 with sister present. Pt c/o SOA with minimal activity. Pt participated in BUE exercises while reclined in chair with good endurance and frequent RBs,  significant edema noted to BLEs and pt noted to have decreased awareness of medical state, hx, etc. Pt left up in recliner with lunch tray. OT POC to continue.     Outcome Measures       Row Name 06/25/25 1100             How much help from another is currently needed...    Putting on and taking off regular lower body clothing? 1  -LS      Bathing (including washing, rinsing, and drying) 2  -LS      Toileting (which includes using toilet bed pan or urinal) 1  -LS      Putting on and taking off regular upper body clothing 2  -LS      Taking care of personal grooming (such as brushing teeth) 2  -LS      Eating meals 4  -LS      AM-PAC 6 Clicks Score (OT) 12  -LS         Functional Assessment    Outcome Measure Options AM-PAC 6 Clicks Daily Activity (OT)  -LS                User Key  (r) = Recorded By, (t) = Taken By, (c) = Cosigned By      Initials Name Provider Type    Agueda Goetz COTA Occupational Therapist Assistant                    Time Calculation:    Time Calculation- OT       Row Name 06/25/25 1159             Time Calculation- OT    OT Start Time 1120  -LS      OT Stop Time 1158  -LS      OT Time Calculation (min) 38 min  -LS      Total Timed Code Minutes- OT 38 minute(s)  -LS      OT Received On 06/25/25  -                User Key  (r) = Recorded By, (t) = Taken By, (c) = Cosigned By      Initials Name Provider Type    Agueda Goetz COTA Occupational Therapist Assistant                  Therapy Charges for Today       Code Description Service Date Service Provider Modifiers Qty    12441856742  HC OT THERAPEUTIC ACT EA 15 MIN 6/25/2025 Agueda Bowles COTA  3                 KYA Oliver  6/25/2025

## 2025-06-25 NOTE — PLAN OF CARE
Goal Outcome Evaluation:  Plan of Care Reviewed With: patient        Progress: no change  Outcome Evaluation: PT tx complete. Pt c/o SOA with O2 on 4L. Bed mob Min A to EOB with HOB elevated. Tolerated sitting EOB ~12mins. Stood and tf to chair Mod x2 with increased time and cues. Pt still c/o SOA. Not able to get an accurate reading. Nursing aware.

## 2025-06-26 PROBLEM — N17.9 ACUTE RENAL FAILURE SUPERIMPOSED ON STAGE 3B CHRONIC KIDNEY DISEASE: Status: ACTIVE | Noted: 2025-06-26

## 2025-06-26 PROBLEM — N18.32 ACUTE RENAL FAILURE SUPERIMPOSED ON STAGE 3B CHRONIC KIDNEY DISEASE: Status: ACTIVE | Noted: 2025-06-26

## 2025-06-26 LAB
ANION GAP SERPL CALCULATED.3IONS-SCNC: 11 MMOL/L (ref 5–15)
ANION GAP SERPL CALCULATED.3IONS-SCNC: 12 MMOL/L (ref 5–15)
BUN SERPL-MCNC: 93.9 MG/DL (ref 8–23)
BUN SERPL-MCNC: 94.2 MG/DL (ref 8–23)
BUN/CREAT SERPL: 45.7 (ref 7–25)
BUN/CREAT SERPL: 47.7 (ref 7–25)
CALCIUM SPEC-SCNC: 7.9 MG/DL (ref 8.6–10.5)
CALCIUM SPEC-SCNC: 8.2 MG/DL (ref 8.6–10.5)
CHLORIDE SERPL-SCNC: 89 MMOL/L (ref 98–107)
CHLORIDE SERPL-SCNC: 92 MMOL/L (ref 98–107)
CO2 SERPL-SCNC: 25 MMOL/L (ref 22–29)
CO2 SERPL-SCNC: 25 MMOL/L (ref 22–29)
CREAT SERPL-MCNC: 1.97 MG/DL (ref 0.57–1)
CREAT SERPL-MCNC: 2.06 MG/DL (ref 0.57–1)
DEPRECATED RDW RBC AUTO: 56.7 FL (ref 37–54)
EGFRCR SERPLBLD CKD-EPI 2021: 23.1 ML/MIN/1.73
EGFRCR SERPLBLD CKD-EPI 2021: 24.4 ML/MIN/1.73
ERYTHROCYTE [DISTWIDTH] IN BLOOD BY AUTOMATED COUNT: 19.1 % (ref 12.3–15.4)
GLUCOSE SERPL-MCNC: 110 MG/DL (ref 65–99)
GLUCOSE SERPL-MCNC: 173 MG/DL (ref 65–99)
HCT VFR BLD AUTO: 28.3 % (ref 34–46.6)
HGB BLD-MCNC: 8.7 G/DL (ref 12–15.9)
MCH RBC QN AUTO: 25.4 PG (ref 26.6–33)
MCHC RBC AUTO-ENTMCNC: 30.7 G/DL (ref 31.5–35.7)
MCV RBC AUTO: 82.7 FL (ref 79–97)
PLATELET # BLD AUTO: 298 10*3/MM3 (ref 140–450)
PMV BLD AUTO: 10.8 FL (ref 6–12)
POTASSIUM SERPL-SCNC: 4.3 MMOL/L (ref 3.5–5.2)
POTASSIUM SERPL-SCNC: 4.4 MMOL/L (ref 3.5–5.2)
PTH-INTACT SERPL-MCNC: 109.5 PG/ML (ref 15–65)
RBC # BLD AUTO: 3.42 10*6/MM3 (ref 3.77–5.28)
SODIUM SERPL-SCNC: 126 MMOL/L (ref 136–145)
SODIUM SERPL-SCNC: 128 MMOL/L (ref 136–145)
WBC NRBC COR # BLD AUTO: 6.77 10*3/MM3 (ref 3.4–10.8)

## 2025-06-26 PROCEDURE — 80048 BASIC METABOLIC PNL TOTAL CA: CPT | Performed by: NURSE PRACTITIONER

## 2025-06-26 PROCEDURE — 25010000002 FUROSEMIDE PER 20 MG: Performed by: EMERGENCY MEDICINE

## 2025-06-26 PROCEDURE — 99222 1ST HOSP IP/OBS MODERATE 55: CPT | Performed by: NURSE PRACTITIONER

## 2025-06-26 PROCEDURE — 97530 THERAPEUTIC ACTIVITIES: CPT

## 2025-06-26 PROCEDURE — 85027 COMPLETE CBC AUTOMATED: CPT | Performed by: NURSE PRACTITIONER

## 2025-06-26 PROCEDURE — 83970 ASSAY OF PARATHORMONE: CPT | Performed by: INTERNAL MEDICINE

## 2025-06-26 RX ORDER — METOLAZONE 5 MG/1
5 TABLET ORAL DAILY
Status: DISCONTINUED | OUTPATIENT
Start: 2025-06-26 | End: 2025-06-28

## 2025-06-26 RX ADMIN — TIMOLOL MALEATE 1 DROP: 5 SOLUTION/ DROPS OPHTHALMIC at 09:10

## 2025-06-26 RX ADMIN — Medication 400 MG: at 09:09

## 2025-06-26 RX ADMIN — METOLAZONE 5 MG: 5 TABLET ORAL at 15:28

## 2025-06-26 RX ADMIN — ROSUVASTATIN 5 MG: 10 TABLET, FILM COATED ORAL at 09:10

## 2025-06-26 RX ADMIN — LATANOPROST 1 DROP: 50 SOLUTION OPHTHALMIC at 21:01

## 2025-06-26 RX ADMIN — APIXABAN 2.5 MG: 2.5 TABLET, FILM COATED ORAL at 09:09

## 2025-06-26 RX ADMIN — Medication 5000 UNITS: at 09:08

## 2025-06-26 RX ADMIN — AMIODARONE HYDROCHLORIDE 400 MG: 200 TABLET ORAL at 09:08

## 2025-06-26 RX ADMIN — METOPROLOL TARTRATE 25 MG: 25 TABLET, FILM COATED ORAL at 21:00

## 2025-06-26 RX ADMIN — METOPROLOL TARTRATE 25 MG: 25 TABLET, FILM COATED ORAL at 09:09

## 2025-06-26 RX ADMIN — GABAPENTIN 300 MG: 300 CAPSULE ORAL at 15:28

## 2025-06-26 RX ADMIN — FUROSEMIDE 5 MG/HR: 10 INJECTION, SOLUTION INTRAMUSCULAR; INTRAVENOUS at 20:32

## 2025-06-26 RX ADMIN — ASPIRIN 81 MG: 81 TABLET, COATED ORAL at 09:09

## 2025-06-26 RX ADMIN — Medication 10 ML: at 09:10

## 2025-06-26 RX ADMIN — ALLOPURINOL 100 MG: 100 TABLET ORAL at 09:09

## 2025-06-26 RX ADMIN — MIDODRINE HYDROCHLORIDE 5 MG: 2.5 TABLET ORAL at 18:14

## 2025-06-26 RX ADMIN — GABAPENTIN 300 MG: 300 CAPSULE ORAL at 21:00

## 2025-06-26 RX ADMIN — CITALOPRAM 10 MG: 20 TABLET, FILM COATED ORAL at 09:09

## 2025-06-26 RX ADMIN — MIDODRINE HYDROCHLORIDE 5 MG: 2.5 TABLET ORAL at 09:09

## 2025-06-26 RX ADMIN — Medication 220 MG: at 09:09

## 2025-06-26 RX ADMIN — TAMSULOSIN HYDROCHLORIDE 0.4 MG: 0.4 CAPSULE ORAL at 09:09

## 2025-06-26 RX ADMIN — GABAPENTIN 300 MG: 300 CAPSULE ORAL at 05:25

## 2025-06-26 RX ADMIN — AMIODARONE HYDROCHLORIDE 400 MG: 200 TABLET ORAL at 21:00

## 2025-06-26 RX ADMIN — PANTOPRAZOLE SODIUM 40 MG: 40 TABLET, DELAYED RELEASE ORAL at 09:09

## 2025-06-26 RX ADMIN — Medication 10 ML: at 21:01

## 2025-06-26 RX ADMIN — APIXABAN 2.5 MG: 2.5 TABLET, FILM COATED ORAL at 21:00

## 2025-06-26 NOTE — THERAPY TREATMENT NOTE
Acute Care - Occupational Therapy Treatment Note  [facility]     Patient Name: Genevieve Cerda  : 1940  MRN: [id number]  Today's Date: 2025             Admit Date: 2025       ICD-10-CM ICD-9-CM   1. Congestive heart failure, unspecified HF chronicity, unspecified heart failure type  I50.9 428.0   2. Chronic kidney disease, unspecified CKD stage  N18.9 585.9   3. Hyponatremia  E87.1 276.1   4. Impaired functional mobility and activity tolerance [Z74.09]  Z74.09 V49.89   5. Decreased activities of daily living (ADL)  Z78.9 V49.89     Patient Active Problem List   Diagnosis    Severe protein-calorie malnutrition    Pleural effusion    Chronic congestive heart failure    Acute on chronic diastolic CHF (congestive heart failure)    CHF (congestive heart failure)    Acute on chronic heart failure with preserved ejection fraction (HFpEF)    Hyponatremia    Atrial fibrillation, persistent    Hypercholesterolemia    Current use of long term anticoagulation    Acute renal failure superimposed on stage 3b chronic kidney disease     History reviewed. No pertinent past medical history.  No past surgical history on file.      OT ASSESSMENT FLOWSHEET (Last 12 Hours)       OT Evaluation and Treatment       Row Name 25 1440                   OT Time and Intention    Subjective Information complains of  SOA  -LS        Document Type therapy note (daily note)  -LS        Mode of Treatment occupational therapy  -LS        Patient Effort good  -LS        Comment c/o SOA  -LS           General Information    Existing Precautions/Restrictions fall;oxygen therapy device and L/min  -LS        Barriers to Rehab medically complex  -LS           Cognition    Orientation Status (Cognition) oriented x 4  -LS           Wound 25 Left gluteal Pressure Injury    Wound - Properties Group Placement Date: 25  -TB Placement Time:   -TB Present on Original Admission: Y  -TB Side: Left  -TB Location: gluteal   -TB Primary Wound Type: Pressure Inj  -TB    Retired Wound - Properties Group Placement Date: 06/23/25  -TB Placement Time: 1658  -TB Present on Original Admission: Y  -TB Side: Left  -TB Location: gluteal  -TB    Retired Wound - Properties Group Placement Date: 06/23/25  -TB Placement Time: 1658  -TB Present on Original Admission: Y  -TB Side: Left  -TB Location: gluteal  -TB    Retired Wound - Properties Group Date first assessed: 06/23/25  -TB Time first assessed: 1658  -TB Present on Original Admission: Y  -TB Side: Left  -TB Location: gluteal  -TB       Wound 06/23/25 1659 Left lower leg Other (Comments)    Wound - Properties Group Placement Date: 06/23/25  -TB Placement Time: 1659  -TB Present on Original Admission: Y  -TB Side: Left  -TB Orientation: lower  -TB Location: leg  -TB Primary Wound Type: Other  -TB    Retired Wound - Properties Group Placement Date: 06/23/25  -TB Placement Time: 1659  -TB Present on Original Admission: Y  -TB Side: Left  -TB Orientation: lower  -TB Location: leg  -TB    Retired Wound - Properties Group Placement Date: 06/23/25  -TB Placement Time: 1659  -TB Present on Original Admission: Y  -TB Side: Left  -TB Orientation: lower  -TB Location: leg  -TB    Retired Wound - Properties Group Date first assessed: 06/23/25  -TB Time first assessed: 1659  -TB Present on Original Admission: Y  -TB Side: Left  -TB Location: leg  -TB       Coping    Observed Emotional State calm;cooperative;pleasant  -LS        Verbalized Emotional State acceptance  -LS           Plan of Care Review    Plan of Care Reviewed With patient  -LS        Progress improving  -LS        Outcome Evaluation OT tx completed on this date. Pt A&Ox4 up in recliner on 4L/NC. Pt worked through BUE/core exercises with good endurance, dynamic sitting activities with  functional reaching and midline crossings tasks with focus on postural stabiltiy for dynamic standing and ADL transfers. Pt's O2 remained in the upper 90s  during tx, req multiple RBs during activity. Pt left up in recliner. OT POC to continue.  -LS           Positioning and Restraints    Pre-Treatment Position sitting in chair/recliner  -LS        Post Treatment Position chair  -LS        In Chair reclined;call light within reach;encouraged to call for assist  -LS           Therapy Plan Review/Discharge Plan (OT)    Anticipated Discharge Disposition (OT) [facility] nursing [facility]  -LS                  User Key  (r) = Recorded By, (t) = Taken By, (c) = Cosigned By      Initials Name Effective Dates    TB Car José, RN 02/21/24 -     LS Agueda Bowles COTA 09/22/22 -                      Occupational Therapy Education       Title: PT OT SLP Therapies (Done)       Topic: Occupational Therapy (Done)       Point: ADL training (Done)       Learning Progress Summary            Patient Acceptance, E,TB,D, DU,NR by LB at 6/24/2025 1305    Comment: Pt. educated on importance of body mechanics for sit<>stand   Family Acceptance, E,TB,D, DU,NR by LB at 6/24/2025 1305    Comment: Pt. educated on importance of body mechanics for sit<>stand                      Point: Body mechanics (Done)       Learning Progress Summary            Patient Acceptance, E,TB,D, DU,NR by LB at 6/24/2025 1305    Comment: Pt. educated on importance of body mechanics for sit<>stand   Family Acceptance, E,TB,D, DU,NR by LB at 6/24/2025 1305    Comment: Pt. educated on importance of body mechanics for sit<>stand                                      User Key       Initials Effective Dates Name Provider Type Discipline     05/12/25 -  Nadine Everett OT Student OT Student OT                      OT Recommendation and Plan     Plan of Care Review  Plan of Care Reviewed With: patient  Progress: improving  Outcome Evaluation: OT tx completed on this date. Pt A&Ox4 up in recliner on 4L/NC. Pt worked through BUE/core exercises with good endurance, dynamic sitting activities with  functional reaching and  midline crossings tasks with focus on postural stabiltiy for dynamic standing and ADL transfers. Pt's O2 remained in the upper 90s during tx, req multiple RBs during activity. Pt left up in recliner. OT POC to continue.  Plan of Care Reviewed With: patient  Outcome Evaluation: OT tx completed on this date. Pt A&Ox4 up in recliner on 4L/NC. Pt worked through BUE/core exercises with good endurance, dynamic sitting activities with  functional reaching and midline crossings tasks with focus on postural stabiltiy for dynamic standing and ADL transfers. Pt's O2 remained in the upper 90s during tx, req multiple RBs during activity. Pt left up in recliner. OT POC to continue.     Outcome Measures       Row Name 06/26/25 1400 06/25/25 1100          How much help from another is currently needed...    Putting on and taking off regular lower body clothing? 1  -LS 1  -LS     Bathing (including washing, rinsing, and drying) 2  -LS 2  -LS     Toileting (which includes using toilet bed pan or urinal) 1  -LS 1  -LS     Putting on and taking off regular upper body clothing 2  -LS 2  -LS     Taking care of personal grooming (such as brushing teeth) 2  -LS 2  -LS     Eating meals 4  -LS 4  -LS     AM-PAC 6 Clicks Score (OT) 12  -LS 12  -LS        Functional Assessment    Outcome Measure Options AM-PAC 6 Clicks Daily Activity (OT)  -LS AM-PAC 6 Clicks Daily Activity (OT)  -LS               User Key  (r) = Recorded By, (t) = Taken By, (c) = Cosigned By      Initials Name Provider Type    LS Agueda Bowles COTA Occupational Therapist Assistant                    Time Calculation:    Time Calculation- OT       Row Name 06/26/25 1544             Time Calculation- OT    OT Start Time 1440  -      OT Stop Time 1533  -      OT Time Calculation (min) 53 min  -      Total Timed Code Minutes- OT 53 minute(s)  -      OT Received On 06/26/25  -                User Key  (r) = Recorded By, (t) = Taken By, (c) = Cosigned By      Initials  Name Provider Type     Agueda Bowles COTA Occupational Therapist Assistant                  Therapy Charges for Today       Code Description Service Date Service Provider Modifiers Qty    81561492188 HC OT THERAPEUTIC ACT EA 15 MIN 6/25/2025 Agueda Bowles COTA GO 3    [id number] HC OT THERAPEUTIC ACT EA 15 MIN 6/26/2025 Agueda Bowles COTA GO 4                 KYA Oliver  6/26/2025

## 2025-06-26 NOTE — CONSULTS
Nephrology ([facility]) Consult Note      Patient:  Genevieve Cerda  YOB: 1940  Date of Service: 6/26/2025  MRN: [id number]   Acct: [id number]   Primary Care Physician: Germaine Diego APRN  Advance Directive:   Code Status and Medical Interventions: No CPR (Do Not Attempt to Resuscitate); Limited Support; No intubation (DNI)   Ordered at: 06/23/25 1629     Code Status (Patient has no pulse and is not breathing):    No CPR (Do Not Attempt to Resuscitate)     Medical Interventions (Patient has pulse or is breathing):    Limited Support     Medical Intervention Limits:    No intubation (DNI)     Level Of Support Discussed With:    Health Care Surrogate       Patient     Admit Date: 6/23/2025       Hospital Day: 3  Referring Provider: No ref. provider found      Patient Seen, Chart, Consults, Notes, Labs, Radiology studies reviewed.      Subjective:  Genevieve Cerda is an 85 y.o. female  whom we were consulted for MICHOACANO and CKD management.  Patient has a history of congestive heart failure, leg edema, atrial fibrillation, hypertension and chronic kidney disease stage IIIb.  She is status post nephrectomy.  Patient is followed at the renal clinic.  He was admitted on June 23 with increasing leg edema and CHF exacerbation.  She has been managed with diuretics intravenous drip.  Patient continues to be edematous.  Her serum creatinine was found to be at 1.9 with GFR at 24.  Renal service was consulted to manage her MICHOACANO and CKD.  Patient denies any change to her urinary habits.  She denies unusual dyspnea.    Allergies:  Codeine    Home Meds:  Medications Prior to Admission   Medication Sig Dispense Refill Last Dose/Taking    allopurinol (ZYLOPRIM) 100 MG tablet Take 1 tablet by mouth Daily.   Taking    amiodarone (PACERONE) 200 MG tablet Take 2 tablets by mouth 2 (Two) Times a Day.   Taking    apixaban (ELIQUIS) 2.5 MG tablet tablet Take 1 tablet by mouth Every 12 (Twelve)  Hours.   Taking    aspirin 81 MG EC tablet Take 1 tablet by mouth Daily.   Taking    cefdinir (OMNICEF) 300 MG capsule Take 1 capsule by mouth 2 (Two) Times a Day for 7 days. 14 capsule 0 Taking    citalopram (CeleXA) 10 MG tablet Take 1 tablet by mouth Daily.   Taking    furosemide (LASIX) 20 MG tablet Take 3 tablets by mouth 2 (Two) Times a Day.   Taking    gabapentin (NEURONTIN) 300 MG capsule Take 1 capsule by mouth Every 8 (Eight) Hours.   Taking    ipratropium-albuterol (DUO-NEB) 0.5-2.5 mg/3 ml nebulizer Take 3 mL by nebulization 4 (Four) Times a Day As Needed for Shortness of Air.   Taking As Needed    latanoprost (XALATAN) 0.005 % ophthalmic solution Administer 1 drop to both eyes Daily.   Taking    magnesium oxide (MAG-OX) 400 MG tablet Take 1 tablet by mouth Daily.   Taking    metoprolol tartrate (LOPRESSOR) 25 MG tablet Take 1 tablet by mouth 2 (Two) Times a Day. Hold for systolic blood pressure <100 or pulse <60   Taking    midodrine (PROAMATINE) 5 MG tablet Take 1 tablet by mouth 2 (Two) Times a Day. Hold if systolic blood pressure is >100   Taking    pantoprazole (PROTONIX) 40 MG EC tablet Take 1 tablet by mouth Daily.   Taking    rosuvastatin (CRESTOR) 5 MG tablet Take 1 tablet by mouth Daily.   Taking    Skin Protectants, Misc. (Eucerin) cream Apply 1 Application topically to the appropriate area as directed Daily. Apply to bilateral upper and lower extremities for dryness once daily and prn   Taking    tamsulosin (FLOMAX) 0.4 MG capsule 24 hr capsule Take 1 capsule by mouth Daily.   Taking    timolol (TIMOPTIC) 0.5 % ophthalmic solution Administer 1 drop to both eyes Daily.   Taking    torsemide (DEMADEX) 20 MG tablet Take 4 tablets by mouth 2 (Two) Times a Day. 240 tablet 11 Taking    vitamin D (ERGOCALCIFEROL) 1.25 MG (76987 UT) capsule capsule Take 1 capsule by mouth Every 7 (Seven) Days. Takes on Monday   Taking    zinc sulfate (ZINCATE) 220 (50 Zn) MG capsule Take 1 capsule by mouth Daily.    Taking    acetaminophen (TYLENOL) 650 MG 8 hr tablet Take 1 tablet by mouth Every 6 (Six) Hours As Needed for Mild Pain.       polyethylene glycol (MiraLax) 17 g packet Take 17 g by mouth Daily As Needed (constipation).       sennosides-docusate (senna-docusate sodium) 8.6-50 MG per tablet Take 2 tablets by mouth Daily As Needed for Constipation.          Medicines:  Current Facility-Administered Medications   Medication Dose Route Frequency Provider Last Rate Last Admin    acetaminophen (TYLENOL) tablet 650 mg  650 mg Oral Q4H PRN Mary Falcon APRN        Or    acetaminophen (TYLENOL) 160 MG/5ML oral solution 650 mg  650 mg Oral Q4H PRN Mary Falcon APRN        Or    acetaminophen (TYLENOL) suppository 650 mg  650 mg Rectal Q4H PRN Mary Falcon APRN        allopurinol (ZYLOPRIM) tablet 100 mg  100 mg Oral Daily Indra Zamorano MD   100 mg at 06/26/25 0909    amiodarone (PACERONE) tablet 400 mg  400 mg Oral BID Indra Zamorano MD   400 mg at 06/26/25 0908    apixaban (ELIQUIS) tablet 2.5 mg  2.5 mg Oral BID Mary Falcon APRN   2.5 mg at 06/26/25 0909    aspirin EC tablet 81 mg  81 mg Oral Daily Indra Zamorano MD   81 mg at 06/26/25 0909    sennosides-docusate (PERICOLACE) 8.6-50 MG per tablet 2 tablet  2 tablet Oral BID PRN Mary Falcon APRN        And    polyethylene glycol (MIRALAX) packet 17 g  17 g Oral Daily PRN Mary Falcon APRN        And    bisacodyl (DULCOLAX) EC tablet 5 mg  5 mg Oral Daily PRN Mary Falcon APRN        And    bisacodyl (DULCOLAX) suppository 10 mg  10 mg Rectal Daily PRN Mary Falcon APRN        Calcium Replacement - Follow Nurse / BPA Driven Protocol   Not Applicable PRN Mary Falcon APRN        cholecalciferol (VITAMIN D3) tablet 5,000 Units  5,000 Units Oral Daily Indra Zamorano MD   5,000 Units at 06/26/25 0908    citalopram (CeleXA) tablet 10 mg  10 mg Oral Daily Indra Zamorano MD   10 mg at 06/26/25 0909    furosemide (LASIX)  200 mg in dextrose (D5W) 5 % 100 mL infusion  5 mg/hr Intravenous Continuous Nicholas Hale MD 2.5 mL/hr at 06/25/25 0146 5 mg/hr at 06/25/25 0146    gabapentin (NEURONTIN) capsule 300 mg  300 mg Oral Q8H Indra Zamorano MD   300 mg at 06/26/25 0525    ipratropium-albuterol (DUO-NEB) nebulizer solution 3 mL  3 mL Nebulization 4x Daily PRN Indra Zamorano MD        latanoprost (XALATAN) 0.005 % ophthalmic solution 1 drop  1 drop Both Eyes Nightly Indra Zamorano MD   1 drop at 06/25/25 2059    magnesium oxide (MAG-OX) tablet 400 mg  400 mg Oral Daily Indra Zamorano MD   400 mg at 06/26/25 0909    Magnesium Standard Dose Replacement - Follow Nurse / BPA Driven Protocol   Not Applicable PRN Mary Falcon APRN        metoprolol tartrate (LOPRESSOR) tablet 25 mg  25 mg Oral BID Indra Zamorano MD   25 mg at 06/26/25 0909    midodrine (PROAMATINE) tablet 5 mg  5 mg Oral BID AC Indra Zamorano MD   5 mg at 06/26/25 0909    nitroglycerin (NITROSTAT) SL tablet 0.4 mg  0.4 mg Sublingual Q5 Min PRN Mary Falcon APRN        ondansetron ODT (ZOFRAN-ODT) disintegrating tablet 4 mg  4 mg Oral Q6H PRN Mary Falcon APRN        Or    ondansetron (ZOFRAN) injection 4 mg  4 mg Intravenous Q6H PRN Mary Falcon APRN        pantoprazole (PROTONIX) EC tablet 40 mg  40 mg Oral Daily Indra Zamorano MD   40 mg at 06/26/25 0909    Phosphorus Replacement - Follow Nurse / BPA Driven Protocol   Not Applicable PRN Mary Falcon APRN        polyethylene glycol (MIRALAX) packet 17 g  17 g Oral Daily PRN Indra Zamorano MD        Potassium Replacement - Follow Nurse / BPA Driven Protocol   Not Applicable PRN Falcon, Mary L, APRN        rosuvastatin (CRESTOR) tablet 5 mg  5 mg Oral Daily Indra Zamorano MD   5 mg at 06/26/25 0910    sennosides-docusate (PERICOLACE) 8.6-50 MG per tablet 2 tablet  2 tablet Oral Daily PRN Inrda Zamorano MD        sodium chloride 0.9 % flush 10 mL  10 mL  "Intravenous Q12H Mary Falcon APRN   10 mL at 06/26/25 0910    sodium chloride 0.9 % flush 10 mL  10 mL Intravenous PRN Mary Falcon APRN        sodium chloride 0.9 % infusion 40 mL  40 mL Intravenous PRN Mary Falcon APRN        tamsulosin (FLOMAX) 24 hr capsule 0.4 mg  0.4 mg Oral Daily Indra Zamorano MD   0.4 mg at 06/26/25 0909    timolol (TIMOPTIC) 0.5 % ophthalmic solution 1 drop  1 drop Both Eyes Daily Indra Zamorano MD   1 drop at 06/26/25 0910    zinc sulfate (ZINCATE) capsule 220 mg  220 mg Oral Daily Indra Zamorano MD   220 mg at 06/26/25 0909       Past Medical History:  History reviewed. No pertinent past medical history.    Past Surgical History:  No past surgical history on file.    Family History  History reviewed. No pertinent family history.    Social History  Social History     Socioeconomic History    Marital status:    Tobacco Use    Smoking status: Never    Smokeless tobacco: Never   Vaping Use    Vaping status: Never Used   Substance and Sexual Activity    Alcohol use: Yes     Comment: rarely    Drug use: Never    Sexual activity: Defer         Review of Systems:  History obtained from chart review and the patient  General ROS: No fever or chills  Respiratory ROS: No cough, shortness of breath, wheezing  Cardiovascular ROS: no chest pain or dyspnea on exertion  Gastrointestinal ROS: No abdominal pain or melena  Genito-Urinary ROS: No dysuria or hematuria  14 point ROS reviewed with the patient and negative except as noted above and in the HPI unless unable to obtain.    Objective:  /70 (BP Location: Right arm, Patient Position: Lying)   Pulse 77   Temp 97.6 °F (36.4 °C) (Oral)   Resp 18   Ht 157.5 cm (62\")   Wt 76.3 kg (168 lb 3.4 oz)   SpO2 94%   BMI 30.77 kg/m²     Intake/Output Summary (Last 24 hours) at 6/26/2025 1421  Last data filed at 6/26/2025 0530  Gross per 24 hour   Intake --   Output 1050 ml   Net -1050 ml     General: awake/alert "   Head: Atraumatic, normocephalic  Neck; no masses, no JVD  Chest:  clear to auscultation bilaterally without respiratory distress  CVS: regular rate and rhythm, soft systolic murmur  Abdominal: soft, nontender, normal bowel sounds  Extremities: Bilateral 1-2+ leg edema  Skin: warm and dry without rash  Neuro: No focal motor deficits  Musculoskeletal: No obvious joint effusions    Labs:  Lab Results (last 7 days)       Procedure Component Value Units Date/Time    Basic Metabolic Panel [[id number]]  (Abnormal) Collected: 06/26/25 0345    Specimen: Blood Updated: 06/26/25 0537     Glucose 110 mg/dL      BUN 93.9 mg/dL      Creatinine 1.97 mg/dL      Sodium 128 mmol/L      Potassium 4.3 mmol/L      Chloride 92 mmol/L      CO2 25.0 mmol/L      Calcium 8.2 mg/dL      BUN/Creatinine Ratio 47.7     Anion Gap 11.0 mmol/L      eGFR 24.4 mL/min/1.73     Narrative:      GFR Categories in Chronic Kidney Disease (CKD)              GFR Category          GFR (mL/min/1.73)    Interpretation  G1                    90 or greater        Normal or high (1)  G2                    60-89                Mild decrease (1)  G3a                   45-59                Mild to moderate decrease  G3b                   30-44                Moderate to severe decrease  G4                    15-29                Severe decrease  G5                    14 or less           Kidney failure    (1)In the absence of evidence of kidney disease, neither GFR category G1 or G2 fulfill the criteria for CKD.    eGFR calculation 2021 CKD-EPI creatinine equation, which does not include race as a factor    CBC (No Diff) [[id number]]  (Abnormal) Collected: 06/26/25 0345    Specimen: Blood Updated: 06/26/25 0505     WBC 6.77 10*3/mm3      RBC 3.42 10*6/mm3      Hemoglobin 8.7 g/dL      Hematocrit 28.3 %      MCV 82.7 fL      MCH 25.4 pg      MCHC 30.7 g/dL      RDW 19.1 %      RDW-SD 56.7 fl      MPV 10.8 fL      Platelets 298 10*3/mm3     Basic Metabolic Panel  [[id number]]  (Abnormal) Collected: 06/25/25 0620    Specimen: Blood Updated: 06/25/25 0704     Glucose 104 mg/dL      BUN 90.3 mg/dL      Creatinine 2.01 mg/dL      Sodium 127 mmol/L      Potassium 4.2 mmol/L      Chloride 91 mmol/L      CO2 27.0 mmol/L      Calcium 8.4 mg/dL      BUN/Creatinine Ratio 44.9     Anion Gap 9.0 mmol/L      eGFR 23.8 mL/min/1.73     Narrative:      GFR Categories in Chronic Kidney Disease (CKD)              GFR Category          GFR (mL/min/1.73)    Interpretation  G1                    90 or greater        Normal or high (1)  G2                    60-89                Mild decrease (1)  G3a                   45-59                Mild to moderate decrease  G3b                   30-44                Moderate to severe decrease  G4                    15-29                Severe decrease  G5                    14 or less           Kidney failure    (1)In the absence of evidence of kidney disease, neither GFR category G1 or G2 fulfill the criteria for CKD.    eGFR calculation 2021 CKD-EPI creatinine equation, which does not include race as a factor    CBC (No Diff) [[id number]]  (Abnormal) Collected: 06/25/25 0620    Specimen: Blood Updated: 06/25/25 0635     WBC 6.26 10*3/mm3      RBC 3.47 10*6/mm3      Hemoglobin 8.7 g/dL      Hematocrit 28.7 %      MCV 82.7 fL      MCH 25.1 pg      MCHC 30.3 g/dL      RDW 19.2 %      RDW-SD 57.1 fl      MPV 10.2 fL      Platelets 304 10*3/mm3     Basic Metabolic Panel [[id number]]  (Abnormal) Collected: 06/24/25 0531    Specimen: Blood Updated: 06/24/25 0730     Glucose 87 mg/dL      BUN 86.7 mg/dL      Creatinine 2.21 mg/dL      Sodium 124 mmol/L      Potassium 4.3 mmol/L      Chloride 89 mmol/L      CO2 23.0 mmol/L      Calcium 8.1 mg/dL      BUN/Creatinine Ratio 39.2     Anion Gap 12.0 mmol/L      eGFR 21.2 mL/min/1.73     Narrative:      Unable to Calculate GFR result due to patient's height not being entered.  GFR Categories in Chronic Kidney Disease  (CKD)              GFR Category          GFR (mL/min/1.73)    Interpretation  G1                    90 or greater        Normal or high (1)  G2                    60-89                Mild decrease (1)  G3a                   45-59                Mild to moderate decrease  G3b                   30-44                Moderate to severe decrease  G4                    15-29                Severe decrease  G5                    14 or less           Kidney failure    (1)In the absence of evidence of kidney disease, neither GFR category G1 or G2 fulfill the criteria for CKD.    eGFR calculation 2021 CKD-EPI creatinine equation, which does not include race as a factor    CBC (No Diff) [[id number]]  (Abnormal) Collected: 06/24/25 0531    Specimen: Blood Updated: 06/24/25 0643     WBC 7.26 10*3/mm3      RBC 3.45 10*6/mm3      Hemoglobin 8.6 g/dL      Hematocrit 28.5 %      MCV 82.6 fL      MCH 24.9 pg      MCHC 30.2 g/dL      RDW 19.1 %      RDW-SD 57.2 fl      MPV 10.8 fL      Platelets 314 10*3/mm3     CBC & Differential [[id number]]  (Abnormal) Collected: 06/23/25 1238    Specimen: Blood Updated: 06/23/25 1252    Narrative:      The following orders were created for panel order CBC & Differential.  Procedure                               Abnormality         Status                     ---------                               -----------         ------                     CBC Auto Differential[[id number]]        Abnormal            Final result                 Please view results for these tests on the individual orders.    CBC Auto Differential [[id number]]  (Abnormal) Collected: 06/23/25 1238    Specimen: Blood Updated: 06/23/25 1252     WBC 8.01 10*3/mm3      RBC 3.77 10*6/mm3      Hemoglobin 9.8 g/dL      Hematocrit 30.9 %      MCV 82.0 fL      MCH 26.0 pg      MCHC 31.7 g/dL      RDW 18.9 %      RDW-SD 55.5 fl      MPV 11.1 fL      Platelets 311 10*3/mm3      Neutrophil % 64.1 %      Lymphocyte % 21.2 %      Monocyte % 9.5 %       Eosinophil % 2.1 %      Basophil % 1.1 %      Immature Grans % 2.0 %      Neutrophils, Absolute 5.13 10*3/mm3      Lymphocytes, Absolute 1.70 10*3/mm3      Monocytes, Absolute 0.76 10*3/mm3      Eosinophils, Absolute 0.17 10*3/mm3      Basophils, Absolute 0.09 10*3/mm3      Immature Grans, Absolute 0.16 10*3/mm3      nRBC 0.0 /100 WBC     Comprehensive Metabolic Panel [[id number]]  (Abnormal) Collected: 06/23/25 0844    Specimen: Blood from Arm, Right Updated: 06/23/25 1117     Glucose 101 mg/dL      BUN 89.4 mg/dL      Creatinine 2.10 mg/dL      Sodium 127 mmol/L      Potassium 5.0 mmol/L      Chloride 89 mmol/L      CO2 24.0 mmol/L      Calcium 8.3 mg/dL      Total Protein 5.6 g/dL      Albumin 2.5 g/dL      ALT (SGPT) 14 U/L      AST (SGOT) 16 U/L      Alkaline Phosphatase 83 U/L      Total Bilirubin <0.2 mg/dL      Globulin 3.1 gm/dL      A/G Ratio 0.8 g/dL      BUN/Creatinine Ratio 42.6     Anion Gap 14.0 mmol/L      eGFR 22.6 mL/min/1.73     Narrative:      GFR Categories in Chronic Kidney Disease (CKD)              GFR Category          GFR (mL/min/1.73)    Interpretation  G1                    90 or greater        Normal or high (1)  G2                    60-89                Mild decrease (1)  G3a                   45-59                Mild to moderate decrease  G3b                   30-44                Moderate to severe decrease  G4                    15-29                Severe decrease  G5                    14 or less           Kidney failure    (1)In the absence of evidence of kidney disease, neither GFR category G1 or G2 fulfill the criteria for CKD.    eGFR calculation 2021 CKD-EPI creatinine equation, which does not include race as a factor    BNP [[id number]]  (Abnormal) Collected: 06/23/25 0844    Specimen: Blood from Arm, Right Updated: 06/23/25 1117     proBNP 9,946.0 pg/mL     Narrative:      This assay is used as an aid in the diagnosis of individuals suspected of having heart failure. It  "can be used as an aid in the diagnosis of acute decompensated heart failure (ADHF) in patients presenting with signs and symptoms of ADHF to the emergency department (ED). In addition, NT-proBNP of <300 pg/mL indicates ADHF is not likely.    Age Range Result Interpretation  NT-proBNP Concentration (pg/mL:      <50             Positive            >450                   Gray                 300-450                    Negative             <300    50-75           Positive            >900                  Gray                300-900                  Negative            <300      >75             Positive            >1800                  Gray                300-1800                  Negative            <300             Radiology:   Imaging Results (Last 72 Hours)       ** No results found for the last 72 hours. **            Culture:  No components found for: \"WOUNDCUL\", \"3\"  No components found for: \"CSFCUL\", \"3\"  No components found for: \"BC\", \"3\"  No components found for: \"URINECUL\", \"3\"      Assessment   - Acute kidney injury-likely cardiorenal syndrome  - CKD stage IIIb  - Hypertensive nephrosclerosis  - Atrial fibrillation  - Hyponatremia  - Leg edema  - Anemia of CKD    Plan:  At this stage, agree with intravenous Lasix drip and will add oral metolazone to support the diuresis.  Will continue to monitor kidney function and electrolytes.  There is no indication for dialysis.  Check iron studies to manage her anemia.  Follow-up uric acid level.      Thank you for the consult, we appreciate the opportunity to provide care to your patients.  Feel free to contact me if I can be of any further assistance.      Brennon Gamez MD  6/26/2025  14:21 CDT  "

## 2025-06-26 NOTE — CONSULTS
[facility] HEART GROUP CONSULT NOTE    Referring Provider: Dr. Indra Zamorano    Reason for Consultation: Acute on chronic heart failure with preserved ejection fraction    Chief Complaint   Patient presents with    Leg Swelling     Pt sent for admit by Dr. Sharma due to bilat leg swelling. Pt was recently started on oral lasix with no effect.       Subjective .     History of present illness:  Genevieve Cerda is a 85 y.o. female with a known PMH significant for chronic heart failure with preserved ejection fraction, persistent atrial fibrillation on chronic anticoagulation, chronic kidney disease, obesity and hyperlipidemia.  She was seen by Dr. Yanez with the heart failure clinic on 6/23/2025 at which time admission for diuresis with close renal function monitoring was recommended.  She was admitted to the hospitalist service on 6/23/2025 and started on a continuous furosemide infusion at 5 mg/h.  Cardiology service has been consulted for assistance with management of acute on chronic heart failure with preserved ejection fraction.    History  History reviewed. No pertinent past medical history.,   No past surgical history on file.,   History reviewed. No pertinent family history.,   Social History     Tobacco Use    Smoking status: Never    Smokeless tobacco: Never   Vaping Use    Vaping status: Never Used   Substance Use Topics    Alcohol use: Yes     Comment: rarely    Drug use: Never   ,     Medications  Current Facility-Administered Medications   Medication Dose Route Frequency Provider Last Rate Last Admin    acetaminophen (TYLENOL) tablet 650 mg  650 mg Oral Q4H PRN Mary Falcon APRN        Or    acetaminophen (TYLENOL) 160 MG/5ML oral solution 650 mg  650 mg Oral Q4H PRN Mary Falcon APRN        Or    acetaminophen (TYLENOL) suppository 650 mg  650 mg Rectal Q4H PRN Mary Falcon APRN        allopurinol (ZYLOPRIM) tablet 100 mg  100 mg Oral Daily Indra Zamorano MD   100 mg at  06/26/25 0909    amiodarone (PACERONE) tablet 400 mg  400 mg Oral BID Indra Zamorano MD   400 mg at 06/26/25 0908    apixaban (ELIQUIS) tablet 2.5 mg  2.5 mg Oral BID Mary Falcon APRN   2.5 mg at 06/26/25 0909    aspirin EC tablet 81 mg  81 mg Oral Daily Indra Zamorano MD   81 mg at 06/26/25 0909    sennosides-docusate (PERICOLACE) 8.6-50 MG per tablet 2 tablet  2 tablet Oral BID PRN Mary Falcon APRN        And    polyethylene glycol (MIRALAX) packet 17 g  17 g Oral Daily PRN Mary Falcon APRN        And    bisacodyl (DULCOLAX) EC tablet 5 mg  5 mg Oral Daily PRN Mary Falcon APRN        And    bisacodyl (DULCOLAX) suppository 10 mg  10 mg Rectal Daily PRN Mary Falcon APRKARINA        Calcium Replacement - Follow Nurse / BPA Driven Protocol   Not Applicable PRN Mary Falcon APRN        cholecalciferol (VITAMIN D3) tablet 5,000 Units  5,000 Units Oral Daily Indra Zamorano MD   5,000 Units at 06/26/25 0908    citalopram (CeleXA) tablet 10 mg  10 mg Oral Daily Indra Zamorano MD   10 mg at 06/26/25 0909    furosemide (LASIX) 200 mg in dextrose (D5W) 5 % 100 mL infusion  5 mg/hr Intravenous Continuous Nicholas Hale MD 2.5 mL/hr at 06/25/25 0146 5 mg/hr at 06/25/25 0146    gabapentin (NEURONTIN) capsule 300 mg  300 mg Oral Q8H Indra Zamorano MD   300 mg at 06/26/25 0525    ipratropium-albuterol (DUO-NEB) nebulizer solution 3 mL  3 mL Nebulization 4x Daily PRN Indra Zamorano MD        latanoprost (XALATAN) 0.005 % ophthalmic solution 1 drop  1 drop Both Eyes Nightly Indra Zamorano MD   1 drop at 06/25/25 2059    magnesium oxide (MAG-OX) tablet 400 mg  400 mg Oral Daily Indra Zamorano MD   400 mg at 06/26/25 0909    Magnesium Standard Dose Replacement - Follow Nurse / BPA Driven Protocol   Not Applicable PRN Mary Falcon, APRKARINA        metoprolol tartrate (LOPRESSOR) tablet 25 mg  25 mg Oral BID Indra Zamorano MD   25 mg at 06/26/25 0909    midodrine  (PROAMATINE) tablet 5 mg  5 mg Oral BID AC Indra Zamorano MD   5 mg at 06/26/25 0909    nitroglycerin (NITROSTAT) SL tablet 0.4 mg  0.4 mg Sublingual Q5 Min PRN Mary Falcon APRN        ondansetron ODT (ZOFRAN-ODT) disintegrating tablet 4 mg  4 mg Oral Q6H PRN Mary Falcon APRN        Or    ondansetron (ZOFRAN) injection 4 mg  4 mg Intravenous Q6H PRN Mary Falcon APRN        pantoprazole (PROTONIX) EC tablet 40 mg  40 mg Oral Daily Indra Zamorano MD   40 mg at 06/26/25 0909    Phosphorus Replacement - Follow Nurse / BPA Driven Protocol   Not Applicable PRN Mary Falcon APRN        polyethylene glycol (MIRALAX) packet 17 g  17 g Oral Daily PRN Indra Zamorano MD        Potassium Replacement - Follow Nurse / BPA Driven Protocol   Not Applicable PRN Mary Falcon APRN        rosuvastatin (CRESTOR) tablet 5 mg  5 mg Oral Daily Indra Zamorano MD   5 mg at 06/26/25 0910    sennosides-docusate (PERICOLACE) 8.6-50 MG per tablet 2 tablet  2 tablet Oral Daily PRN Indra Zamorano MD        sodium chloride 0.9 % flush 10 mL  10 mL Intravenous Q12H Mary Falcon APRN   10 mL at 06/26/25 0910    sodium chloride 0.9 % flush 10 mL  10 mL Intravenous PRN Mary Falcon APRN        sodium chloride 0.9 % infusion 40 mL  40 mL Intravenous PRN Mary Falcon APRN        tamsulosin (FLOMAX) 24 hr capsule 0.4 mg  0.4 mg Oral Daily Indra Zamorano MD   0.4 mg at 06/26/25 0909    timolol (TIMOPTIC) 0.5 % ophthalmic solution 1 drop  1 drop Both Eyes Daily Indra Zamorano MD   1 drop at 06/26/25 0910    zinc sulfate (ZINCATE) capsule 220 mg  220 mg Oral Daily Indra Zamorano MD   220 mg at 06/26/25 0909       Allergies:  Codeine    Review of Systems  Review of Systems   Constitutional: Negative for chills, decreased appetite, fever, malaise/fatigue, night sweats, weight gain and weight loss.   HENT:  Negative for nosebleeds.    Eyes:  Negative for visual disturbance.    Cardiovascular:  Positive for dyspnea on exertion and leg swelling. Negative for chest pain, near-syncope, orthopnea, palpitations, paroxysmal nocturnal dyspnea and syncope.   Respiratory:  Positive for shortness of breath. Negative for cough, hemoptysis, snoring and wheezing.    Endocrine: Negative for cold intolerance and heat intolerance.   Hematologic/Lymphatic: Does not bruise/bleed easily.   Skin:  Negative for rash.   Musculoskeletal:  Negative for back pain and falls.   Gastrointestinal:  Negative for abdominal pain, change in bowel habit, constipation, diarrhea, dysphagia, heartburn, nausea and vomiting.   Genitourinary:  Negative for hematuria.   Neurological:  Negative for dizziness, headaches, light-headedness and weakness.   Psychiatric/Behavioral:  Negative for altered mental status.    Allergic/Immunologic: Negative for persistent infections.       Objective     Physical Exam:  Patient Vitals for the past 24 hrs:   BP Temp Temp src Pulse Resp SpO2 Weight   06/26/25 0732 104/51 97.5 °F (36.4 °C) Oral 77 18 98 % --   06/26/25 0333 112/60 97.4 °F (36.3 °C) Oral 78 16 100 % 76.3 kg (168 lb 3.4 oz)   06/26/25 0049 113/58 97 °F (36.1 °C) Oral 75 18 100 % --   06/25/25 2006 98/62 97.8 °F (36.6 °C) Oral 84 18 100 % --   06/25/25 1601 97/62 97.5 °F (36.4 °C) Oral 92 18 95 % --   06/25/25 1414 -- -- -- -- -- -- 76.3 kg (168 lb 3.2 oz)     Telemetry: Afib 67-81        06/25/25  0535 06/25/25  1414 06/26/25  0333   Weight: 76.3 kg (168 lb 3.2 oz) 76.3 kg (168 lb 3.2 oz) 76.3 kg (168 lb 3.4 oz)     Weight change: 0 kg (0 lb)      Intake/Output Summary (Last 24 hours) at 6/26/2025 1217  Last data filed at 6/26/2025 0530  Gross per 24 hour   Intake --   Output 1050 ml   Net -1050 ml     I/O last 3 completed shifts:  In: 724.1 [P.O.:640; I.V.:84.1]  Out: 2150 [Urine:2150]  No intake/output data recorded.    Net fluid balance since admission: -2,715 ml    Vitals and nursing note reviewed.   Constitutional:        General: Not in acute distress.     Appearance: Normal and healthy appearance. Well-developed, normal weight and not in distress. Not diaphoretic.   Eyes:      General: Lids are normal.         Right eye: No discharge.         Left eye: No discharge.      Conjunctiva/sclera: Conjunctivae normal.      Pupils: Pupils are equal, round, and reactive to light.   HENT:      Head: Normocephalic and atraumatic.      Jaw: There is normal jaw occlusion.      Right Ear: External ear normal.      Left Ear: External ear normal.      Nose: Nose normal.   Neck:      Thyroid: No thyromegaly.      Vascular: No carotid bruit, JVD or JVR. JVD normal.      Trachea: Trachea normal. No tracheal deviation.   Pulmonary:      Effort: Pulmonary effort is normal. No respiratory distress.      Breath sounds: Examination of the right-upper field reveals decreased breath sounds. Examination of the left-upper field reveals decreased breath sounds. Examination of the right-middle field reveals decreased breath sounds. Examination of the left-middle field reveals decreased breath sounds. Examination of the right-lower field reveals decreased breath sounds. Examination of the left-lower field reveals decreased breath sounds. Decreased breath sounds present. No wheezing. No rhonchi. No rales.   Chest:      Chest wall: Not tender to palpatation.   Cardiovascular:      PMI at left midclavicular line. Normal rate. Irregularly irregular rhythm. Normal S1. Normal S2.       Murmurs: There is a grade 2/6 high frequency blowing holosystolic murmur at the apex. There is a grade 2/4 high frequency blowing decrescendo, early diastolic murmur at the URSB, radiating to the apex.      No gallop.  No click. No rub.   Pulses:     Intact distal pulses. No decreased pulses.   Edema:     Peripheral edema present.     Pretibial: bilateral 3+ edema of the pretibial area.     Ankle: bilateral 3+ edema of the ankle.     Feet: bilateral 3+ edema of the feet.  Abdominal:       General: Bowel sounds are normal. There is no distension.      Palpations: Abdomen is soft.      Tenderness: There is no abdominal tenderness.   Musculoskeletal: Normal range of motion.         General: No tenderness or deformity.      Cervical back: Normal range of motion and neck supple. Skin:     General: Skin is warm and dry.      Coloration: Skin is not pale.      Findings: No erythema or rash.   Neurological:      General: No focal deficit present.      Mental Status: Alert, oriented to person, place, and time and oriented to person, place and time.   Psychiatric:         Attention and Perception: Attention and perception normal.         Mood and Affect: Mood and affect normal.         Speech: Speech normal.         Behavior: Behavior normal.         Thought Content: Thought content normal.         Cognition and Memory: Cognition and memory normal.         Judgment: Judgment normal.         Results Review:   I reviewed the patient's new clinical results.    Lab Results (last 72 hours)       Procedure Component Value Units Date/Time    Basic Metabolic Panel [[id number]]  (Abnormal) Collected: 06/26/25 0345    Specimen: Blood Updated: 06/26/25 0537     Glucose 110 mg/dL      BUN 93.9 mg/dL      Creatinine 1.97 mg/dL      Sodium 128 mmol/L      Potassium 4.3 mmol/L      Chloride 92 mmol/L      CO2 25.0 mmol/L      Calcium 8.2 mg/dL      BUN/Creatinine Ratio 47.7     Anion Gap 11.0 mmol/L      eGFR 24.4 mL/min/1.73     Narrative:      GFR Categories in Chronic Kidney Disease (CKD)              GFR Category          GFR (mL/min/1.73)    Interpretation  G1                    90 or greater        Normal or high (1)  G2                    60-89                Mild decrease (1)  G3a                   45-59                Mild to moderate decrease  G3b                   30-44                Moderate to severe decrease  G4                    15-29                Severe decrease  G5                    14 or less            Kidney failure    (1)In the absence of evidence of kidney disease, neither GFR category G1 or G2 fulfill the criteria for CKD.    eGFR calculation 2021 CKD-EPI creatinine equation, which does not include race as a factor    CBC (No Diff) [[id number]]  (Abnormal) Collected: 06/26/25 0345    Specimen: Blood Updated: 06/26/25 0505     WBC 6.77 10*3/mm3      RBC 3.42 10*6/mm3      Hemoglobin 8.7 g/dL      Hematocrit 28.3 %      MCV 82.7 fL      MCH 25.4 pg      MCHC 30.7 g/dL      RDW 19.1 %      RDW-SD 56.7 fl      MPV 10.8 fL      Platelets 298 10*3/mm3     Basic Metabolic Panel [[id number]]  (Abnormal) Collected: 06/25/25 0620    Specimen: Blood Updated: 06/25/25 0704     Glucose 104 mg/dL      BUN 90.3 mg/dL      Creatinine 2.01 mg/dL      Sodium 127 mmol/L      Potassium 4.2 mmol/L      Chloride 91 mmol/L      CO2 27.0 mmol/L      Calcium 8.4 mg/dL      BUN/Creatinine Ratio 44.9     Anion Gap 9.0 mmol/L      eGFR 23.8 mL/min/1.73     Narrative:      GFR Categories in Chronic Kidney Disease (CKD)              GFR Category          GFR (mL/min/1.73)    Interpretation  G1                    90 or greater        Normal or high (1)  G2                    60-89                Mild decrease (1)  G3a                   45-59                Mild to moderate decrease  G3b                   30-44                Moderate to severe decrease  G4                    15-29                Severe decrease  G5                    14 or less           Kidney failure    (1)In the absence of evidence of kidney disease, neither GFR category G1 or G2 fulfill the criteria for CKD.    eGFR calculation 2021 CKD-EPI creatinine equation, which does not include race as a factor    CBC (No Diff) [[id number]]  (Abnormal) Collected: 06/25/25 0620    Specimen: Blood Updated: 06/25/25 0635     WBC 6.26 10*3/mm3      RBC 3.47 10*6/mm3      Hemoglobin 8.7 g/dL      Hematocrit 28.7 %      MCV 82.7 fL      MCH 25.1 pg      MCHC 30.3 g/dL      RDW 19.2 %       RDW-SD 57.1 fl      MPV 10.2 fL      Platelets 304 10*3/mm3     Basic Metabolic Panel [[id number]]  (Abnormal) Collected: 06/24/25 0531    Specimen: Blood Updated: 06/24/25 0730     Glucose 87 mg/dL      BUN 86.7 mg/dL      Creatinine 2.21 mg/dL      Sodium 124 mmol/L      Potassium 4.3 mmol/L      Chloride 89 mmol/L      CO2 23.0 mmol/L      Calcium 8.1 mg/dL      BUN/Creatinine Ratio 39.2     Anion Gap 12.0 mmol/L      eGFR 21.2 mL/min/1.73     Narrative:      Unable to Calculate GFR result due to patient's height not being entered.  GFR Categories in Chronic Kidney Disease (CKD)              GFR Category          GFR (mL/min/1.73)    Interpretation  G1                    90 or greater        Normal or high (1)  G2                    60-89                Mild decrease (1)  G3a                   45-59                Mild to moderate decrease  G3b                   30-44                Moderate to severe decrease  G4                    15-29                Severe decrease  G5                    14 or less           Kidney failure    (1)In the absence of evidence of kidney disease, neither GFR category G1 or G2 fulfill the criteria for CKD.    eGFR calculation 2021 CKD-EPI creatinine equation, which does not include race as a factor    CBC (No Diff) [[id number]]  (Abnormal) Collected: 06/24/25 0531    Specimen: Blood Updated: 06/24/25 0643     WBC 7.26 10*3/mm3      RBC 3.45 10*6/mm3      Hemoglobin 8.6 g/dL      Hematocrit 28.5 %      MCV 82.6 fL      MCH 24.9 pg      MCHC 30.2 g/dL      RDW 19.1 %      RDW-SD 57.2 fl      MPV 10.8 fL      Platelets 314 10*3/mm3     CBC & Differential [[id number]]  (Abnormal) Collected: 06/23/25 1238    Specimen: Blood Updated: 06/23/25 1252    Narrative:      The following orders were created for panel order CBC & Differential.  Procedure                               Abnormality         Status                     ---------                               -----------         ------       "               CBC Auto Differential[[id number]]        Abnormal            Final result                 Please view results for these tests on the individual orders.    CBC Auto Differential [[id number]]  (Abnormal) Collected: 06/23/25 1238    Specimen: Blood Updated: 06/23/25 1252     WBC 8.01 10*3/mm3      RBC 3.77 10*6/mm3      Hemoglobin 9.8 g/dL      Hematocrit 30.9 %      MCV 82.0 fL      MCH 26.0 pg      MCHC 31.7 g/dL      RDW 18.9 %      RDW-SD 55.5 fl      MPV 11.1 fL      Platelets 311 10*3/mm3      Neutrophil % 64.1 %      Lymphocyte % 21.2 %      Monocyte % 9.5 %      Eosinophil % 2.1 %      Basophil % 1.1 %      Immature Grans % 2.0 %      Neutrophils, Absolute 5.13 10*3/mm3      Lymphocytes, Absolute 1.70 10*3/mm3      Monocytes, Absolute 0.76 10*3/mm3      Eosinophils, Absolute 0.17 10*3/mm3      Basophils, Absolute 0.09 10*3/mm3      Immature Grans, Absolute 0.16 10*3/mm3      nRBC 0.0 /100 WBC             No results found for: \"ECHOEFEST\"    Imaging Results (Last 72 Hours)       ** No results found for the last 72 hours. **          Assessment       Acute on chronic heart failure with preserved ejection fraction (HFpEF)    Acute renal failure superimposed on stage 3b chronic kidney disease    Hyponatremia    Atrial fibrillation, persistent    Hypercholesterolemia    Current use of long term anticoagulation    Plan     1.  Acute on chronic heart failure with preserved ejection fraction-  NYHA class III, stage C. Start Jardiance 10 mg daily. Continue IV lasix gtt for now. Will discuss potential increase in dose versus transition to Bumex gtt with Dr. Parada. BMP every 12 hours while on continuous diuretic infusion.  Continue daily weights.  Continue heart healthy diet.  Continue to monitor intake and output.  2.  Acute renal failure superimposed on stage IIIb chronic kidney disease-consult nephrology for assistance with management of hypervolemia in the setting of worsening renal function.  3.  " Hyponatremia- 2000 ml/24 hour fluid restriction.   4.  Persistent atrial fibrillation-rate controlled on telemetry overnight and today.  Continue amiodarone and metoprolol tartrate.  NPO after midnight for possible cardioversion with Dr. Yanez tomorrow. Continue to monitor.  5.  Hypercholesterolemia-continue rosuvastatin.  6.  Current use of long-term anticoagulation-continue low-dose Eliquis, as patient is >80 years of age with creatinine >1.5. she denies any missed doses of Eliquis in the last four weeks.     Further orders per Dr. Parada upon his evaluation of the patient.     Thank you for the consultation, cardiology will gladly continue to follow.     SARAH Ontiveros  6/26/2025  12:17 CDT      Please note this cardiology consultation note is the result of a face to face consultation with the patient, in addition to reviewing medical records at length by myself,  Electronically signed by SARAH Ontiveros, 06/26/25, 11:32 AM CDT.         Time: 35 minutes

## 2025-06-26 NOTE — PLAN OF CARE
Problem: Adult Inpatient Plan of Care  Goal: Plan of Care Review  Outcome: Progressing  Goal: Patient-Specific Goal (Individualized)  Outcome: Progressing  Goal: Absence of Hospital-Acquired Illness or Injury  Outcome: Progressing  Intervention: Identify and Manage Fall Risk  Recent Flowsheet Documentation  Taken 6/26/2025 0800 by Allison Barry RN  Safety Promotion/Fall Prevention:   clutter free environment maintained   nonskid shoes/slippers when out of bed   safety round/check completed  Intervention: Prevent Skin Injury  Recent Flowsheet Documentation  Taken 6/26/2025 0800 by Allison Barry RN  Body Position: supine  Skin Protection: incontinence pads utilized  Goal: Optimal Comfort and Wellbeing  Outcome: Progressing  Goal: Readiness for Transition of Care  Outcome: Progressing     Problem: Heart Failure  Goal: Optimal Coping  Outcome: Progressing  Goal: Optimal Cardiac Output and Blood Flow  Outcome: Progressing  Goal: Stable Heart Rate and Rhythm  Outcome: Progressing  Goal: Fluid and Electrolyte Balance  Outcome: Progressing  Goal: Optimal Functional Ability  Outcome: Progressing  Intervention: Optimize Functional Ability  Recent Flowsheet Documentation  Taken 6/26/2025 0800 by Allison Barry RN  Activity Management: activity encouraged  Goal: Improved Oral Intake  Outcome: Progressing  Goal: Effective Oxygenation and Ventilation  Outcome: Progressing  Intervention: Promote Airway Secretion Clearance  Recent Flowsheet Documentation  Taken 6/26/2025 0800 by Allison Barry RN  Activity Management: activity encouraged  Intervention: Optimize Oxygenation and Ventilation  Recent Flowsheet Documentation  Taken 6/26/2025 0800 by Allison Barry RN  Head of Bed (HOB) Positioning: HOB at 30-45 degrees  Goal: Effective Breathing Pattern During Sleep  Outcome: Progressing  Intervention: Monitor and Manage Obstructive Sleep Apnea  Recent Flowsheet Documentation  Taken 6/26/2025 0800 by Allison Barry  RN  Medication Review/Management: medications reviewed     Problem: Skin Injury Risk Increased  Goal: Skin Health and Integrity  Outcome: Progressing  Intervention: Optimize Skin Protection  Recent Flowsheet Documentation  Taken 6/26/2025 0800 by Allison Barry RN  Activity Management: activity encouraged  Pressure Reduction Techniques: frequent weight shift encouraged  Head of Bed (HOB) Positioning: HOB at 30-45 degrees  Pressure Reduction Devices: pressure-redistributing mattress utilized  Skin Protection: incontinence pads utilized     Problem: Comorbidity Management  Goal: Maintenance of Heart Failure Symptom Control  Outcome: Progressing  Intervention: Maintain Heart Failure Management  Recent Flowsheet Documentation  Taken 6/26/2025 0800 by Allison Barry RN  Medication Review/Management: medications reviewed  Goal: Blood Pressure in Desired Range  Outcome: Progressing  Intervention: Maintain Blood Pressure Management  Recent Flowsheet Documentation  Taken 6/26/2025 0800 by Allison Barry RN  Medication Review/Management: medications reviewed     Problem: Fall Injury Risk  Goal: Absence of Fall and Fall-Related Injury  Outcome: Progressing  Intervention: Identify and Manage Contributors  Recent Flowsheet Documentation  Taken 6/26/2025 0800 by Allison Barry RN  Medication Review/Management: medications reviewed  Intervention: Promote Injury-Free Environment  Recent Flowsheet Documentation  Taken 6/26/2025 0800 by Allison Barry RN  Safety Promotion/Fall Prevention:   clutter free environment maintained   nonskid shoes/slippers when out of bed   safety round/check completed     Problem: Malnutrition  Goal: Improved Nutritional Intake  Outcome: Progressing   Goal Outcome Evaluation:         No significant changes to patient status this shift. Pt remained AAOx4 with flat affect. Up to chair with PT. No complaints of pain or discomfort this shift. Pt has been AFIB on the monitor, rate 63-74. Daughter has  been at bedside. Bed remains in low locked position with side rails up X2 and call light in reach.

## 2025-06-26 NOTE — PROGRESS NOTES
[facility] Medicine Services  INPATIENT PROGRESS NOTE    Patient Name: Genevieve Cerda  Date of Admission: 6/23/2025  Today's Date: 06/26/25  Length of Stay: 3  Primary Care Physician: Germaine Diego APRN    Subjective   Chief Complaint: Leg swelling    Patient has no new complaint this morning, said that she is feeling a lot better.  Patient's family wants cardiologist consult.    Review of Systems   All pertinent negatives and positives are as above. All other systems have been reviewed and are negative unless otherwise stated.     Objective    Temp:  [97 °F (36.1 °C)-97.8 °F (36.6 °C)] 97.4 °F (36.3 °C)  Heart Rate:  [75-84] 78  Resp:  [16-18] 18  BP: ()/(51-76) 114/76  Physical Exam  Constitutional:       Appearance: Normal appearance.   HENT:      Head: Normocephalic and atraumatic.      Nose: Nose normal.      Mouth/Throat:      Mouth: Mucous membranes are moist.      Pharynx: Oropharynx is clear.   Eyes:      Extraocular Movements: Extraocular movements intact.      Conjunctiva/sclera: Conjunctivae normal.   Cardiovascular:      Rate and Rhythm: Normal rate. Rhythm irregular.      Pulses: Normal pulses.      Heart sounds: Normal heart sounds.   Pulmonary:      Effort: Pulmonary effort is normal.      Breath sounds: Rales present.   Abdominal:      General: Bowel sounds are normal.      Palpations: Abdomen is soft.   Musculoskeletal:      Cervical back: Normal range of motion and neck supple.      Right lower leg: edema.      Left lower leg:  edema.   Skin:     General: Skin is warm and dry.      Capillary Refill: Capillary refill takes 2 to 3 seconds.   Neurological:      General: No focal deficit present.      Mental Status: She is alert and oriented to person, place, and time.   Psychiatric:         Mood and Affect: Mood normal.         Behavior: Behavior normal.       Results Review:  I have reviewed the labs, radiology results, and diagnostic  "studies.    Laboratory Data:   Results from last 7 days   Lab Units 06/26/25  0345 06/25/25  0620 06/24/25  0531   WBC 10*3/mm3 6.77 6.26 7.26   HEMOGLOBIN g/dL 8.7* 8.7* 8.6*   HEMATOCRIT % 28.3* 28.7* 28.5*   PLATELETS 10*3/mm3 298 304 314        Results from last 7 days   Lab Units 06/26/25  0345 06/25/25  0620 06/24/25  0531 06/23/25  0844   SODIUM mmol/L 128* 127* 124* 127*  127*   POTASSIUM mmol/L 4.3 4.2 4.3 5.0  5.0   CHLORIDE mmol/L 92* 91* 89* 89*  89*   CO2 mmol/L 25.0 27.0 23.0 24.0  26.0   BUN mg/dL 93.9* 90.3* 86.7* 89.4*  89.4*   CREATININE mg/dL 1.97* 2.01* 2.21* 2.10*  2.18*   CALCIUM mg/dL 8.2* 8.4* 8.1* 8.3*  8.2*   BILIRUBIN mg/dL  --   --   --  <0.2   ALK PHOS U/L  --   --   --  83   ALT (SGPT) U/L  --   --   --  14   AST (SGOT) U/L  --   --   --  16   GLUCOSE mg/dL 110* 104* 87 101*  101*       Culture Data:   No results found for: \"BLOODCX\", \"URINECX\", \"WOUNDCX\", \"MRSACX\", \"RESPCX\", \"STOOLCX\"    Radiology Data:   Imaging Results (Last 24 Hours)       ** No results found for the last 24 hours. **            I have reviewed the patient's current medications.     Assessment/Plan   Assessment  Active Hospital Problems    Diagnosis     **Acute on chronic heart failure with preserved ejection fraction (HFpEF)     Acute renal failure superimposed on stage 3b chronic kidney disease     Hyponatremia     Atrial fibrillation, persistent     Hypercholesterolemia     Current use of long term anticoagulation        Treatment Plan    - Acute on chronic diastolic congestive heart failure  Patient failed outpatient treatment and was sent to the emergency room by her PCP.  Currently she is being diuresed with furosemide drip and is making clinical improvement.  We will continue furosemide drip and reevaluate in the morning.  Cardiology consulted and recommended continue Lasix drip and added jardiance.  Will continue all her guideline directed medications.    - Hyponatremia  Patient's hyponatremia is " improving and is likely related to the ongoing diuresis.  Continue fluid restriction and follow serial BMP    - Chronic atrial fibrillation  We will restart patient's amiodarone, metoprolol and apixaban.  Cardiology is planning cardioversion tomorrow    DVT prophylaxis-apixaban    Disposition-cardioversion tomorrow      Electronically signed by Indra Zamorano MD, 06/26/25, 16:17 CDT.

## 2025-06-26 NOTE — PLAN OF CARE
Goal Outcome Evaluation:   Patient resting in bed this am, pt has slept most of the night, Lasix drip @ 2.5 ml/hr, oxygen @ 2lbnc, pt has denied pain during shift, no needs voiced this am, call light in reach

## 2025-06-26 NOTE — PLAN OF CARE
Goal Outcome Evaluation:  Plan of Care Reviewed With: patient        Progress: improving  Outcome Evaluation: OT tx completed on this date. Pt A&Ox4 up in recliner on 4L/NC. Pt worked through BUE/core exercises with good endurance, dynamic sitting activities with  functional reaching and midline crossings tasks with focus on postural stabiltiy for dynamic standing and ADL transfers. Pt's O2 remained in the upper 90s during tx, req multiple RBs during activity. Pt left up in recliner. OT POC to continue.    Anticipated Discharge Disposition (OT): skilled nursing facility

## 2025-06-27 ENCOUNTER — ANESTHESIA EVENT (OUTPATIENT)
Dept: CARDIOLOGY | Facility: HOSPITAL | Age: 85
End: 2025-06-27
Payer: MEDICARE

## 2025-06-27 ENCOUNTER — APPOINTMENT (OUTPATIENT)
Dept: CARDIOLOGY | Facility: HOSPITAL | Age: 85
End: 2025-06-27
Payer: MEDICARE

## 2025-06-27 ENCOUNTER — ANESTHESIA (OUTPATIENT)
Dept: CARDIOLOGY | Facility: HOSPITAL | Age: 85
End: 2025-06-27
Payer: MEDICARE

## 2025-06-27 LAB
ANION GAP SERPL CALCULATED.3IONS-SCNC: 11 MMOL/L (ref 5–15)
ANION GAP SERPL CALCULATED.3IONS-SCNC: 12 MMOL/L (ref 5–15)
BUN SERPL-MCNC: 92.9 MG/DL (ref 8–23)
BUN SERPL-MCNC: 93.7 MG/DL (ref 8–23)
BUN/CREAT SERPL: 44.4 (ref 7–25)
BUN/CREAT SERPL: 46 (ref 7–25)
CALCIUM SPEC-SCNC: 8.1 MG/DL (ref 8.6–10.5)
CALCIUM SPEC-SCNC: 8.3 MG/DL (ref 8.6–10.5)
CHLORIDE SERPL-SCNC: 87 MMOL/L (ref 98–107)
CHLORIDE SERPL-SCNC: 89 MMOL/L (ref 98–107)
CO2 SERPL-SCNC: 24 MMOL/L (ref 22–29)
CO2 SERPL-SCNC: 25 MMOL/L (ref 22–29)
CREAT SERPL-MCNC: 2.02 MG/DL (ref 0.57–1)
CREAT SERPL-MCNC: 2.11 MG/DL (ref 0.57–1)
EGFRCR SERPLBLD CKD-EPI 2021: 22.4 ML/MIN/1.73
EGFRCR SERPLBLD CKD-EPI 2021: 23.6 ML/MIN/1.73
GLUCOSE SERPL-MCNC: 108 MG/DL (ref 65–99)
GLUCOSE SERPL-MCNC: 147 MG/DL (ref 65–99)
IRON 24H UR-MRATE: 28 MCG/DL (ref 37–145)
IRON SATN MFR SERPL: 10 % (ref 20–50)
MAGNESIUM SERPL-MCNC: 2.2 MG/DL (ref 1.6–2.4)
POTASSIUM SERPL-SCNC: 4.5 MMOL/L (ref 3.5–5.2)
POTASSIUM SERPL-SCNC: 4.9 MMOL/L (ref 3.5–5.2)
QT INTERVAL: 360 MS
QT INTERVAL: 762 MS
QTC INTERVAL: 382 MS
QTC INTERVAL: 666 MS
SODIUM SERPL-SCNC: 123 MMOL/L (ref 136–145)
SODIUM SERPL-SCNC: 125 MMOL/L (ref 136–145)
TIBC SERPL-MCNC: 282 MCG/DL (ref 298–536)
TRANSFERRIN SERPL-MCNC: 189 MG/DL (ref 200–360)
URATE SERPL-MCNC: 7.2 MG/DL (ref 2.4–5.7)

## 2025-06-27 PROCEDURE — 5A2204Z RESTORATION OF CARDIAC RHYTHM, SINGLE: ICD-10-PCS | Performed by: HOSPITALIST

## 2025-06-27 PROCEDURE — 84466 ASSAY OF TRANSFERRIN: CPT | Performed by: INTERNAL MEDICINE

## 2025-06-27 PROCEDURE — 25010000002 BUMETANIDE PER 0.5 MG: Performed by: INTERNAL MEDICINE

## 2025-06-27 PROCEDURE — 84550 ASSAY OF BLOOD/URIC ACID: CPT | Performed by: INTERNAL MEDICINE

## 2025-06-27 PROCEDURE — 25810000003 SODIUM CHLORIDE 0.9 % SOLUTION: Performed by: NURSE ANESTHETIST, CERTIFIED REGISTERED

## 2025-06-27 PROCEDURE — 25010000002 LIDOCAINE PF 2% 2 % SOLUTION: Performed by: NURSE ANESTHETIST, CERTIFIED REGISTERED

## 2025-06-27 PROCEDURE — 80048 BASIC METABOLIC PNL TOTAL CA: CPT | Performed by: NURSE PRACTITIONER

## 2025-06-27 PROCEDURE — 93005 ELECTROCARDIOGRAM TRACING: CPT | Performed by: HOSPITALIST

## 2025-06-27 PROCEDURE — 83540 ASSAY OF IRON: CPT | Performed by: INTERNAL MEDICINE

## 2025-06-27 PROCEDURE — 92960 CARDIOVERSION ELECTRIC EXT: CPT

## 2025-06-27 PROCEDURE — 83735 ASSAY OF MAGNESIUM: CPT | Performed by: HOSPITALIST

## 2025-06-27 PROCEDURE — 93005 ELECTROCARDIOGRAM TRACING: CPT

## 2025-06-27 PROCEDURE — 92960 CARDIOVERSION ELECTRIC EXT: CPT | Performed by: HOSPITALIST

## 2025-06-27 PROCEDURE — 93010 ELECTROCARDIOGRAM REPORT: CPT | Performed by: HOSPITALIST

## 2025-06-27 PROCEDURE — 25010000002 BUMETANIDE PER 0.5 MG: Performed by: NURSE PRACTITIONER

## 2025-06-27 PROCEDURE — 25010000002 PROPOFOL 10 MG/ML EMULSION: Performed by: NURSE ANESTHETIST, CERTIFIED REGISTERED

## 2025-06-27 PROCEDURE — 25010000002 BUMETANIDE PER 0.5 MG: Performed by: HOSPITALIST

## 2025-06-27 PROCEDURE — 99232 SBSQ HOSP IP/OBS MODERATE 35: CPT | Performed by: HOSPITALIST

## 2025-06-27 RX ORDER — PROPOFOL 10 MG/ML
VIAL (ML) INTRAVENOUS AS NEEDED
Status: DISCONTINUED | OUTPATIENT
Start: 2025-06-27 | End: 2025-06-27 | Stop reason: SURG

## 2025-06-27 RX ORDER — EPHEDRINE SULFATE 50 MG/ML
INJECTION, SOLUTION INTRAVENOUS AS NEEDED
Status: DISCONTINUED | OUTPATIENT
Start: 2025-06-27 | End: 2025-06-27 | Stop reason: SURG

## 2025-06-27 RX ORDER — LIDOCAINE HYDROCHLORIDE 20 MG/ML
INJECTION, SOLUTION EPIDURAL; INFILTRATION; INTRACAUDAL; PERINEURAL AS NEEDED
Status: DISCONTINUED | OUTPATIENT
Start: 2025-06-27 | End: 2025-06-27 | Stop reason: SURG

## 2025-06-27 RX ORDER — AMIODARONE HYDROCHLORIDE 200 MG/1
200 TABLET ORAL DAILY
Status: DISCONTINUED | OUTPATIENT
Start: 2025-06-28 | End: 2025-07-04 | Stop reason: HOSPADM

## 2025-06-27 RX ORDER — BUMETANIDE 0.25 MG/ML
1 INJECTION, SOLUTION INTRAMUSCULAR; INTRAVENOUS ONCE
Status: COMPLETED | OUTPATIENT
Start: 2025-06-27 | End: 2025-06-27

## 2025-06-27 RX ORDER — SODIUM CHLORIDE 9 MG/ML
INJECTION, SOLUTION INTRAVENOUS CONTINUOUS PRN
Status: DISCONTINUED | OUTPATIENT
Start: 2025-06-27 | End: 2025-06-27 | Stop reason: SURG

## 2025-06-27 RX ADMIN — AMIODARONE HYDROCHLORIDE 400 MG: 200 TABLET ORAL at 10:10

## 2025-06-27 RX ADMIN — ALLOPURINOL 100 MG: 100 TABLET ORAL at 10:10

## 2025-06-27 RX ADMIN — APIXABAN 2.5 MG: 2.5 TABLET, FILM COATED ORAL at 10:11

## 2025-06-27 RX ADMIN — Medication 220 MG: at 10:11

## 2025-06-27 RX ADMIN — ROSUVASTATIN 5 MG: 10 TABLET, FILM COATED ORAL at 10:11

## 2025-06-27 RX ADMIN — GABAPENTIN 300 MG: 300 CAPSULE ORAL at 21:25

## 2025-06-27 RX ADMIN — PANTOPRAZOLE SODIUM 40 MG: 40 TABLET, DELAYED RELEASE ORAL at 10:12

## 2025-06-27 RX ADMIN — LATANOPROST 1 DROP: 50 SOLUTION OPHTHALMIC at 21:25

## 2025-06-27 RX ADMIN — METOLAZONE 5 MG: 5 TABLET ORAL at 10:11

## 2025-06-27 RX ADMIN — APIXABAN 2.5 MG: 2.5 TABLET, FILM COATED ORAL at 21:25

## 2025-06-27 RX ADMIN — Medication 10 ML: at 21:26

## 2025-06-27 RX ADMIN — Medication 5000 UNITS: at 10:12

## 2025-06-27 RX ADMIN — MIDODRINE HYDROCHLORIDE 5 MG: 2.5 TABLET ORAL at 17:22

## 2025-06-27 RX ADMIN — BUMETANIDE 0.5 MG/HR: 0.25 INJECTION INTRAMUSCULAR; INTRAVENOUS at 11:27

## 2025-06-27 RX ADMIN — EPHEDRINE SULFATE 25 MG: 50 INJECTION INTRAVENOUS at 14:04

## 2025-06-27 RX ADMIN — TIMOLOL MALEATE 1 DROP: 5 SOLUTION/ DROPS OPHTHALMIC at 10:13

## 2025-06-27 RX ADMIN — LIDOCAINE HYDROCHLORIDE 60 MG: 20 INJECTION, SOLUTION EPIDURAL; INFILTRATION; INTRACAUDAL; PERINEURAL at 13:55

## 2025-06-27 RX ADMIN — GABAPENTIN 300 MG: 300 CAPSULE ORAL at 15:10

## 2025-06-27 RX ADMIN — PROPOFOL 50 MG: 10 INJECTION, EMULSION INTRAVENOUS at 13:55

## 2025-06-27 RX ADMIN — SODIUM CHLORIDE: 9 INJECTION, SOLUTION INTRAVENOUS at 13:54

## 2025-06-27 RX ADMIN — EPHEDRINE SULFATE 25 MG: 50 INJECTION INTRAVENOUS at 14:05

## 2025-06-27 RX ADMIN — TAMSULOSIN HYDROCHLORIDE 0.4 MG: 0.4 CAPSULE ORAL at 10:12

## 2025-06-27 RX ADMIN — CITALOPRAM 10 MG: 20 TABLET, FILM COATED ORAL at 10:11

## 2025-06-27 RX ADMIN — METOPROLOL TARTRATE 25 MG: 25 TABLET, FILM COATED ORAL at 10:10

## 2025-06-27 RX ADMIN — ASPIRIN 81 MG: 81 TABLET, COATED ORAL at 10:11

## 2025-06-27 RX ADMIN — Medication 10 ML: at 10:20

## 2025-06-27 RX ADMIN — Medication 400 MG: at 10:11

## 2025-06-27 RX ADMIN — BUMETANIDE 1 MG/HR: 0.25 INJECTION INTRAMUSCULAR; INTRAVENOUS at 18:31

## 2025-06-27 RX ADMIN — MIDODRINE HYDROCHLORIDE 5 MG: 2.5 TABLET ORAL at 07:38

## 2025-06-27 RX ADMIN — BUMETANIDE 1 MG: 0.25 INJECTION INTRAMUSCULAR; INTRAVENOUS at 17:22

## 2025-06-27 NOTE — PROGRESS NOTES
Enter Query Response Below      Query Response: Chronic illness related severe protein calorie malnutrition              If applicable, please update the problem list.

## 2025-06-27 NOTE — PROGRESS NOTES
"S: Patient continues to have significant leg swelling.  Breathing reportedly has waxed and waned although no acute shortness of breath this morning.  She denies chest pain, palpitations, and lightheadedness.    Medications: Reviewed    Review of Systems: All pertinent negatives and positives as noted above.  Otherwise, all systems reviewed and found to be negative.    Telemetry: Atrial fibrillation    O:  /52 (BP Location: Left arm, Patient Position: Lying)   Pulse 61   Temp 97.1 °F (36.2 °C) (Oral)   Resp 16   Ht 157.5 cm (62\")   Wt 76.4 kg (168 lb 6.9 oz)   SpO2 99%   BMI 30.81 kg/m²   Temp:  [97 °F (36.1 °C)-97.7 °F (36.5 °C)] 97.1 °F (36.2 °C)  Heart Rate:  [61-78] 61  Resp:  [16-18] 16  BP: ()/(51-76) 104/52    Gen: female lying in bed in NAD  HEENT: NC/AT, sclera anicteric  Neck: Supple, JVD elevated  CV: Irregularly irregular rhythm with regular rate, no m/r/g  Pulm: Right lower lobe reduced breath sounds but otherwise CTAB, NWOB  Abd: Soft, ND/NT  Ext: Legs wrapped with at least 2+ underlying pretibial edema  Neuro: Aox3, grossly nonfocal    Diagnostic Data:    BMP   BMP          6/24/2025    05:31 6/25/2025    06:20 6/26/2025    03:45 6/26/2025    19:42   BMP   BUN 86.7  90.3  93.9  94.2    Creatinine 2.21  2.01  1.97  2.06    Sodium 124  127  128  126    Potassium 4.3  4.2  4.3  4.4    Chloride 89  91  92  89    CO2 23.0  27.0  25.0  25.0    Calcium 8.1  8.4  8.2  7.9      Labs today pending will be reviewed    ASSESSMENT/PLAN:    1.  Acute on chronic diastolic CHF  2.  MICHOACANO on CKD 3B  3.  Persistent atrial fibrillation  4.  Hypertension  5.  Hyponatremia    Overall continues to appear hypervolemic on exam with likely cardiorenal MICHOACANO and hypervolemic hyponatremia.  Review of her weights indicates that she has been relatively stable thus far during admission, and I/O documentation appears to be inaccurate making it challenging to assess how effective her diuresis has truly been.  She has " been on a relatively low-dose of Lasix infusion, so I initially planned to increase the dose of her Lasix infusion today.  However, it appears that nephrology is transitioning her to a Bumex infusion instead, which I also think is reasonable.  She has been started on metolazone per nephrology, so we will continue this for now and monitor weight, I/O, and labs.    I do think that she may have some improvement in CHF management if able to achieve sinus rhythm.  She has been on amiodarone for 1 week.  I reviewed the MAR from her care facility indicating that she has not missed any doses of Eliquis there in the month of June and has not missed any doses of Eliquis since arrival to the hospital last Friday.  Plan to proceed with cardioversion today.  Risks/benefits were discussed with the patient and family and informed consent obtained.    - Continue amiodarone 400 mg twice daily today with plan to transition to 200 mg daily starting tomorrow  - Continue Eliquis 2.5 mg twice daily  - Transitioning to Bumex infusion  - Continue metolazone 5 mg daily for now  - Continue close laboratory monitoring  - Plan for cardioversion today

## 2025-06-27 NOTE — PLAN OF CARE
Goal Outcome Evaluation:      Pt resting in bed, pt has slept all night, Lasix drip @ 2.5 ml/hr, Npo since midnight, no needs voiced this am, call light in reach

## 2025-06-27 NOTE — PROGRESS NOTES
Enter Query Response Below      Query Response: Deep tissue pressure injury of the sacrum, present on admission              If applicable, please update the problem list.

## 2025-06-27 NOTE — ANESTHESIA PREPROCEDURE EVALUATION
Anesthesia Evaluation     Patient summary reviewed and Nursing notes reviewed   NPO Solid Status: > 8 hours  NPO Liquid Status: > 8 hours           Airway   Mallampati: II  TM distance: >3 FB  Neck ROM: full  No difficulty expected  Dental    (+) poor dentition    Pulmonary    (+) pleural effusion,  Cardiovascular     ECG reviewed  PT is on anticoagulation therapy    (+) dysrhythmias Atrial Fib, CHF , hyperlipidemia      Neuro/Psych- negative ROS  GI/Hepatic/Renal/Endo    (+) renal disease-    Musculoskeletal (-) negative ROS    Abdominal    Substance History - negative use     OB/GYN negative ob/gyn ROS         Other                    Anesthesia Plan    ASA 3     MAC     intravenous induction     Anesthetic plan, risks, benefits, and alternatives have been provided, discussed and informed consent has been obtained with: patient.    CODE STATUS:    Code Status (Patient has no pulse and is not breathing): No CPR (Do Not Attempt to Resuscitate)  Medical Interventions (Patient has pulse or is breathing): Limited Support  Medical Intervention Limits: No intubation (DNI)  Level Of Support Discussed With: Health Care Surrogate   Patient

## 2025-06-27 NOTE — ANESTHESIA POSTPROCEDURE EVALUATION
"Patient: Genevieve Cerda    Procedure Summary       Date: 06/27/25 Room / Location: [facility] CATH LAB    Anesthesia Start: 1353 Anesthesia Stop: 1411    Procedure: CARDIOVERSION EXTERNAL IN CARDIOLOGY DEPARTMENT Diagnosis: (atrial fibrillation)    Scheduled Providers: Rancho Yanez MD Provider: Cassia Umaña CRNA    Anesthesia Type: MAC ASA Status: 3            Anesthesia Type: MAC    Vitals  Vitals Value Taken Time   BP 99/42 06/27/25 14:21   Temp     Pulse 41 06/27/25 14:21   Resp 15 06/27/25 14:15   SpO2 92 % 06/27/25 14:21   Vitals shown include unfiled device data.        Post Anesthesia Care and Evaluation    Patient location during evaluation: PHASE II  Patient participation: complete - patient participated  Level of consciousness: awake and alert  Pain management: adequate    Airway patency: patent  Anesthetic complications: No anesthetic complications    Cardiovascular status: acceptable  Respiratory status: acceptable  Hydration status: acceptable    Comments: Blood pressure 90/48, pulse (!) 43, temperature 97.5 °F (36.4 °C), temperature source Oral, resp. rate 15, height 157.5 cm (62\"), weight 76.4 kg (168 lb 6.9 oz), SpO2 95%.    Pt discharged from PACU based on albert score >8    "

## 2025-06-27 NOTE — PROGRESS NOTES
Nephrology ([facility]) Progress Note      Patient:  Genevieve Cerda  YOB: 1940  Date of Service: 6/27/2025  MRN: [id number]   Acct: [id number]   Primary Care Physician: Germaine Diego APRN  Advance Directive:   Code Status and Medical Interventions: No CPR (Do Not Attempt to Resuscitate); Limited Support; No intubation (DNI)   Ordered at: 06/23/25 1629     Code Status (Patient has no pulse and is not breathing):    No CPR (Do Not Attempt to Resuscitate)     Medical Interventions (Patient has pulse or is breathing):    Limited Support     Medical Intervention Limits:    No intubation (DNI)     Level Of Support Discussed With:    Health Care Surrogate       Patient     Admit Date: 6/23/2025       Hospital Day: 4  Referring Provider: No ref. provider found      Patient personally seen and examined.  Complete chart including Consults, Notes, Operative Reports, Labs, Cardiology, and Radiology studies reviewed as able.        Subjective:  Genevieve Cerda is a 85 y.o. female for whom we were consulted for evaluation and treatment of acute kidney injury. Baseline chronic kidney disease stage 3b, baseline creatinine approximately 1.6. Has followed in our office in the past and more recently has also followed with Dr An in [place]. History of prior nephrectomy. Other history includes congestive heart failure, atrial fibrillation, hypertension. Admitted on 6/23 for CHF exacerbation. Creatinine 2.10 at time of admission. Hospital course remarkable for treatment with a Lasix infusion.  Nephrology consulted on 6/26.  Denied changes to urination, no dysuria or hematuria. Denied recent n/v/d. Metolazone added on 6/26.     Today is awake and alert. Denies dyspnea at rest. Still has some significant lower extremity edema that is weeping. Urine output nonoliguric    Allergies:  Codeine    Home Meds:  Medications Prior to Admission   Medication Sig Dispense Refill Last Dose/Taking     allopurinol (ZYLOPRIM) 100 MG tablet Take 1 tablet by mouth Daily.   Taking    amiodarone (PACERONE) 200 MG tablet Take 2 tablets by mouth 2 (Two) Times a Day.   Taking    apixaban (ELIQUIS) 2.5 MG tablet tablet Take 1 tablet by mouth Every 12 (Twelve) Hours.   Taking    aspirin 81 MG EC tablet Take 1 tablet by mouth Daily.   Taking    [] cefdinir (OMNICEF) 300 MG capsule Take 1 capsule by mouth 2 (Two) Times a Day for 7 days. 14 capsule 0 Taking    citalopram (CeleXA) 10 MG tablet Take 1 tablet by mouth Daily.   Taking    furosemide (LASIX) 20 MG tablet Take 3 tablets by mouth 2 (Two) Times a Day.   Taking    gabapentin (NEURONTIN) 300 MG capsule Take 1 capsule by mouth Every 8 (Eight) Hours.   Taking    ipratropium-albuterol (DUO-NEB) 0.5-2.5 mg/3 ml nebulizer Take 3 mL by nebulization 4 (Four) Times a Day As Needed for Shortness of Air.   Taking As Needed    latanoprost (XALATAN) 0.005 % ophthalmic solution Administer 1 drop to both eyes Daily.   Taking    magnesium oxide (MAG-OX) 400 MG tablet Take 1 tablet by mouth Daily.   Taking    metoprolol tartrate (LOPRESSOR) 25 MG tablet Take 1 tablet by mouth 2 (Two) Times a Day. Hold for systolic blood pressure <100 or pulse <60   Taking    midodrine (PROAMATINE) 5 MG tablet Take 1 tablet by mouth 2 (Two) Times a Day. Hold if systolic blood pressure is >100   Taking    pantoprazole (PROTONIX) 40 MG EC tablet Take 1 tablet by mouth Daily.   Taking    rosuvastatin (CRESTOR) 5 MG tablet Take 1 tablet by mouth Daily.   Taking    Skin Protectants, Misc. (Eucerin) cream Apply 1 Application topically to the appropriate area as directed Daily. Apply to bilateral upper and lower extremities for dryness once daily and prn   Taking    tamsulosin (FLOMAX) 0.4 MG capsule 24 hr capsule Take 1 capsule by mouth Daily.   Taking    timolol (TIMOPTIC) 0.5 % ophthalmic solution Administer 1 drop to both eyes Daily.   Taking    torsemide (DEMADEX) 20 MG tablet Take 4 tablets by  mouth 2 (Two) Times a Day. 240 tablet 11 Taking    vitamin D (ERGOCALCIFEROL) 1.25 MG (69147 UT) capsule capsule Take 1 capsule by mouth Every 7 (Seven) Days. Takes on Monday   Taking    zinc sulfate (ZINCATE) 220 (50 Zn) MG capsule Take 1 capsule by mouth Daily.   Taking    acetaminophen (TYLENOL) 650 MG 8 hr tablet Take 1 tablet by mouth Every 6 (Six) Hours As Needed for Mild Pain.       polyethylene glycol (MiraLax) 17 g packet Take 17 g by mouth Daily As Needed (constipation).       sennosides-docusate (senna-docusate sodium) 8.6-50 MG per tablet Take 2 tablets by mouth Daily As Needed for Constipation.          Medicines:  Current Facility-Administered Medications   Medication Dose Route Frequency Provider Last Rate Last Admin    acetaminophen (TYLENOL) tablet 650 mg  650 mg Oral Q4H PRN Mary Falcon APRN        Or    acetaminophen (TYLENOL) 160 MG/5ML oral solution 650 mg  650 mg Oral Q4H PRN Mary Falcon APRN        Or    acetaminophen (TYLENOL) suppository 650 mg  650 mg Rectal Q4H PRN Mary Falcno APRN        allopurinol (ZYLOPRIM) tablet 100 mg  100 mg Oral Daily Indra Zamorano MD   100 mg at 06/26/25 0909    amiodarone (PACERONE) tablet 400 mg  400 mg Oral BID Indra Zamorano MD   400 mg at 06/26/25 2100    apixaban (ELIQUIS) tablet 2.5 mg  2.5 mg Oral BID Mary Falcon APRN   2.5 mg at 06/26/25 2100    aspirin EC tablet 81 mg  81 mg Oral Daily Indra Zamorano MD   81 mg at 06/26/25 0909    sennosides-docusate (PERICOLACE) 8.6-50 MG per tablet 2 tablet  2 tablet Oral BID PRN Mary Falcon APRN        And    polyethylene glycol (MIRALAX) packet 17 g  17 g Oral Daily PRN Mary Falcon APRN        And    bisacodyl (DULCOLAX) EC tablet 5 mg  5 mg Oral Daily PRN Mary Falcon APRN        And    bisacodyl (DULCOLAX) suppository 10 mg  10 mg Rectal Daily PRN Mary Falcon APRN        Calcium Replacement - Follow Nurse / BPA Driven Protocol   Not Applicable PRN Terrie,  SARAH Blakely        cholecalciferol (VITAMIN D3) tablet 5,000 Units  5,000 Units Oral Daily Indra Zamorano MD   5,000 Units at 06/26/25 0908    citalopram (CeleXA) tablet 10 mg  10 mg Oral Daily Indra Zamorano MD   10 mg at 06/26/25 0909    furosemide (LASIX) 200 mg in dextrose (D5W) 5 % 100 mL infusion  5 mg/hr Intravenous Continuous Nicholas Hale MD 2.5 mL/hr at 06/26/25 2032 5 mg/hr at 06/26/25 2032    gabapentin (NEURONTIN) capsule 300 mg  300 mg Oral Q8H Indra Zamorano MD   300 mg at 06/26/25 2100    ipratropium-albuterol (DUO-NEB) nebulizer solution 3 mL  3 mL Nebulization 4x Daily PRN Indra Zamorano MD        latanoprost (XALATAN) 0.005 % ophthalmic solution 1 drop  1 drop Both Eyes Nightly Indra Zamorano MD   1 drop at 06/26/25 2101    magnesium oxide (MAG-OX) tablet 400 mg  400 mg Oral Daily Indra Zamorano MD   400 mg at 06/26/25 0909    Magnesium Standard Dose Replacement - Follow Nurse / BPA Driven Protocol   Not Applicable PRN Mary Falcon APRN        metOLazone (ZAROXOLYN) tablet 5 mg  5 mg Oral Daily Brennon Gamez MD   5 mg at 06/26/25 1528    metoprolol tartrate (LOPRESSOR) tablet 25 mg  25 mg Oral BID Indra Zamorano MD   25 mg at 06/26/25 2100    midodrine (PROAMATINE) tablet 5 mg  5 mg Oral BID AC Indra Zamorano MD   5 mg at 06/27/25 0738    nitroglycerin (NITROSTAT) SL tablet 0.4 mg  0.4 mg Sublingual Q5 Min PRN Mary Falcon APRN        ondansetron ODT (ZOFRAN-ODT) disintegrating tablet 4 mg  4 mg Oral Q6H PRN Mary Falcon APRN        Or    ondansetron (ZOFRAN) injection 4 mg  4 mg Intravenous Q6H PRN Mary Falcon APRN        pantoprazole (PROTONIX) EC tablet 40 mg  40 mg Oral Daily Indra Zamorano MD   40 mg at 06/26/25 0909    Phosphorus Replacement - Follow Nurse / BPA Driven Protocol   Not Applicable PRN Mary Falcon APRN        polyethylene glycol (MIRALAX) packet 17 g  17 g Oral Daily PRN Indra Zamorano MD         Potassium Replacement - Follow Nurse / BPA Driven Protocol   Not Applicable PRN Mary Falcon APRN        rosuvastatin (CRESTOR) tablet 5 mg  5 mg Oral Daily Indra Zamorano MD   5 mg at 06/26/25 0910    sennosides-docusate (PERICOLACE) 8.6-50 MG per tablet 2 tablet  2 tablet Oral Daily PRN Indra Zamorano MD        sodium chloride 0.9 % flush 10 mL  10 mL Intravenous Q12H Mary Falcon APRN   10 mL at 06/26/25 2101    sodium chloride 0.9 % flush 10 mL  10 mL Intravenous PRN Mary Falcon APRN        sodium chloride 0.9 % infusion 40 mL  40 mL Intravenous PRN Mary Falcon APRN        tamsulosin (FLOMAX) 24 hr capsule 0.4 mg  0.4 mg Oral Daily Indra Zamorano MD   0.4 mg at 06/26/25 0909    timolol (TIMOPTIC) 0.5 % ophthalmic solution 1 drop  1 drop Both Eyes Daily Indra Zamorano MD   1 drop at 06/26/25 0910    zinc sulfate (ZINCATE) capsule 220 mg  220 mg Oral Daily Indra Zamorano MD   220 mg at 06/26/25 0909       Past Medical History:  History reviewed. No pertinent past medical history.    Past Surgical History:  No past surgical history on file.    Family History  History reviewed. No pertinent family history.    Social History  Social History     Socioeconomic History    Marital status:    Tobacco Use    Smoking status: Never    Smokeless tobacco: Never   Vaping Use    Vaping status: Never Used   Substance and Sexual Activity    Alcohol use: Yes     Comment: rarely    Drug use: Never    Sexual activity: Defer       Review of Systems:  History obtained from chart review and the patient  General ROS: No fever or chills  Respiratory ROS: No cough, shortness of breath, wheezing  Cardiovascular ROS: positive for - dyspnea on exertion and edema  No chest pain or palpitations  Gastrointestinal ROS: No abdominal pain or melena  Genito-Urinary ROS: No dysuria or hematuria  Psych ROS: No anxiety and depression  14 point ROS reviewed with the patient and negative except as  noted above and in the HPI unless unable to obtain.    Objective:  Patient Vitals for the past 24 hrs:   BP Temp Temp src Pulse Resp SpO2 Weight   06/27/25 0753 104/52 97.1 °F (36.2 °C) Oral 61 16 99 % --   06/27/25 0316 100/51 97 °F (36.1 °C) Oral 74 16 92 % 76.4 kg (168 lb 6.9 oz)   06/26/25 2326 99/52 97.7 °F (36.5 °C) Oral 70 18 95 % --   06/26/25 1900 109/61 97 °F (36.1 °C) Oral 71 18 98 % --   06/26/25 1556 114/76 97.4 °F (36.3 °C) Oral 78 18 94 % --   06/26/25 1220 101/70 97.6 °F (36.4 °C) Oral 77 18 94 % --       Intake/Output Summary (Last 24 hours) at 6/27/2025 0956  Last data filed at 6/26/2025 2326  Gross per 24 hour   Intake --   Output 600 ml   Net -600 ml     General: awake/alert   Chest:  clear to auscultation bilaterally without respiratory distress  CVS: irregularly irregular rhythm  Abdominal: soft, nontender, positive bowel sounds  Extremities: 3+ bilateral lower extremity edema  Skin: warm and dry without rash      Labs:  Results from last 7 days   Lab Units 06/26/25 0345 06/25/25  0620 06/24/25  0531   WBC 10*3/mm3 6.77 6.26 7.26   HEMOGLOBIN g/dL 8.7* 8.7* 8.6*   HEMATOCRIT % 28.3* 28.7* 28.5*   PLATELETS 10*3/mm3 298 304 314         Results from last 7 days   Lab Units 06/26/25  1942 06/26/25  0345 06/25/25  0620 06/24/25  0531 06/23/25  0844   SODIUM mmol/L 126* 128* 127*   < > 127*  127*   POTASSIUM mmol/L 4.4 4.3 4.2   < > 5.0  5.0   CHLORIDE mmol/L 89* 92* 91*   < > 89*  89*   CO2 mmol/L 25.0 25.0 27.0   < > 24.0  26.0   BUN mg/dL 94.2* 93.9* 90.3*   < > 89.4*  89.4*   CREATININE mg/dL 2.06* 1.97* 2.01*   < > 2.10*  2.18*   CALCIUM mg/dL 7.9* 8.2* 8.4*   < > 8.3*  8.2*   EGFR mL/min/1.73 23.1* 24.4* 23.8*   < > 22.6*  21.6*   BILIRUBIN mg/dL  --   --   --   --  <0.2   ALK PHOS U/L  --   --   --   --  83   ALT (SGPT) U/L  --   --   --   --  14   AST (SGOT) U/L  --   --   --   --  16   GLUCOSE mg/dL 173* 110* 104*   < > 101*  101*    < > = values in this interval not displayed.  "      Radiology:   Imaging Results (Last 72 Hours)       ** No results found for the last 72 hours. **            Culture:  No results found for: \"BLOODCX\", \"URINECX\", \"WOUNDCX\", \"MRSACX\", \"RESPCX\", \"STOOLCX\"      Assessment    Acute kidney injury, cardiorenal  Baseline chronic kidney disease stage 3b  Hypertension  Atrial fibrillation  Acute on chronic CHF  Hyponatremia  Anemia of CKD    Plan:   Change to Bumex infusion. Continue PO Metolazone  Renal function has remained stable during the ongoing diuresis      Jed Newsome, APRN  6/27/2025  09:56 CDT    "

## 2025-06-27 NOTE — PLAN OF CARE
Goal Outcome Evaluation:  Plan of Care Reviewed With: patient        Progress: improving  Outcome Evaluation: AF 59-68 and converted to SB 59 following cardioversion. No c/o of SOB. Continuing to diurese. Family and friends bedside throughout the day. Safety maintained and call light within reach.

## 2025-06-27 NOTE — CASE MANAGEMENT/SOCIAL WORK
Continued Stay Note  [facility]     Patient Name: Genevieve Cerda  MRN: [id number]  Today's Date: 6/27/2025    Admit Date: 6/23/2025    Plan: [place]   Discharge Plan       Row Name 06/27/25 1628       Plan    Plan Comments Plan is still for return to [facility] when pt is medically stable. No precert needed. Pharmacy is [facility].                   Discharge Codes    No documentation.                       MARIOLA Barbour

## 2025-06-27 NOTE — PROGRESS NOTES
[facility] Medicine Services  INPATIENT PROGRESS NOTE    Patient Name: Genevieve Cerda  Date of Admission: 6/23/2025  Today's Date: 06/27/25  Length of Stay: 4  Primary Care Physician: Germaine Diego APRN    Subjective   Chief Complaint: Leg swelling    Patient has no new complaint this morning, discussed proposed cardioversion and patient and daughter at bedside were agreeable.    Review of Systems   All pertinent negatives and positives are as above. All other systems have been reviewed and are negative unless otherwise stated.     Objective    Temp:  [97 °F (36.1 °C)-97.7 °F (36.5 °C)] 97.5 °F (36.4 °C)  Heart Rate:  [43-74] 65  Resp:  [15-18] 16  BP: ()/(42-61) 113/51  Physical Exam  Constitutional:       Appearance: Normal appearance.   HENT:      Head: Normocephalic and atraumatic.      Nose: Nose normal.      Mouth/Throat:      Mouth: Mucous membranes are moist.      Pharynx: Oropharynx is clear.   Eyes:      Extraocular Movements: Extraocular movements intact.      Conjunctiva/sclera: Conjunctivae normal.   Cardiovascular:      Rate and Rhythm: Normal rate. Rhythm irregular.      Pulses: Normal pulses.      Heart sounds: Normal heart sounds.   Pulmonary:      Effort: Pulmonary effort is normal.      Breath sounds: Rales present.   Abdominal:      General: Bowel sounds are normal.      Palpations: Abdomen is soft.   Musculoskeletal:      Cervical back: Normal range of motion and neck supple.      Right lower leg: edema.      Left lower leg:  edema.   Skin:     General: Skin is warm and dry.      Capillary Refill: Capillary refill takes 2 to 3 seconds.   Neurological:      General: No focal deficit present.      Mental Status: She is alert and oriented to person, place, and time.   Psychiatric:         Mood and Affect: Mood normal.         Behavior: Behavior normal.       Results Review:  I have reviewed the labs, radiology results, and diagnostic  "studies.    Laboratory Data:   Results from last 7 days   Lab Units 06/26/25  0345 06/25/25  0620 06/24/25  0531   WBC 10*3/mm3 6.77 6.26 7.26   HEMOGLOBIN g/dL 8.7* 8.7* 8.6*   HEMATOCRIT % 28.3* 28.7* 28.5*   PLATELETS 10*3/mm3 298 304 314        Results from last 7 days   Lab Units 06/27/25  1518 06/26/25  1942 06/26/25  0345 06/24/25  0531 06/23/25  0844   SODIUM mmol/L 125* 126* 128*   < > 127*  127*   POTASSIUM mmol/L 4.9 4.4 4.3   < > 5.0  5.0   CHLORIDE mmol/L 89* 89* 92*   < > 89*  89*   CO2 mmol/L 25.0 25.0 25.0   < > 24.0  26.0   BUN mg/dL 92.9* 94.2* 93.9*   < > 89.4*  89.4*   CREATININE mg/dL 2.02* 2.06* 1.97*   < > 2.10*  2.18*   CALCIUM mg/dL 8.3* 7.9* 8.2*   < > 8.3*  8.2*   BILIRUBIN mg/dL  --   --   --   --  <0.2   ALK PHOS U/L  --   --   --   --  83   ALT (SGPT) U/L  --   --   --   --  14   AST (SGOT) U/L  --   --   --   --  16   GLUCOSE mg/dL 108* 173* 110*   < > 101*  101*    < > = values in this interval not displayed.       Culture Data:   No results found for: \"BLOODCX\", \"URINECX\", \"WOUNDCX\", \"MRSACX\", \"RESPCX\", \"STOOLCX\"    Radiology Data:   Imaging Results (Last 24 Hours)       ** No results found for the last 24 hours. **            I have reviewed the patient's current medications.     Assessment/Plan   Assessment  Active Hospital Problems    Diagnosis     **Acute on chronic heart failure with preserved ejection fraction (HFpEF)     Acute renal failure superimposed on stage 3b chronic kidney disease     Hyponatremia     Atrial fibrillation, persistent     Hypercholesterolemia     Current use of long term anticoagulation        Treatment Plan    - Acute on chronic diastolic congestive heart failure  Patient failed outpatient treatment and was sent to the emergency room by her PCP.  Currently she is being diuresed with Bumex drip [Lasix drip discontinued] and is making marginal clinical improvement.  Cardiology and nephrology are on consult and helping with management.  Patient has " also been started on metolazone.  Will continue all her guideline directed medications.    - Hyponatremia [likely hypervolemic hyponatremia]  Patient's hyponatremia is not making any significant improvement  Continue fluid restriction/diuresis and follow serial BMP    - Chronic atrial fibrillation  We will restart patient's amiodarone, metoprolol and apixaban.  Cardiology is planning cardioversion today as follows-  - Continue amiodarone 400 mg twice daily today with plan to transition to 200 mg daily starting tomorrow  - Continue Eliquis 2.5 mg twice daily  - Transitioning to Bumex infusion  - Continue metolazone 5 mg daily for now  - Continue close laboratory monitoring  - Plan for cardioversion today    DVT prophylaxis-apixaban    Disposition-cardioversion today.      Electronically signed by Indra Zamorano MD, 06/27/25, 16:28 CDT.

## 2025-06-28 LAB
ANION GAP SERPL CALCULATED.3IONS-SCNC: 11 MMOL/L (ref 5–15)
BACTERIA UR QL AUTO: ABNORMAL /HPF
BILIRUB UR QL STRIP: NEGATIVE
BUN SERPL-MCNC: 95.9 MG/DL (ref 8–23)
BUN/CREAT SERPL: 41.5 (ref 7–25)
CALCIUM SPEC-SCNC: 8.2 MG/DL (ref 8.6–10.5)
CHLORIDE SERPL-SCNC: 88 MMOL/L (ref 98–107)
CLARITY UR: CLEAR
CO2 SERPL-SCNC: 26 MMOL/L (ref 22–29)
COLOR UR: YELLOW
CREAT SERPL-MCNC: 2.31 MG/DL (ref 0.57–1)
EGFRCR SERPLBLD CKD-EPI 2021: 20.1 ML/MIN/1.73
GLUCOSE SERPL-MCNC: 99 MG/DL (ref 65–99)
GLUCOSE UR STRIP-MCNC: NEGATIVE MG/DL
HGB UR QL STRIP.AUTO: NEGATIVE
HYALINE CASTS UR QL AUTO: ABNORMAL /LPF
KETONES UR QL STRIP: NEGATIVE
LEUKOCYTE ESTERASE UR QL STRIP.AUTO: ABNORMAL
NITRITE UR QL STRIP: NEGATIVE
PH UR STRIP.AUTO: <=5 [PH] (ref 5–8)
POTASSIUM SERPL-SCNC: 3.9 MMOL/L (ref 3.5–5.2)
POTASSIUM SERPL-SCNC: 4 MMOL/L (ref 3.5–5.2)
PROT UR QL STRIP: ABNORMAL
QT INTERVAL: 536 MS
QTC INTERVAL: 478 MS
RBC # UR STRIP: ABNORMAL /HPF
REF LAB TEST METHOD: ABNORMAL
SODIUM SERPL-SCNC: 125 MMOL/L (ref 136–145)
SP GR UR STRIP: 1.01 (ref 1–1.03)
SQUAMOUS #/AREA URNS HPF: ABNORMAL /HPF
UROBILINOGEN UR QL STRIP: ABNORMAL
WBC # UR STRIP: ABNORMAL /HPF
YEAST URNS QL MICRO: ABNORMAL /HPF

## 2025-06-28 PROCEDURE — 25010000002 BUMETANIDE PER 0.5 MG: Performed by: HOSPITALIST

## 2025-06-28 PROCEDURE — 93005 ELECTROCARDIOGRAM TRACING: CPT | Performed by: HOSPITALIST

## 2025-06-28 PROCEDURE — 80048 BASIC METABOLIC PNL TOTAL CA: CPT | Performed by: NURSE PRACTITIONER

## 2025-06-28 PROCEDURE — 84132 ASSAY OF SERUM POTASSIUM: CPT | Performed by: INTERNAL MEDICINE

## 2025-06-28 PROCEDURE — 99232 SBSQ HOSP IP/OBS MODERATE 35: CPT | Performed by: HOSPITALIST

## 2025-06-28 PROCEDURE — 63710000001 ONDANSETRON ODT 4 MG TABLET DISPERSIBLE: Performed by: NURSE PRACTITIONER

## 2025-06-28 PROCEDURE — 93010 ELECTROCARDIOGRAM REPORT: CPT | Performed by: HOSPITALIST

## 2025-06-28 PROCEDURE — 81001 URINALYSIS AUTO W/SCOPE: CPT | Performed by: INTERNAL MEDICINE

## 2025-06-28 RX ORDER — POTASSIUM CHLORIDE 750 MG/1
10 TABLET, FILM COATED, EXTENDED RELEASE ORAL ONCE
Status: COMPLETED | OUTPATIENT
Start: 2025-06-28 | End: 2025-06-28

## 2025-06-28 RX ADMIN — Medication 10 ML: at 09:24

## 2025-06-28 RX ADMIN — BUMETANIDE 1 MG/HR: 0.25 INJECTION INTRAMUSCULAR; INTRAVENOUS at 17:13

## 2025-06-28 RX ADMIN — LATANOPROST 1 DROP: 50 SOLUTION OPHTHALMIC at 21:08

## 2025-06-28 RX ADMIN — ROSUVASTATIN 5 MG: 10 TABLET, FILM COATED ORAL at 09:23

## 2025-06-28 RX ADMIN — Medication 10 ML: at 21:08

## 2025-06-28 RX ADMIN — Medication 220 MG: at 09:23

## 2025-06-28 RX ADMIN — AMIODARONE HYDROCHLORIDE 200 MG: 200 TABLET ORAL at 09:23

## 2025-06-28 RX ADMIN — MIDODRINE HYDROCHLORIDE 5 MG: 2.5 TABLET ORAL at 17:13

## 2025-06-28 RX ADMIN — ONDANSETRON 4 MG: 4 TABLET, ORALLY DISINTEGRATING ORAL at 17:20

## 2025-06-28 RX ADMIN — PANTOPRAZOLE SODIUM 40 MG: 40 TABLET, DELAYED RELEASE ORAL at 09:23

## 2025-06-28 RX ADMIN — POTASSIUM CHLORIDE 10 MEQ: 750 TABLET, EXTENDED RELEASE ORAL at 11:49

## 2025-06-28 RX ADMIN — ALLOPURINOL 100 MG: 100 TABLET ORAL at 09:23

## 2025-06-28 RX ADMIN — METOLAZONE 5 MG: 5 TABLET ORAL at 09:22

## 2025-06-28 RX ADMIN — GABAPENTIN 300 MG: 300 CAPSULE ORAL at 21:07

## 2025-06-28 RX ADMIN — APIXABAN 2.5 MG: 2.5 TABLET, FILM COATED ORAL at 21:07

## 2025-06-28 RX ADMIN — Medication 400 MG: at 09:23

## 2025-06-28 RX ADMIN — GABAPENTIN 300 MG: 300 CAPSULE ORAL at 14:29

## 2025-06-28 RX ADMIN — MIDODRINE HYDROCHLORIDE 5 MG: 2.5 TABLET ORAL at 07:36

## 2025-06-28 RX ADMIN — APIXABAN 2.5 MG: 2.5 TABLET, FILM COATED ORAL at 09:23

## 2025-06-28 RX ADMIN — TIMOLOL MALEATE 1 DROP: 5 SOLUTION/ DROPS OPHTHALMIC at 09:23

## 2025-06-28 RX ADMIN — ASPIRIN 81 MG: 81 TABLET, COATED ORAL at 09:23

## 2025-06-28 RX ADMIN — GABAPENTIN 300 MG: 300 CAPSULE ORAL at 05:00

## 2025-06-28 RX ADMIN — Medication 5000 UNITS: at 09:22

## 2025-06-28 RX ADMIN — TAMSULOSIN HYDROCHLORIDE 0.4 MG: 0.4 CAPSULE ORAL at 09:23

## 2025-06-28 NOTE — PROGRESS NOTES
Nephrology ([facility]) Progress Note      Patient:  Genevieve Cerda  YOB: 1940  Date of Service: 6/28/2025  MRN: [id number]   Acct: [id number]   Primary Care Physician: Germaine Diego APRN  Advance Directive:   Code Status and Medical Interventions: No CPR (Do Not Attempt to Resuscitate); Limited Support; No intubation (DNI)   Ordered at: 06/23/25 1629     Code Status (Patient has no pulse and is not breathing):    No CPR (Do Not Attempt to Resuscitate)     Medical Interventions (Patient has pulse or is breathing):    Limited Support     Medical Intervention Limits:    No intubation (DNI)     Level Of Support Discussed With:    Health Care Surrogate       Patient     Admit Date: 6/23/2025       Hospital Day: 5  Referring Provider: No ref. provider found      Patient personally seen and examined.  Complete chart including Consults, Notes, Operative Reports, Labs, Cardiology, and Radiology studies reviewed as able.        Subjective:  Genevieve Cerda is a 85 y.o. female for whom we were consulted for evaluation and treatment of acute kidney injury. Baseline chronic kidney disease stage 3b, baseline creatinine approximately 1.6. Has followed in our office in the past and more recently has also followed with Dr An in [place]. History of prior nephrectomy. Other history includes congestive heart failure, atrial fibrillation, hypertension. Admitted on 6/23 for CHF exacerbation. Creatinine 2.10 at time of admission. Hospital course remarkable for treatment with a Lasix infusion.  Nephrology consulted on 6/26.  Denied changes to urination, no dysuria or hematuria. Denied recent n/v/d. Metolazone added on 6/26.     Patient is status post external cardioversion for A-fib on June 27.  She remains on diuretics for leg edema.  She denies much dyspnea.    Allergies:  Codeine    Home Meds:  Medications Prior to Admission   Medication Sig Dispense Refill Last Dose/Taking     allopurinol (ZYLOPRIM) 100 MG tablet Take 1 tablet by mouth Daily.   Taking    amiodarone (PACERONE) 200 MG tablet Take 2 tablets by mouth 2 (Two) Times a Day.   Taking    apixaban (ELIQUIS) 2.5 MG tablet tablet Take 1 tablet by mouth Every 12 (Twelve) Hours.   Taking    aspirin 81 MG EC tablet Take 1 tablet by mouth Daily.   Taking    [] cefdinir (OMNICEF) 300 MG capsule Take 1 capsule by mouth 2 (Two) Times a Day for 7 days. 14 capsule 0 Taking    citalopram (CeleXA) 10 MG tablet Take 1 tablet by mouth Daily.   Taking    furosemide (LASIX) 20 MG tablet Take 3 tablets by mouth 2 (Two) Times a Day.   Taking    gabapentin (NEURONTIN) 300 MG capsule Take 1 capsule by mouth Every 8 (Eight) Hours.   Taking    ipratropium-albuterol (DUO-NEB) 0.5-2.5 mg/3 ml nebulizer Take 3 mL by nebulization 4 (Four) Times a Day As Needed for Shortness of Air.   Taking As Needed    latanoprost (XALATAN) 0.005 % ophthalmic solution Administer 1 drop to both eyes Daily.   Taking    magnesium oxide (MAG-OX) 400 MG tablet Take 1 tablet by mouth Daily.   Taking    metoprolol tartrate (LOPRESSOR) 25 MG tablet Take 1 tablet by mouth 2 (Two) Times a Day. Hold for systolic blood pressure <100 or pulse <60   Taking    midodrine (PROAMATINE) 5 MG tablet Take 1 tablet by mouth 2 (Two) Times a Day. Hold if systolic blood pressure is >100   Taking    pantoprazole (PROTONIX) 40 MG EC tablet Take 1 tablet by mouth Daily.   Taking    rosuvastatin (CRESTOR) 5 MG tablet Take 1 tablet by mouth Daily.   Taking    Skin Protectants, Misc. (Eucerin) cream Apply 1 Application topically to the appropriate area as directed Daily. Apply to bilateral upper and lower extremities for dryness once daily and prn   Taking    tamsulosin (FLOMAX) 0.4 MG capsule 24 hr capsule Take 1 capsule by mouth Daily.   Taking    timolol (TIMOPTIC) 0.5 % ophthalmic solution Administer 1 drop to both eyes Daily.   Taking    torsemide (DEMADEX) 20 MG tablet Take 4 tablets by  mouth 2 (Two) Times a Day. 240 tablet 11 Taking    vitamin D (ERGOCALCIFEROL) 1.25 MG (55124 UT) capsule capsule Take 1 capsule by mouth Every 7 (Seven) Days. Takes on Monday   Taking    zinc sulfate (ZINCATE) 220 (50 Zn) MG capsule Take 1 capsule by mouth Daily.   Taking    acetaminophen (TYLENOL) 650 MG 8 hr tablet Take 1 tablet by mouth Every 6 (Six) Hours As Needed for Mild Pain.       polyethylene glycol (MiraLax) 17 g packet Take 17 g by mouth Daily As Needed (constipation).       sennosides-docusate (senna-docusate sodium) 8.6-50 MG per tablet Take 2 tablets by mouth Daily As Needed for Constipation.          Medicines:  Current Facility-Administered Medications   Medication Dose Route Frequency Provider Last Rate Last Admin    acetaminophen (TYLENOL) tablet 650 mg  650 mg Oral Q4H PRN Mary Falcon APRN        Or    acetaminophen (TYLENOL) 160 MG/5ML oral solution 650 mg  650 mg Oral Q4H PRN Mary Falcon APRN        Or    acetaminophen (TYLENOL) suppository 650 mg  650 mg Rectal Q4H PRN Mary Falcon APRN        allopurinol (ZYLOPRIM) tablet 100 mg  100 mg Oral Daily Indra Zamorano MD   100 mg at 06/28/25 0923    amiodarone (PACERONE) tablet 200 mg  200 mg Oral Daily Rancho Yanez MD   200 mg at 06/28/25 0923    apixaban (ELIQUIS) tablet 2.5 mg  2.5 mg Oral BID Mary Falcon APRN   2.5 mg at 06/28/25 0923    aspirin EC tablet 81 mg  81 mg Oral Daily Indra Zamorano MD   81 mg at 06/28/25 0923    sennosides-docusate (PERICOLACE) 8.6-50 MG per tablet 2 tablet  2 tablet Oral BID PRN Mary Falcon APRN        And    polyethylene glycol (MIRALAX) packet 17 g  17 g Oral Daily PRN Mary Falcon APRN        And    bisacodyl (DULCOLAX) EC tablet 5 mg  5 mg Oral Daily PRN Mary Falcon APRN        And    bisacodyl (DULCOLAX) suppository 10 mg  10 mg Rectal Daily PRN Mary Falcon APRN        bumetanide (BUMEX) 25 mg/100mL (0.25 mg/mL) infusion  1 mg/hr Intravenous Continuous Beau  Rancho LOPEZ MD 4 mL/hr at 06/27/25 1831 1 mg/hr at 06/27/25 1831    Calcium Replacement - Follow Nurse / BPA Driven Protocol   Not Applicable PRN Mary Falcon APRN        cholecalciferol (VITAMIN D3) tablet 5,000 Units  5,000 Units Oral Daily Indra Zamorano MD   5,000 Units at 06/28/25 0922    gabapentin (NEURONTIN) capsule 300 mg  300 mg Oral Q8H Indra Zamorano MD   300 mg at 06/28/25 0500    ipratropium-albuterol (DUO-NEB) nebulizer solution 3 mL  3 mL Nebulization 4x Daily PRN Indra Zamorano MD        latanoprost (XALATAN) 0.005 % ophthalmic solution 1 drop  1 drop Both Eyes Nightly Indra Zamorano MD   1 drop at 06/27/25 2125    magnesium oxide (MAG-OX) tablet 400 mg  400 mg Oral Daily Idnra Zamorano MD   400 mg at 06/28/25 0923    Magnesium Standard Dose Replacement - Follow Nurse / BPA Driven Protocol   Not Applicable PRN Mary Falcon APRN        metOLazone (ZAROXOLYN) tablet 5 mg  5 mg Oral Daily Brennon Gamez MD   5 mg at 06/28/25 0922    midodrine (PROAMATINE) tablet 5 mg  5 mg Oral BID AC Indra Zamorano MD   5 mg at 06/28/25 0736    nitroglycerin (NITROSTAT) SL tablet 0.4 mg  0.4 mg Sublingual Q5 Min PRN Mary Falcon APRN        ondansetron ODT (ZOFRAN-ODT) disintegrating tablet 4 mg  4 mg Oral Q6H PRN Mary Falcon APRN        Or    ondansetron (ZOFRAN) injection 4 mg  4 mg Intravenous Q6H PRN Mary Falcon APRN        pantoprazole (PROTONIX) EC tablet 40 mg  40 mg Oral Daily Indra Zamorano MD   40 mg at 06/28/25 0923    Phosphorus Replacement - Follow Nurse / BPA Driven Protocol   Not Applicable PRN Mary Falcon APRN        polyethylene glycol (MIRALAX) packet 17 g  17 g Oral Daily PRN Indra Zamorano MD        Potassium Replacement - Follow Nurse / BPA Driven Protocol   Not Applicable PRN Mary Falcon APRN        rosuvastatin (CRESTOR) tablet 5 mg  5 mg Oral Daily Indra Zamorano MD   5 mg at 06/28/25 0923    sennosides-docusate  (PERICOLACE) 8.6-50 MG per tablet 2 tablet  2 tablet Oral Daily PRN Indra Zamorano MD        sodium chloride 0.9 % flush 10 mL  10 mL Intravenous Q12H Mary Falcon APRN   10 mL at 06/28/25 0924    sodium chloride 0.9 % flush 10 mL  10 mL Intravenous PRN Mary Falcon APRN        sodium chloride 0.9 % infusion 40 mL  40 mL Intravenous PRN Mary Falcon APRN        tamsulosin (FLOMAX) 24 hr capsule 0.4 mg  0.4 mg Oral Daily Indra Zamorano MD   0.4 mg at 06/28/25 0923    timolol (TIMOPTIC) 0.5 % ophthalmic solution 1 drop  1 drop Both Eyes Daily Indra Zmaorano MD   1 drop at 06/28/25 0923    zinc sulfate (ZINCATE) capsule 220 mg  220 mg Oral Daily Indra Zamorano MD   220 mg at 06/28/25 0923       Past Medical History:  History reviewed. No pertinent past medical history.    Past Surgical History:  No past surgical history on file.    Family History  History reviewed. No pertinent family history.    Social History  Social History     Socioeconomic History    Marital status:    Tobacco Use    Smoking status: Never    Smokeless tobacco: Never   Vaping Use    Vaping status: Never Used   Substance and Sexual Activity    Alcohol use: Yes     Comment: rarely    Drug use: Never    Sexual activity: Defer       Review of Systems:  History obtained from chart review and the patient  General ROS: No fever or chills  Respiratory ROS: No cough, shortness of breath, wheezing  Cardiovascular ROS: positive for - dyspnea on exertion and edema  No chest pain or palpitations  Gastrointestinal ROS: No abdominal pain or melena  Genito-Urinary ROS: No dysuria or hematuria  Psych ROS: No anxiety and depression  14 point ROS reviewed with the patient and negative except as noted above and in the HPI unless unable to obtain.    Objective:  Patient Vitals for the past 24 hrs:   BP Temp Temp src Pulse Resp SpO2 Height   06/28/25 1230 122/49 -- -- 51 16 95 % --   06/28/25 0827 118/43 97.7 °F (36.5 °C) Oral 52  "16 95 % --   06/28/25 0432 114/45 97.4 °F (36.3 °C) Oral 59 16 98 % 166.4 cm (65.5\")   06/28/25 0018 100/58 97.5 °F (36.4 °C) Oral 50 16 97 % --   06/27/25 2103 110/63 97.4 °F (36.3 °C) Oral 60 16 96 % --   06/27/25 1534 113/51 -- -- 65 16 98 % --   06/27/25 1448 107/47 -- -- (!) 45 16 96 % --   06/27/25 1430 108/46 -- -- (!) 44 16 96 % --       Intake/Output Summary (Last 24 hours) at 6/28/2025 1427  Last data filed at 6/28/2025 0900  Gross per 24 hour   Intake 240 ml   Output 500 ml   Net -260 ml     General: awake/alert   Chest:  clear to auscultation bilaterally without respiratory distress  CVS: irregularly irregular rhythm  Abdominal: soft, nontender, positive bowel sounds  Extremities: 3+ bilateral lower extremity edema  Skin: warm and dry without rash      Labs:  Results from last 7 days   Lab Units 06/26/25  0345 06/25/25  0620 06/24/25  0531   WBC 10*3/mm3 6.77 6.26 7.26   HEMOGLOBIN g/dL 8.7* 8.7* 8.6*   HEMATOCRIT % 28.3* 28.7* 28.5*   PLATELETS 10*3/mm3 298 304 314         Results from last 7 days   Lab Units 06/28/25  0818 06/27/25  1915 06/27/25  1518 06/24/25  0531 06/23/25  0844   SODIUM mmol/L 125* 123* 125*   < > 127*  127*   POTASSIUM mmol/L 3.9 4.5 4.9   < > 5.0  5.0   CHLORIDE mmol/L 88* 87* 89*   < > 89*  89*   CO2 mmol/L 26.0 24.0 25.0   < > 24.0  26.0   BUN mg/dL 95.9* 93.7* 92.9*   < > 89.4*  89.4*   CREATININE mg/dL 2.31* 2.11* 2.02*   < > 2.10*  2.18*   CALCIUM mg/dL 8.2* 8.1* 8.3*   < > 8.3*  8.2*   EGFR mL/min/1.73 20.1* 22.4* 23.6*   < > 22.6*  21.6*   BILIRUBIN mg/dL  --   --   --   --  <0.2   ALK PHOS U/L  --   --   --   --  83   ALT (SGPT) U/L  --   --   --   --  14   AST (SGOT) U/L  --   --   --   --  16   GLUCOSE mg/dL 99 147* 108*   < > 101*  101*    < > = values in this interval not displayed.       Radiology:   Imaging Results (Last 72 Hours)       ** No results found for the last 72 hours. **            Culture:  No results found for: \"BLOODCX\", \"URINECX\", \"WOUNDCX\", " "\"MRSACX\", \"RESPCX\", \"STOOLCX\"      Assessment    Acute kidney injury, cardiorenal  Baseline chronic kidney disease stage 3b  Hypertension  Atrial fibrillation  Acute on chronic CHF  Hyponatremia  Anemia of CKD    Plan:  Continue IV Bumex infusion.  Hold on PO Metolazone  Renal function is fluctuating a bit under current diuretic regimen  Monitor labs.      Brennon Gamez MD  6/28/2025  14:27 CDT    "

## 2025-06-28 NOTE — PLAN OF CARE
Goal Outcome Evaluation:  Plan of Care Reviewed With: patient        Progress: improving  Outcome Evaluation: SB/S 46-63 with first degree block per tele. C/O nausea x1 and zofran administered. Pt continues to diurese on Bumex drip with good UOP. Safety  maintained and call light within reach.

## 2025-06-28 NOTE — PROGRESS NOTES
"S: Successful cardioversion to sinus rhythm yesterday.  Patient does report feeling a little more short of breath today.  Reportedly good urine output although difficult to track I/O.  Weight unable to be obtained at this morning.  Discontinued have significant lower extremity swelling.    Medications: Reviewed    Review of Systems: All pertinent negatives and positives as noted above.  Otherwise, all systems reviewed and found to be negative.    O:  /43 (BP Location: Left arm, Patient Position: Lying)   Pulse 52   Temp 97.7 °F (36.5 °C) (Oral)   Resp 16   Ht 166.4 cm (65.5\")   Wt 76.4 kg (168 lb 6.9 oz)   SpO2 95%   BMI 27.60 kg/m²   Temp:  [97.4 °F (36.3 °C)-97.7 °F (36.5 °C)] 97.7 °F (36.5 °C)  Heart Rate:  [43-73] 52  Resp:  [15-16] 16  BP: ()/(42-63) 118/43    Gen: female lying in bed in NAD  HEENT: NC/AT, sclera anicteric  Neck: Supple, JVD elevated  CV: Regular rhythm with mild bradycardia, no m/r/g  Pulm: Left lower lobe crackles and decreased right lower lobe breath sounds, NWOB  Abd: Soft, ND/NT  Ext: WWP, legs wrapped with at least 2+ underlying pretibial edema and 1+ knee/lower lie edema bilaterally    Diagnostic Data:    [facility]          6/26/2025    03:45 6/26/2025    19:42 6/27/2025    15:18 6/27/2025    19:15 6/28/2025    08:18   BMP   BUN 93.9  94.2  92.9  93.7  95.9    Creatinine 1.97  2.06  2.02  2.11  2.31    Sodium 128  126  125  123  125    Potassium 4.3  4.4  4.9  4.5  3.9    Chloride 92  89  89  87  88    CO2 25.0  25.0  25.0  24.0  26.0    Calcium 8.2  7.9  8.3  8.1  8.2      Magnesium   Magnesium   Date/Time Value Ref Range Status   06/27/2025 1518 2.2 1.6 - 2.4 mg/dL Final         ASSESSMENT/PLAN:    1.  Acute on chronic diastolic CHF  2.  MICHOACANO on CKD 3B  3.  Persistent atrial fibrillation-now s/p cardioversion  4.  Hypertension-currently with relatively lower BP  5.  Hyponatremia  6.  Prolonged QT  7.  LVH    - Transitioned amiodarone to 200 mg once daily  - Stop " citalopram given prolonged QT; this was discussed with the patient.  - Recommend avoiding QT prolonging medications as able.  QT does appear improved on ECG this morning.  - Continue Eliquis 2.5 mg twice daily  - Continue Bumex infusion  - Continue metolazone although may discontinue if significant worsening hyponatremia.  - BMP BID while on Bumex infusion  - Metoprolol discontinued due to bradycardia given that she is maintaining sinus rhythm with amiodarone  - Plan for eventual amyloid evaluation given atrial fibrillation, LVH on echo with low voltage on ECG, diastolic CHF

## 2025-06-28 NOTE — PROGRESS NOTES
Nephrology ([facility]) Progress Note      Patient:  Genevieve Cerda  YOB: 1940  Date of Service: 6/28/2025  MRN: [id number]   Acct: [id number]   Primary Care Physician: Germaine Diego APRN  Advance Directive:   Code Status and Medical Interventions: No CPR (Do Not Attempt to Resuscitate); Limited Support; No intubation (DNI)   Ordered at: 06/23/25 1629     Code Status (Patient has no pulse and is not breathing):    No CPR (Do Not Attempt to Resuscitate)     Medical Interventions (Patient has pulse or is breathing):    Limited Support     Medical Intervention Limits:    No intubation (DNI)     Level Of Support Discussed With:    Health Care Surrogate       Patient     Admit Date: 6/23/2025       Hospital Day: 5  Referring Provider: No ref. provider found      Patient personally seen and examined.  Complete chart including Consults, Notes, Operative Reports, Labs, Cardiology, and Radiology studies reviewed as able.        Subjective:  Genevieve Cerda is a 85 y.o. female for whom we were consulted for evaluation and treatment of acute kidney injury. Baseline chronic kidney disease stage 3b, baseline creatinine approximately 1.6. Has followed in our office in the past and more recently has also followed with Dr An in [place]. History of prior nephrectomy. Other history includes congestive heart failure, atrial fibrillation, hypertension. Admitted on 6/23 for CHF exacerbation. Creatinine 2.10 at time of admission. Hospital course remarkable for treatment with a Lasix infusion.  Nephrology consulted on 6/26.  Denied changes to urination, no dysuria or hematuria. Denied recent n/v/d. Metolazone added on 6/26.     Patient is status post external cardioversion for A-fib on June 27.  She remains on diuretics for leg edema.  She denies much dyspnea.    Allergies:  Codeine    Home Meds:  Medications Prior to Admission   Medication Sig Dispense Refill Last Dose/Taking     allopurinol (ZYLOPRIM) 100 MG tablet Take 1 tablet by mouth Daily.   Taking    amiodarone (PACERONE) 200 MG tablet Take 2 tablets by mouth 2 (Two) Times a Day.   Taking    apixaban (ELIQUIS) 2.5 MG tablet tablet Take 1 tablet by mouth Every 12 (Twelve) Hours.   Taking    aspirin 81 MG EC tablet Take 1 tablet by mouth Daily.   Taking    [] cefdinir (OMNICEF) 300 MG capsule Take 1 capsule by mouth 2 (Two) Times a Day for 7 days. 14 capsule 0 Taking    citalopram (CeleXA) 10 MG tablet Take 1 tablet by mouth Daily.   Taking    furosemide (LASIX) 20 MG tablet Take 3 tablets by mouth 2 (Two) Times a Day.   Taking    gabapentin (NEURONTIN) 300 MG capsule Take 1 capsule by mouth Every 8 (Eight) Hours.   Taking    ipratropium-albuterol (DUO-NEB) 0.5-2.5 mg/3 ml nebulizer Take 3 mL by nebulization 4 (Four) Times a Day As Needed for Shortness of Air.   Taking As Needed    latanoprost (XALATAN) 0.005 % ophthalmic solution Administer 1 drop to both eyes Daily.   Taking    magnesium oxide (MAG-OX) 400 MG tablet Take 1 tablet by mouth Daily.   Taking    metoprolol tartrate (LOPRESSOR) 25 MG tablet Take 1 tablet by mouth 2 (Two) Times a Day. Hold for systolic blood pressure <100 or pulse <60   Taking    midodrine (PROAMATINE) 5 MG tablet Take 1 tablet by mouth 2 (Two) Times a Day. Hold if systolic blood pressure is >100   Taking    pantoprazole (PROTONIX) 40 MG EC tablet Take 1 tablet by mouth Daily.   Taking    rosuvastatin (CRESTOR) 5 MG tablet Take 1 tablet by mouth Daily.   Taking    Skin Protectants, Misc. (Eucerin) cream Apply 1 Application topically to the appropriate area as directed Daily. Apply to bilateral upper and lower extremities for dryness once daily and prn   Taking    tamsulosin (FLOMAX) 0.4 MG capsule 24 hr capsule Take 1 capsule by mouth Daily.   Taking    timolol (TIMOPTIC) 0.5 % ophthalmic solution Administer 1 drop to both eyes Daily.   Taking    torsemide (DEMADEX) 20 MG tablet Take 4 tablets by  mouth 2 (Two) Times a Day. 240 tablet 11 Taking    vitamin D (ERGOCALCIFEROL) 1.25 MG (29967 UT) capsule capsule Take 1 capsule by mouth Every 7 (Seven) Days. Takes on Monday   Taking    zinc sulfate (ZINCATE) 220 (50 Zn) MG capsule Take 1 capsule by mouth Daily.   Taking    acetaminophen (TYLENOL) 650 MG 8 hr tablet Take 1 tablet by mouth Every 6 (Six) Hours As Needed for Mild Pain.       polyethylene glycol (MiraLax) 17 g packet Take 17 g by mouth Daily As Needed (constipation).       sennosides-docusate (senna-docusate sodium) 8.6-50 MG per tablet Take 2 tablets by mouth Daily As Needed for Constipation.          Medicines:  Current Facility-Administered Medications   Medication Dose Route Frequency Provider Last Rate Last Admin    acetaminophen (TYLENOL) tablet 650 mg  650 mg Oral Q4H PRN Mary Falcon APRN        Or    acetaminophen (TYLENOL) 160 MG/5ML oral solution 650 mg  650 mg Oral Q4H PRN Mary Falcon APRN        Or    acetaminophen (TYLENOL) suppository 650 mg  650 mg Rectal Q4H PRN Mary Falcon APRN        allopurinol (ZYLOPRIM) tablet 100 mg  100 mg Oral Daily Indra Zamorano MD   100 mg at 06/28/25 0923    amiodarone (PACERONE) tablet 200 mg  200 mg Oral Daily Rancho Yanez MD   200 mg at 06/28/25 0923    apixaban (ELIQUIS) tablet 2.5 mg  2.5 mg Oral BID Mary Falcon APRN   2.5 mg at 06/28/25 0923    aspirin EC tablet 81 mg  81 mg Oral Daily Indra Zamorano MD   81 mg at 06/28/25 0923    sennosides-docusate (PERICOLACE) 8.6-50 MG per tablet 2 tablet  2 tablet Oral BID PRN Mary Falcon APRN        And    polyethylene glycol (MIRALAX) packet 17 g  17 g Oral Daily PRN Mary Falcon APRN        And    bisacodyl (DULCOLAX) EC tablet 5 mg  5 mg Oral Daily PRN Mary Falcon APRN        And    bisacodyl (DULCOLAX) suppository 10 mg  10 mg Rectal Daily PRN Mary Falcon APRN        bumetanide (BUMEX) 25 mg/100mL (0.25 mg/mL) infusion  1 mg/hr Intravenous Continuous Beau  Rancho LOPEZ MD 4 mL/hr at 06/27/25 1831 1 mg/hr at 06/27/25 1831    Calcium Replacement - Follow Nurse / BPA Driven Protocol   Not Applicable PRN Mary Falcon APRN        cholecalciferol (VITAMIN D3) tablet 5,000 Units  5,000 Units Oral Daily Indra Zamorano MD   5,000 Units at 06/28/25 0922    gabapentin (NEURONTIN) capsule 300 mg  300 mg Oral Q8H Indra Zamorano MD   300 mg at 06/28/25 0500    ipratropium-albuterol (DUO-NEB) nebulizer solution 3 mL  3 mL Nebulization 4x Daily PRN Indra Zamorano MD        latanoprost (XALATAN) 0.005 % ophthalmic solution 1 drop  1 drop Both Eyes Nightly Indra Zamorano MD   1 drop at 06/27/25 2125    magnesium oxide (MAG-OX) tablet 400 mg  400 mg Oral Daily Indra Zamorano MD   400 mg at 06/28/25 0923    Magnesium Standard Dose Replacement - Follow Nurse / BPA Driven Protocol   Not Applicable PRN Mary Falcon APRN        metOLazone (ZAROXOLYN) tablet 5 mg  5 mg Oral Daily Brennon Gamez MD   5 mg at 06/28/25 0922    midodrine (PROAMATINE) tablet 5 mg  5 mg Oral BID AC Indra Zamorano MD   5 mg at 06/28/25 0736    nitroglycerin (NITROSTAT) SL tablet 0.4 mg  0.4 mg Sublingual Q5 Min PRN Mary Falcon APRN        ondansetron ODT (ZOFRAN-ODT) disintegrating tablet 4 mg  4 mg Oral Q6H PRN Mary Falcon APRN        Or    ondansetron (ZOFRAN) injection 4 mg  4 mg Intravenous Q6H PRN Mary Falcon APRN        pantoprazole (PROTONIX) EC tablet 40 mg  40 mg Oral Daily Indra Zamorano MD   40 mg at 06/28/25 0923    Phosphorus Replacement - Follow Nurse / BPA Driven Protocol   Not Applicable PRN Mary Falcon APRN        polyethylene glycol (MIRALAX) packet 17 g  17 g Oral Daily PRN Indra Zamorano MD        Potassium Replacement - Follow Nurse / BPA Driven Protocol   Not Applicable PRN Mary Falcon APRN        rosuvastatin (CRESTOR) tablet 5 mg  5 mg Oral Daily Indra Zamorano MD   5 mg at 06/28/25 0923    sennosides-docusate  (PERICOLACE) 8.6-50 MG per tablet 2 tablet  2 tablet Oral Daily PRN Indra Zamorano MD        sodium chloride 0.9 % flush 10 mL  10 mL Intravenous Q12H Mary Falcon APRN   10 mL at 06/28/25 0924    sodium chloride 0.9 % flush 10 mL  10 mL Intravenous PRN Mary Falcon APRN        sodium chloride 0.9 % infusion 40 mL  40 mL Intravenous PRN Mary Falcon APRN        tamsulosin (FLOMAX) 24 hr capsule 0.4 mg  0.4 mg Oral Daily Indra Zamorano MD   0.4 mg at 06/28/25 0923    timolol (TIMOPTIC) 0.5 % ophthalmic solution 1 drop  1 drop Both Eyes Daily Indra Zamorano MD   1 drop at 06/28/25 0923    zinc sulfate (ZINCATE) capsule 220 mg  220 mg Oral Daily Indra Zamorano MD   220 mg at 06/28/25 0923       Past Medical History:  History reviewed. No pertinent past medical history.    Past Surgical History:  No past surgical history on file.    Family History  History reviewed. No pertinent family history.    Social History  Social History     Socioeconomic History    Marital status:    Tobacco Use    Smoking status: Never    Smokeless tobacco: Never   Vaping Use    Vaping status: Never Used   Substance and Sexual Activity    Alcohol use: Yes     Comment: rarely    Drug use: Never    Sexual activity: Defer       Review of Systems:  History obtained from chart review and the patient  General ROS: No fever or chills  Respiratory ROS: No cough, shortness of breath, wheezing  Cardiovascular ROS: positive for - dyspnea on exertion and edema  No chest pain or palpitations  Gastrointestinal ROS: No abdominal pain or melena  Genito-Urinary ROS: No dysuria or hematuria  Psych ROS: No anxiety and depression  14 point ROS reviewed with the patient and negative except as noted above and in the HPI unless unable to obtain.    Objective:  Patient Vitals for the past 24 hrs:   BP Temp Temp src Pulse Resp SpO2 Height   06/28/25 1230 122/49 -- -- 51 16 95 % --   06/28/25 0827 118/43 97.7 °F (36.5 °C) Oral 52  "16 95 % --   06/28/25 0432 114/45 97.4 °F (36.3 °C) Oral 59 16 98 % 166.4 cm (65.5\")   06/28/25 0018 100/58 97.5 °F (36.4 °C) Oral 50 16 97 % --   06/27/25 2103 110/63 97.4 °F (36.3 °C) Oral 60 16 96 % --   06/27/25 1534 113/51 -- -- 65 16 98 % --   06/27/25 1448 107/47 -- -- (!) 45 16 96 % --   06/27/25 1430 108/46 -- -- (!) 44 16 96 % --   06/27/25 1425 102/42 -- -- (!) 43 16 95 % --       Intake/Output Summary (Last 24 hours) at 6/28/2025 1421  Last data filed at 6/28/2025 0900  Gross per 24 hour   Intake 240 ml   Output 500 ml   Net -260 ml     General: awake/alert   Chest:  clear to auscultation bilaterally without respiratory distress  CVS: irregularly irregular rhythm  Abdominal: soft, nontender, positive bowel sounds  Extremities: 3+ bilateral lower extremity edema  Skin: warm and dry without rash      Labs:  Results from last 7 days   Lab Units 06/26/25  0345 06/25/25  0620 06/24/25  0531   WBC 10*3/mm3 6.77 6.26 7.26   HEMOGLOBIN g/dL 8.7* 8.7* 8.6*   HEMATOCRIT % 28.3* 28.7* 28.5*   PLATELETS 10*3/mm3 298 304 314         Results from last 7 days   Lab Units 06/28/25  0818 06/27/25  1915 06/27/25  1518 06/24/25  0531 06/23/25  0844   SODIUM mmol/L 125* 123* 125*   < > 127*  127*   POTASSIUM mmol/L 3.9 4.5 4.9   < > 5.0  5.0   CHLORIDE mmol/L 88* 87* 89*   < > 89*  89*   CO2 mmol/L 26.0 24.0 25.0   < > 24.0  26.0   BUN mg/dL 95.9* 93.7* 92.9*   < > 89.4*  89.4*   CREATININE mg/dL 2.31* 2.11* 2.02*   < > 2.10*  2.18*   CALCIUM mg/dL 8.2* 8.1* 8.3*   < > 8.3*  8.2*   EGFR mL/min/1.73 20.1* 22.4* 23.6*   < > 22.6*  21.6*   BILIRUBIN mg/dL  --   --   --   --  <0.2   ALK PHOS U/L  --   --   --   --  83   ALT (SGPT) U/L  --   --   --   --  14   AST (SGOT) U/L  --   --   --   --  16   GLUCOSE mg/dL 99 147* 108*   < > 101*  101*    < > = values in this interval not displayed.       Radiology:   Imaging Results (Last 72 Hours)       ** No results found for the last 72 hours. **            Culture:  No " "results found for: \"BLOODCX\", \"URINECX\", \"WOUNDCX\", \"MRSACX\", \"RESPCX\", \"STOOLCX\"      Assessment    Acute kidney injury, cardiorenal  Baseline chronic kidney disease stage 3b  Hypertension  Atrial fibrillation  Acute on chronic CHF  Hyponatremia  Anemia of CKD    Plan:  Continue IV Bumex infusion.  Hold on PO Metolazone  Renal function is fluctuating a bit under current diuretic regimen  Monitor labs.      Brennon Gamez MD  6/28/2025  14:21 CDT    "

## 2025-06-28 NOTE — PROGRESS NOTES
[facility] Medicine Services  INPATIENT PROGRESS NOTE    Patient Name: Genevieve Cerda  Date of Admission: 6/23/2025  Today's Date: 06/28/25  Length of Stay: 5  Primary Care Physician: Germaine Diego APRN    Subjective   Chief Complaint: Leg swelling    Patient has no new complaint this morning, sitting up in the chair.    Review of Systems   All pertinent negatives and positives are as above. All other systems have been reviewed and are negative unless otherwise stated.     Objective    Temp:  [97.4 °F (36.3 °C)-97.7 °F (36.5 °C)] 97.7 °F (36.5 °C)  Heart Rate:  [50-60] 51  Resp:  [16] 16  BP: (100-122)/(43-63) 122/49  Physical Exam  Constitutional:       Appearance: Normal appearance.   HENT:      Head: Normocephalic and atraumatic.      Nose: Nose normal.      Mouth/Throat:      Mouth: Mucous membranes are moist.      Pharynx: Oropharynx is clear.   Eyes:      Extraocular Movements: Extraocular movements intact.      Conjunctiva/sclera: Conjunctivae normal.   Cardiovascular:      Rate and Rhythm: Normal rate. Rhythm irregular.      Pulses: Normal pulses.      Heart sounds: Normal heart sounds.   Pulmonary:      Effort: Pulmonary effort is normal.      Breath sounds: Rales present.   Abdominal:      General: Bowel sounds are normal.      Palpations: Abdomen is soft.   Musculoskeletal:      Cervical back: Normal range of motion and neck supple.      Right lower leg: edema.      Left lower leg:  edema.   Skin:     General: Skin is warm and dry.      Capillary Refill: Capillary refill takes 2 to 3 seconds.   Neurological:      General: No focal deficit present.      Mental Status: She is alert and oriented to person, place, and time.   Psychiatric:         Mood and Affect: Mood normal.         Behavior: Behavior normal.       Results Review:  I have reviewed the labs, radiology results, and diagnostic studies.    Laboratory Data:   Results from last 7 days   Lab Units  "06/26/25  0345 06/25/25  0620 06/24/25  0531   WBC 10*3/mm3 6.77 6.26 7.26   HEMOGLOBIN g/dL 8.7* 8.7* 8.6*   HEMATOCRIT % 28.3* 28.7* 28.5*   PLATELETS 10*3/mm3 298 304 314        Results from last 7 days   Lab Units 06/28/25  0818 06/27/25  1915 06/27/25  1518 06/24/25  0531 06/23/25  0844   SODIUM mmol/L 125* 123* 125*   < > 127*  127*   POTASSIUM mmol/L 3.9 4.5 4.9   < > 5.0  5.0   CHLORIDE mmol/L 88* 87* 89*   < > 89*  89*   CO2 mmol/L 26.0 24.0 25.0   < > 24.0  26.0   BUN mg/dL 95.9* 93.7* 92.9*   < > 89.4*  89.4*   CREATININE mg/dL 2.31* 2.11* 2.02*   < > 2.10*  2.18*   CALCIUM mg/dL 8.2* 8.1* 8.3*   < > 8.3*  8.2*   BILIRUBIN mg/dL  --   --   --   --  <0.2   ALK PHOS U/L  --   --   --   --  83   ALT (SGPT) U/L  --   --   --   --  14   AST (SGOT) U/L  --   --   --   --  16   GLUCOSE mg/dL 99 147* 108*   < > 101*  101*    < > = values in this interval not displayed.       Culture Data:   No results found for: \"BLOODCX\", \"URINECX\", \"WOUNDCX\", \"MRSACX\", \"RESPCX\", \"STOOLCX\"    Radiology Data:   Imaging Results (Last 24 Hours)       ** No results found for the last 24 hours. **            I have reviewed the patient's current medications.     Assessment/Plan   Assessment  Active Hospital Problems    Diagnosis     **Acute on chronic heart failure with preserved ejection fraction (HFpEF)     Acute renal failure superimposed on stage 3b chronic kidney disease     Hyponatremia     Atrial fibrillation, persistent     Hypercholesterolemia     Current use of long term anticoagulation        Treatment Plan    - Acute on chronic diastolic congestive heart failure  Patient failed outpatient treatment and was sent to the emergency room by her PCP.  Currently she is being diuresed with Bumex drip [Lasix drip discontinued] and is making marginal clinical improvement.  Cardiology and nephrology are on consult and helping with management.  Patient has also been started on metolazone.  Will continue all her guideline " directed medications.    - Hyponatremia [likely hypervolemic hyponatremia]  Patient's hyponatremia is not making any significant improvement  Continue fluid restriction/diuresis and follow serial BMP    - Chronic atrial fibrillation  We will  continue patient's amiodarone and apixaban [metoprolol on hold because of bradycardia].  Patient is status post cardioversion  Cardiology recommendations as follows-  - Continue amiodarone 200 mg twice daily   - Continue Eliquis 2.5 mg twice daily  - Bumex infusion  - Continue metolazone 5 mg daily for now  - Continue close laboratory monitoring    DVT prophylaxis-apixaban    Disposition-continue current management and follow cardiology's recommendations      Electronically signed by Indra Zamorano MD, 06/28/25, 15:58 CDT.

## 2025-06-29 LAB
ANION GAP SERPL CALCULATED.3IONS-SCNC: 17 MMOL/L (ref 5–15)
BUN SERPL-MCNC: 92.4 MG/DL (ref 8–23)
BUN/CREAT SERPL: 39.7 (ref 7–25)
CALCIUM SPEC-SCNC: 8.2 MG/DL (ref 8.6–10.5)
CHLORIDE SERPL-SCNC: 87 MMOL/L (ref 98–107)
CO2 SERPL-SCNC: 21 MMOL/L (ref 22–29)
CREAT SERPL-MCNC: 2.33 MG/DL (ref 0.57–1)
EGFRCR SERPLBLD CKD-EPI 2021: 19.9 ML/MIN/1.73
GLUCOSE SERPL-MCNC: 97 MG/DL (ref 65–99)
POTASSIUM SERPL-SCNC: 3.5 MMOL/L (ref 3.5–5.2)
POTASSIUM SERPL-SCNC: 3.6 MMOL/L (ref 3.5–5.2)
SODIUM SERPL-SCNC: 125 MMOL/L (ref 136–145)

## 2025-06-29 PROCEDURE — 82784 ASSAY IGA/IGD/IGG/IGM EACH: CPT | Performed by: HOSPITALIST

## 2025-06-29 PROCEDURE — 83521 IG LIGHT CHAINS FREE EACH: CPT | Performed by: HOSPITALIST

## 2025-06-29 PROCEDURE — 99231 SBSQ HOSP IP/OBS SF/LOW 25: CPT | Performed by: HOSPITALIST

## 2025-06-29 PROCEDURE — 86335 IMMUNFIX E-PHORSIS/URINE/CSF: CPT | Performed by: HOSPITALIST

## 2025-06-29 PROCEDURE — 86334 IMMUNOFIX E-PHORESIS SERUM: CPT | Performed by: HOSPITALIST

## 2025-06-29 PROCEDURE — 25010000002 BUMETANIDE PER 0.5 MG: Performed by: HOSPITALIST

## 2025-06-29 PROCEDURE — 84132 ASSAY OF SERUM POTASSIUM: CPT | Performed by: INTERNAL MEDICINE

## 2025-06-29 PROCEDURE — 97530 THERAPEUTIC ACTIVITIES: CPT

## 2025-06-29 PROCEDURE — 80048 BASIC METABOLIC PNL TOTAL CA: CPT | Performed by: INTERNAL MEDICINE

## 2025-06-29 PROCEDURE — 84155 ASSAY OF PROTEIN SERUM: CPT | Performed by: HOSPITALIST

## 2025-06-29 PROCEDURE — 84165 PROTEIN E-PHORESIS SERUM: CPT | Performed by: HOSPITALIST

## 2025-06-29 RX ORDER — POTASSIUM CHLORIDE 1500 MG/1
20 TABLET, EXTENDED RELEASE ORAL ONCE
Status: COMPLETED | OUTPATIENT
Start: 2025-06-29 | End: 2025-06-29

## 2025-06-29 RX ADMIN — APIXABAN 2.5 MG: 2.5 TABLET, FILM COATED ORAL at 21:00

## 2025-06-29 RX ADMIN — POTASSIUM CHLORIDE 20 MEQ: 1500 TABLET, EXTENDED RELEASE ORAL at 09:23

## 2025-06-29 RX ADMIN — GABAPENTIN 300 MG: 300 CAPSULE ORAL at 16:37

## 2025-06-29 RX ADMIN — GABAPENTIN 300 MG: 300 CAPSULE ORAL at 05:29

## 2025-06-29 RX ADMIN — APIXABAN 2.5 MG: 2.5 TABLET, FILM COATED ORAL at 09:22

## 2025-06-29 RX ADMIN — PANTOPRAZOLE SODIUM 40 MG: 40 TABLET, DELAYED RELEASE ORAL at 09:23

## 2025-06-29 RX ADMIN — ALLOPURINOL 100 MG: 100 TABLET ORAL at 09:22

## 2025-06-29 RX ADMIN — Medication 220 MG: at 09:23

## 2025-06-29 RX ADMIN — Medication 400 MG: at 09:23

## 2025-06-29 RX ADMIN — Medication 5000 UNITS: at 10:40

## 2025-06-29 RX ADMIN — AMIODARONE HYDROCHLORIDE 200 MG: 200 TABLET ORAL at 09:23

## 2025-06-29 RX ADMIN — MIDODRINE HYDROCHLORIDE 5 MG: 2.5 TABLET ORAL at 07:20

## 2025-06-29 RX ADMIN — BUMETANIDE 1 MG/HR: 0.25 INJECTION INTRAMUSCULAR; INTRAVENOUS at 16:37

## 2025-06-29 RX ADMIN — Medication 10 ML: at 09:23

## 2025-06-29 RX ADMIN — LATANOPROST 1 DROP: 50 SOLUTION OPHTHALMIC at 20:59

## 2025-06-29 RX ADMIN — ASPIRIN 81 MG: 81 TABLET, COATED ORAL at 09:22

## 2025-06-29 RX ADMIN — TIMOLOL MALEATE 1 DROP: 5 SOLUTION/ DROPS OPHTHALMIC at 09:24

## 2025-06-29 RX ADMIN — TAMSULOSIN HYDROCHLORIDE 0.4 MG: 0.4 CAPSULE ORAL at 09:22

## 2025-06-29 RX ADMIN — Medication 10 ML: at 20:59

## 2025-06-29 RX ADMIN — GABAPENTIN 300 MG: 300 CAPSULE ORAL at 21:00

## 2025-06-29 RX ADMIN — ROSUVASTATIN 5 MG: 10 TABLET, FILM COATED ORAL at 09:23

## 2025-06-29 RX ADMIN — MIDODRINE HYDROCHLORIDE 5 MG: 2.5 TABLET ORAL at 16:37

## 2025-06-29 NOTE — PROGRESS NOTES
"S: No adverse events overnight.  Unable to obtain a weight again today due to bed malfunction.  Appears to have decent urine output although I/O tracking is somewhat challenging.  Still a little short of breath this morning but no other acute symptoms reported.    Medications: Reviewed    O:  /43 (BP Location: Right arm, Patient Position: Sitting)   Pulse 60   Temp 98 °F (36.7 °C)   Resp 14   Ht 166.4 cm (65.5\")   Wt 76.4 kg (168 lb 6.9 oz)   SpO2 100%   BMI 27.60 kg/m²   Temp:  [97.3 °F (36.3 °C)-98 °F (36.7 °C)] 98 °F (36.7 °C)  Heart Rate:  [49-67] 60  Resp:  [14-16] 14  BP: (109-134)/(41-63) 127/43    Gen: female sitting in chair in NAD  HEENT: NC/AT, sclera anicteric  Neck: Supple, JVD elevated  CV: RRR, no m/r/g  Pulm: Faint bilateral pulmonary crackles with reduced right lower lobe breath sounds, NWOB  Abd: Soft, ND/NT  Ext: WWP, lower legs wrapped with 2+ bilateral ankle edema and 1-2+ bilateral knee/thigh edema    Diagnostic Data:    BMP   BMP          6/27/2025    15:18 6/27/2025    19:15 6/28/2025    08:18 6/28/2025    16:35 6/29/2025    04:39 6/29/2025    13:14   BMP   BUN 92.9  93.7  95.9   92.4     Creatinine 2.02  2.11  2.31   2.33     Sodium 125  123  125   125     Potassium 4.9  4.5  3.9  4.0  3.6  3.5    Chloride 89  87  88   87     CO2 25.0  24.0  26.0   21.0     Calcium 8.3  8.1  8.2   8.2         ASSESSMENT/PLAN:    1.  Acute on chronic diastolic CHF  2.  MICHOACANO on CKD 3B  3.  Persistent atrial fibrillation-now s/p cardioversion  4.  Hypertension (chronic, but now with relatively low BP)  5.  Hyponatremia  6.  Prolonged QT  7.  LVH-evaluating for amyloid.  Obtained immunofixation and light chain labs in hospital and plan for outpatient PYP scan.    - Continue Bumex infusion  - Metolazone now on hold per nephrology due to hyponatremia  - Continue BID BMP while on Bumex infusion although may decrease frequency if labs remain stable  - Continue amiodarone 2 mg daily  - Metoprolol " previously stopped due to bradycardia  - Continue Eliquis 2.5 mg twice daily

## 2025-06-29 NOTE — CASE MANAGEMENT/SOCIAL WORK
Continued Stay Note   [place]     Patient Name: Genevieve Cerda  MRN: [id number]  Today's Date: 6/29/2025    Admit Date: 6/23/2025    Plan: [facility]   Discharge Plan       Row Name 06/29/25 1410       Plan    Plan [facility]    Patient/Family in Agreement with Plan yes    Plan Comments Plan is still for return to [facility] when pt is medically stable. No precert needed. Pharmacy is [facility].                   Discharge Codes    No documentation.                       BRANDON LehmanW

## 2025-06-29 NOTE — PLAN OF CARE
Problem: Adult Inpatient Plan of Care  Goal: Plan of Care Review  Outcome: Progressing  Flowsheets (Taken 6/29/2025 4636)  Progress: no change  Outcome Evaluation: VSS with bradycardia. Pt A&Ox4. Pt on 2L NC. No c/o pain during this shift. Sinus/SB 47-64 HR down to 43 this shift. Bumex gtt infusing. Safety maintained.  Plan of Care Reviewed With: patient

## 2025-06-29 NOTE — THERAPY TREATMENT NOTE
Acute Care - Physical Therapy Treatment Note  [facility]     Patient Name: Genevieve Cerda  : 1940  MRN: [id number]  Today's Date: 2025      Visit Dx:     ICD-10-CM ICD-9-CM   1. Congestive heart failure, unspecified HF chronicity, unspecified heart failure type  I50.9 428.0   2. Chronic kidney disease, unspecified CKD stage  N18.9 585.9   3. Hyponatremia  E87.1 276.1   4. Impaired functional mobility and activity tolerance [Z74.09]  Z74.09 V49.89   5. Decreased activities of daily living (ADL)  Z78.9 V49.89     Patient Active Problem List   Diagnosis    Severe protein-calorie malnutrition    Pleural effusion    Chronic congestive heart failure    Acute on chronic diastolic CHF (congestive heart failure)    CHF (congestive heart failure)    Acute on chronic heart failure with preserved ejection fraction (HFpEF)    Hyponatremia    Atrial fibrillation, persistent    Hypercholesterolemia    Current use of long term anticoagulation    Acute renal failure superimposed on stage 3b chronic kidney disease     History reviewed. No pertinent past medical history.  No past surgical history on file.  PT Assessment (Last 12 Hours)       PT Evaluation and Treatment       Row Name 25 1552          Physical Therapy Time and Intention    Subjective Information complains of;pain  -TB     Document Type therapy note (daily note)  -TB     Mode of Treatment physical therapy  -TB       Row Name 25 6413          General Information    Existing Precautions/Restrictions fall;oxygen therapy device and L/min  -TB       Row Name 25 7089          Pain    Pretreatment Pain Rating 4/10  -TB     Posttreatment Pain Rating 4/10  -TB     Pain Location extremity  -TB     Pain Side/Orientation lower  -TB       Row Name 25 7659          Bed Mobility    Bed Mobility sit-supine;scooting/bridging  -TB     Scooting/Bridging [place] (Bed Mobility) maximum assist (25% patient effort);2 person assist  Up in bed  -TB      Sit-Supine [place] (Bed Mobility) moderate assist (50% patient effort);verbal cues  -TB       Row Name 06/29/25 1550          Transfers    Transfers sit-stand transfer;stand-sit transfer;chair-bed transfer  -TB       Row Name 06/29/25 1550          Chair-Bed Transfer    Chair-Bed [place] (Transfers) moderate assist (50% patient effort);2 person assist;verbal cues  -TB       Row Name 06/29/25 1550          Sit-Stand Transfer    Sit-Stand [place] (Transfers) moderate assist (50% patient effort);2 person assist;verbal cues  -TB       Row Name 06/29/25 1550          Stand-Sit Transfer    Stand-Sit [place] (Transfers) moderate assist (50% patient effort);2 person assist;verbal cues  -TB       Row Name 06/29/25 1550          Balance    Balance Assessment sitting static balance;sitting dynamic balance  -TB     Static Sitting Balance standby assist  -TB     Dynamic Sitting Balance standby assist  -TB     Position, Sitting Balance sitting edge of bed  -TB     Comment, Balance Too tired for LE exs  -TB       Row Name             Wound 06/23/25 1658 Left gluteal Pressure Injury    Wound - Properties Group Placement Date: 06/23/25  -TBA Placement Time: 1658  -TBA Present on Original Admission: Y  -TBA Side: Left  -TBA Location: gluteal  -TBA Primary Wound Type: Pressure Inj  -TBA    Retired Wound - Properties Group Placement Date: 06/23/25  -TBA Placement Time: 1658  -TBA Present on Original Admission: Y  -TBA Side: Left  -TBA Location: gluteal  -TBA    Retired Wound - Properties Group Placement Date: 06/23/25  -TBA Placement Time: 1658  -TBA Present on Original Admission: Y  -TBA Side: Left  -TBA Location: gluteal  -TBA    Retired Wound - Properties Group Date first assessed: 06/23/25  -TBA Time first assessed: 1658  -TBA Present on Original Admission: Y  -TBA Side: Left  -TBA Location: gluteal  -TBA      Row Name             Wound 06/23/25 1659 Left lower leg Other (Comments)    Wound - Properties Group  Placement Date: 06/23/25 -TBA Placement Time: 1659 -TBA Present on Original Admission: Y  -TBA Side: Left  -TBA Orientation: lower  -TBA Location: leg  -TBA Primary Wound Type: Other  -TBA    Retired Wound - Properties Group Placement Date: 06/23/25 -TBA Placement Time: 1659 -TBA Present on Original Admission: Y  -TBA Side: Left  -TBA Orientation: lower  -TBA Location: leg  -TBA    Retired Wound - Properties Group Placement Date: 06/23/25  -TBA Placement Time: 1659 -TBA Present on Original Admission: Y  -TBA Side: Left  -TBA Orientation: lower  -TBA Location: leg  -TBA    Retired Wound - Properties Group Date first assessed: 06/23/25 -TBA Time first assessed: 1659 -TBA Present on Original Admission: Y  -TBA Side: Left  -TBA Location: leg  -TBA      Row Name 06/29/25 1550          Positioning and Restraints    Pre-Treatment Position sitting in chair/recliner  -TB     Post Treatment Position bed  -TB     In Bed fowlers;call light within reach;encouraged to call for assist;exit alarm on;side rails up x2  -TB               User Key  (r) = Recorded By, (t) = Taken By, (c) = Cosigned By      Initials Name Provider Type    TB Don Barahona, PTA Physical Therapist Assistant    Car Cervantes, RN Registered Nurse                    Physical Therapy Education       Title: PT OT SLP Therapies (Done)       Topic: Physical Therapy (Done)       Point: Mobility training (Done)       Learning Progress Summary            Patient Acceptance, E,TB,LORETO, VU,NR by CARLOS ALBERTO at 6/24/2025 1604    Comment: Education re: purpose of PT/importance of activitiy, safety/falls prevention, increase awareness of upright, improved deep breathing tech, upright posture, LE ROM and positioning for edema mgmt; pacing activity allowing time for rest   Family Acceptance, E,TB,D, VU,NR by CARLOS ALBERTO at 6/24/2025 1604    Comment: Education re: purpose of PT/importance of activitiy, safety/falls prevention, increase awareness of upright, improved deep  breathing tech, upright posture, LE ROM and positioning for edema mgmt; pacing activity allowing time for rest                      Point: Home exercise program (Done)       Learning Progress Summary            Patient Acceptance, E,TB,D, VU,NR by  at 6/24/2025 1604    Comment: Education re: purpose of PT/importance of activitiy, safety/falls prevention, increase awareness of upright, improved deep breathing tech, upright posture, LE ROM and positioning for edema mgmt; pacing activity allowing time for rest   Family Acceptance, E,TB,D, VU,NR by  at 6/24/2025 1604    Comment: Education re: purpose of PT/importance of activitiy, safety/falls prevention, increase awareness of upright, improved deep breathing tech, upright posture, LE ROM and positioning for edema mgmt; pacing activity allowing time for rest                      Point: Precautions (Done)       Learning Progress Summary            Patient Acceptance, E,TB,D, VU,NR by  at 6/24/2025 1604    Comment: Education re: purpose of PT/importance of activitiy, safety/falls prevention, increase awareness of upright, improved deep breathing tech, upright posture, LE ROM and positioning for edema mgmt; pacing activity allowing time for rest   Family Acceptance, E,TB,D, VU,NR by  at 6/24/2025 1604    Comment: Education re: purpose of PT/importance of activitiy, safety/falls prevention, increase awareness of upright, improved deep breathing tech, upright posture, LE ROM and positioning for edema mgmt; pacing activity allowing time for rest                                      User Key       Initials Effective Dates Name Provider Type Discipline     08/02/18 -  Melania Orozco, PT Physical Therapist PT                  PT Recommendation and Plan     Plan of Care Reviewed With: patient  Progress: no change  Outcome Evaluation: PT tx complete. Pt c/o SOA with O2 on 4L. Bed mob Min A to EOB with HOB elevated. Tolerated sitting EOB ~12mins. Stood and tf to chair  Mod x2 with increased time and cues. Pt still c/o SOA. Not able to get an accurate reading. Nursing aware.       Time Calculation:    PT Charges       Row Name 06/29/25 1621             Time Calculation    Start Time 1550  -TB      Stop Time 1600  -TB      Time Calculation (min) 10 min  -TB      PT Received On 06/29/25  -TB         Time Calculation- PT    Total Timed Code Minutes- PT 10 minute(s)  -TB                User Key  (r) = Recorded By, (t) = Taken By, (c) = Cosigned By      Initials Name Provider Type    TB Don Barahona, PTA Physical Therapist Assistant                  Therapy Charges for Today       Code Description Service Date Service Provider Modifiers Qty    60573110323 HC PT THERAPEUTIC ACT EA 15 MIN 6/29/2025 Don Barahona PTA GP 1            PT G-Codes  Outcome Measure Options: AM-PAC 6 Clicks Daily Activity (OT)  AM-PAC 6 Clicks Score (PT): 13  AM-PAC 6 Clicks Score (OT): 12    Don Barahona PTA  6/29/2025

## 2025-06-29 NOTE — PLAN OF CARE
Goal Outcome Evaluation:  Plan of Care Reviewed With: patient        Progress: no change  Outcome Evaluation: S 64-66 with first degree block per tele. Pt has rested throughout the shift with no c/o pain or SOB. Bumex gtt infusing. Safety maintained and call light within reach.

## 2025-06-29 NOTE — PROGRESS NOTES
Nephrology ([facility]) Progress Note      Patient:  Genevieve Cerda  YOB: 1940  Date of Service: 6/29/2025  MRN: [id number]   Acct: [id number]   Primary Care Physician: Germaine Diego APRN  Advance Directive:   Code Status and Medical Interventions: No CPR (Do Not Attempt to Resuscitate); Limited Support; No intubation (DNI)   Ordered at: 06/23/25 1629     Code Status (Patient has no pulse and is not breathing):    No CPR (Do Not Attempt to Resuscitate)     Medical Interventions (Patient has pulse or is breathing):    Limited Support     Medical Intervention Limits:    No intubation (DNI)     Level Of Support Discussed With:    Health Care Surrogate       Patient     Admit Date: 6/23/2025       Hospital Day: 6  Referring Provider: No ref. provider found      Patient personally seen and examined.  Complete chart including Consults, Notes, Operative Reports, Labs, Cardiology, and Radiology studies reviewed as able.        Subjective:  Genevieve Cerda is a 85 y.o. female for whom we were consulted for evaluation and treatment of acute kidney injury. Baseline chronic kidney disease stage 3b, baseline creatinine approximately 1.6. Has followed in our office in the past and more recently has also followed with Dr An in [place]. History of prior nephrectomy. Other history includes congestive heart failure, atrial fibrillation, hypertension. Admitted on 6/23 for CHF exacerbation. Creatinine 2.10 at time of admission. Hospital course remarkable for treatment with a Lasix infusion.  Nephrology consulted on 6/26.  Denied changes to urination, no dysuria or hematuria. Denied recent n/v/d. Metolazone added on 6/26.     Patient is status post external cardioversion for A-fib on June 27.  She remains on diuretics for leg edema.  She denies much dyspnea today.     Allergies:  Codeine    Home Meds:  Medications Prior to Admission   Medication Sig Dispense Refill Last Dose/Taking     allopurinol (ZYLOPRIM) 100 MG tablet Take 1 tablet by mouth Daily.   Taking    amiodarone (PACERONE) 200 MG tablet Take 2 tablets by mouth 2 (Two) Times a Day.   Taking    apixaban (ELIQUIS) 2.5 MG tablet tablet Take 1 tablet by mouth Every 12 (Twelve) Hours.   Taking    aspirin 81 MG EC tablet Take 1 tablet by mouth Daily.   Taking    [] cefdinir (OMNICEF) 300 MG capsule Take 1 capsule by mouth 2 (Two) Times a Day for 7 days. 14 capsule 0 Taking    citalopram (CeleXA) 10 MG tablet Take 1 tablet by mouth Daily.   Taking    furosemide (LASIX) 20 MG tablet Take 3 tablets by mouth 2 (Two) Times a Day.   Taking    gabapentin (NEURONTIN) 300 MG capsule Take 1 capsule by mouth Every 8 (Eight) Hours.   Taking    ipratropium-albuterol (DUO-NEB) 0.5-2.5 mg/3 ml nebulizer Take 3 mL by nebulization 4 (Four) Times a Day As Needed for Shortness of Air.   Taking As Needed    latanoprost (XALATAN) 0.005 % ophthalmic solution Administer 1 drop to both eyes Daily.   Taking    magnesium oxide (MAG-OX) 400 MG tablet Take 1 tablet by mouth Daily.   Taking    metoprolol tartrate (LOPRESSOR) 25 MG tablet Take 1 tablet by mouth 2 (Two) Times a Day. Hold for systolic blood pressure <100 or pulse <60   Taking    midodrine (PROAMATINE) 5 MG tablet Take 1 tablet by mouth 2 (Two) Times a Day. Hold if systolic blood pressure is >100   Taking    pantoprazole (PROTONIX) 40 MG EC tablet Take 1 tablet by mouth Daily.   Taking    rosuvastatin (CRESTOR) 5 MG tablet Take 1 tablet by mouth Daily.   Taking    Skin Protectants, Misc. (Eucerin) cream Apply 1 Application topically to the appropriate area as directed Daily. Apply to bilateral upper and lower extremities for dryness once daily and prn   Taking    tamsulosin (FLOMAX) 0.4 MG capsule 24 hr capsule Take 1 capsule by mouth Daily.   Taking    timolol (TIMOPTIC) 0.5 % ophthalmic solution Administer 1 drop to both eyes Daily.   Taking    torsemide (DEMADEX) 20 MG tablet Take 4 tablets by  mouth 2 (Two) Times a Day. 240 tablet 11 Taking    vitamin D (ERGOCALCIFEROL) 1.25 MG (02983 UT) capsule capsule Take 1 capsule by mouth Every 7 (Seven) Days. Takes on Monday   Taking    zinc sulfate (ZINCATE) 220 (50 Zn) MG capsule Take 1 capsule by mouth Daily.   Taking    acetaminophen (TYLENOL) 650 MG 8 hr tablet Take 1 tablet by mouth Every 6 (Six) Hours As Needed for Mild Pain.       polyethylene glycol (MiraLax) 17 g packet Take 17 g by mouth Daily As Needed (constipation).       sennosides-docusate (senna-docusate sodium) 8.6-50 MG per tablet Take 2 tablets by mouth Daily As Needed for Constipation.          Medicines:  Current Facility-Administered Medications   Medication Dose Route Frequency Provider Last Rate Last Admin    acetaminophen (TYLENOL) tablet 650 mg  650 mg Oral Q4H PRN Mary Falcon APRN        Or    acetaminophen (TYLENOL) 160 MG/5ML oral solution 650 mg  650 mg Oral Q4H PRN Mary Falcon APRN        Or    acetaminophen (TYLENOL) suppository 650 mg  650 mg Rectal Q4H PRN Mary Falcon APRN        allopurinol (ZYLOPRIM) tablet 100 mg  100 mg Oral Daily Indra Zamorano MD   100 mg at 06/29/25 0922    amiodarone (PACERONE) tablet 200 mg  200 mg Oral Daily Rancho Yanez MD   200 mg at 06/29/25 0923    apixaban (ELIQUIS) tablet 2.5 mg  2.5 mg Oral BID Mary Falcon APRN   2.5 mg at 06/29/25 0922    aspirin EC tablet 81 mg  81 mg Oral Daily Indra Zamorano MD   81 mg at 06/29/25 0922    bisacodyl (DULCOLAX) EC tablet 5 mg  5 mg Oral Daily PRN Mary Falcon APRN        And    bisacodyl (DULCOLAX) suppository 10 mg  10 mg Rectal Daily PRN Mary Falcon APRN        bumetanide (BUMEX) 25 mg/100mL (0.25 mg/mL) infusion  1 mg/hr Intravenous Continuous Rancho Yanez MD 4 mL/hr at 06/28/25 1713 1 mg/hr at 06/28/25 1713    Calcium Replacement - Follow Nurse / BPA Driven Protocol   Not Applicable PRN Mary Falcon, SARAH        cholecalciferol (VITAMIN D3) tablet 5,000 Units   5,000 Units Oral Daily Indra Zamorano MD   5,000 Units at 06/29/25 1040    gabapentin (NEURONTIN) capsule 300 mg  300 mg Oral Q8H Indra Zamorano MD   300 mg at 06/29/25 0529    ipratropium-albuterol (DUO-NEB) nebulizer solution 3 mL  3 mL Nebulization 4x Daily PRN Indra Zamorano MD        latanoprost (XALATAN) 0.005 % ophthalmic solution 1 drop  1 drop Both Eyes Nightly Indra Zamorano MD   1 drop at 06/28/25 2108    magnesium oxide (MAG-OX) tablet 400 mg  400 mg Oral Daily Indra Zamorano MD   400 mg at 06/29/25 0923    Magnesium Standard Dose Replacement - Follow Nurse / BPA Driven Protocol   Not Applicable PRN Mary Falcon APRN        midodrine (PROAMATINE) tablet 5 mg  5 mg Oral BID AC Indra Zamorano MD   5 mg at 06/29/25 0720    nitroglycerin (NITROSTAT) SL tablet 0.4 mg  0.4 mg Sublingual Q5 Min PRN Mary Falcon APRN        ondansetron ODT (ZOFRAN-ODT) disintegrating tablet 4 mg  4 mg Oral Q6H PRN Mary Falcon APRN   4 mg at 06/28/25 1720    Or    ondansetron (ZOFRAN) injection 4 mg  4 mg Intravenous Q6H PRN Mary Falcon APRN        pantoprazole (PROTONIX) EC tablet 40 mg  40 mg Oral Daily Indra Zamorano MD   40 mg at 06/29/25 0923    Phosphorus Replacement - Follow Nurse / BPA Driven Protocol   Not Applicable PRN Mary Falcon APRN        polyethylene glycol (MIRALAX) packet 17 g  17 g Oral Daily PRN Indra Zamorano MD        rosuvastatin (CRESTOR) tablet 5 mg  5 mg Oral Daily Indra Zamorano MD   5 mg at 06/29/25 0923    sennosides-docusate (PERICOLACE) 8.6-50 MG per tablet 2 tablet  2 tablet Oral Daily PRN Indra Zamorano MD        sodium chloride 0.9 % flush 10 mL  10 mL Intravenous Q12H Mary Falcon APRN   10 mL at 06/29/25 0923    sodium chloride 0.9 % flush 10 mL  10 mL Intravenous PRN Mary Falcon APRN        sodium chloride 0.9 % infusion 40 mL  40 mL Intravenous PRN Mary Falcon APRN        tamsulosin (FLOMAX) 24 hr capsule  0.4 mg  0.4 mg Oral Daily Indra Zamorano MD   0.4 mg at 06/29/25 0922    timolol (TIMOPTIC) 0.5 % ophthalmic solution 1 drop  1 drop Both Eyes Daily Indra Zamorano MD   1 drop at 06/29/25 0924    zinc sulfate (ZINCATE) capsule 220 mg  220 mg Oral Daily Indra Zamorano MD   220 mg at 06/29/25 0923       Past Medical History:  History reviewed. No pertinent past medical history.    Past Surgical History:  No past surgical history on file.    Family History  History reviewed. No pertinent family history.    Social History  Social History     Socioeconomic History    Marital status:    Tobacco Use    Smoking status: Never    Smokeless tobacco: Never   Vaping Use    Vaping status: Never Used   Substance and Sexual Activity    Alcohol use: Yes     Comment: rarely    Drug use: Never    Sexual activity: Defer       Review of Systems:  History obtained from chart review and the patient  General ROS: No fever or chills  Respiratory ROS: No cough, shortness of breath, wheezing  Cardiovascular ROS: positive for - dyspnea on exertion and edema  No chest pain or palpitations  Gastrointestinal ROS: No abdominal pain or melena  Genito-Urinary ROS: No dysuria or hematuria  Psych ROS: No anxiety and depression  14 point ROS reviewed with the patient and negative except as noted above and in the HPI unless unable to obtain.    Objective:  Patient Vitals for the past 24 hrs:   BP Temp Temp src Pulse Resp SpO2   06/29/25 1046 127/43 98 °F (36.7 °C) -- 60 14 100 %   06/29/25 0727 116/43 97.3 °F (36.3 °C) Axillary 67 14 96 %   06/29/25 0318 109/63 97.3 °F (36.3 °C) Axillary (!) 49 16 98 %   06/28/25 2336 115/41 97.5 °F (36.4 °C) Axillary 62 16 95 %   06/28/25 1937 126/55 97.7 °F (36.5 °C) Oral 62 16 99 %   06/28/25 1612 134/60 97.5 °F (36.4 °C) Oral 64 16 98 %       Intake/Output Summary (Last 24 hours) at 6/29/2025 1429  Last data filed at 6/29/2025 1244  Gross per 24 hour   Intake 360 ml   Output 2350 ml   Net -1990  "ml     General: awake/alert   Chest:  clear to auscultation bilaterally without respiratory distress  CVS: irregularly irregular rhythm  Abdominal: soft, nontender, positive bowel sounds  Extremities: 3+ bilateral lower extremity edema  Skin: warm and dry without rash      Labs:  Results from last 7 days   Lab Units 06/26/25  0345 06/25/25  0620 06/24/25  0531   WBC 10*3/mm3 6.77 6.26 7.26   HEMOGLOBIN g/dL 8.7* 8.7* 8.6*   HEMATOCRIT % 28.3* 28.7* 28.5*   PLATELETS 10*3/mm3 298 304 314         Results from last 7 days   Lab Units 06/29/25  1314 06/29/25  0439 06/28/25  1635 06/28/25  0818 06/27/25  1915 06/24/25  0531 06/23/25  0844   SODIUM mmol/L  --  125*  --  125* 123*   < > 127*  127*   POTASSIUM mmol/L 3.5 3.6 4.0 3.9 4.5   < > 5.0  5.0   CHLORIDE mmol/L  --  87*  --  88* 87*   < > 89*  89*   CO2 mmol/L  --  21.0*  --  26.0 24.0   < > 24.0  26.0   BUN mg/dL  --  92.4*  --  95.9* 93.7*   < > 89.4*  89.4*   CREATININE mg/dL  --  2.33*  --  2.31* 2.11*   < > 2.10*  2.18*   CALCIUM mg/dL  --  8.2*  --  8.2* 8.1*   < > 8.3*  8.2*   EGFR mL/min/1.73  --  19.9*  --  20.1* 22.4*   < > 22.6*  21.6*   BILIRUBIN mg/dL  --   --   --   --   --   --  <0.2   ALK PHOS U/L  --   --   --   --   --   --  83   ALT (SGPT) U/L  --   --   --   --   --   --  14   AST (SGOT) U/L  --   --   --   --   --   --  16   GLUCOSE mg/dL  --  97  --  99 147*   < > 101*  101*    < > = values in this interval not displayed.       Radiology:   Imaging Results (Last 72 Hours)       ** No results found for the last 72 hours. **            Culture:  No results found for: \"BLOODCX\", \"URINECX\", \"WOUNDCX\", \"MRSACX\", \"RESPCX\", \"STOOLCX\"      Assessment    Acute kidney injury, cardiorenal  Baseline chronic kidney disease stage 3b  Hypertension  Atrial fibrillation  Acute on chronic CHF  Hyponatremia  Anemia of CKD    Plan:  Continue IV Bumex infusion.  Holding PO Metolazone to prevent further hyponatremia  Renal function is fluctuating a bit " under current diuretic regimen  Okay to use some salt with her diet  Monitor labs.      Brennon Gamez MD  6/29/2025  14:29 CDT

## 2025-06-30 LAB
ANION GAP SERPL CALCULATED.3IONS-SCNC: 13 MMOL/L (ref 5–15)
BUN SERPL-MCNC: 96 MG/DL (ref 8–23)
BUN/CREAT SERPL: 43 (ref 7–25)
CALCIUM SPEC-SCNC: 8.2 MG/DL (ref 8.6–10.5)
CHLORIDE SERPL-SCNC: 88 MMOL/L (ref 98–107)
CO2 SERPL-SCNC: 29 MMOL/L (ref 22–29)
CREAT SERPL-MCNC: 2.23 MG/DL (ref 0.57–1)
EGFRCR SERPLBLD CKD-EPI 2021: 21 ML/MIN/1.73
GLUCOSE SERPL-MCNC: 169 MG/DL (ref 65–99)
POTASSIUM SERPL-SCNC: 3.3 MMOL/L (ref 3.5–5.2)
SODIUM SERPL-SCNC: 130 MMOL/L (ref 136–145)

## 2025-06-30 PROCEDURE — 97535 SELF CARE MNGMENT TRAINING: CPT | Performed by: OCCUPATIONAL THERAPIST

## 2025-06-30 PROCEDURE — 99232 SBSQ HOSP IP/OBS MODERATE 35: CPT | Performed by: NURSE PRACTITIONER

## 2025-06-30 PROCEDURE — 80048 BASIC METABOLIC PNL TOTAL CA: CPT | Performed by: NURSE PRACTITIONER

## 2025-06-30 PROCEDURE — 97530 THERAPEUTIC ACTIVITIES: CPT

## 2025-06-30 PROCEDURE — 97110 THERAPEUTIC EXERCISES: CPT

## 2025-06-30 PROCEDURE — 25010000002 BUMETANIDE PER 0.5 MG: Performed by: HOSPITALIST

## 2025-06-30 RX ORDER — TOLVAPTAN 30 MG/1
30 TABLET ORAL ONCE
Status: COMPLETED | OUTPATIENT
Start: 2025-06-30 | End: 2025-06-30

## 2025-06-30 RX ADMIN — PANTOPRAZOLE SODIUM 40 MG: 40 TABLET, DELAYED RELEASE ORAL at 08:17

## 2025-06-30 RX ADMIN — ASPIRIN 81 MG: 81 TABLET, COATED ORAL at 08:18

## 2025-06-30 RX ADMIN — GABAPENTIN 300 MG: 300 CAPSULE ORAL at 21:20

## 2025-06-30 RX ADMIN — ROSUVASTATIN 5 MG: 10 TABLET, FILM COATED ORAL at 08:18

## 2025-06-30 RX ADMIN — APIXABAN 2.5 MG: 2.5 TABLET, FILM COATED ORAL at 08:18

## 2025-06-30 RX ADMIN — Medication 5000 UNITS: at 08:18

## 2025-06-30 RX ADMIN — Medication 10 ML: at 21:21

## 2025-06-30 RX ADMIN — Medication 400 MG: at 08:18

## 2025-06-30 RX ADMIN — LATANOPROST 1 DROP: 50 SOLUTION OPHTHALMIC at 21:21

## 2025-06-30 RX ADMIN — GABAPENTIN 300 MG: 300 CAPSULE ORAL at 05:13

## 2025-06-30 RX ADMIN — AMIODARONE HYDROCHLORIDE 200 MG: 200 TABLET ORAL at 08:17

## 2025-06-30 RX ADMIN — Medication 5 MG: at 21:49

## 2025-06-30 RX ADMIN — Medication 220 MG: at 08:17

## 2025-06-30 RX ADMIN — APIXABAN 2.5 MG: 2.5 TABLET, FILM COATED ORAL at 21:20

## 2025-06-30 RX ADMIN — TAMSULOSIN HYDROCHLORIDE 0.4 MG: 0.4 CAPSULE ORAL at 08:18

## 2025-06-30 RX ADMIN — TOLVAPTAN 30 MG: 30 TABLET ORAL at 16:50

## 2025-06-30 RX ADMIN — GABAPENTIN 300 MG: 300 CAPSULE ORAL at 13:21

## 2025-06-30 RX ADMIN — BUMETANIDE 1 MG/HR: 0.25 INJECTION INTRAMUSCULAR; INTRAVENOUS at 13:21

## 2025-06-30 RX ADMIN — ALLOPURINOL 100 MG: 100 TABLET ORAL at 08:18

## 2025-06-30 RX ADMIN — TIMOLOL MALEATE 1 DROP: 5 SOLUTION/ DROPS OPHTHALMIC at 08:22

## 2025-06-30 NOTE — PROGRESS NOTES
Nephrology ([facility]) Progress Note      Patient:  Genevieve Cerda  YOB: 1940  Date of Service: 6/30/2025  MRN: [id number]   Acct: [id number]   Primary Care Physician: Germaine Diego APRN  Advance Directive:   Code Status and Medical Interventions: No CPR (Do Not Attempt to Resuscitate); Limited Support; No intubation (DNI)   Ordered at: 06/23/25 1629     Code Status (Patient has no pulse and is not breathing):    No CPR (Do Not Attempt to Resuscitate)     Medical Interventions (Patient has pulse or is breathing):    Limited Support     Medical Intervention Limits:    No intubation (DNI)     Level Of Support Discussed With:    Health Care Surrogate       Patient     Admit Date: 6/23/2025       Hospital Day: 7  Referring Provider: No ref. provider found      Patient personally seen and examined.  Complete chart including Consults, Notes, Operative Reports, Labs, Cardiology, and Radiology studies reviewed as able.    Chief complaint: Abnormal labs.    Subjective:  Genevieve Cerda is a 85 y.o. female for whom we were consulted for evaluation and treatment of acute kidney injury. Baseline chronic kidney disease stage 3b, baseline creatinine approximately 1.6. Has followed in our office in the past and more recently has also followed with Dr An in [place]. History of prior nephrectomy. Other history includes congestive heart failure, atrial fibrillation, hypertension. Admitted on 6/23 for CHF exacerbation. Creatinine 2.10 at time of admission. Hospital course remarkable for treatment with a Lasix infusion.  Nephrology consulted on 6/26.  Denied changes to urination, no dysuria or hematuria. Denied recent n/v/d. Metolazone added on 6/26.     This morning patient is weak, bedridden.  She has good response to intravenous Bumex drip    Allergies:  Codeine    Home Meds:  Medications Prior to Admission   Medication Sig Dispense Refill Last Dose/Taking    allopurinol  (ZYLOPRIM) 100 MG tablet Take 1 tablet by mouth Daily.   Taking    amiodarone (PACERONE) 200 MG tablet Take 2 tablets by mouth 2 (Two) Times a Day.   Taking    apixaban (ELIQUIS) 2.5 MG tablet tablet Take 1 tablet by mouth Every 12 (Twelve) Hours.   Taking    aspirin 81 MG EC tablet Take 1 tablet by mouth Daily.   Taking    [] cefdinir (OMNICEF) 300 MG capsule Take 1 capsule by mouth 2 (Two) Times a Day for 7 days. 14 capsule 0 Taking    citalopram (CeleXA) 10 MG tablet Take 1 tablet by mouth Daily.   Taking    furosemide (LASIX) 20 MG tablet Take 3 tablets by mouth 2 (Two) Times a Day.   Taking    gabapentin (NEURONTIN) 300 MG capsule Take 1 capsule by mouth Every 8 (Eight) Hours.   Taking    ipratropium-albuterol (DUO-NEB) 0.5-2.5 mg/3 ml nebulizer Take 3 mL by nebulization 4 (Four) Times a Day As Needed for Shortness of Air.   Taking As Needed    latanoprost (XALATAN) 0.005 % ophthalmic solution Administer 1 drop to both eyes Daily.   Taking    magnesium oxide (MAG-OX) 400 MG tablet Take 1 tablet by mouth Daily.   Taking    metoprolol tartrate (LOPRESSOR) 25 MG tablet Take 1 tablet by mouth 2 (Two) Times a Day. Hold for systolic blood pressure <100 or pulse <60   Taking    midodrine (PROAMATINE) 5 MG tablet Take 1 tablet by mouth 2 (Two) Times a Day. Hold if systolic blood pressure is >100   Taking    pantoprazole (PROTONIX) 40 MG EC tablet Take 1 tablet by mouth Daily.   Taking    rosuvastatin (CRESTOR) 5 MG tablet Take 1 tablet by mouth Daily.   Taking    Skin Protectants, Misc. (Eucerin) cream Apply 1 Application topically to the appropriate area as directed Daily. Apply to bilateral upper and lower extremities for dryness once daily and prn   Taking    tamsulosin (FLOMAX) 0.4 MG capsule 24 hr capsule Take 1 capsule by mouth Daily.   Taking    timolol (TIMOPTIC) 0.5 % ophthalmic solution Administer 1 drop to both eyes Daily.   Taking    torsemide (DEMADEX) 20 MG tablet Take 4 tablets by mouth 2 (Two)  Times a Day. 240 tablet 11 Taking    vitamin D (ERGOCALCIFEROL) 1.25 MG (33566 UT) capsule capsule Take 1 capsule by mouth Every 7 (Seven) Days. Takes on Monday   Taking    zinc sulfate (ZINCATE) 220 (50 Zn) MG capsule Take 1 capsule by mouth Daily.   Taking    acetaminophen (TYLENOL) 650 MG 8 hr tablet Take 1 tablet by mouth Every 6 (Six) Hours As Needed for Mild Pain.       polyethylene glycol (MiraLax) 17 g packet Take 17 g by mouth Daily As Needed (constipation).       sennosides-docusate (senna-docusate sodium) 8.6-50 MG per tablet Take 2 tablets by mouth Daily As Needed for Constipation.          Medicines:  Current Facility-Administered Medications   Medication Dose Route Frequency Provider Last Rate Last Admin    acetaminophen (TYLENOL) tablet 650 mg  650 mg Oral Q4H PRN Mary Falcon APRN        Or    acetaminophen (TYLENOL) 160 MG/5ML oral solution 650 mg  650 mg Oral Q4H PRN Mary Falcon APRN        Or    acetaminophen (TYLENOL) suppository 650 mg  650 mg Rectal Q4H PRN Mary Falcon APRN        allopurinol (ZYLOPRIM) tablet 100 mg  100 mg Oral Daily Indra Zamorano MD   100 mg at 06/30/25 0818    amiodarone (PACERONE) tablet 200 mg  200 mg Oral Daily Rancho Yanez MD   200 mg at 06/30/25 0817    apixaban (ELIQUIS) tablet 2.5 mg  2.5 mg Oral BID Mary Falcon APRN   2.5 mg at 06/30/25 0818    aspirin EC tablet 81 mg  81 mg Oral Daily Indra Zamorano MD   81 mg at 06/30/25 0818    bisacodyl (DULCOLAX) EC tablet 5 mg  5 mg Oral Daily PRN Mary Falcon APRN        And    bisacodyl (DULCOLAX) suppository 10 mg  10 mg Rectal Daily PRN Mary Falcon APRN        bumetanide (BUMEX) 25 mg/100mL (0.25 mg/mL) infusion  1 mg/hr Intravenous Continuous Rancho Yanez MD 4 mL/hr at 06/30/25 1321 1 mg/hr at 06/30/25 1321    Calcium Replacement - Follow Nurse / BPA Driven Protocol   Not Applicable PRN Mary Falcon, SARAH        cholecalciferol (VITAMIN D3) tablet 5,000 Units  5,000 Units  Oral Daily Indra Zamorano MD   5,000 Units at 06/30/25 0818    gabapentin (NEURONTIN) capsule 300 mg  300 mg Oral Q8H Indra Zamorano MD   300 mg at 06/30/25 1321    ipratropium-albuterol (DUO-NEB) nebulizer solution 3 mL  3 mL Nebulization 4x Daily PRN Indra Zamorano MD        latanoprost (XALATAN) 0.005 % ophthalmic solution 1 drop  1 drop Both Eyes Nightly Indra Zamorano MD   1 drop at 06/29/25 2059    magnesium oxide (MAG-OX) tablet 400 mg  400 mg Oral Daily Indra Zamorano MD   400 mg at 06/30/25 0818    Magnesium Standard Dose Replacement - Follow Nurse / BPA Driven Protocol   Not Applicable PRN Mary Falcon APRN        midodrine (PROAMATINE) tablet 5 mg  5 mg Oral BID AC Indra Zamorano MD   5 mg at 06/29/25 1637    nitroglycerin (NITROSTAT) SL tablet 0.4 mg  0.4 mg Sublingual Q5 Min PRN Mary Falcon APRN        ondansetron ODT (ZOFRAN-ODT) disintegrating tablet 4 mg  4 mg Oral Q6H PRN Mary Falcon APRN   4 mg at 06/28/25 1720    Or    ondansetron (ZOFRAN) injection 4 mg  4 mg Intravenous Q6H PRN Mary Falcon APRN        pantoprazole (PROTONIX) EC tablet 40 mg  40 mg Oral Daily Indra Zamorano MD   40 mg at 06/30/25 0817    Phosphorus Replacement - Follow Nurse / BPA Driven Protocol   Not Applicable PRN Mary Falcon APRN        polyethylene glycol (MIRALAX) packet 17 g  17 g Oral Daily PRN Indra Zamorano MD        rosuvastatin (CRESTOR) tablet 5 mg  5 mg Oral Daily Indra Zamorano MD   5 mg at 06/30/25 0818    sennosides-docusate (PERICOLACE) 8.6-50 MG per tablet 2 tablet  2 tablet Oral Daily PRN Indra Zamorano MD        sodium chloride 0.9 % flush 10 mL  10 mL Intravenous Q12H Mary Falcon APRN   10 mL at 06/29/25 2059    sodium chloride 0.9 % flush 10 mL  10 mL Intravenous PRN Mary Falcon APRN        sodium chloride 0.9 % infusion 40 mL  40 mL Intravenous PRN Mary Falcon APRN        tamsulosin (FLOMAX) 24 hr capsule 0.4 mg  0.4  mg Oral Daily Indra Zamorano MD   0.4 mg at 06/30/25 0818    timolol (TIMOPTIC) 0.5 % ophthalmic solution 1 drop  1 drop Both Eyes Daily Indra Zamorano MD   1 drop at 06/30/25 0822    zinc sulfate (ZINCATE) capsule 220 mg  220 mg Oral Daily Indra Zamorano MD   220 mg at 06/30/25 0817       Past Medical History:  History reviewed. No pertinent past medical history.    Past Surgical History:  No past surgical history on file.    Family History  History reviewed. No pertinent family history.    Social History  Social History     Socioeconomic History    Marital status:    Tobacco Use    Smoking status: Never    Smokeless tobacco: Never   Vaping Use    Vaping status: Never Used   Substance and Sexual Activity    Alcohol use: Yes     Comment: rarely    Drug use: Never    Sexual activity: Defer       Review of Systems:  History obtained from chart review and the patient  General ROS: No fever or chills  Respiratory ROS: positive for - shortness of breath  Cardiovascular ROS: No chest pain or palpitations  Gastrointestinal ROS: No abdominal pain or melena  Genito-Urinary ROS: No dysuria or hematuria  Psych ROS: No anxiety and depression  14 point ROS reviewed with the patient and negative except as noted above and in the HPI unless unable to obtain.    Objective:  Patient Vitals for the past 24 hrs:   BP Temp Temp src Pulse Resp SpO2 Weight   06/30/25 1054 132/57 98 °F (36.7 °C) Oral 76 18 93 % --   06/30/25 0814 138/55 98.1 °F (36.7 °C) Oral 75 18 95 % --   06/30/25 0358 111/43 98.2 °F (36.8 °C) Oral 71 16 93 % 75.2 kg (165 lb 12.8 oz)   06/29/25 2336 118/46 98.2 °F (36.8 °C) Oral 70 16 92 % --   06/29/25 1943 112/46 97.8 °F (36.6 °C) Axillary 70 16 95 % --   06/29/25 1554 121/48 97.6 °F (36.4 °C) Oral 67 14 94 % --       Intake/Output Summary (Last 24 hours) at 6/30/2025 1455  Last data filed at 6/30/2025 0900  Gross per 24 hour   Intake 480 ml   Output 2375 ml   Net -1895 ml     General:  "awake/alert   HEENT: Normocephalic atraumatic head  Neck: Supple with no JVD or carotid base.  Chest:  clear to auscultation bilaterally without respiratory distress  CVS: regular rate and rhythm  Abdominal: soft, nontender, positive bowel sounds  Extremities: no cyanosis or edema  Skin: No discoloration      Labs:  Results from last 7 days   Lab Units 06/26/25  0345 06/25/25  0620 06/24/25  0531   WBC 10*3/mm3 6.77 6.26 7.26   HEMOGLOBIN g/dL 8.7* 8.7* 8.6*   HEMATOCRIT % 28.3* 28.7* 28.5*   PLATELETS 10*3/mm3 298 304 314         Results from last 7 days   Lab Units 06/29/25  1314 06/29/25  0439 06/28/25  1635 06/28/25  0818 06/27/25  1915   SODIUM mmol/L  --  125*  --  125* 123*   POTASSIUM mmol/L 3.5 3.6 4.0 3.9 4.5   CHLORIDE mmol/L  --  87*  --  88* 87*   CO2 mmol/L  --  21.0*  --  26.0 24.0   BUN mg/dL  --  92.4*  --  95.9* 93.7*   CREATININE mg/dL  --  2.33*  --  2.31* 2.11*   CALCIUM mg/dL  --  8.2*  --  8.2* 8.1*   EGFR mL/min/1.73  --  19.9*  --  20.1* 22.4*   GLUCOSE mg/dL  --  97  --  99 147*       Radiology:   Imaging Results (Last 72 Hours)       ** No results found for the last 72 hours. **            Culture:  No results found for: \"BLOODCX\", \"URINECX\", \"WOUNDCX\", \"MRSACX\", \"RESPCX\", \"STOOLCX\"      Assessment   1.  Acute kidney injury/cardiorenal syndrome.  2.  Stage IIIb CKD baseline.  3.  Hypertensive renal disease.  4.  Hyponatremia related to hypervolemia worsening.  5.  Anemia of chronic kidney disease.  6.  Atrial fibrillation.  7.  Acute on chronic systolic CHF exacerbation    Plan:  1.  Continue intravenous Bumex drip.  Agree to hold metolazone.  2.  Initiate tolvaptan for aquauresis  3.  Discontinue salt tablets.  4.  Continue to monitor renal function and sodium closely.      Julius Cole MD  6/30/2025  14:55 CDT    "

## 2025-06-30 NOTE — PROGRESS NOTES
[facility]  INPATIENT PROGRESS NOTE    Patient Name: Genevieve Cerda  Date of Admission: 6/23/2025  Today's Date: 06/30/25  Length of Stay: 7  Primary Care Physician: Germaine Diego APRN    Subjective   Chief Complaint: Leg swelling    Patient has no new complaint this morning, sitting up in the chair.    Review of Systems   All pertinent negatives and positives are as above. All other systems have been reviewed and are negative unless otherwise stated.     Objective    Temp:  [97.6 °F (36.4 °C)-98.2 °F (36.8 °C)] 98.1 °F (36.7 °C)  Heart Rate:  [60-75] 75  Resp:  [14-18] 18  BP: (111-138)/(43-55) 138/55  Physical Exam  Constitutional:       Appearance: Normal appearance.   HENT:      Head: Normocephalic and atraumatic.      Nose: Nose normal.      Mouth/Throat:      Mouth: Mucous membranes are moist.      Pharynx: Oropharynx is clear.   Eyes:      Extraocular Movements: Extraocular movements intact.      Conjunctiva/sclera: Conjunctivae normal.   Cardiovascular:      Rate and Rhythm: Normal rate. Rhythm irregular.      Pulses: Normal pulses.      Heart sounds: Normal heart sounds.   Pulmonary:      Effort: Pulmonary effort is normal.      Breath sounds: Rales present.   Abdominal:      General: Bowel sounds are normal.      Palpations: Abdomen is soft.   Musculoskeletal:      Cervical back: Normal range of motion and neck supple.      Right lower leg: edema.      Left lower leg:  edema.   Skin:     General: Skin is warm and dry.      Capillary Refill: Capillary refill takes 2 to 3 seconds.   Neurological:      General: No focal deficit present.      Mental Status: She is alert and oriented to person, place, and time.   Psychiatric:         Mood and Affect: Mood normal.         Behavior: Behavior normal.       Results Review:  I have reviewed the labs, radiology results, and diagnostic studies.    Laboratory Data:   Results from last 7 days   Lab Units  "06/26/25  0345 06/25/25  0620 06/24/25  0531   WBC 10*3/mm3 6.77 6.26 7.26   HEMOGLOBIN g/dL 8.7* 8.7* 8.6*   HEMATOCRIT % 28.3* 28.7* 28.5*   PLATELETS 10*3/mm3 298 304 314        Results from last 7 days   Lab Units 06/29/25  1314 06/29/25  0439 06/28/25  1635 06/28/25  0818 06/27/25  1915   SODIUM mmol/L  --  125*  --  125* 123*   POTASSIUM mmol/L 3.5 3.6 4.0 3.9 4.5   CHLORIDE mmol/L  --  87*  --  88* 87*   CO2 mmol/L  --  21.0*  --  26.0 24.0   BUN mg/dL  --  92.4*  --  95.9* 93.7*   CREATININE mg/dL  --  2.33*  --  2.31* 2.11*   CALCIUM mg/dL  --  8.2*  --  8.2* 8.1*   GLUCOSE mg/dL  --  97  --  99 147*       Culture Data:   No results found for: \"BLOODCX\", \"URINECX\", \"WOUNDCX\", \"MRSACX\", \"RESPCX\", \"STOOLCX\"    Radiology Data:   Imaging Results (Last 24 Hours)       ** No results found for the last 24 hours. **            I have reviewed the patient's current medications.     Assessment/Plan   Assessment  Active Hospital Problems    Diagnosis     **Acute on chronic heart failure with preserved ejection fraction (HFpEF)     Acute renal failure superimposed on stage 3b chronic kidney disease     Hyponatremia     Atrial fibrillation, persistent     Hypercholesterolemia     Current use of long term anticoagulation        Treatment Plan    - Acute on chronic diastolic congestive heart failure  Patient failed outpatient treatment and was sent to the emergency room by her PCP.  Currently she is being diuresed with Bumex drip [Lasix drip discontinued] and is making marginal clinical improvement.  Cardiology and nephrology are on consult and helping with management.  Patient has also been started on metolazone.  Will continue all her guideline directed medications.    - Hyponatremia [likely hypervolemic hyponatremia]  Patient's hyponatremia is not making any significant improvement  Continue fluid restriction/diuresis and follow serial BMP    - Chronic atrial fibrillation  We will  continue patient's amiodarone and " apixaban [metoprolol on hold because of bradycardia].  Patient is status post cardioversion  Cardiology recommendations as follows-  - Continue amiodarone 200 mg twice daily   - Continue Eliquis 2.5 mg twice daily  - Bumex infusion  - Continue metolazone 5 mg daily for now  - Continue close laboratory monitoring    DVT prophylaxis-apixaban    Disposition-continue current management and follow cardiology's recommendations      Electronically signed by Indra Zamorano MD, 06/30/25, 08:45 CDT.

## 2025-06-30 NOTE — PLAN OF CARE
Problem: Adult Inpatient Plan of Care  Goal: Plan of Care Review  Outcome: Progressing  Flowsheets (Taken 6/30/2025 8945)  Progress: no change  Outcome Evaluation: VSS. pt A&Ox4. Pt on 2L NC. No c/o pain this shift. Bumex gtt infusing. Sinus 69-72 on tele. Safety maintained.  Plan of Care Reviewed With: patient

## 2025-06-30 NOTE — THERAPY TREATMENT NOTE
Patient Name: Genevieve Cerda  : 1940    MRN: [id number]                              Today's Date: 2025       Admit Date: 2025    Visit Dx:     ICD-10-CM ICD-9-CM   1. Congestive heart failure, unspecified HF chronicity, unspecified heart failure type  I50.9 428.0   2. Chronic kidney disease, unspecified CKD stage  N18.9 585.9   3. Hyponatremia  E87.1 276.1   4. Impaired functional mobility and activity tolerance [Z74.09]  Z74.09 V49.89   5. Decreased activities of daily living (ADL)  Z78.9 V49.89     Patient Active Problem List   Diagnosis    Severe protein-calorie malnutrition    Pleural effusion    Chronic congestive heart failure    Acute on chronic diastolic CHF (congestive heart failure)    CHF (congestive heart failure)    Acute on chronic heart failure with preserved ejection fraction (HFpEF)    Hyponatremia    Atrial fibrillation, persistent    Hypercholesterolemia    Current use of long term anticoagulation    Acute renal failure superimposed on stage 3b chronic kidney disease     History reviewed. No pertinent past medical history.  No past surgical history on file.   General Information       Row Name 25 1131          OT Time and Intention    Subjective Information complains of;fatigue  -JW (r) BS (t) JW (c)     Document Type therapy note (daily note)  -ARLETTE (r) BS (t) JW (c)     Mode of Treatment occupational therapy  -ARLETTE (r) BS (t) JW (c)     Patient Effort adequate  -JW (r) BS (t) JW (c)     Symptoms Noted During/After Treatment none  -JW (r) BS (t) JW (c)       Row Name 25 1131          General Information    Existing Precautions/Restrictions fall;oxygen therapy device and L/min  -JW (r) BS (t) JW (c)     Barriers to Rehab medically complex  -JW (r) BS (t) JW (c)       Row Name 25 1131          Cognition    Orientation Status (Cognition) oriented x 4  -JW (r) BS (t) JW (c)       Row Name 25 1131          Safety Issues/Impairments Affecting Functional Mobility     Safety Issues Affecting Function (Mobility) friction/shear risk;safety precaution awareness  -JW (r) BS (t) JW (c)     Impairments Affecting Function (Mobility) balance;endurance/activity tolerance;shortness of breath;postural/trunk control;strength  -JW (r) BS (t) JW (c)               User Key  (r) = Recorded By, (t) = Taken By, (c) = Cosigned By      Initials Name Provider Type    ARLETTE [facility]ramer [facility]/L, [facility]    Hetal Garcia, OT Student OT Student                     Mobility/ADL's       Row Name 06/30/25 1131          Activities of Daily Living    BADL Assessment/Intervention grooming;feeding;lower body dressing  -JW (r) BS (t) JW (c)       Row Name 06/30/25 113          Grooming Assessment/Training    [place] Level (Grooming) hair care, combing/brushing;oral care regimen;wash face, hands;set up;independent  -JW (r) BS (t) JW (c)       Row Name 06/30/25 113          Self-Feeding Assessment/Training    [place] Level (Feeding) prepare tray/open items;minimum assist (75% patient effort);scoop food and bring to mouth;independent  -JW (r) BS (t) JW (c)     Self-Feeding Assess/Train, Spillage Amount minimal  -JW (r) BS (t) JW (c)     Position (Feeding) sitting up in bed  -JW (r) BS (t) JW (c)       Row Name 06/30/25 113          Lower Body Dressing Assessment/Training    [place] Level (Lower Body Dressing) don;doff;socks;maximum assist (25% patient effort)  -JW (r) BS (t) JW (c)     Position (Lower Body Dressing) sitting up in bed  -JW (r) BS (t) JW (c)               User Key  (r) = Recorded By, (t) = Taken By, (c) = Cosigned By      Initials Name Provider Type    ARLETTE Mahi Kramer OTR/L, B[facility]IN Occupational Therapist    Hetal Garcia, OT Student OT Student                   Obj/Interventions       Row Name 06/30/25 1131          Balance    Balance Assessment sitting static balance;sitting dynamic balance  -ARLETTE (r) BS (t) JW (c)     Static Sitting Balance  independent  -ARLETTE (r) BRANDON (t) ARLETTE (c)     Dynamic Sitting Balance independent  -ARLETTE (r) BRANDON (t) ARLETTE (c)     Position, Sitting Balance supported;long sitting  sitting up in bed.  -ARLETTE (r) BRANDON (t) ARLETTE (c)     Balance Interventions sitting;supported;static;dynamic;occupation based/functional task  -ARLETTE (r) BRANDON (t) ARLETTE (c)               User Key  (r) = Recorded By, (t) = Taken By, (c) = Cosigned By      Initials Name Provider Type    Mahi Laughlin, OTR/L, CSRS Occupational Therapist    Hetal Garcia, OT Student OT Student                   Goals/Plan    No documentation.                  Clinical Impression       Row Name 06/30/25 1131          Pain Assessment    Additional Documentation Pain Scale: FACES Pre/Post-Treatment (Group)  -ARLETTE (r) BRANDON (t) ARLETTE (c)       Row Name 06/30/25 1131          Pain Scale: FACES Pre/Post-Treatment    Pain: FACES Scale, Pretreatment 0-->no hurt  -ARLETTE (r) BRANDON (t) ARLETTE (c)     Posttreatment Pain Rating 0-->no hurt  -ARLETTE (r) BRANDON (t) ARLETTE (c)       Row Name 06/30/25 1131          Plan of Care Review    Plan of Care Reviewed With patient  -ARLETTE (neil) BRANDON (leann) ARLETTE (inr)     Progress no change  -ARLETTE (neli) BRANDON (leann) ARLETTE (nir)     Outcome Evaluation OT treatment completed. Pt presented fowlers in bed upon entering and was agreeable to participate in therapy. Pt was A&O x4. She reported feeling fatigued and required vcs throughout session to wake up, therefore, we completed treatment session at bed level this date. Pt required Max A to don and doff socks. She was able to wash her face, perform a mouth rinse, and comb her hair independently with setup. Pt demonstrated independence in self feeding, however, required Min A to prepare and open packages. Ms. Cerda would benefit from continued skilled OT per POC. Recommend d/c to SNF.  -ARLETTE (neil) BRANDON (t) ARLETTE (c)       Row Name 06/30/25 1131          Therapy Assessment/Plan (OT)    Rehab Potential (OT) fair  -ARLETTE (neil) BRANDON (t) ARLETTE (c)       Row Name 06/30/25 1131          Therapy Plan  Review/Discharge Plan (OT)    Anticipated Discharge Disposition (OT) [facility]  -JW (r) BS (t) JW (c)       Row Name 06/30/25 1131          Vital Signs    O2 Delivery Pre Treatment nasal cannula  -JW (r) BS (t) JW (c)     O2 Delivery Intra Treatment nasal cannula  -JW (r) BS (t) JW (c)     O2 Delivery Post Treatment nasal cannula  -JW (r) BS (t) JW (c)     Pre Patient Position Supine  -JW (r) BS (t) JW (c)     Intra Patient Position Supine  -JW (r) BS (t) JW (c)     Post Patient Position Supine  -JW (r) BS (t) JW (c)       Row Name 06/30/25 1131          Positioning and Restraints    Pre-Treatment Position in bed  -JW (r) BS (t) JW (c)     Post Treatment Position bed  -JW (r) BS (t) JW (c)     In Bed notified nsg;fowlers;call light within reach;encouraged to call for assist  with lunch tray.  -JW (r) BS (t) JW (c)               User Key  (r) = Recorded By, (t) = Taken By, (c) = Cosigned By      Initials Name Provider Type    Mahi Laughlin, OTR/L, CSRS Occupational Therapist    Hetal Garcia, OT Student OT Student                   Outcome Measures       Row Name 06/30/25 1131          How much help from another is currently needed...    Putting on and taking off regular lower body clothing? 1  -JW (r) BS (t) JW (c)     Bathing (including washing, rinsing, and drying) 2  -JW (r) BS (t) JW (c)     Toileting (which includes using toilet bed pan or urinal) 1  -JW (r) BS (t) JW (c)     Putting on and taking off regular upper body clothing 2  -JW (r) BS (t) JW (c)     Taking care of personal grooming (such as brushing teeth) 3  -JW (r) BS (t) JW (c)     Eating meals 4  -JW (r) BS (t) JW (c)     AM-PAC 6 Clicks Score (OT) 13  -JW (r) BS (t)       Row Name 06/30/25 1000          How much help from another person do you currently need...    Turning from your back to your side while in flat bed without using bedrails? 3  -MALGORZATA     Moving from lying on back to sitting on the side of a flat bed  without bedrails? 2  -MALGORZATA     Moving to and from a bed to a chair (including a wheelchair)? 2  -MALGORZATA     Standing up from a chair using your arms (e.g., wheelchair, bedside chair)? 2  -MALGORZATA     Climbing 3-5 steps with a railing? 2  -MALGORZATA     To walk in hospital room? 2  -MALGORZATA     AM-PAC 6 Clicks Score (PT) 13  -MALGORZATA     Highest Level of Mobility Goal Move to Chair/Commode-4  -MALGORZATA       Row Name 06/30/25 1131          Functional Assessment    Outcome Measure Options AM-PAC 6 Clicks Daily Activity (OT)  -JW (r) BS (t) JW (c)               User Key  (r) = Recorded By, (t) = Taken By, (c) = Cosigned By      Initials Name Provider Type    JW Mahi Kramer, SUSAN/L, CSRS Occupational Therapist    Dusty Guillen, RN Registered Nurse    Hetal Garcia, OT Student OT Student                    Occupational Therapy Education       Title: PT OT SLP Therapies (Done)       Topic: Occupational Therapy (Done)       Point: ADL training (Done)       Learning Progress Summary            Patient Acceptance, E, VU,NR by BS at 6/30/2025 1131                      Point: Body mechanics (Done)       Learning Progress Summary            Patient Acceptance, E, VU,NR by BS at 6/30/2025 1131                                      User Key       Initials Effective Dates Name Provider Type Discipline     05/12/25 -  Hetal Farmer, OT Student OT Student OT                  OT Recommendation and Plan     Plan of Care Review  Plan of Care Reviewed With: patient  Progress: no change  Outcome Evaluation: OT treatment completed. Pt presented fowlers in bed upon entering and was agreeable to participate in therapy. Pt was A&O x4. She reported feeling fatigued and required vcs throughout session to wake up, therefore, we completed treatment session at bed level this date. Pt required Max A to don and doff socks. She was able to wash her face, perform a mouth rinse, and comb her hair independently with setup. Pt demonstrated independence in self feeding,  however, required Min A to prepare and open packages. Ms. Cerda would benefit from continued skilled OT per POC. Recommend d/c to SNF.     Time Calculation:         Time Calculation- OT       Row Name 06/30/25 1131             Time Calculation- OT    OT Start Time 1131  -JW (r) BS (t) JW (c)      OT Stop Time 1157  -JW (r) BS (t) JW (c)      OT Time Calculation (min) 26 min  -JW (r) BS (t)      Total Timed Code Minutes- OT 26 minute(s)  -JW (r) BS (t) JW (c)      OT Received On 06/30/25  -JW (r) BS (t) JW (c)         Timed Charges    29487 - OT Self Care/Mgmt Minutes 26  -JW (r) BS (t) JW (c)         Total Minutes    Timed Charges Total Minutes 26  -JW (r) BS (t)       Total Minutes 26  -JW (r) BS (t)                User Key  (r) = Recorded By, (t) = Taken By, (c) = Cosigned By      Initials Name Provider Type    Mahi Laughlin, OTR/L, CSRS Occupational Therapist    Hetal Garcia, OT Student OT Student                           Hetal Farmer, KECIA Student  6/30/2025

## 2025-06-30 NOTE — PLAN OF CARE
Goal Outcome Evaluation:              Outcome Evaluation: No significant changes. Pt. still on Bumex drip. Aox4. Safety Maintained.

## 2025-06-30 NOTE — PROGRESS NOTES
[facility] -  Progress Note     LOS: 7 days   Patient Care Team:  Germaine Diego APRN as PCP - General (Family Medicine)    Chief Complaint: leg swelling    Subjective     Interval History:     Patient Complaints: Patient is laying in bed with daughter at bedside this am. She reports that she is having some tremors in her arms. She denies any chest pain. She reports that her arms are sore from drawing blood so often. She denies any heart racing or palpitations. She reports swelling in bilateral lower legs that dont seem much improved to family. She denies any orthopnea or PND.     Review of Systems:     Review of Systems   Respiratory:  Positive for shortness of breath.    Cardiovascular:  Positive for leg swelling. Negative for chest pain and palpitations.     Objective     Vital Sign Min/Max for last 24 hours  Temp  Min: 97.6 °F (36.4 °C)  Max: 98.2 °F (36.8 °C)   BP  Min: 111/43  Max: 138/55   Pulse  Min: 60  Max: 75   Resp  Min: 14  Max: 18   SpO2  Min: 92 %  Max: 100 %   No data recorded   Weight  Min: 75.2 kg (165 lb 12.8 oz)  Max: 75.2 kg (165 lb 12.8 oz)         06/30/25  0358   Weight: 75.2 kg (165 lb 12.8 oz)       Intake/Output Summary (Last 24 hours) at 6/30/2025 1034  Last data filed at 6/30/2025 0900  Gross per 24 hour   Intake 720 ml   Output 2775 ml   Net -2055 ml     Telemetry: sinus rhythm rates 69-74    Physical Exam:    Vitals reviewed.   Constitutional:       General: Not in acute distress.     Appearance: Normal appearance. Well-developed. Frail.      Interventions: Nasal cannula in place.   Eyes:      Pupils: Pupils are equal, round, and reactive to light.   HENT:      Head: Normocephalic and atraumatic.      Nose: Nose normal.   Neck:      Vascular: No carotid bruit.   Pulmonary:      Effort: Pulmonary effort is normal. No respiratory distress.      Breath sounds: Decreased breath sounds present. No wheezing. No rales.   Cardiovascular:      Normal rate.  Regular rhythm.      Murmurs: There is no murmur.   Edema:     Peripheral edema present.     Pretibial: bilateral 2+ edema of the pretibial area.     Ankle: bilateral 2+ edema of the ankle.     Comments: Bilateral lower legs wrapped  Abdominal:      General: There is no distension.      Palpations: Abdomen is soft.   Musculoskeletal: Normal range of motion.      Cervical back: Normal range of motion and neck supple. Skin:     General: Skin is warm.      Findings: No erythema or rash.   Neurological:      General: No focal deficit present.      Mental Status: Alert and oriented to person, place, and time.   Psychiatric:         Attention and Perception: Attention normal.         Mood and Affect: Mood normal.         Speech: Speech normal.         Behavior: Behavior normal.         Thought Content: Thought content normal.         Judgment: Judgment normal.       Results Review:   Lab Results (last 72 hours)       Procedure Component Value Units Date/Time    Potassium [[id number]]  (Normal) Collected: 06/29/25 1314    Specimen: Blood Updated: 06/29/25 1406     Potassium 3.5 mmol/L     Basic Metabolic Panel [[id number]]  (Abnormal) Collected: 06/29/25 0439    Specimen: Blood Updated: 06/29/25 0616     Glucose 97 mg/dL      BUN 92.4 mg/dL      Creatinine 2.33 mg/dL      Sodium 125 mmol/L      Potassium 3.6 mmol/L      Chloride 87 mmol/L      CO2 21.0 mmol/L      Calcium 8.2 mg/dL      BUN/Creatinine Ratio 39.7     Anion Gap 17.0 mmol/L      eGFR 19.9 mL/min/1.73     Narrative:      GFR Categories in Chronic Kidney Disease (CKD)              GFR Category          GFR (mL/min/1.73)    Interpretation  G1                    90 or greater        Normal or high (1)  G2                    60-89                Mild decrease (1)  G3a                   45-59                Mild to moderate decrease  G3b                   30-44                Moderate to severe decrease  G4                    15-29                Severe decrease  G5                     14 or less           Kidney failure    (1)In the absence of evidence of kidney disease, neither GFR category G1 or G2 fulfill the criteria for CKD.    eGFR calculation 2021 CKD-EPI creatinine equation, which does not include race as a factor    Immunofixation, Urine - Urine, Clean Catch [[id number]] Collected: 06/29/25 0538    Specimen: Urine, Clean Catch Updated: 06/29/25 0609    Immunofixation (MINH), Protein Electrophoresis (PE), and Quantitative Free Kappa and Lambda Light Chains (FLC), Serum [[id number]] Collected: 06/29/25 0439    Specimen: Blood Updated: 06/29/25 0509    Potassium [[id number]]  (Normal) Collected: 06/28/25 1635    Specimen: Blood Updated: 06/28/25 1840     Potassium 4.0 mmol/L     Urinalysis With Microscopic If Indicated (No Culture) - Urine, Clean Catch [[id number]]  (Abnormal) Collected: 06/28/25 1353    Specimen: Urine, Clean Catch Updated: 06/28/25 1507     Color, UA Yellow     Appearance, UA Clear     pH, UA <=5.0     Specific Gravity, UA 1.010     Glucose, UA Negative     Ketones, UA Negative     Bilirubin, UA Negative     Blood, UA Negative     Protein, UA 30 mg/dL (1+)     Leuk Esterase, UA Small (1+)     Nitrite, UA Negative     Urobilinogen, UA 0.2 E.U./dL    Urinalysis, Microscopic Only - Urine, Clean Catch [[id number]]  (Abnormal) Collected: 06/28/25 1353    Specimen: Urine, Clean Catch Updated: 06/28/25 1507     RBC, UA 0-2 /HPF      WBC, UA 3-5 /HPF      Bacteria, UA None Seen /HPF      Squamous Epithelial Cells, UA 3-6 /HPF      Yeast, UA Small/1+ Budding Yeast /HPF      Hyaline Casts, UA 0-2 /LPF      Methodology Automated Microscopy    Basic Metabolic Panel [[id number]]  (Abnormal) Collected: 06/28/25 0818    Specimen: Blood Updated: 06/28/25 0848     Glucose 99 mg/dL      BUN 95.9 mg/dL      Creatinine 2.31 mg/dL      Sodium 125 mmol/L      Potassium 3.9 mmol/L      Chloride 88 mmol/L      CO2 26.0 mmol/L      Calcium 8.2 mg/dL      BUN/Creatinine Ratio 41.5      Anion Gap 11.0 mmol/L      eGFR 20.1 mL/min/1.73     Narrative:      GFR Categories in Chronic Kidney Disease (CKD)              GFR Category          GFR (mL/min/1.73)    Interpretation  G1                    90 or greater        Normal or high (1)  G2                    60-89                Mild decrease (1)  G3a                   45-59                Mild to moderate decrease  G3b                   30-44                Moderate to severe decrease  G4                    15-29                Severe decrease  G5                    14 or less           Kidney failure    (1)In the absence of evidence of kidney disease, neither GFR category G1 or G2 fulfill the criteria for CKD.    eGFR calculation 2021 CKD-EPI creatinine equation, which does not include race as a factor    Basic Metabolic Panel [[id number]]  (Abnormal) Collected: 06/27/25 1915    Specimen: Blood Updated: 06/27/25 1941     Glucose 147 mg/dL      BUN 93.7 mg/dL      Creatinine 2.11 mg/dL      Sodium 123 mmol/L      Potassium 4.5 mmol/L      Chloride 87 mmol/L      CO2 24.0 mmol/L      Calcium 8.1 mg/dL      BUN/Creatinine Ratio 44.4     Anion Gap 12.0 mmol/L      eGFR 22.4 mL/min/1.73     Narrative:      GFR Categories in Chronic Kidney Disease (CKD)              GFR Category          GFR (mL/min/1.73)    Interpretation  G1                    90 or greater        Normal or high (1)  G2                    60-89                Mild decrease (1)  G3a                   45-59                Mild to moderate decrease  G3b                   30-44                Moderate to severe decrease  G4                    15-29                Severe decrease  G5                    14 or less           Kidney failure    (1)In the absence of evidence of kidney disease, neither GFR category G1 or G2 fulfill the criteria for CKD.    eGFR calculation 2021 CKD-EPI creatinine equation, which does not include race as a factor    Magnesium [[id number]]  (Normal) Collected: 06/27/25  1518    Specimen: Blood Updated: 06/27/25 1634     Magnesium 2.2 mg/dL     Basic Metabolic Panel [[id number]]  (Abnormal) Collected: 06/27/25 1518    Specimen: Blood Updated: 06/27/25 1543     Glucose 108 mg/dL      BUN 92.9 mg/dL      Creatinine 2.02 mg/dL      Sodium 125 mmol/L      Potassium 4.9 mmol/L      Comment: Slight hemolysis detected by analyzer. Result may be falsely elevated.        Chloride 89 mmol/L      CO2 25.0 mmol/L      Calcium 8.3 mg/dL      BUN/Creatinine Ratio 46.0     Anion Gap 11.0 mmol/L      eGFR 23.6 mL/min/1.73     Narrative:      GFR Categories in Chronic Kidney Disease (CKD)              GFR Category          GFR (mL/min/1.73)    Interpretation  G1                    90 or greater        Normal or high (1)  G2                    60-89                Mild decrease (1)  G3a                   45-59                Mild to moderate decrease  G3b                   30-44                Moderate to severe decrease  G4                    15-29                Severe decrease  G5                    14 or less           Kidney failure    (1)In the absence of evidence of kidney disease, neither GFR category G1 or G2 fulfill the criteria for CKD.    eGFR calculation 2021 CKD-EPI creatinine equation, which does not include race as a factor    Iron Profile w/o Ferritin [[id number]]  (Abnormal) Collected: 06/27/25 0951    Specimen: Blood Updated: 06/27/25 1107     Iron 28 mcg/dL      Iron Saturation (TSAT) 10 %      Transferrin 189 mg/dL      TIBC 282 mcg/dL     Uric Acid [[id number]]  (Abnormal) Collected: 06/27/25 0951    Specimen: Blood Updated: 06/27/25 1107     Uric Acid 7.2 mg/dL                 Echo EF Estimated  Results for orders placed during the hospital encounter of 06/23/25    Adult Transthoracic Echo Complete With Contrast if Necessary Per Protocol    Interpretation Summary    Left ventricular ejection fraction appears to be 56 - 60%.    Left ventricular wall thickness is consistent with  "mild concentric hypertrophy.    Left ventricular diastolic function was indeterminate.    Left atrial volume is severely increased.    Moderate aortic valve regurgitation is present.    Estimated right ventricular systolic pressure from tricuspid regurgitation is normal (<35 mmHg).        Cath Ejection Fraction Quantitative  No results found for: \"CATHEF\"        Medication Review: yes  Current Facility-Administered Medications   Medication Dose Route Frequency Provider Last Rate Last Admin    acetaminophen (TYLENOL) tablet 650 mg  650 mg Oral Q4H PRN Mary Falcon APRN        Or    acetaminophen (TYLENOL) 160 MG/5ML oral solution 650 mg  650 mg Oral Q4H PRN Mary Falcon APRN        Or    acetaminophen (TYLENOL) suppository 650 mg  650 mg Rectal Q4H PRN Mary Falcon APRN        allopurinol (ZYLOPRIM) tablet 100 mg  100 mg Oral Daily Indra Zamorano MD   100 mg at 06/30/25 0818    amiodarone (PACERONE) tablet 200 mg  200 mg Oral Daily Rancho Yanez MD   200 mg at 06/30/25 0817    apixaban (ELIQUIS) tablet 2.5 mg  2.5 mg Oral BID Mary Falcon APRN   2.5 mg at 06/30/25 0818    aspirin EC tablet 81 mg  81 mg Oral Daily Indra Zamorano MD   81 mg at 06/30/25 0818    bisacodyl (DULCOLAX) EC tablet 5 mg  5 mg Oral Daily PRN Mary Falcon APRN        And    bisacodyl (DULCOLAX) suppository 10 mg  10 mg Rectal Daily PRN Mary Falcon APRN        bumetanide (BUMEX) 25 mg/100mL (0.25 mg/mL) infusion  1 mg/hr Intravenous Continuous Rancho Yanez MD 4 mL/hr at 06/29/25 1637 1 mg/hr at 06/29/25 1637    Calcium Replacement - Follow Nurse / BPA Driven Protocol   Not Applicable PRN Mary Falcon APRN        cholecalciferol (VITAMIN D3) tablet 5,000 Units  5,000 Units Oral Daily Indra Zamorano MD   5,000 Units at 06/30/25 0818    gabapentin (NEURONTIN) capsule 300 mg  300 mg Oral Q8H Indra Zamorano MD   300 mg at 06/30/25 0513    ipratropium-albuterol (DUO-NEB) nebulizer solution 3 mL  3 " mL Nebulization 4x Daily PRN Indra Zamorano MD        latanoprost (XALATAN) 0.005 % ophthalmic solution 1 drop  1 drop Both Eyes Nightly Indra Zamorano MD   1 drop at 06/29/25 2059    magnesium oxide (MAG-OX) tablet 400 mg  400 mg Oral Daily Indra Zamorano MD   400 mg at 06/30/25 0818    Magnesium Standard Dose Replacement - Follow Nurse / BPA Driven Protocol   Not Applicable PRN Mary Falcon APRN        midodrine (PROAMATINE) tablet 5 mg  5 mg Oral BID AC Indra Zamorano MD   5 mg at 06/29/25 1637    nitroglycerin (NITROSTAT) SL tablet 0.4 mg  0.4 mg Sublingual Q5 Min PRN Mary Falcon APRN        ondansetron ODT (ZOFRAN-ODT) disintegrating tablet 4 mg  4 mg Oral Q6H PRN Mary Falcon APRN   4 mg at 06/28/25 1720    Or    ondansetron (ZOFRAN) injection 4 mg  4 mg Intravenous Q6H PRN Mary Falcon APRN        pantoprazole (PROTONIX) EC tablet 40 mg  40 mg Oral Daily Indra Zamorano MD   40 mg at 06/30/25 0817    Phosphorus Replacement - Follow Nurse / BPA Driven Protocol   Not Applicable PRN Mary Falcon APRN        polyethylene glycol (MIRALAX) packet 17 g  17 g Oral Daily PRN Indra Zamorano MD        rosuvastatin (CRESTOR) tablet 5 mg  5 mg Oral Daily Indra Zamorano MD   5 mg at 06/30/25 0818    sennosides-docusate (PERICOLACE) 8.6-50 MG per tablet 2 tablet  2 tablet Oral Daily PRN Indra Zamorano MD        sodium chloride 0.9 % flush 10 mL  10 mL Intravenous Q12H Mary Falcon APRN   10 mL at 06/29/25 2059    sodium chloride 0.9 % flush 10 mL  10 mL Intravenous PRN Mary Falcon APRN        sodium chloride 0.9 % infusion 40 mL  40 mL Intravenous PRN Falcon, Mary L, APRN        tamsulosin (FLOMAX) 24 hr capsule 0.4 mg  0.4 mg Oral Daily Indra Zamorano MD   0.4 mg at 06/30/25 0818    timolol (TIMOPTIC) 0.5 % ophthalmic solution 1 drop  1 drop Both Eyes Daily Indra Zamorano MD   1 drop at 06/30/25 0822    zinc sulfate (ZINCATE) capsule 220 mg   220 mg Oral Daily Indra Zamorano MD   220 mg at 06/30/25 0817         Assessment & Plan     -Acute on diastolic congestive heart failure: LVEF 56-60% on echo 6/24/2025. Patient is on bumex drip. Nephrology following    -MICHOACANO on CKD 3b: Cr not drawn today; placed orders to be done now. Cr 6/29/2025 was 2.33, nephrology following     -Paroxysmal atrial fibrillation: S/p cardioversion. Telemetry shows sinus rhythm rates 69-74 today. Continue amiodarone and eliquis. Metoprolol previously discontinued due to bradycardia    -hypertension     -hyponatremia:  6/29/2025    -LVH: evaluating for amyloid, obtained immunofixation and light chain labs. Plan for PYP scan outpatient    -Aortic valve regurgitation: moderate on echo 6/23/2025    Plan:   - continue bumex drip; nephrology following  - continue amiodarone and eliquis  - continue to monitor kidney function and electrolytes closely   - continue to monitor I/O closely       Electronically signed by SARAH Alfredo, 06/30/25, 10:33 AM CDT.

## 2025-06-30 NOTE — PROGRESS NOTES
[facility]  INPATIENT PROGRESS NOTE    Patient Name: Genevieve Cerda  Date of Admission: 6/23/2025  Today's Date: 06/30/25  Length of Stay: 7  Primary Care Physician: Germaine Diego APRN    Subjective   Chief Complaint: Leg swelling    Patient has no new complaint this morning, sitting up in the chair.    Review of Systems   All pertinent negatives and positives are as above. All other systems have been reviewed and are negative unless otherwise stated.     Objective    Temp:  [97.8 °F (36.6 °C)-98.6 °F (37 °C)] 98.6 °F (37 °C)  Heart Rate:  [70-86] 86  Resp:  [16-18] 18  BP: (111-145)/(43-78) 145/78  Physical Exam  Constitutional:       Appearance: Normal appearance.   HENT:      Head: Normocephalic and atraumatic.      Nose: Nose normal.      Mouth/Throat:      Mouth: Mucous membranes are moist.      Pharynx: Oropharynx is clear.   Eyes:      Extraocular Movements: Extraocular movements intact.      Conjunctiva/sclera: Conjunctivae normal.   Cardiovascular:      Rate and Rhythm: Normal rate. Rhythm irregular.      Pulses: Normal pulses.      Heart sounds: Normal heart sounds.   Pulmonary:      Effort: Pulmonary effort is normal.      Breath sounds: Rales present.   Abdominal:      General: Bowel sounds are normal.      Palpations: Abdomen is soft.   Musculoskeletal:      Cervical back: Normal range of motion and neck supple.      Right lower leg: edema.      Left lower leg:  edema.   Skin:     General: Skin is warm and dry.      Capillary Refill: Capillary refill takes 2 to 3 seconds.   Neurological:      General: No focal deficit present.      Mental Status: She is alert and oriented to person, place, and time.   Psychiatric:         Mood and Affect: Mood normal.         Behavior: Behavior normal.       Results Review:  I have reviewed the labs, radiology results, and diagnostic studies.    Laboratory Data:   Results from last 7 days   Lab Units  "06/26/25  0345 06/25/25  0620 06/24/25  0531   WBC 10*3/mm3 6.77 6.26 7.26   HEMOGLOBIN g/dL 8.7* 8.7* 8.6*   HEMATOCRIT % 28.3* 28.7* 28.5*   PLATELETS 10*3/mm3 298 304 314        Results from last 7 days   Lab Units 06/30/25  1445 06/29/25  1314 06/29/25  0439 06/28/25  1635 06/28/25  0818   SODIUM mmol/L 130*  --  125*  --  125*   POTASSIUM mmol/L 3.3* 3.5 3.6   < > 3.9   CHLORIDE mmol/L 88*  --  87*  --  88*   CO2 mmol/L 29.0  --  21.0*  --  26.0   BUN mg/dL 96.0*  --  92.4*  --  95.9*   CREATININE mg/dL 2.23*  --  2.33*  --  2.31*   CALCIUM mg/dL 8.2*  --  8.2*  --  8.2*   GLUCOSE mg/dL 169*  --  97  --  99    < > = values in this interval not displayed.       Culture Data:   No results found for: \"BLOODCX\", \"URINECX\", \"WOUNDCX\", \"MRSACX\", \"RESPCX\", \"STOOLCX\"    Radiology Data:   Imaging Results (Last 24 Hours)       ** No results found for the last 24 hours. **            I have reviewed the patient's current medications.     Assessment/Plan   Assessment  Active Hospital Problems    Diagnosis     **Acute on chronic heart failure with preserved ejection fraction (HFpEF)     Acute renal failure superimposed on stage 3b chronic kidney disease     Hyponatremia     Atrial fibrillation, persistent     Hypercholesterolemia     Current use of long term anticoagulation        Treatment Plan    - Acute on chronic diastolic congestive heart failure  Patient failed outpatient treatment and was sent to the emergency room by her PCP.  Currently she is being diuresed with Bumex drip [Lasix drip discontinued] and is making marginal clinical improvement.  Cardiology and nephrology are on consult and helping with management.  Patient has also been started on metolazone.  Will continue all her guideline directed medications.    - Hyponatremia [likely hypervolemic hyponatremia]  Patient's hyponatremia is not making any significant improvement  Continue fluid restriction/diuresis and follow serial BMP    - Chronic atrial " fibrillation  We will  continue patient's amiodarone and apixaban [metoprolol on hold because of bradycardia].  Patient is status post cardioversion  Cardiology recommendations as follows-  - Continue amiodarone 200 mg twice daily   - Continue Eliquis 2.5 mg twice daily  - Bumex infusion  - Continue metolazone 5 mg daily for now  - Continue close laboratory monitoring    DVT prophylaxis-apixaban    Disposition-continue current management and follow cardiology/nephrology recommendations for discharge planning      Electronically signed by Indra Zamorano MD, 06/30/25, 17:13 CDT.

## 2025-06-30 NOTE — PLAN OF CARE
Goal Outcome Evaluation:  Plan of Care Reviewed With: patient        Progress: no change  Outcome Evaluation: OT treatment completed. Pt presented fowlers in bed upon entering and was agreeable to participate in therapy. Pt was A&O x4. She reported feeling fatigued and required vcs throughout session to wake up, therefore, we completed treatment session at bed level this date. Pt required Max A to don and doff socks. She was able to wash her face, perform a mouth rinse, and comb her hair independently with setup. Pt demonstrated independence in self feeding, however, required Min A to prepare and open packages. Ms. Cerda would benefit from continued skilled OT per POC. Recommend d/c to SNF.    Anticipated Discharge Disposition (OT): skilled nursing facility

## 2025-06-30 NOTE — THERAPY TREATMENT NOTE
Acute Care - Physical Therapy Treatment Note  [facility]     Patient Name: Genevieve Cerda  : 1940  MRN: [id number]  Today's Date: 2025      Visit Dx:     ICD-10-CM ICD-9-CM   1. Congestive heart failure, unspecified HF chronicity, unspecified heart failure type  I50.9 428.0   2. Chronic kidney disease, unspecified CKD stage  N18.9 585.9   3. Hyponatremia  E87.1 276.1   4. Impaired functional mobility and activity tolerance [Z74.09]  Z74.09 V49.89   5. Decreased activities of daily living (ADL)  Z78.9 V49.89     Patient Active Problem List   Diagnosis    Severe protein-calorie malnutrition    Pleural effusion    Chronic congestive heart failure    Acute on chronic diastolic CHF (congestive heart failure)    CHF (congestive heart failure)    Acute on chronic heart failure with preserved ejection fraction (HFpEF)    Hyponatremia    Atrial fibrillation, persistent    Hypercholesterolemia    Current use of long term anticoagulation    Acute renal failure superimposed on stage 3b chronic kidney disease     History reviewed. No pertinent past medical history.  No past surgical history on file.  PT Assessment (Last 12 Hours)       PT Evaluation and Treatment       Row Name 25 1500          Physical Therapy Time and Intention    Subjective Information complains of;weakness;fatigue;pain  -LY     Document Type therapy note (daily note)  -LY     Mode of Treatment physical therapy  -LY       Row Name 25 1500          General Information    Existing Precautions/Restrictions fall;oxygen therapy device and L/min  -LY       Row Name 25 1500          Pain    Pain Location extremity  -LY     Pain Side/Orientation bilateral;lower  -LY     Pain Management Interventions exercise or physical activity utilized  -LY     Response to Pain Interventions activity participation with tolerable pain  -LY       Row Name 25 1500          Pain Scale: FACES Pre/Post-Treatment    Pain: FACES Scale, Pretreatment  0-->no hurt  -LY     Posttreatment Pain Rating 4-->hurts little more  -LY       Row Name 06/30/25 1500          Bed Mobility    Scooting/Bridging [place] (Bed Mobility) dependent (less than 25% patient effort)  -LY     Supine-Sit [place] (Bed Mobility) verbal cues;moderate assist (50% patient effort);maximum assist (25% patient effort)  -LY     Sit-Supine [place] (Bed Mobility) verbal cues;moderate assist (50% patient effort)  -LY     Bed Mobility, Safety Issues decreased use of arms for pushing/pulling;decreased use of legs for bridging/pushing;impaired trunk control for bed mobility  -LY     Assistive Device (Bed Mobility) bed rails;head of bed elevated;repositioning sheet  -LY       Row Name 06/30/25 1500          Balance    Comment, Balance BUE reaching with CGA, minimal movement outside of [place]  d/t weakness  -LY       Row Name 06/30/25 1500          Motor Skills    Comments, Therapeutic Exercise BLE AROM seated  -LY     Additional Documentation Comments, Therapeutic Exercise (Row)  -LY       Row Name             Wound 06/23/25 1658 Left gluteal Pressure Injury    Wound - Properties Group Placement Date: 06/23/25  -TB Placement Time: 1658  -TB Present on Original Admission: Y  -TB Side: Left  -TB Location: gluteal  -TB Primary Wound Type: Pressure Inj  -TB    Retired Wound - Properties Group Placement Date: 06/23/25  -TB Placement Time: 1658  -TB Present on Original Admission: Y  -TB Side: Left  -TB Location: gluteal  -TB    Retired Wound - Properties Group Placement Date: 06/23/25  -TB Placement Time: 1658  -TB Present on Original Admission: Y  -TB Side: Left  -TB Location: gluteal  -TB    Retired Wound - Properties Group Date first assessed: 06/23/25  -TB Time first assessed: 1658  -TB Present on Original Admission: Y  -TB Side: Left  -TB Location: gluteal  -TB      Row Name             Wound 06/23/25 1659 Left lower leg Other (Comments)    Wound - Properties Group Placement Date: 06/23/25  -TB  Placement Time: 1659 -TB Present on Original Admission: Y  -TB Side: Left  -TB Orientation: lower  -TB Location: leg  -TB Primary Wound Type: Other  -TB    Retired Wound - Properties Group Placement Date: 06/23/25  -TB Placement Time: 1659 -TB Present on Original Admission: Y  -TB Side: Left  -TB Orientation: lower  -TB Location: leg  -TB    Retired Wound - Properties Group Placement Date: 06/23/25  -TB Placement Time: 1659 -TB Present on Original Admission: Y  -TB Side: Left  -TB Orientation: lower  -TB Location: leg  -TB    Retired Wound - Properties Group Date first assessed: 06/23/25  -TB Time first assessed: 1659 -TB Present on Original Admission: Y  -TB Side: Left  -TB Location: leg  -TB      Row Name 06/30/25 1500          Positioning and Restraints    Pre-Treatment Position in bed  -LY     Post Treatment Position bed  -LY     In Bed fowlers;call light within reach;encouraged to call for assist;legs elevated  -LY               User Key  (r) = Recorded By, (t) = Taken By, (c) = Cosigned By      Initials Name Provider Type    Lupe Wong, PTA Physical Therapist Assistant    Car Tineo, RN Registered Nurse                    Physical Therapy Education       Title: PT OT SLP Therapies (Done)       Topic: Physical Therapy (Done)       Point: Mobility training (Done)       Learning Progress Summary            Patient Acceptance, E,MANAS,LORETO, VU,NR by  at 6/24/2025 1604    Comment: Education re: purpose of PT/importance of activitiy, safety/falls prevention, increase awareness of upright, improved deep breathing tech, upright posture, LE ROM and positioning for edema mgmt; pacing activity allowing time for rest   Family Acceptance, E,TB,D, VU,NR by  at 6/24/2025 1604    Comment: Education re: purpose of PT/importance of activitiy, safety/falls prevention, increase awareness of upright, improved deep breathing tech, upright posture, LE ROM and positioning for edema mgmt; pacing activity allowing  time for rest                      Point: Home exercise program (Done)       Learning Progress Summary            Patient Acceptance, E,TB,D, VU,NR by  at 6/24/2025 1604    Comment: Education re: purpose of PT/importance of activitiy, safety/falls prevention, increase awareness of upright, improved deep breathing tech, upright posture, LE ROM and positioning for edema mgmt; pacing activity allowing time for rest   Family Acceptance, E,TB,D, VU,NR by  at 6/24/2025 1604    Comment: Education re: purpose of PT/importance of activitiy, safety/falls prevention, increase awareness of upright, improved deep breathing tech, upright posture, LE ROM and positioning for edema mgmt; pacing activity allowing time for rest                      Point: Precautions (Done)       Learning Progress Summary            Patient Acceptance, E,TB,D, VU,NR by  at 6/24/2025 1604    Comment: Education re: purpose of PT/importance of activitiy, safety/falls prevention, increase awareness of upright, improved deep breathing tech, upright posture, LE ROM and positioning for edema mgmt; pacing activity allowing time for rest   Family Acceptance, E,TB,D, VU,NR by  at 6/24/2025 1604    Comment: Education re: purpose of PT/importance of activitiy, safety/falls prevention, increase awareness of upright, improved deep breathing tech, upright posture, LE ROM and positioning for edema mgmt; pacing activity allowing time for rest                                      User Key       Initials Effective Dates Name Provider Type Discipline     08/02/18 -  Melania Orozco, PT Physical Therapist PT                  PT Recommendation and Plan             Time Calculation:    PT Charges       Row Name 06/30/25 1600             Time Calculation    Start Time 1500  -LY      Stop Time 1527  -LY      Time Calculation (min) 27 min  -LY      PT Received On 06/30/25  -LY         Time Calculation- PT    Total Timed Code Minutes- PT 27 minute(s)  -LY          Timed Charges    06526 - PT Therapeutic Exercise Minutes 12  -LY      60954 - PT Therapeutic Activity Minutes 15  -LY         Total Minutes    Timed Charges Total Minutes 27  -LY       Total Minutes 27  -LY                User Key  (r) = Recorded By, (t) = Taken By, (c) = Cosigned By      Initials Name Provider Type    Lupe Wong PTA Physical Therapist Assistant                  Therapy Charges for Today       Code Description Service Date Service Provider Modifiers Qty    12891988503 HC PT THER PROC EA 15 MIN 6/30/2025 Lupe Mosqueda PTA GP 1    86144889110 HC PT THERAPEUTIC ACT EA 15 MIN 6/30/2025 Lupe Mosqueda PTA GP 1            PT G-Codes  Outcome Measure Options: AM-PAC 6 Clicks Daily Activity (OT)  AM-PAC 6 Clicks Score (PT): 13  AM-PAC 6 Clicks Score (OT): 13    Lupe Mosqueda PTA  6/30/2025

## 2025-07-01 LAB
ALBUMIN SERPL-MCNC: 2.2 G/DL (ref 3.5–5.2)
ALBUMIN/GLOB SERPL: 0.8 G/DL
ALP SERPL-CCNC: 70 U/L (ref 39–117)
ALT SERPL W P-5'-P-CCNC: 8 U/L (ref 1–33)
ANION GAP SERPL CALCULATED.3IONS-SCNC: 14 MMOL/L (ref 5–15)
AST SERPL-CCNC: 15 U/L (ref 1–32)
BASOPHILS # BLD AUTO: 0.03 10*3/MM3 (ref 0–0.2)
BASOPHILS NFR BLD AUTO: 0.3 % (ref 0–1.5)
BILIRUB SERPL-MCNC: 0.2 MG/DL (ref 0–1.2)
BUN SERPL-MCNC: 94.4 MG/DL (ref 8–23)
BUN/CREAT SERPL: 44.3 (ref 7–25)
CALCIUM SPEC-SCNC: 8.1 MG/DL (ref 8.6–10.5)
CHLORIDE SERPL-SCNC: 87 MMOL/L (ref 98–107)
CO2 SERPL-SCNC: 29 MMOL/L (ref 22–29)
CREAT SERPL-MCNC: 2.13 MG/DL (ref 0.57–1)
DEPRECATED RDW RBC AUTO: 56.1 FL (ref 37–54)
EGFRCR SERPLBLD CKD-EPI 2021: 22.2 ML/MIN/1.73
EOSINOPHIL # BLD AUTO: 0.1 10*3/MM3 (ref 0–0.4)
EOSINOPHIL NFR BLD AUTO: 1.1 % (ref 0.3–6.2)
ERYTHROCYTE [DISTWIDTH] IN BLOOD BY AUTOMATED COUNT: 19.3 % (ref 12.3–15.4)
GLOBULIN UR ELPH-MCNC: 2.9 GM/DL
GLUCOSE SERPL-MCNC: 121 MG/DL (ref 65–99)
HCT VFR BLD AUTO: 29.4 % (ref 34–46.6)
HGB BLD-MCNC: 9.3 G/DL (ref 12–15.9)
IMM GRANULOCYTES # BLD AUTO: 0.02 10*3/MM3 (ref 0–0.05)
IMM GRANULOCYTES NFR BLD AUTO: 0.2 % (ref 0–0.5)
LYMPHOCYTES # BLD AUTO: 1.55 10*3/MM3 (ref 0.7–3.1)
LYMPHOCYTES NFR BLD AUTO: 17.4 % (ref 19.6–45.3)
MAGNESIUM SERPL-MCNC: 2.1 MG/DL (ref 1.6–2.4)
MCH RBC QN AUTO: 25.4 PG (ref 26.6–33)
MCHC RBC AUTO-ENTMCNC: 31.6 G/DL (ref 31.5–35.7)
MCV RBC AUTO: 80.3 FL (ref 79–97)
MONOCYTES # BLD AUTO: 0.97 10*3/MM3 (ref 0.1–0.9)
MONOCYTES NFR BLD AUTO: 10.9 % (ref 5–12)
NEUTROPHILS NFR BLD AUTO: 6.24 10*3/MM3 (ref 1.7–7)
NEUTROPHILS NFR BLD AUTO: 70.1 % (ref 42.7–76)
NRBC BLD AUTO-RTO: 0 /100 WBC (ref 0–0.2)
PLATELET # BLD AUTO: 270 10*3/MM3 (ref 140–450)
PMV BLD AUTO: 10.5 FL (ref 6–12)
POTASSIUM SERPL-SCNC: 2.8 MMOL/L (ref 3.5–5.2)
PROT SERPL-MCNC: 5.1 G/DL (ref 6–8.5)
RBC # BLD AUTO: 3.66 10*6/MM3 (ref 3.77–5.28)
SODIUM SERPL-SCNC: 130 MMOL/L (ref 136–145)
WBC NRBC COR # BLD AUTO: 8.91 10*3/MM3 (ref 3.4–10.8)

## 2025-07-01 PROCEDURE — 97110 THERAPEUTIC EXERCISES: CPT

## 2025-07-01 PROCEDURE — 85025 COMPLETE CBC W/AUTO DIFF WBC: CPT | Performed by: INTERNAL MEDICINE

## 2025-07-01 PROCEDURE — 99232 SBSQ HOSP IP/OBS MODERATE 35: CPT | Performed by: NURSE PRACTITIONER

## 2025-07-01 PROCEDURE — 80053 COMPREHEN METABOLIC PANEL: CPT | Performed by: INTERNAL MEDICINE

## 2025-07-01 PROCEDURE — 83735 ASSAY OF MAGNESIUM: CPT | Performed by: HOSPITALIST

## 2025-07-01 RX ORDER — TOLVAPTAN 30 MG/1
30 TABLET ORAL ONCE
Status: COMPLETED | OUTPATIENT
Start: 2025-07-01 | End: 2025-07-01

## 2025-07-01 RX ORDER — POTASSIUM CHLORIDE 1.5 G/1.58G
40 POWDER, FOR SOLUTION ORAL ONCE
Status: COMPLETED | OUTPATIENT
Start: 2025-07-01 | End: 2025-07-01

## 2025-07-01 RX ADMIN — GABAPENTIN 300 MG: 300 CAPSULE ORAL at 05:04

## 2025-07-01 RX ADMIN — APIXABAN 2.5 MG: 2.5 TABLET, FILM COATED ORAL at 20:44

## 2025-07-01 RX ADMIN — Medication 220 MG: at 09:44

## 2025-07-01 RX ADMIN — TIMOLOL MALEATE 1 DROP: 5 SOLUTION/ DROPS OPHTHALMIC at 09:44

## 2025-07-01 RX ADMIN — MIDODRINE HYDROCHLORIDE 5 MG: 2.5 TABLET ORAL at 16:40

## 2025-07-01 RX ADMIN — GABAPENTIN 300 MG: 300 CAPSULE ORAL at 20:44

## 2025-07-01 RX ADMIN — LATANOPROST 1 DROP: 50 SOLUTION OPHTHALMIC at 20:44

## 2025-07-01 RX ADMIN — Medication 5000 UNITS: at 09:41

## 2025-07-01 RX ADMIN — Medication 10 ML: at 20:44

## 2025-07-01 RX ADMIN — ACETAMINOPHEN 650 MG: 325 TABLET ORAL at 09:43

## 2025-07-01 RX ADMIN — PANTOPRAZOLE SODIUM 40 MG: 40 TABLET, DELAYED RELEASE ORAL at 09:42

## 2025-07-01 RX ADMIN — ROSUVASTATIN 5 MG: 10 TABLET, FILM COATED ORAL at 09:44

## 2025-07-01 RX ADMIN — GABAPENTIN 300 MG: 300 CAPSULE ORAL at 16:40

## 2025-07-01 RX ADMIN — DOCUSATE SODIUM 50 MG AND SENNOSIDES 8.6 MG 2 TABLET: 8.6; 5 TABLET, FILM COATED ORAL at 05:13

## 2025-07-01 RX ADMIN — POTASSIUM CHLORIDE 40 MEQ: 1.5 POWDER, FOR SOLUTION ORAL at 09:41

## 2025-07-01 RX ADMIN — ASPIRIN 81 MG: 81 TABLET, COATED ORAL at 09:44

## 2025-07-01 RX ADMIN — Medication 10 ML: at 09:44

## 2025-07-01 RX ADMIN — APIXABAN 2.5 MG: 2.5 TABLET, FILM COATED ORAL at 09:42

## 2025-07-01 RX ADMIN — TAMSULOSIN HYDROCHLORIDE 0.4 MG: 0.4 CAPSULE ORAL at 09:44

## 2025-07-01 RX ADMIN — MIDODRINE HYDROCHLORIDE 5 MG: 2.5 TABLET ORAL at 09:42

## 2025-07-01 RX ADMIN — ALLOPURINOL 100 MG: 100 TABLET ORAL at 09:42

## 2025-07-01 RX ADMIN — TOLVAPTAN 30 MG: 30 TABLET ORAL at 16:40

## 2025-07-01 RX ADMIN — AMIODARONE HYDROCHLORIDE 200 MG: 200 TABLET ORAL at 09:44

## 2025-07-01 RX ADMIN — Medication 400 MG: at 09:42

## 2025-07-01 NOTE — PLAN OF CARE
Goal Outcome Evaluation:              Outcome Evaluation: Nutrition F/U: Pt resting/sleeping at time of visit. Aroused to speak with this RD. She states she is tired. Appetite remains good. Pt reports she is eating well. Currently on a cardiac diet consuming 50% at meals. ~540 cc with meals x 2 days. ~2350 cc UOP plus two unmeasured. Pt current wt is 173.3#; up from 165.8# over night. Pt remains on a bumex drip at this time. Last BM 6/28. ONS in place to supplement po intake and aid in wound healing. Pt reports she is drinking supplements. Will continue to monitor and f/u as needed.

## 2025-07-01 NOTE — PROGRESS NOTES
[facility] -  Progress Note     LOS: 8 days   Patient Care Team:  Germaine Diego APRN as PCP - General (Family Medicine)    Chief Complaint: leg swelling    Subjective     Interval History:     Patient Complaints: patient lying in bed comfortably. She was taking a nap. She denies any chest pain. She denies any shortness of breath. She reports improvement in leg swelling today.     Review of Systems:     Review of Systems   Respiratory:  Negative for shortness of breath.    Cardiovascular:  Positive for leg swelling. Negative for chest pain and palpitations.   All other systems reviewed and are negative.    Objective     Vital Sign Min/Max for last 24 hours  Temp  Min: 97.9 °F (36.6 °C)  Max: 98.6 °F (37 °C)   BP  Min: 91/44  Max: 145/63   Pulse  Min: 73  Max: 102   Resp  Min: 16  Max: 18   SpO2  Min: 91 %  Max: 98 %   No data recorded   Weight  Min: 78.6 kg (173 lb 4.8 oz)  Max: 78.6 kg (173 lb 4.8 oz)         07/01/25  0300   Weight: 78.6 kg (173 lb 4.8 oz)       Intake/Output Summary (Last 24 hours) at 7/1/2025 1509  Last data filed at 7/1/2025 1200  Gross per 24 hour   Intake 600 ml   Output 3975 ml   Net -3375 ml       Telemetry: sinus rhythm rates 70-90's today     Physical Exam:    Vitals reviewed.   Constitutional:       General: Not in acute distress.     Appearance: Normal appearance. Well-developed. Frail.   Eyes:      Pupils: Pupils are equal, round, and reactive to light.   HENT:      Head: Normocephalic and atraumatic.      Nose: Nose normal.   Neck:      Vascular: No carotid bruit.   Pulmonary:      Effort: Pulmonary effort is normal. No respiratory distress.      Breath sounds: Decreased breath sounds present. No wheezing. No rales.   Cardiovascular:      Normal rate. Regular rhythm.      Murmurs: There is no murmur.   Edema:     Peripheral edema present.     Ankle: bilateral 2+ edema of the ankle.     Feet: bilateral 2+ edema of the feet.     Comments: Bilateral legs  wrapped  Abdominal:      General: There is no distension.      Palpations: Abdomen is soft.   Musculoskeletal: Normal range of motion.      Cervical back: Normal range of motion and neck supple. Skin:     General: Skin is warm.      Findings: No erythema or rash.   Neurological:      General: No focal deficit present.      Mental Status: Alert and oriented to person, place, and time.   Psychiatric:         Attention and Perception: Attention normal.         Mood and Affect: Mood normal.         Speech: Speech normal.         Behavior: Behavior normal.         Thought Content: Thought content normal.         Judgment: Judgment normal.       Results Review:   Lab Results (last 72 hours)       Procedure Component Value Units Date/Time    Magnesium [[id number]]  (Normal) Collected: 07/01/25 1249    Specimen: Blood Updated: 07/01/25 1323     Magnesium 2.1 mg/dL     Comprehensive Metabolic Panel [[id number]]  (Abnormal) Collected: 07/01/25 0344    Specimen: Blood Updated: 07/01/25 0446     Glucose 121 mg/dL      BUN 94.4 mg/dL      Creatinine 2.13 mg/dL      Sodium 130 mmol/L      Potassium 2.8 mmol/L      Chloride 87 mmol/L      CO2 29.0 mmol/L      Calcium 8.1 mg/dL      Total Protein 5.1 g/dL      Albumin 2.2 g/dL      ALT (SGPT) 8 U/L      AST (SGOT) 15 U/L      Alkaline Phosphatase 70 U/L      Total Bilirubin 0.2 mg/dL      Globulin 2.9 gm/dL      A/G Ratio 0.8 g/dL      BUN/Creatinine Ratio 44.3     Anion Gap 14.0 mmol/L      eGFR 22.2 mL/min/1.73     Narrative:      GFR Categories in Chronic Kidney Disease (CKD)              GFR Category          GFR (mL/min/1.73)    Interpretation  G1                    90 or greater        Normal or high (1)  G2                    60-89                Mild decrease (1)  G3a                   45-59                Mild to moderate decrease  G3b                   30-44                Moderate to severe decrease  G4                    15-29                Severe decrease  G5                     14 or less           Kidney failure    (1)In the absence of evidence of kidney disease, neither GFR category G1 or G2 fulfill the criteria for CKD.    eGFR calculation 2021 CKD-EPI creatinine equation, which does not include race as a factor    CBC & Differential [[id number]]  (Abnormal) Collected: 07/01/25 0344    Specimen: Blood Updated: 07/01/25 0424    Narrative:      The following orders were created for panel order CBC & Differential.  Procedure                               Abnormality         Status                     ---------                               -----------         ------                     CBC Auto Differential[[id number]]        Abnormal            Final result                 Please view results for these tests on the individual orders.    CBC Auto Differential [[id number]]  (Abnormal) Collected: 07/01/25 0344    Specimen: Blood Updated: 07/01/25 0424     WBC 8.91 10*3/mm3      RBC 3.66 10*6/mm3      Hemoglobin 9.3 g/dL      Hematocrit 29.4 %      MCV 80.3 fL      MCH 25.4 pg      MCHC 31.6 g/dL      RDW 19.3 %      RDW-SD 56.1 fl      MPV 10.5 fL      Platelets 270 10*3/mm3      Neutrophil % 70.1 %      Lymphocyte % 17.4 %      Monocyte % 10.9 %      Eosinophil % 1.1 %      Basophil % 0.3 %      Immature Grans % 0.2 %      Neutrophils, Absolute 6.24 10*3/mm3      Lymphocytes, Absolute 1.55 10*3/mm3      Monocytes, Absolute 0.97 10*3/mm3      Eosinophils, Absolute 0.10 10*3/mm3      Basophils, Absolute 0.03 10*3/mm3      Immature Grans, Absolute 0.02 10*3/mm3      nRBC 0.0 /100 WBC     Basic Metabolic Panel [[id number]]  (Abnormal) Collected: 06/30/25 1445    Specimen: Blood Updated: 06/30/25 1522     Glucose 169 mg/dL      BUN 96.0 mg/dL      Creatinine 2.23 mg/dL      Sodium 130 mmol/L      Potassium 3.3 mmol/L      Chloride 88 mmol/L      CO2 29.0 mmol/L      Calcium 8.2 mg/dL      BUN/Creatinine Ratio 43.0     Anion Gap 13.0 mmol/L      eGFR 21.0 mL/min/1.73     Narrative:      GFR  Categories in Chronic Kidney Disease (CKD)              GFR Category          GFR (mL/min/1.73)    Interpretation  G1                    90 or greater        Normal or high (1)  G2                    60-89                Mild decrease (1)  G3a                   45-59                Mild to moderate decrease  G3b                   30-44                Moderate to severe decrease  G4                    15-29                Severe decrease  G5                    14 or less           Kidney failure    (1)In the absence of evidence of kidney disease, neither GFR category G1 or G2 fulfill the criteria for CKD.    eGFR calculation 2021 CKD-EPI creatinine equation, which does not include race as a factor    Potassium [[id number]]  (Normal) Collected: 06/29/25 1314    Specimen: Blood Updated: 06/29/25 1406     Potassium 3.5 mmol/L     Basic Metabolic Panel [[id number]]  (Abnormal) Collected: 06/29/25 0439    Specimen: Blood Updated: 06/29/25 0616     Glucose 97 mg/dL      BUN 92.4 mg/dL      Creatinine 2.33 mg/dL      Sodium 125 mmol/L      Potassium 3.6 mmol/L      Chloride 87 mmol/L      CO2 21.0 mmol/L      Calcium 8.2 mg/dL      BUN/Creatinine Ratio 39.7     Anion Gap 17.0 mmol/L      eGFR 19.9 mL/min/1.73     Narrative:      GFR Categories in Chronic Kidney Disease (CKD)              GFR Category          GFR (mL/min/1.73)    Interpretation  G1                    90 or greater        Normal or high (1)  G2                    60-89                Mild decrease (1)  G3a                   45-59                Mild to moderate decrease  G3b                   30-44                Moderate to severe decrease  G4                    15-29                Severe decrease  G5                    14 or less           Kidney failure    (1)In the absence of evidence of kidney disease, neither GFR category G1 or G2 fulfill the criteria for CKD.    eGFR calculation 2021 CKD-EPI creatinine equation, which does not include race as a factor  "   Immunofixation, Urine - Urine, Clean Catch [[id number]] Collected: 06/29/25 0538    Specimen: Urine, Clean Catch Updated: 06/29/25 0609    Immunofixation (MINH), Protein Electrophoresis (PE), and Quantitative Free Kappa and Lambda Light Chains (FLC), Serum [[id number]] Collected: 06/29/25 0439    Specimen: Blood Updated: 06/29/25 0509    Potassium [[id number]]  (Normal) Collected: 06/28/25 1635    Specimen: Blood Updated: 06/28/25 1840     Potassium 4.0 mmol/L                 Echo EF Estimated  No results found for: \"ECHOEFEST\"      Cath Ejection Fraction Quantitative  No results found for: \"CATHEF\"        Medication Review: yes  Current Facility-Administered Medications   Medication Dose Route Frequency Provider Last Rate Last Admin    acetaminophen (TYLENOL) tablet 650 mg  650 mg Oral Q4H PRN Mary Falcon APRN   650 mg at 07/01/25 0943    Or    acetaminophen (TYLENOL) 160 MG/5ML oral solution 650 mg  650 mg Oral Q4H PRN Mary Falcon APRN        Or    acetaminophen (TYLENOL) suppository 650 mg  650 mg Rectal Q4H PRN Mary Falcon APRN        allopurinol (ZYLOPRIM) tablet 100 mg  100 mg Oral Daily Indra Zamorano MD   100 mg at 07/01/25 0942    amiodarone (PACERONE) tablet 200 mg  200 mg Oral Daily Rancho Yanez MD   200 mg at 07/01/25 0944    apixaban (ELIQUIS) tablet 2.5 mg  2.5 mg Oral BID Mary Falcon APRN   2.5 mg at 07/01/25 0942    aspirin EC tablet 81 mg  81 mg Oral Daily Indra Zamorano MD   81 mg at 07/01/25 0944    bisacodyl (DULCOLAX) EC tablet 5 mg  5 mg Oral Daily PRN Mary Falcon APRN        And    bisacodyl (DULCOLAX) suppository 10 mg  10 mg Rectal Daily PRN Mary Falcon APRN        bumetanide (BUMEX) 25 mg/100mL (0.25 mg/mL) infusion  1 mg/hr Intravenous Continuous Rancho Yanez MD 4 mL/hr at 06/30/25 1321 1 mg/hr at 06/30/25 1321    Calcium Replacement - Follow Nurse / BPA Driven Protocol   Not Applicable PRN Mary Falcon, APRN        cholecalciferol (VITAMIN " D3) tablet 5,000 Units  5,000 Units Oral Daily Indra Zamorano MD   5,000 Units at 07/01/25 0941    gabapentin (NEURONTIN) capsule 300 mg  300 mg Oral Q8H Indra Zamorano MD   300 mg at 07/01/25 0504    ipratropium-albuterol (DUO-NEB) nebulizer solution 3 mL  3 mL Nebulization 4x Daily PRN Indra Zamorano MD        latanoprost (XALATAN) 0.005 % ophthalmic solution 1 drop  1 drop Both Eyes Nightly Indra Zamorano MD   1 drop at 06/30/25 2121    magnesium oxide (MAG-OX) tablet 400 mg  400 mg Oral Daily Indra Zamorano MD   400 mg at 07/01/25 0942    Magnesium Standard Dose Replacement - Follow Nurse / BPA Driven Protocol   Not Applicable PRN Mary Falcon APRN        midodrine (PROAMATINE) tablet 5 mg  5 mg Oral BID AC Indra Zamorano MD   5 mg at 07/01/25 0942    nitroglycerin (NITROSTAT) SL tablet 0.4 mg  0.4 mg Sublingual Q5 Min PRN Mary Falcon APRN        ondansetron ODT (ZOFRAN-ODT) disintegrating tablet 4 mg  4 mg Oral Q6H PRN Mary Falcon APRN   4 mg at 06/28/25 1720    Or    ondansetron (ZOFRAN) injection 4 mg  4 mg Intravenous Q6H PRN Mary Falcon APRN        pantoprazole (PROTONIX) EC tablet 40 mg  40 mg Oral Daily Indra Zamorano MD   40 mg at 07/01/25 0942    Phosphorus Replacement - Follow Nurse / BPA Driven Protocol   Not Applicable PRN Mary Falcon APRN        polyethylene glycol (MIRALAX) packet 17 g  17 g Oral Daily PRN Indra Zamorano MD        Potassium Replacement - Follow Nurse / BPA Driven Protocol   Not Applicable PRN Audelia Ortega MD        rosuvastatin (CRESTOR) tablet 5 mg  5 mg Oral Daily Indra Zamorano MD   5 mg at 07/01/25 0944    sennosides-docusate (PERICOLACE) 8.6-50 MG per tablet 2 tablet  2 tablet Oral Daily PRN Indra Zamorano MD   2 tablet at 07/01/25 0513    sodium chloride 0.9 % flush 10 mL  10 mL Intravenous Q12H Mary Falcon APRN   10 mL at 07/01/25 0944    sodium chloride 0.9 % flush 10 mL  10 mL Intravenous PRN  Mary Falcon APRN        sodium chloride 0.9 % infusion 40 mL  40 mL Intravenous PRN Mary Falcon APRN        tamsulosin (FLOMAX) 24 hr capsule 0.4 mg  0.4 mg Oral Daily Indra Zamorano MD   0.4 mg at 07/01/25 0944    timolol (TIMOPTIC) 0.5 % ophthalmic solution 1 drop  1 drop Both Eyes Daily Indra Zamorano MD   1 drop at 07/01/25 0944    tolvaptan (SAMSCA) tablet 30 mg  30 mg Oral Once Julius Cole MD        zinc sulfate (ZINCATE) capsule 220 mg  220 mg Oral Daily Indra Zamorano MD   220 mg at 07/01/25 0944         Assessment & Plan     -Acute on diastolic congestive heart failure: LVEF 56-60% on echo 6/24/2025. Patient is on bumex drip. Nephrology following     -MICHOACANO on CKD 3b: Cr 2.13. nephrology following      -Paroxysmal atrial fibrillation: S/p cardioversion. Telemetry shows sinus rhythm rates 70-90's today. Continue amiodarone and eliquis. Metoprolol previously discontinued due to bradycardia     -hypertension      -hyponatremia:   today      -LVH: evaluating for amyloid, obtained immunofixation and light chain labs. Plan for PYP scan outpatient     -Aortic valve regurgitation: moderate on echo 6/23/2025     Plan:   - continue bumex drip; nephrology following  - continue amiodarone and eliquis  - continue to monitor kidney function and electrolytes closely   - continue to monitor I/O closely     Electronically signed by SARAH Alfredo, 07/01/25, 3:08 PM CDT.

## 2025-07-01 NOTE — THERAPY TREATMENT NOTE
Patient Name: Genevieve Cerda  : 1940    MRN: [id number]                              Today's Date: 2025       Admit Date: 2025    Visit Dx:     ICD-10-CM ICD-9-CM   1. Congestive heart failure, unspecified HF chronicity, unspecified heart failure type  I50.9 428.0   2. Chronic kidney disease, unspecified CKD stage  N18.9 585.9   3. Hyponatremia  E87.1 276.1   4. Impaired functional mobility and activity tolerance [Z74.09]  Z74.09 V49.89   5. Decreased activities of daily living (ADL)  Z78.9 V49.89     Patient Active Problem List   Diagnosis    Severe protein-calorie malnutrition    Pleural effusion    Chronic congestive heart failure    Acute on chronic diastolic CHF (congestive heart failure)    CHF (congestive heart failure)    Acute on chronic heart failure with preserved ejection fraction (HFpEF)    Hyponatremia    Atrial fibrillation, persistent    Hypercholesterolemia    Current use of long term anticoagulation    Acute renal failure superimposed on stage 3b chronic kidney disease     History reviewed. No pertinent past medical history.  No past surgical history on file.   General Information       Row Name 25 1419          OT Time and Intention    Subjective Information complains of;fatigue;pain  Patient states that she cannot keep her eyes open today.  -NAVDEEP     Document Type therapy note (daily note)  -NAVDEEP     Mode of Treatment occupational therapy  -NAVDEEP     Patient Effort adequate  -NAVDEEP     Symptoms Noted During/After Treatment fatigue  -NAVDEEP       Row Name 25 1419          Cognition    Orientation Status (Cognition) oriented x 4  -NAVDEEP       Row Name 25 1419          Safety Issues/Impairments Affecting Functional Mobility    Impairments Affecting Function (Mobility) balance;endurance/activity tolerance;shortness of breath;postural/trunk control;strength  -NAVDEEP               User Key  (r) = Recorded By, (t) = Taken By, (c) = Cosigned By      Initials Name Provider Type    NAVDEEP  Ivania Mendoza, OTR/L Occupational Therapist                     Mobility/ADL's       [facility] Name 07/01/25 1419          Functional Mobility    Patient was able to Ambulate no, other medical factors prevent ambulation  -     Reason Patient was unable to Ambulate Excessive Sedation/Somnolence  -[facility] Name 07/01/25 1419          Activities of Daily Living    BADL Assessment/Intervention feeding  -[facility] Name 07/01/25 1419          Self-Feeding Assessment/Training    [place] Level (Feeding) liquids to mouth;maximum assist (25% patient effort)  Patient complains of pain in the L hand and she states that she just cannot stay awake to hold drink with R hand.  -     Position (Feeding) sitting up in bed  -NAVDEEP               User Key  (r) = Recorded By, (t) = Taken By, (c) = Cosigned By      Initials Name Provider Type    Ivania Hardy, OTR/L Occupational Therapist                   Obj/Interventions       [facility] Name 07/01/25 1419          Motor Skills    Therapeutic Exercise aerobic  -[facility] Name 07/01/25 1419          Aerobic Exercise    Comment, Aerobic Exercise (Therapeutic Exercise) Patient and sister educated on interval exercises to complete 30 seconds BUE exercises alternated with 30 seconds rest.  -               User Key  (r) = Recorded By, (t) = Taken By, (c) = Cosigned By      Initials Name Provider Type    Ivania Hardy OTR/L Occupational Therapist                   Goals/Plan    No documentation.                  Clinical Impression       [facility] Name 07/01/25 1419          Pain Assessment    Pain Management Interventions exercise or physical activity utilized  -     Response to Pain Interventions activity participation with tolerable pain  -[facility] Name 07/01/25 1419          Pain Scale: FACES Pre/Post-Treatment    Pain: FACES Scale, Pretreatment 0-->no hurt  -     Posttreatment Pain Rating 2-->hurts little bit  -     Pre/Posttreatment Pain Comment Pain in the L hand   -       Row Name 07/01/25 1419          Plan of Care Review    Plan of Care Reviewed With patient;family  -     Progress no change  -     Outcome Evaluation OT treatment complete. Patient in bed asleep. Patient educated on need for increased movement. Patient participated in BUE interval exercises of 30 seconds exercise alternated with 30 seconds rest. Patient and sister instructed to continue with interval execises every couple hours. Patient encouraged to eat. She did not eat lunch but was willing to drink her nutritian shake with therapist assistance. Continue OT per POC. Recommend SNF placment upon hospital discharge.  -       Row Name 07/01/25 1419          Therapy Assessment/Plan (OT)    Rehab Potential (OT) fair  -     Criteria for Skilled Therapeutic Interventions Met (OT) yes;skilled treatment is necessary  -     Therapy Frequency (OT) 5 times/wk  -     Predicted Duration of Therapy Intervention (OT) until hospital discharge  -       Row Name 07/01/25 1419          Therapy Plan Review/Discharge Plan (OT)    Anticipated Discharge Disposition (OT) skilled nursing facility  -       Row Name 07/01/25 1419          Positioning and Restraints    Pre-Treatment Position in bed  -     Post Treatment Position bed  -NAVDEEP     In Bed fowlers;call light within reach;encouraged to call for assist;with family/caregiver;side rails up x3  -               User Key  (r) = Recorded By, (t) = Taken By, (c) = Cosigned By      Initials Name Provider Type    NAVDEEP Ivania Mendoza, OTR/L Occupational Therapist                   Outcome Measures       Row Name 07/01/25 1419          How much help from another is currently needed...    Putting on and taking off regular lower body clothing? 1  -NAVDEEP     Bathing (including washing, rinsing, and drying) 2  -NAVDEEP     Toileting (which includes using toilet bed pan or urinal) 1  -NAVDEEP     Putting on and taking off regular upper body clothing 1  -NAVDEEP     Taking care of personal  grooming (such as brushing teeth) 2  -NAVDEEP     Eating meals 2  -NAVDEEP     AM-PAC 6 Clicks Score (OT) 9  -NAVDEEP       Row Name 07/01/25 0800          How much help from another person do you currently need...    Turning from your back to your side while in flat bed without using bedrails? 3  -GT     Moving from lying on back to sitting on the side of a flat bed without bedrails? 2  -GT     Moving to and from a bed to a chair (including a wheelchair)? 2  -GT     Standing up from a chair using your arms (e.g., wheelchair, bedside chair)? 2  -GT     Climbing 3-5 steps with a railing? 2  -GT     To walk in hospital room? 2  -GT     AM-PAC 6 Clicks Score (PT) 13  -GT     Highest Level of Mobility Goal Move to Chair/Commode-4  -GT       Row Name 07/01/25 1419          Functional Assessment    Outcome Measure Options AM-PAC 6 Clicks Daily Activity (OT)  -               User Key  (r) = Recorded By, (t) = Taken By, (c) = Cosigned By      Initials Name Provider Type    NAVDEEP Ivania Mendoza, OTR/L Occupational Therapist    GT Yandy Carvalho RN Registered Nurse                    Occupational Therapy Education       Title: PT OT SLP Therapies (Done)       Topic: Occupational Therapy (Done)       Point: ADL training (Done)       Learning Progress Summary            Patient Acceptance, E, VU,NR by BS at 6/30/2025 1131                      Point: Home exercise program (Done)       Learning Progress Summary            Patient Acceptance, E,TB, VU,NR by  at 7/1/2025 1440    Comment: Education on benefits of movement, interval exercise program   Family Acceptance, E,TB, VU,NR by  at 7/1/2025 1440    Comment: Education on benefits of movement, interval exercise program                      Point: Body mechanics (Done)       Learning Progress Summary            Patient Acceptance, E, VU,NR by BS at 6/30/2025 1131                                      User Key       Initials Effective Dates Name Provider Type Discipline     02/28/25 -   Ivania Mendoza OTR/L Occupational Therapist OT    BS 05/12/25 -  Hetal Farmer, KECIA Student OT Student OT                  OT Recommendation and Plan  Therapy Frequency (OT): 5 times/wk  Plan of Care Review  Plan of Care Reviewed With: patient, family  Progress: no change  Outcome Evaluation: OT treatment complete. Patient in bed asleep. Patient educated on need for increased movement. Patient participated in BUE interval exercises of 30 seconds exercise alternated with 30 seconds rest. Patient and sister instructed to continue with interval execises every couple hours. Patient encouraged to eat. She did not eat lunch but was willing to drink her nutritian shake with therapist assistance. Continue OT per POC. Recommend SNF placment upon hospital discharge.     Time Calculation:         Time Calculation- OT       Row Name 07/01/25 1419             Time Calculation- OT    OT Start Time 1340  -NAVDEEP      OT Stop Time 1419  -NAVDEEP      OT Time Calculation (min) 39 min  -NAVDEEP      OT Received On 07/01/25  -NAVDEEP         Timed Charges    97801 - OT Therapeutic Exercise Minutes 39  -NAVDEEP         Total Minutes    Timed Charges Total Minutes 39  -NAVDEEP       Total Minutes 39  -NAVDEEP                User Key  (r) = Recorded By, (t) = Taken By, (c) = Cosigned By      Initials Name Provider Type    Ivania Hardy OTR/L Occupational Therapist                  Therapy Charges for Today       Code Description Service Date Service Provider Modifiers Qty    19383081404  OT THER PROC EA 15 MIN 7/1/2025 Ivania Mendoza OTR/L GO 3                 SUSAN Escamilla/AMALIA  7/1/2025

## 2025-07-01 NOTE — PROGRESS NOTES
[facility] Medicine Services  INPATIENT PROGRESS NOTE    Patient Name: Genevieve Cerda  Date of Admission: 6/23/2025  Today's Date: 07/01/25  Length of Stay: 8  Primary Care Physician: Germaine Diego APRN    Subjective   Chief Complaint: Leg swelling  History of Present Illness  Genevieve Cerda is a 85-year-old female that was seen by Dr. Yanez at the heart failure clinic and family was advised to bring patient to ER due to increased lower extremity edema.  Patient has dressings on bilateral lower extremities due to fluid weeping out of her legs.  Dr. Sharma saw patient in the clinic on 6/20/2025 with complaints of increased shortness of breath and had gained about 22 pounds since late May.  Her Lasix dose was changed to 40 twice daily and then changed to Torsemide 80 mg twice daily.  Patient was also started on amiodarone with a plan to consider cardioversion.  Patient reported that she did not have a significant increase in urine output.  Her weight was up by about half a pound.  She reported her shortness of breath is a little better today but otherwise she felt the same.  She denies any chest pain palpitations and not lightheadedness,     Patient resting in wheelchair in ER hallway D with 2 family members.  She is in no acute distress.  She is awake, alert., and oriented x 3.  She has oxygen on.  Family reports that she resides at [facility] in [place].  Dressings to bilateral lower extremities dry and intact.  Patient's BMP was 9946.0, creatinine 2.18, Hemoglobin 9.8, hematocrit 30.9, sodium 127.  Started patient on Lasix drip.  Will continue the drip and recheck BNP in 6 hours.  Patient is being admitted to hospitalist services.  6/30  Patient has no new complaint this morning, sitting up in the chair.  7/1  As before history of chronic diastolic congestive heart failure presented with acute on chronic diastolic congestive heart failure her last EF was  56% cardiology on board history of A-fib status post cardioversion history of hypertension chronic renal failure nephrology on board remains on Bumex drip  Review of Systems   All pertinent negatives and positives are as above. All other systems have been reviewed and are negative unless otherwise stated.     Objective    Temp:  [97.9 °F (36.6 °C)-98.6 °F (37 °C)] 98 °F (36.7 °C)  Heart Rate:  [] 89  Resp:  [16-18] 16  BP: ()/(44-78) 145/63  Physical Exam  Constitutional:       Appearance: Normal appearance.   HENT:      Head: Normocephalic and atraumatic.      Nose: Nose normal.      Mouth/Throat:      Mouth: Mucous membranes are moist.      Pharynx: Oropharynx is clear.   Eyes:      Extraocular Movements: Extraocular movements intact.      Conjunctiva/sclera: Conjunctivae normal.   Cardiovascular:      Rate and Rhythm: Normal rate. Rhythm irregular.      Pulses: Normal pulses.      Heart sounds: Normal heart sounds.   Pulmonary:      Effort: Pulmonary effort is normal.      Breath sounds: Rales present.   Abdominal:      General: Bowel sounds are normal.      Palpations: Abdomen is soft.   Musculoskeletal:      Cervical back: Normal range of motion and neck supple.      Right lower leg: edema.      Left lower leg:  edema.   Skin:     General: Skin is warm and dry.      Capillary Refill: Capillary refill takes 2 to 3 seconds.   Neurological:      General: No focal deficit present.      Mental Status: She is alert and oriented to person, place, and time.   Psychiatric:         Mood and Affect: Mood normal.         Behavior: Behavior normal.       Results Review:  I have reviewed the labs, radiology results, and diagnostic studies.    Laboratory Data:   Results from last 7 days   Lab Units 07/01/25  0344 06/26/25  0345 06/25/25  0620   WBC 10*3/mm3 8.91 6.77 6.26   HEMOGLOBIN g/dL 9.3* 8.7* 8.7*   HEMATOCRIT % 29.4* 28.3* 28.7*   PLATELETS 10*3/mm3 270 298 304        Results from last 7 days   Lab Units  "07/01/25  0344 06/30/25  1445 06/29/25  1314 06/29/25  0439   SODIUM mmol/L 130* 130*  --  125*   POTASSIUM mmol/L 2.8* 3.3* 3.5 3.6   CHLORIDE mmol/L 87* 88*  --  87*   CO2 mmol/L 29.0 29.0  --  21.0*   BUN mg/dL 94.4* 96.0*  --  92.4*   CREATININE mg/dL 2.13* 2.23*  --  2.33*   CALCIUM mg/dL 8.1* 8.2*  --  8.2*   BILIRUBIN mg/dL 0.2  --   --   --    ALK PHOS U/L 70  --   --   --    ALT (SGPT) U/L 8  --   --   --    AST (SGOT) U/L 15  --   --   --    GLUCOSE mg/dL 121* 169*  --  97       Culture Data:   No results found for: \"BLOODCX\", \"URINECX\", \"WOUNDCX\", \"MRSACX\", \"RESPCX\", \"STOOLCX\"    Radiology Data:   Imaging Results (Last 24 Hours)       ** No results found for the last 24 hours. **            I have reviewed the patient's current medications.     Assessment/Plan   Assessment  Active Hospital Problems    Diagnosis     **Acute on chronic heart failure with preserved ejection fraction (HFpEF)     Acute renal failure superimposed on stage 3b chronic kidney disease     Hyponatremia     Atrial fibrillation, persistent     Hypercholesterolemia     Current use of long term anticoagulation        Treatment Plan    - Acute on chronic diastolic congestive heart failure  Patient failed outpatient treatment and was sent to the emergency room by her PCP.  Currently she is being diuresed with Bumex drip [Lasix drip discontinued] and is making marginal clinical improvement.  Cardiology and nephrology are on consult and helping with management.  Patient has also been started on metolazone.  Will continue all her guideline directed medications.    - Hyponatremia [likely hypervolemic hyponatremia]  Patient's hyponatremia is not making any significant improvement  Continue fluid restriction/diuresis and follow serial BMP    - Chronic atrial fibrillation  We will  continue patient's amiodarone and apixaban [metoprolol on hold because of bradycardia].  Patient is status post cardioversion  Cardiology recommendations as " follows-  - Continue amiodarone 200 mg twice daily   - Continue Eliquis 2.5 mg twice daily  - Bumex infusion  - Continue metolazone 5 mg daily for now  - Continue close laboratory monitoring    DVT prophylaxis-apixaban    Disposition-continue current management and follow cardiology/nephrology recommendations for discharge planning      Electronically signed by Audelia Ortega MD, 07/01/25, 10:19 CDT.

## 2025-07-01 NOTE — PLAN OF CARE
Goal Outcome Evaluation:      Patient calm and pleasant, with minor c/o pain to hand at the start of the shift. Pain controlled with tylenol. VSS/ WNL. Patient A&O x4 and remained on 2L NC throughout shift.  Bumex gtt infusing and finished around 1600. IV flushed and capped. HR S/SA 71-90 per tele. Plan to continue amiodarone and eliquis as ordered per MD.  Metoprolol discontinued  r/t bradycardia, continue to monitor kidney function and electrolytes.  Care and Safety maintained.

## 2025-07-01 NOTE — PLAN OF CARE
Goal Outcome Evaluation:  Plan of Care Reviewed With: patient, family        Progress: no change  Outcome Evaluation: OT treatment complete. Patient in bed asleep. Patient educated on need for increased movement. Patient participated in BUE interval exercises of 30 seconds exercise alternated with 30 seconds rest. Patient and sister instructed to continue with interval execises every couple hours. Patient encouraged to eat. She did not eat lunch but was willing to drink her nutritian shake with therapist assistance. Continue OT per POC. Recommend SNF placment upon hospital discharge.    Anticipated Discharge Disposition (OT): skilled nursing facility

## 2025-07-01 NOTE — PLAN OF CARE
Problem: Adult Inpatient Plan of Care  Goal: Plan of Care Review  Outcome: Progressing  Flowsheets (Taken 7/1/2025 4736)  Progress: no change  Outcome Evaluation: VSS. pt A&Ox4. pt on 2L NC. No c/o pain during this shift. Bumex gtt infusing. Sinus/SA 67-99 on tele. Safety maintained.  Plan of Care Reviewed With: patient

## 2025-07-01 NOTE — PROGRESS NOTES
Nephrology ([facility]) Progress Note      Patient:  Genevieve Cerda  YOB: 1940  Date of Service: 7/1/2025  MRN: [id number]   Acct: [id number]   Primary Care Physician: Geramine Diego APRN  Advance Directive:   Code Status and Medical Interventions: No CPR (Do Not Attempt to Resuscitate); Limited Support; No intubation (DNI)   Ordered at: 06/23/25 1629     Code Status (Patient has no pulse and is not breathing):    No CPR (Do Not Attempt to Resuscitate)     Medical Interventions (Patient has pulse or is breathing):    Limited Support     Medical Intervention Limits:    No intubation (DNI)     Level Of Support Discussed With:    Health Care Surrogate       Patient     Admit Date: 6/23/2025       Hospital Day: 8  Referring Provider: No ref. provider found      Patient personally seen and examined.  Complete chart including Consults, Notes, Operative Reports, Labs, Cardiology, and Radiology studies reviewed as able.    Chief complaint: Abnormal labs.    Subjective:  Genevieve Cerda is a 85 y.o. female for whom we were consulted for evaluation and treatment of acute kidney injury. Baseline chronic kidney disease stage 3b, baseline creatinine approximately 1.6. Has followed in our office in the past and more recently has also followed with Dr An in [place]. History of prior nephrectomy. Other history includes congestive heart failure, atrial fibrillation, hypertension. Admitted on 6/23 for CHF exacerbation. Creatinine 2.10 at time of admission. Hospital course remarkable for treatment with a Lasix infusion.  Nephrology consulted on 6/26.  Denied changes to urination, no dysuria or hematuria. Denied recent n/v/d. Metolazone added on 6/26.     This afternoon patient is comfortable, denies any shortness of breath.  However she is complaining of left wrist pain.    Allergies:  Codeine    Home Meds:  Medications Prior to Admission   Medication Sig Dispense Refill Last  Dose/Taking    allopurinol (ZYLOPRIM) 100 MG tablet Take 1 tablet by mouth Daily.   Taking    amiodarone (PACERONE) 200 MG tablet Take 2 tablets by mouth 2 (Two) Times a Day.   Taking    apixaban (ELIQUIS) 2.5 MG tablet tablet Take 1 tablet by mouth Every 12 (Twelve) Hours.   Taking    aspirin 81 MG EC tablet Take 1 tablet by mouth Daily.   Taking    [] cefdinir (OMNICEF) 300 MG capsule Take 1 capsule by mouth 2 (Two) Times a Day for 7 days. 14 capsule 0 Taking    citalopram (CeleXA) 10 MG tablet Take 1 tablet by mouth Daily.   Taking    furosemide (LASIX) 20 MG tablet Take 3 tablets by mouth 2 (Two) Times a Day.   Taking    gabapentin (NEURONTIN) 300 MG capsule Take 1 capsule by mouth Every 8 (Eight) Hours.   Taking    ipratropium-albuterol (DUO-NEB) 0.5-2.5 mg/3 ml nebulizer Take 3 mL by nebulization 4 (Four) Times a Day As Needed for Shortness of Air.   Taking As Needed    latanoprost (XALATAN) 0.005 % ophthalmic solution Administer 1 drop to both eyes Daily.   Taking    magnesium oxide (MAG-OX) 400 MG tablet Take 1 tablet by mouth Daily.   Taking    metoprolol tartrate (LOPRESSOR) 25 MG tablet Take 1 tablet by mouth 2 (Two) Times a Day. Hold for systolic blood pressure <100 or pulse <60   Taking    midodrine (PROAMATINE) 5 MG tablet Take 1 tablet by mouth 2 (Two) Times a Day. Hold if systolic blood pressure is >100   Taking    pantoprazole (PROTONIX) 40 MG EC tablet Take 1 tablet by mouth Daily.   Taking    rosuvastatin (CRESTOR) 5 MG tablet Take 1 tablet by mouth Daily.   Taking    Skin Protectants, Misc. (Eucerin) cream Apply 1 Application topically to the appropriate area as directed Daily. Apply to bilateral upper and lower extremities for dryness once daily and prn   Taking    tamsulosin (FLOMAX) 0.4 MG capsule 24 hr capsule Take 1 capsule by mouth Daily.   Taking    timolol (TIMOPTIC) 0.5 % ophthalmic solution Administer 1 drop to both eyes Daily.   Taking    torsemide (DEMADEX) 20 MG tablet Take 4  tablets by mouth 2 (Two) Times a Day. 240 tablet 11 Taking    vitamin D (ERGOCALCIFEROL) 1.25 MG (58636 UT) capsule capsule Take 1 capsule by mouth Every 7 (Seven) Days. Takes on Monday   Taking    zinc sulfate (ZINCATE) 220 (50 Zn) MG capsule Take 1 capsule by mouth Daily.   Taking    acetaminophen (TYLENOL) 650 MG 8 hr tablet Take 1 tablet by mouth Every 6 (Six) Hours As Needed for Mild Pain.       polyethylene glycol (MiraLax) 17 g packet Take 17 g by mouth Daily As Needed (constipation).       sennosides-docusate (senna-docusate sodium) 8.6-50 MG per tablet Take 2 tablets by mouth Daily As Needed for Constipation.          Medicines:  Current Facility-Administered Medications   Medication Dose Route Frequency Provider Last Rate Last Admin    acetaminophen (TYLENOL) tablet 650 mg  650 mg Oral Q4H PRN Mary Falcon APRN   650 mg at 07/01/25 0943    Or    acetaminophen (TYLENOL) 160 MG/5ML oral solution 650 mg  650 mg Oral Q4H PRN Mary Falcon APRN        Or    acetaminophen (TYLENOL) suppository 650 mg  650 mg Rectal Q4H PRN Mary Falcon APRN        allopurinol (ZYLOPRIM) tablet 100 mg  100 mg Oral Daily Indra Zamorano MD   100 mg at 07/01/25 0942    amiodarone (PACERONE) tablet 200 mg  200 mg Oral Daily Rancho Yanez MD   200 mg at 07/01/25 0944    apixaban (ELIQUIS) tablet 2.5 mg  2.5 mg Oral BID Mary Falcon APRN   2.5 mg at 07/01/25 0942    aspirin EC tablet 81 mg  81 mg Oral Daily Indra Zamorano MD   81 mg at 07/01/25 0944    bisacodyl (DULCOLAX) EC tablet 5 mg  5 mg Oral Daily PRN Mary Falcon APRN        And    bisacodyl (DULCOLAX) suppository 10 mg  10 mg Rectal Daily PRN Mary Falcon APRN        bumetanide (BUMEX) 25 mg/100mL (0.25 mg/mL) infusion  1 mg/hr Intravenous Continuous Rancho Yanez MD 4 mL/hr at 06/30/25 1321 1 mg/hr at 06/30/25 1321    Calcium Replacement - Follow Nurse / BPA Driven Protocol   Not Applicable PRN Mary Falcon, APRN        cholecalciferol  (VITAMIN D3) tablet 5,000 Units  5,000 Units Oral Daily Indra Zamorano MD   5,000 Units at 07/01/25 0941    gabapentin (NEURONTIN) capsule 300 mg  300 mg Oral Q8H Indra Zamorano MD   300 mg at 07/01/25 0504    ipratropium-albuterol (DUO-NEB) nebulizer solution 3 mL  3 mL Nebulization 4x Daily PRN Indra Zamorano MD        latanoprost (XALATAN) 0.005 % ophthalmic solution 1 drop  1 drop Both Eyes Nightly Indra Zamorano MD   1 drop at 06/30/25 2121    magnesium oxide (MAG-OX) tablet 400 mg  400 mg Oral Daily Indra Zamorano MD   400 mg at 07/01/25 0942    Magnesium Standard Dose Replacement - Follow Nurse / BPA Driven Protocol   Not Applicable PRN Mary Falcon APRN        midodrine (PROAMATINE) tablet 5 mg  5 mg Oral BID AC Indra Zamorano MD   5 mg at 07/01/25 0942    nitroglycerin (NITROSTAT) SL tablet 0.4 mg  0.4 mg Sublingual Q5 Min PRN Mary Falcon APRN        ondansetron ODT (ZOFRAN-ODT) disintegrating tablet 4 mg  4 mg Oral Q6H PRN Mary Falcon APRN   4 mg at 06/28/25 1720    Or    ondansetron (ZOFRAN) injection 4 mg  4 mg Intravenous Q6H PRN Mary Falcon APRN        pantoprazole (PROTONIX) EC tablet 40 mg  40 mg Oral Daily Indra Zamorano MD   40 mg at 07/01/25 0942    Phosphorus Replacement - Follow Nurse / BPA Driven Protocol   Not Applicable PRN Mary Falcon APRN        polyethylene glycol (MIRALAX) packet 17 g  17 g Oral Daily PRN Indra Zamorano MD        Potassium Replacement - Follow Nurse / BPA Driven Protocol   Not Applicable PRN Audelia Ortega MD        rosuvastatin (CRESTOR) tablet 5 mg  5 mg Oral Daily Indra Zamorano MD   5 mg at 07/01/25 0944    sennosides-docusate (PERICOLACE) 8.6-50 MG per tablet 2 tablet  2 tablet Oral Daily Indra Muhammad MD   2 tablet at 07/01/25 0513    sodium chloride 0.9 % flush 10 mL  10 mL Intravenous Q12H Mary Falcon APRN   10 mL at 07/01/25 0944    sodium chloride 0.9 % flush 10 mL  10 mL  "Intravenous PRN Mary Falcon APRN        sodium chloride 0.9 % infusion 40 mL  40 mL Intravenous PRN Mary Falcon APRN        tamsulosin (FLOMAX) 24 hr capsule 0.4 mg  0.4 mg Oral Daily Indra Zamorano MD   0.4 mg at 07/01/25 0944    timolol (TIMOPTIC) 0.5 % ophthalmic solution 1 drop  1 drop Both Eyes Daily Indra Zamorano MD   1 drop at 07/01/25 0944    zinc sulfate (ZINCATE) capsule 220 mg  220 mg Oral Daily Indra Zamorano MD   220 mg at 07/01/25 0944       Past Medical History:  History reviewed. No pertinent past medical history.    Past Surgical History:  No past surgical history on file.    Family History  History reviewed. No pertinent family history.    Social History  Social History     Socioeconomic History    Marital status:    Tobacco Use    Smoking status: Never    Smokeless tobacco: Never   Vaping Use    Vaping status: Never Used   Substance and Sexual Activity    Alcohol use: Yes     Comment: rarely    Drug use: Never    Sexual activity: Defer       Review of Systems:  History obtained from chart review and the patient  General ROS: No fever or chills  Respiratory ROS: positive for - shortness of breath  Cardiovascular ROS: No chest pain or palpitations  Gastrointestinal ROS: No abdominal pain or melena  Genito-Urinary ROS: No dysuria or hematuria  Psych ROS: No anxiety and depression  14 point ROS reviewed with the patient and negative except as noted above and in the HPI unless unable to obtain.    Objective:  Patient Vitals for the past 24 hrs:   BP Temp Temp src Pulse Resp SpO2 Height Weight   07/01/25 1136 109/45 98.3 °F (36.8 °C) Oral 73 16 92 % -- --   07/01/25 0710 145/63 98 °F (36.7 °C) Oral 89 16 98 % -- --   07/01/25 0300 124/55 97.9 °F (36.6 °C) Oral 84 16 91 % 166.4 cm (65.5\") 78.6 kg (173 lb 4.8 oz)   07/01/25 0100 111/48 -- -- -- -- -- -- --   07/01/25 0021 91/44 98.5 °F (36.9 °C) Oral 102 16 94 % -- --   06/30/25 2114 113/44 98.6 °F (37 °C) Oral 95 16 95 % " "-- --   06/30/25 1524 145/78 98.6 °F (37 °C) Oral 86 18 92 % -- --       Intake/Output Summary (Last 24 hours) at 7/1/2025 1357  Last data filed at 7/1/2025 1200  Gross per 24 hour   Intake 600 ml   Output 3975 ml   Net -3375 ml     General: awake/alert   HEENT: Normocephalic atraumatic head  Neck: Supple with no JVD or carotid base.  Chest:  clear to auscultation bilaterally without respiratory distress  CVS: regular rate and rhythm  Abdominal: soft, nontender, positive bowel sounds  Extremities: no cyanosis or edema  Skin: No discoloration      Labs:  Results from last 7 days   Lab Units 07/01/25  0344 06/26/25  0345 06/25/25  0620   WBC 10*3/mm3 8.91 6.77 6.26   HEMOGLOBIN g/dL 9.3* 8.7* 8.7*   HEMATOCRIT % 29.4* 28.3* 28.7*   PLATELETS 10*3/mm3 270 298 304         Results from last 7 days   Lab Units 07/01/25  0344 06/30/25  1445 06/29/25  1314 06/29/25  0439   SODIUM mmol/L 130* 130*  --  125*   POTASSIUM mmol/L 2.8* 3.3* 3.5 3.6   CHLORIDE mmol/L 87* 88*  --  87*   CO2 mmol/L 29.0 29.0  --  21.0*   BUN mg/dL 94.4* 96.0*  --  92.4*   CREATININE mg/dL 2.13* 2.23*  --  2.33*   CALCIUM mg/dL 8.1* 8.2*  --  8.2*   EGFR mL/min/1.73 22.2* 21.0*  --  19.9*   BILIRUBIN mg/dL 0.2  --   --   --    ALK PHOS U/L 70  --   --   --    ALT (SGPT) U/L 8  --   --   --    AST (SGOT) U/L 15  --   --   --    GLUCOSE mg/dL 121* 169*  --  97       Radiology:   Imaging Results (Last 72 Hours)       ** No results found for the last 72 hours. **            Culture:  No results found for: \"BLOODCX\", \"URINECX\", \"WOUNDCX\", \"MRSACX\", \"RESPCX\", \"STOOLCX\"      Assessment   1.  Acute kidney injury/cardiorenal syndrome.  2.  Stage IIIb CKD baseline.  3.  Hypertensive renal disease.  4.  Hyponatremia related to hypervolemia worsening.  5.  Anemia of chronic kidney disease.  6.  Atrial fibrillation.  7.  Acute on chronic systolic CHF exacerbation  8.  Hypokalemia/new onset    Plan:  1.  Patient is responding well to intravenous Bumex drip and " tolvaptan combination.  She has excellent urine output, serum sodium is improving.  2.  Give potassium supplement for correction of hypokalemia  3.  Repeat tolvaptan again today.  4.  Continue to monitor renal function and sodium closely.      Julius Cole MD  7/1/2025  13:57 CDT

## 2025-07-02 LAB
ALBUMIN SERPL ELPH-MCNC: 1.9 G/DL (ref 2.9–4.4)
ALBUMIN SERPL-MCNC: 1.9 G/DL (ref 3.5–5.2)
ALBUMIN/GLOB SERPL: 0.7 G/DL
ALBUMIN/GLOB SERPL: 0.7 {RATIO} (ref 0.7–1.7)
ALP SERPL-CCNC: 65 U/L (ref 39–117)
ALPHA1 GLOB SERPL ELPH-MCNC: 0.3 G/DL (ref 0–0.4)
ALPHA2 GLOB SERPL ELPH-MCNC: 0.8 G/DL (ref 0.4–1)
ALT SERPL W P-5'-P-CCNC: 8 U/L (ref 1–33)
ANION GAP SERPL CALCULATED.3IONS-SCNC: 12 MMOL/L (ref 5–15)
AST SERPL-CCNC: 14 U/L (ref 1–32)
B-GLOBULIN SERPL ELPH-MCNC: 0.7 G/DL (ref 0.7–1.3)
BASOPHILS # BLD AUTO: 0.04 10*3/MM3 (ref 0–0.2)
BASOPHILS NFR BLD AUTO: 0.5 % (ref 0–1.5)
BILIRUB SERPL-MCNC: 0.2 MG/DL (ref 0–1.2)
BUN SERPL-MCNC: 96.1 MG/DL (ref 8–23)
BUN/CREAT SERPL: 42.7 (ref 7–25)
CALCIUM SPEC-SCNC: 7.9 MG/DL (ref 8.6–10.5)
CHLORIDE SERPL-SCNC: 86 MMOL/L (ref 98–107)
CO2 SERPL-SCNC: 31 MMOL/L (ref 22–29)
CREAT SERPL-MCNC: 2.25 MG/DL (ref 0.57–1)
DEPRECATED RDW RBC AUTO: 55.5 FL (ref 37–54)
EGFRCR SERPLBLD CKD-EPI 2021: 20.8 ML/MIN/1.73
EOSINOPHIL # BLD AUTO: 0.22 10*3/MM3 (ref 0–0.4)
EOSINOPHIL NFR BLD AUTO: 2.7 % (ref 0.3–6.2)
ERYTHROCYTE [DISTWIDTH] IN BLOOD BY AUTOMATED COUNT: 19.6 % (ref 12.3–15.4)
GAMMA GLOB SERPL ELPH-MCNC: 1.1 G/DL (ref 0.4–1.8)
GLOBULIN SER-MCNC: 3 G/DL (ref 2.2–3.9)
GLOBULIN UR ELPH-MCNC: 2.8 GM/DL
GLUCOSE SERPL-MCNC: 116 MG/DL (ref 65–99)
HCT VFR BLD AUTO: 28.9 % (ref 34–46.6)
HGB BLD-MCNC: 9.1 G/DL (ref 12–15.9)
IGA SERPL-MCNC: 446 MG/DL (ref 64–422)
IGG SERPL-MCNC: 840 MG/DL (ref 586–1602)
IGM SERPL-MCNC: 82 MG/DL (ref 26–217)
IMM GRANULOCYTES # BLD AUTO: 0.03 10*3/MM3 (ref 0–0.05)
IMM GRANULOCYTES NFR BLD AUTO: 0.4 % (ref 0–0.5)
INTERPRETATION SERPL IEP-IMP: ABNORMAL
INTERPRETATION UR IFE-IMP: NORMAL
KAPPA LC FREE SER-MCNC: 108.3 MG/L (ref 3.3–19.4)
KAPPA LC FREE/LAMBDA FREE SER: 1.35 {RATIO} (ref 0.26–1.65)
LABORATORY COMMENT REPORT: ABNORMAL
LAMBDA LC FREE SERPL-MCNC: 80.5 MG/L (ref 5.7–26.3)
LYMPHOCYTES # BLD AUTO: 1.63 10*3/MM3 (ref 0.7–3.1)
LYMPHOCYTES NFR BLD AUTO: 20 % (ref 19.6–45.3)
M PROTEIN SERPL ELPH-MCNC: ABNORMAL G/DL
MCH RBC QN AUTO: 24.9 PG (ref 26.6–33)
MCHC RBC AUTO-ENTMCNC: 31.5 G/DL (ref 31.5–35.7)
MCV RBC AUTO: 79.2 FL (ref 79–97)
MONOCYTES # BLD AUTO: 0.76 10*3/MM3 (ref 0.1–0.9)
MONOCYTES NFR BLD AUTO: 9.3 % (ref 5–12)
NEUTROPHILS NFR BLD AUTO: 5.49 10*3/MM3 (ref 1.7–7)
NEUTROPHILS NFR BLD AUTO: 67.1 % (ref 42.7–76)
NRBC BLD AUTO-RTO: 0 /100 WBC (ref 0–0.2)
PLATELET # BLD AUTO: 225 10*3/MM3 (ref 140–450)
PMV BLD AUTO: 10 FL (ref 6–12)
POTASSIUM SERPL-SCNC: 2.9 MMOL/L (ref 3.5–5.2)
POTASSIUM SERPL-SCNC: 3.2 MMOL/L (ref 3.5–5.2)
PROT SERPL-MCNC: 4.7 G/DL (ref 6–8.5)
PROT SERPL-MCNC: 4.9 G/DL (ref 6–8.5)
RBC # BLD AUTO: 3.65 10*6/MM3 (ref 3.77–5.28)
SODIUM SERPL-SCNC: 129 MMOL/L (ref 136–145)
WBC NRBC COR # BLD AUTO: 8.17 10*3/MM3 (ref 3.4–10.8)

## 2025-07-02 PROCEDURE — 84132 ASSAY OF SERUM POTASSIUM: CPT | Performed by: INTERNAL MEDICINE

## 2025-07-02 PROCEDURE — 97530 THERAPEUTIC ACTIVITIES: CPT

## 2025-07-02 PROCEDURE — 85025 COMPLETE CBC W/AUTO DIFF WBC: CPT | Performed by: HOSPITALIST

## 2025-07-02 PROCEDURE — 25010000002 BUMETANIDE PER 0.5 MG: Performed by: HOSPITALIST

## 2025-07-02 PROCEDURE — 80053 COMPREHEN METABOLIC PANEL: CPT | Performed by: HOSPITALIST

## 2025-07-02 PROCEDURE — 97110 THERAPEUTIC EXERCISES: CPT

## 2025-07-02 PROCEDURE — 99232 SBSQ HOSP IP/OBS MODERATE 35: CPT | Performed by: NURSE PRACTITIONER

## 2025-07-02 PROCEDURE — 97535 SELF CARE MNGMENT TRAINING: CPT | Performed by: OCCUPATIONAL THERAPIST

## 2025-07-02 RX ORDER — BUMETANIDE 0.25 MG/ML
1 INJECTION, SOLUTION INTRAMUSCULAR; INTRAVENOUS ONCE
Status: DISCONTINUED | OUTPATIENT
Start: 2025-07-02 | End: 2025-07-04 | Stop reason: HOSPADM

## 2025-07-02 RX ORDER — POTASSIUM CHLORIDE 1500 MG/1
40 TABLET, EXTENDED RELEASE ORAL ONCE
Status: COMPLETED | OUTPATIENT
Start: 2025-07-02 | End: 2025-07-02

## 2025-07-02 RX ORDER — TOLVAPTAN 30 MG/1
30 TABLET ORAL ONCE
Status: COMPLETED | OUTPATIENT
Start: 2025-07-02 | End: 2025-07-02

## 2025-07-02 RX ADMIN — GABAPENTIN 300 MG: 300 CAPSULE ORAL at 20:58

## 2025-07-02 RX ADMIN — TOLVAPTAN 30 MG: 30 TABLET ORAL at 15:17

## 2025-07-02 RX ADMIN — GABAPENTIN 300 MG: 300 CAPSULE ORAL at 15:17

## 2025-07-02 RX ADMIN — POTASSIUM CHLORIDE 40 MEQ: 1500 TABLET, EXTENDED RELEASE ORAL at 15:18

## 2025-07-02 RX ADMIN — ROSUVASTATIN 5 MG: 10 TABLET, FILM COATED ORAL at 08:45

## 2025-07-02 RX ADMIN — LATANOPROST 1 DROP: 50 SOLUTION OPHTHALMIC at 20:57

## 2025-07-02 RX ADMIN — Medication 220 MG: at 08:46

## 2025-07-02 RX ADMIN — ALLOPURINOL 100 MG: 100 TABLET ORAL at 08:45

## 2025-07-02 RX ADMIN — AMIODARONE HYDROCHLORIDE 200 MG: 200 TABLET ORAL at 08:45

## 2025-07-02 RX ADMIN — GABAPENTIN 300 MG: 300 CAPSULE ORAL at 04:12

## 2025-07-02 RX ADMIN — MIDODRINE HYDROCHLORIDE 5 MG: 2.5 TABLET ORAL at 08:46

## 2025-07-02 RX ADMIN — MIDODRINE HYDROCHLORIDE 5 MG: 2.5 TABLET ORAL at 17:20

## 2025-07-02 RX ADMIN — Medication 10 ML: at 20:57

## 2025-07-02 RX ADMIN — ASPIRIN 81 MG: 81 TABLET, COATED ORAL at 08:45

## 2025-07-02 RX ADMIN — APIXABAN 2.5 MG: 2.5 TABLET, FILM COATED ORAL at 20:56

## 2025-07-02 RX ADMIN — PANTOPRAZOLE SODIUM 40 MG: 40 TABLET, DELAYED RELEASE ORAL at 08:45

## 2025-07-02 RX ADMIN — BUMETANIDE 0.5 MG/HR: 0.25 INJECTION INTRAMUSCULAR; INTRAVENOUS at 13:01

## 2025-07-02 RX ADMIN — Medication 5000 UNITS: at 08:46

## 2025-07-02 RX ADMIN — POTASSIUM CHLORIDE 40 MEQ: 1500 TABLET, EXTENDED RELEASE ORAL at 08:45

## 2025-07-02 RX ADMIN — TIMOLOL MALEATE 1 DROP: 5 SOLUTION/ DROPS OPHTHALMIC at 08:46

## 2025-07-02 RX ADMIN — APIXABAN 2.5 MG: 2.5 TABLET, FILM COATED ORAL at 08:45

## 2025-07-02 RX ADMIN — ACETAMINOPHEN 650 MG: 325 TABLET ORAL at 04:12

## 2025-07-02 RX ADMIN — Medication 10 ML: at 08:51

## 2025-07-02 RX ADMIN — TAMSULOSIN HYDROCHLORIDE 0.4 MG: 0.4 CAPSULE ORAL at 08:45

## 2025-07-02 RX ADMIN — Medication 400 MG: at 08:46

## 2025-07-02 RX ADMIN — ACETAMINOPHEN 650 MG: 325 TABLET ORAL at 20:56

## 2025-07-02 NOTE — CASE MANAGEMENT/SOCIAL WORK
Continued Stay Note   [place]     Patient Name: Genevieve Cerda  MRN: [id number]  Today's Date: 7/2/2025    Admit Date: 6/23/2025    Plan: [facility]   Discharge Plan       Row Name 07/02/25 1013       Plan    Plan [facility]    Patient/Family in Agreement with Plan yes    Plan Comments Pt to dc to [facility] upon dc.  Pharmacy has been changed in [facility].  Phone:  [phone] Fax: [phone].                   Discharge Codes    No documentation.                       STEPH Arias

## 2025-07-02 NOTE — PROGRESS NOTES
Nephrology ([facility]) Progress Note      Patient:  Genevieve Cerda  YOB: 1940  Date of Service: 7/2/2025  MRN: [id number]   Acct: [id number]   Primary Care Physician: Germaine Diego APRN  Advance Directive:   Code Status and Medical Interventions: No CPR (Do Not Attempt to Resuscitate); Limited Support; No intubation (DNI)   Ordered at: 06/23/25 1629     Code Status (Patient has no pulse and is not breathing):    No CPR (Do Not Attempt to Resuscitate)     Medical Interventions (Patient has pulse or is breathing):    Limited Support     Medical Intervention Limits:    No intubation (DNI)     Level Of Support Discussed With:    Health Care Surrogate       Patient     Admit Date: 6/23/2025       Hospital Day: 9  Referring Provider: No ref. provider found      Patient personally seen and examined.  Complete chart including Consults, Notes, Operative Reports, Labs, Cardiology, and Radiology studies reviewed as able.    Chief complaint: Abnormal labs.    Subjective:  Genevieve Cerda is a 85 y.o. female for whom we were consulted for evaluation and treatment of acute kidney injury. Baseline chronic kidney disease stage 3b, baseline creatinine approximately 1.6. Has followed in our office in the past and more recently has also followed with Dr An in [place]. History of prior nephrectomy. Other history includes congestive heart failure, atrial fibrillation, hypertension. Admitted on 6/23 for CHF exacerbation. Creatinine 2.10 at time of admission. Hospital course remarkable for treatment with a Lasix infusion.  Nephrology consulted on 6/26.  Denied changes to urination, no dysuria or hematuria. Denied recent n/v/d. Metolazone added on 6/26.     This afternoon patient feels well, comfortable denies any shortness of breath.  She has excellent diuresis and her pulm edema as well as swelling is improving.  Her serum sodium is improving as well.  She is developing severe  hypokalemia and contraction alkalosis.    Allergies:  Codeine    Home Meds:  Medications Prior to Admission   Medication Sig Dispense Refill Last Dose/Taking    allopurinol (ZYLOPRIM) 100 MG tablet Take 1 tablet by mouth Daily.   Taking    amiodarone (PACERONE) 200 MG tablet Take 2 tablets by mouth 2 (Two) Times a Day.   Taking    apixaban (ELIQUIS) 2.5 MG tablet tablet Take 1 tablet by mouth Every 12 (Twelve) Hours.   Taking    aspirin 81 MG EC tablet Take 1 tablet by mouth Daily.   Taking    [] cefdinir (OMNICEF) 300 MG capsule Take 1 capsule by mouth 2 (Two) Times a Day for 7 days. 14 capsule 0 Taking    citalopram (CeleXA) 10 MG tablet Take 1 tablet by mouth Daily.   Taking    furosemide (LASIX) 20 MG tablet Take 3 tablets by mouth 2 (Two) Times a Day.   Taking    gabapentin (NEURONTIN) 300 MG capsule Take 1 capsule by mouth Every 8 (Eight) Hours.   Taking    ipratropium-albuterol (DUO-NEB) 0.5-2.5 mg/3 ml nebulizer Take 3 mL by nebulization 4 (Four) Times a Day As Needed for Shortness of Air.   Taking As Needed    latanoprost (XALATAN) 0.005 % ophthalmic solution Administer 1 drop to both eyes Daily.   Taking    magnesium oxide (MAG-OX) 400 MG tablet Take 1 tablet by mouth Daily.   Taking    metoprolol tartrate (LOPRESSOR) 25 MG tablet Take 1 tablet by mouth 2 (Two) Times a Day. Hold for systolic blood pressure <100 or pulse <60   Taking    midodrine (PROAMATINE) 5 MG tablet Take 1 tablet by mouth 2 (Two) Times a Day. Hold if systolic blood pressure is >100   Taking    pantoprazole (PROTONIX) 40 MG EC tablet Take 1 tablet by mouth Daily.   Taking    rosuvastatin (CRESTOR) 5 MG tablet Take 1 tablet by mouth Daily.   Taking    Skin Protectants, Misc. (Eucerin) cream Apply 1 Application topically to the appropriate area as directed Daily. Apply to bilateral upper and lower extremities for dryness once daily and prn   Taking    tamsulosin (FLOMAX) 0.4 MG capsule 24 hr capsule Take 1 capsule by mouth Daily.    Taking    timolol (TIMOPTIC) 0.5 % ophthalmic solution Administer 1 drop to both eyes Daily.   Taking    torsemide (DEMADEX) 20 MG tablet Take 4 tablets by mouth 2 (Two) Times a Day. 240 tablet 11 Taking    vitamin D (ERGOCALCIFEROL) 1.25 MG (55768 UT) capsule capsule Take 1 capsule by mouth Every 7 (Seven) Days. Takes on Monday   Taking    zinc sulfate (ZINCATE) 220 (50 Zn) MG capsule Take 1 capsule by mouth Daily.   Taking    acetaminophen (TYLENOL) 650 MG 8 hr tablet Take 1 tablet by mouth Every 6 (Six) Hours As Needed for Mild Pain.       polyethylene glycol (MiraLax) 17 g packet Take 17 g by mouth Daily As Needed (constipation).       sennosides-docusate (senna-docusate sodium) 8.6-50 MG per tablet Take 2 tablets by mouth Daily As Needed for Constipation.          Medicines:  Current Facility-Administered Medications   Medication Dose Route Frequency Provider Last Rate Last Admin    acetaminophen (TYLENOL) tablet 650 mg  650 mg Oral Q4H PRN Mary Falcon APRN   650 mg at 07/02/25 0412    Or    acetaminophen (TYLENOL) 160 MG/5ML oral solution 650 mg  650 mg Oral Q4H PRN Mary Falcon APRN        Or    acetaminophen (TYLENOL) suppository 650 mg  650 mg Rectal Q4H PRN Mary Falcon APRN        allopurinol (ZYLOPRIM) tablet 100 mg  100 mg Oral Daily Indra Zamorano MD   100 mg at 07/02/25 0845    amiodarone (PACERONE) tablet 200 mg  200 mg Oral Daily Rancho Yanez MD   200 mg at 07/02/25 0845    apixaban (ELIQUIS) tablet 2.5 mg  2.5 mg Oral BID Mary Falcon APRN   2.5 mg at 07/02/25 0845    aspirin EC tablet 81 mg  81 mg Oral Daily Indra Zamorano MD   81 mg at 07/02/25 0845    bisacodyl (DULCOLAX) EC tablet 5 mg  5 mg Oral Daily PRN Mary Falcon APRN        And    bisacodyl (DULCOLAX) suppository 10 mg  10 mg Rectal Daily PRN Mary Falcon APRN        bumetanide (BUMEX) 25 mg/100mL (0.25 mg/mL) infusion  1 mg/hr Intravenous Continuous Rancho Yanez MD   Stopped at 07/01/25 1500     bumetanide (BUMEX) 25 mg/100mL (0.25 mg/mL) infusion  0.5 mg/hr Intravenous Continuous Audelia Ortega MD 2 mL/hr at 07/02/25 1301 0.5 mg/hr at 07/02/25 1301    Calcium Replacement - Follow Nurse / BPA Driven Protocol   Not Applicable PRN Mary Falcon APRN        cholecalciferol (VITAMIN D3) tablet 5,000 Units  5,000 Units Oral Daily Indra Zamorano MD   5,000 Units at 07/02/25 0846    gabapentin (NEURONTIN) capsule 300 mg  300 mg Oral Q8H Indra Zamorano MD   300 mg at 07/02/25 0412    ipratropium-albuterol (DUO-NEB) nebulizer solution 3 mL  3 mL Nebulization 4x Daily PRN Indra Zamorano MD        latanoprost (XALATAN) 0.005 % ophthalmic solution 1 drop  1 drop Both Eyes Nightly Indra Zamorano MD   1 drop at 07/01/25 2044    magnesium oxide (MAG-OX) tablet 400 mg  400 mg Oral Daily Indra Zamorano MD   400 mg at 07/02/25 0846    Magnesium Standard Dose Replacement - Follow Nurse / BPA Driven Protocol   Not Applicable PRN Mary Falcon APRN        midodrine (PROAMATINE) tablet 5 mg  5 mg Oral BID AC Indra Zamorano MD   5 mg at 07/02/25 0846    nitroglycerin (NITROSTAT) SL tablet 0.4 mg  0.4 mg Sublingual Q5 Min PRN Mary Falcon APRN        ondansetron ODT (ZOFRAN-ODT) disintegrating tablet 4 mg  4 mg Oral Q6H PRN Mary Falcon APRN   4 mg at 06/28/25 1720    Or    ondansetron (ZOFRAN) injection 4 mg  4 mg Intravenous Q6H PRN Mary Falcon APRN        pantoprazole (PROTONIX) EC tablet 40 mg  40 mg Oral Daily Indra Zamorano MD   40 mg at 07/02/25 0845    Phosphorus Replacement - Follow Nurse / BPA Driven Protocol   Not Applicable PRN Falcon, Mary L, APRN        polyethylene glycol (MIRALAX) packet 17 g  17 g Oral Daily PRN Indra Zamorano MD        Potassium Replacement - Follow Nurse / BPA Driven Protocol   Not Applicable PRN Audelia Ortega MD        rosuvastatin (CRESTOR) tablet 5 mg  5 mg Oral Daily Indra Zamorano MD   5 mg at 07/02/25 9075     sennosides-docusate (PERICOLACE) 8.6-50 MG per tablet 2 tablet  2 tablet Oral Daily PRN Indra Zamorano MD   2 tablet at 07/01/25 0513    sodium chloride 0.9 % flush 10 mL  10 mL Intravenous Q12H Mary Falcon, APRN   10 mL at 07/02/25 0851    sodium chloride 0.9 % flush 10 mL  10 mL Intravenous PRN Mary Falcon APRN        sodium chloride 0.9 % infusion 40 mL  40 mL Intravenous PRN Mary Falcon APRN        tamsulosin (FLOMAX) 24 hr capsule 0.4 mg  0.4 mg Oral Daily Indra Zamorano MD   0.4 mg at 07/02/25 0845    timolol (TIMOPTIC) 0.5 % ophthalmic solution 1 drop  1 drop Both Eyes Daily Indra Zamorano MD   1 drop at 07/02/25 0846    zinc sulfate (ZINCATE) capsule 220 mg  220 mg Oral Daily Indra Zamorano MD   220 mg at 07/02/25 0846       Past Medical History:  History reviewed. No pertinent past medical history.    Past Surgical History:  No past surgical history on file.    Family History  History reviewed. No pertinent family history.    Social History  Social History     Socioeconomic History    Marital status:    Tobacco Use    Smoking status: Never    Smokeless tobacco: Never   Vaping Use    Vaping status: Never Used   Substance and Sexual Activity    Alcohol use: Yes     Comment: rarely    Drug use: Never    Sexual activity: Defer       Review of Systems:  History obtained from chart review and the patient  General ROS: No fever or chills  Respiratory ROS: positive for - shortness of breath  Cardiovascular ROS: No chest pain or palpitations  Gastrointestinal ROS: No abdominal pain or melena  Genito-Urinary ROS: No dysuria or hematuria  Psych ROS: No anxiety and depression  14 point ROS reviewed with the patient and negative except as noted above and in the HPI unless unable to obtain.    Objective:  Patient Vitals for the past 24 hrs:   BP Temp Temp src Pulse Resp SpO2 Weight   07/02/25 1100 108/41 97.4 °F (36.3 °C) Oral 57 16 96 % --   07/02/25 0700 115/41 97.7 °F (36.5 °C)  "Oral 58 16 98 % --   07/02/25 0416 -- -- -- -- -- -- 77.5 kg (170 lb 14.4 oz)   07/02/25 0408 113/65 98 °F (36.7 °C) Oral 60 16 97 % --   07/01/25 2300 117/44 -- -- 55 16 98 % --   07/01/25 1931 106/50 97.8 °F (36.6 °C) Oral 71 16 98 % --   07/01/25 1640 116/58 -- -- 61 -- -- --   07/01/25 1620 109/49 97.8 °F (36.6 °C) Oral 72 16 -- --       Intake/Output Summary (Last 24 hours) at 7/2/2025 1353  Last data filed at 7/2/2025 0700  Gross per 24 hour   Intake 360 ml   Output 900 ml   Net -540 ml     General: awake/alert   HEENT: Normocephalic atraumatic head  Neck: Supple with no JVD or carotid base.  Chest:  clear to auscultation bilaterally without respiratory distress  CVS: regular rate and rhythm  Abdominal: soft, nontender, positive bowel sounds  Extremities: no cyanosis or edema  Skin: No discoloration      Labs:  Results from last 7 days   Lab Units 07/02/25  0405 07/01/25  0344 06/26/25  0345   WBC 10*3/mm3 8.17 8.91 6.77   HEMOGLOBIN g/dL 9.1* 9.3* 8.7*   HEMATOCRIT % 28.9* 29.4* 28.3*   PLATELETS 10*3/mm3 225 270 298         Results from last 7 days   Lab Units 07/02/25  0405 07/01/25  0344 06/30/25  1445   SODIUM mmol/L 129* 130* 130*   POTASSIUM mmol/L 2.9* 2.8* 3.3*   CHLORIDE mmol/L 86* 87* 88*   CO2 mmol/L 31.0* 29.0 29.0   BUN mg/dL 96.1* 94.4* 96.0*   CREATININE mg/dL 2.25* 2.13* 2.23*   CALCIUM mg/dL 7.9* 8.1* 8.2*   EGFR mL/min/1.73 20.8* 22.2* 21.0*   BILIRUBIN mg/dL 0.2 0.2  --    ALK PHOS U/L 65 70  --    ALT (SGPT) U/L 8 8  --    AST (SGOT) U/L 14 15  --    GLUCOSE mg/dL 116* 121* 169*       Radiology:   Imaging Results (Last 72 Hours)       ** No results found for the last 72 hours. **            Culture:  No results found for: \"BLOODCX\", \"URINECX\", \"WOUNDCX\", \"MRSACX\", \"RESPCX\", \"STOOLCX\"      Assessment   1.  Acute kidney injury/cardiorenal syndrome.  2.  Stage IIIb CKD baseline.  3.  Hypertensive renal disease.  4.  Hyponatremia related to hypervolemia worsening.  5.  Anemia of chronic kidney " disease.  6.  Atrial fibrillation.  7.  Acute on chronic systolic CHF exacerbation  8.  Hypokalemia/new onset    Plan:  1.  Recommending to discontinue intravenous Bumex drip, initiate intermittent IV Bumex.  2.  Need potassium supplement for correction of hypokalemia.  3.  Continue tolvaptan for the treatment of hyponatremia, at 15 mg every day.  4.  Continue to monitor renal function and sodium closely.      Julius Cole MD  7/2/2025  13:53 CDT

## 2025-07-02 NOTE — PROGRESS NOTES
[facility] Medicine Services  INPATIENT PROGRESS NOTE    Patient Name: Genevieve Cerda  Date of Admission: 6/23/2025  Today's Date: 07/02/25  Length of Stay: 9  Primary Care Physician: Germaine Diego APRN    Subjective   Chief Complaint: Leg swelling  History of Present Illness  Genevieve Cerda is a 85-year-old female that was seen by Dr. Yanez at the heart failure clinic and family was advised to bring patient to ER due to increased lower extremity edema.  Patient has dressings on bilateral lower extremities due to fluid weeping out of her legs.  Dr. Sharma saw patient in the clinic on 6/20/2025 with complaints of increased shortness of breath and had gained about 22 pounds since late May.  Her Lasix dose was changed to 40 twice daily and then changed to Torsemide 80 mg twice daily.  Patient was also started on amiodarone with a plan to consider cardioversion.  Patient reported that she did not have a significant increase in urine output.  Her weight was up by about half a pound.  She reported her shortness of breath is a little better today but otherwise she felt the same.  She denies any chest pain palpitations and not lightheadedness,     Patient resting in wheelchair in ER hallway D with 2 family members.  She is in no acute distress.  She is awake, alert., and oriented x 3.  She has oxygen on.  Family reports that she resides at [facility] in [place].  Dressings to bilateral lower extremities dry and intact.  Patient's BMP was 9946.0, creatinine 2.18, Hemoglobin 9.8, hematocrit 30.9, sodium 127.  Started patient on Lasix drip.  Will continue the drip and recheck BNP in 6 hours.  Patient is being admitted to hospitalist services.  6/30  Patient has no new complaint this morning, sitting up in the chair.  7/1  As before history of chronic diastolic congestive heart failure presented with acute on chronic diastolic congestive heart failure her last EF was  56% cardiology on board history of A-fib status post cardioversion history of hypertension chronic renal failure nephrology on board remains on Bumex drip  7/2  Continue current treatment and Bumex drip continue monitoring appreciate nephrology seems to be responding potassium was replaced appreciate cardiology heather on amiodarone Eliquis  Review of Systems   All pertinent negatives and positives are as above. All other systems have been reviewed and are negative unless otherwise stated.     Objective    Temp:  [97.7 °F (36.5 °C)-98.3 °F (36.8 °C)] 97.7 °F (36.5 °C)  Heart Rate:  [55-73] 58  Resp:  [16] 16  BP: (106-117)/(41-65) 115/41  Physical Exam  Constitutional:       Appearance: Normal appearance.   HENT:      Head: Normocephalic and atraumatic.      Nose: Nose normal.      Mouth/Throat:      Mouth: Mucous membranes are moist.      Pharynx: Oropharynx is clear.   Eyes:      Extraocular Movements: Extraocular movements intact.      Conjunctiva/sclera: Conjunctivae normal.   Cardiovascular:      Rate and Rhythm: Normal rate. Rhythm irregular.      Pulses: Normal pulses.      Heart sounds: Normal heart sounds.   Pulmonary:      Effort: Pulmonary effort is normal.      Breath sounds: Rales present.   Abdominal:      General: Bowel sounds are normal.      Palpations: Abdomen is soft.   Musculoskeletal:      Cervical back: Normal range of motion and neck supple.      Right lower leg: edema.      Left lower leg:  edema.   Skin:     General: Skin is warm and dry.      Capillary Refill: Capillary refill takes 2 to 3 seconds.   Neurological:      General: No focal deficit present.      Mental Status: She is alert and oriented to person, place, and time.   Psychiatric:         Mood and Affect: Mood normal.         Behavior: Behavior normal.       Results Review:  I have reviewed the labs, radiology results, and diagnostic studies.    Laboratory Data:   Results from last 7 days   Lab Units 07/02/25  0404 07/01/25  4624  "06/26/25  0345   WBC 10*3/mm3 8.17 8.91 6.77   HEMOGLOBIN g/dL 9.1* 9.3* 8.7*   HEMATOCRIT % 28.9* 29.4* 28.3*   PLATELETS 10*3/mm3 225 270 298        Results from last 7 days   Lab Units 07/02/25  0405 07/01/25  0344 06/30/25  1445   SODIUM mmol/L 129* 130* 130*   POTASSIUM mmol/L 2.9* 2.8* 3.3*   CHLORIDE mmol/L 86* 87* 88*   CO2 mmol/L 31.0* 29.0 29.0   BUN mg/dL 96.1* 94.4* 96.0*   CREATININE mg/dL 2.25* 2.13* 2.23*   CALCIUM mg/dL 7.9* 8.1* 8.2*   BILIRUBIN mg/dL 0.2 0.2  --    ALK PHOS U/L 65 70  --    ALT (SGPT) U/L 8 8  --    AST (SGOT) U/L 14 15  --    GLUCOSE mg/dL 116* 121* 169*       Culture Data:   No results found for: \"BLOODCX\", \"URINECX\", \"WOUNDCX\", \"MRSACX\", \"RESPCX\", \"STOOLCX\"    Radiology Data:   Imaging Results (Last 24 Hours)       ** No results found for the last 24 hours. **            I have reviewed the patient's current medications.     Assessment/Plan   Assessment  Active Hospital Problems    Diagnosis     **Acute on chronic heart failure with preserved ejection fraction (HFpEF)     Acute renal failure superimposed on stage 3b chronic kidney disease     Hyponatremia     Atrial fibrillation, persistent     Hypercholesterolemia     Current use of long term anticoagulation        Treatment Plan    - Acute on chronic diastolic congestive heart failure  Patient failed outpatient treatment and was sent to the emergency room by her PCP.  Currently she is being diuresed with Bumex drip [Lasix drip discontinued] and is making marginal clinical improvement.  Cardiology and nephrology are on consult and helping with management.  Patient has also been started on metolazone.  Will continue all her guideline directed medications.    - Hyponatremia [likely hypervolemic hyponatremia]  Patient's hyponatremia is not making any significant improvement  Continue fluid restriction/diuresis and follow serial BMP    - Chronic atrial fibrillation  We will  continue patient's amiodarone and apixaban [metoprolol on " hold because of bradycardia].  Patient is status post cardioversion  Cardiology recommendations as follows-  - Continue amiodarone 200 mg twice daily   - Continue Eliquis 2.5 mg twice daily  - Bumex infusion  - Continue metolazone 5 mg daily for now  - Continue close laboratory monitoring    DVT prophylaxis-apixaban    Disposition-continue current management and follow cardiology/nephrology recommendations for discharge planning      Electronically signed by Audelia Ortega MD, 07/02/25, 10:31 CDT.

## 2025-07-02 NOTE — PLAN OF CARE
Problem: Adult Inpatient Plan of Care  Goal: Plan of Care Review  Recent Flowsheet Documentation  Taken 7/2/2025 1058 by Walker Montiel, OTR/L  Progress: no change  Outcome Evaluation: OT tx completed. Pt reports no complaints except weakness and fatigue. Pt's daughter and granddaughter are present at times during OT tx, but leave during OT tx. Pt was supervision with set up for brushing teeth and combing hair. Pt was max-dependent to don deoderant. Pt was mod A for doing/Critical access hospital gown. Pt was dependent to don socks. Pt was supervision with set up to wash face and underarms, dependent to wash hair with shampoo shower cap. Pt was dependent to scoot to HOB with glide pad. Pt refused further functional mobility and ADLs at this time. Continue OT POC.

## 2025-07-02 NOTE — PROGRESS NOTES
[facility] -  Progress Note     LOS: 9 days   Patient Care Team:  Germaine Diego APRN as PCP - General (Family Medicine)    Chief Complaint: leg swelling    Subjective     Interval History:     Patient Complaints: patient is sitting in bed with family at bedside. She denies any heart racing or palpitations. She denies any chest pain. She denies any shortness of breath. She has continued to have leg swelling. Nephrology discontinued bumex drip. K 2.9 today and Na 129.     Review of Systems:     Review of Systems   Respiratory:  Negative for shortness of breath.    Cardiovascular:  Positive for leg swelling. Negative for chest pain and palpitations.   All other systems reviewed and are negative.    Objective     Vital Sign Min/Max for last 24 hours  Temp  Min: 97.4 °F (36.3 °C)  Max: 98 °F (36.7 °C)   BP  Min: 106/50  Max: 117/44   Pulse  Min: 55  Max: 72   Resp  Min: 16  Max: 16   SpO2  Min: 96 %  Max: 98 %   No data recorded   Weight  Min: 77.5 kg (170 lb 14.4 oz)  Max: 77.5 kg (170 lb 14.4 oz)         07/02/25  0416   Weight: 77.5 kg (170 lb 14.4 oz)       Intake/Output Summary (Last 24 hours) at 7/2/2025 1539  Last data filed at 7/2/2025 0700  Gross per 24 hour   Intake 360 ml   Output 900 ml   Net -540 ml     Telemetry: sinus bradycardia rates 50's      Physical Exam:    Vitals reviewed.   Constitutional:       General: Not in acute distress.     Appearance: Normal appearance. Well-developed. Frail.      Interventions: Nasal cannula in place.   Eyes:      Pupils: Pupils are equal, round, and reactive to light.   HENT:      Head: Normocephalic and atraumatic.      Nose: Nose normal.   Neck:      Vascular: No carotid bruit.   Pulmonary:      Effort: Pulmonary effort is normal. No respiratory distress.      Breath sounds: Normal breath sounds. No wheezing. No rales.   Cardiovascular:      Normal rate. Regular rhythm.      Murmurs: There is a grade 2/6 systolic murmur.   Edema:      Peripheral edema present.     Pretibial: bilateral 2+ edema of the pretibial area.     Ankle: bilateral 2+ edema of the ankle.     Feet: bilateral 2+ edema of the feet.     Comments: Bilateral legs wrapped  Abdominal:      General: There is no distension.      Palpations: Abdomen is soft.   Musculoskeletal: Normal range of motion.      Cervical back: Normal range of motion and neck supple. Skin:     General: Skin is warm.      Findings: No erythema or rash.   Neurological:      General: No focal deficit present.      Mental Status: Alert and oriented to person, place, and time.   Psychiatric:         Attention and Perception: Attention normal.         Mood and Affect: Mood normal.         Speech: Speech normal.         Behavior: Behavior normal.         Thought Content: Thought content normal.         Judgment: Judgment normal.       Results Review:   Lab Results (last 72 hours)       Procedure Component Value Units Date/Time    Potassium [[id number]]  (Abnormal) Collected: 07/02/25 1454    Specimen: Blood Updated: 07/02/25 1522     Potassium 3.2 mmol/L     Immunofixation (MINH), Protein Electrophoresis (PE), and Quantitative Free Kappa and Lambda Light Chains (FLC), Serum [[id number]]  (Abnormal) Collected: 06/29/25 0439    Specimen: Blood Updated: 07/02/25 1223     IgG 840 mg/dL      IgA 446 mg/dL      IgM 82 mg/dL      Total Protein 4.9 g/dL      Albumin 1.9 g/dL      Alpha-1-Globulin 0.3 g/dL      Alpha-2-Globulin 0.8 g/dL      Beta Globulin 0.7 g/dL      Gamma Globulin 1.1 g/dL      M-Stephen Comment: g/dL      Comment: Beta gamma band of restricted mobility due to fibrinogen in the  sample submitted.  Gamma appears asymmetrical.        Globulin 3.0 g/dL      A/G Ratio 0.7     Immunofixation Reflex, Serum Comment:     Comment: Presence of monoclonal protein is unclear at this time. Suggest  repeat in 3 to 6 months if clinically indicated.        Please note Comment     Comment: Protein electrophoresis scan will  follow via computer, mail, or   delivery.        Free Light Chain, Kappa 108.3 mg/L      Free Lambda Light Chains 80.5 mg/L      Kappa/Lambda Ratio 1.35    Narrative:      Performed at:  [address] [place] [address]  [id number]  : James Leal PhD, Phone:  [phone]    Comprehensive Metabolic Panel [[id number]]  (Abnormal) Collected: 07/02/25 0405    Specimen: Blood Updated: 07/02/25 0454     Glucose 116 mg/dL      BUN 96.1 mg/dL      Creatinine 2.25 mg/dL      Sodium 129 mmol/L      Potassium 2.9 mmol/L      Chloride 86 mmol/L      CO2 31.0 mmol/L      Calcium 7.9 mg/dL      Total Protein 4.7 g/dL      Albumin 1.9 g/dL      ALT (SGPT) 8 U/L      AST (SGOT) 14 U/L      Alkaline Phosphatase 65 U/L      Total Bilirubin 0.2 mg/dL      Globulin 2.8 gm/dL      A/G Ratio 0.7 g/dL      BUN/Creatinine Ratio 42.7     Anion Gap 12.0 mmol/L      eGFR 20.8 mL/min/1.73     Narrative:      GFR Categories in Chronic Kidney Disease (CKD)              GFR Category          GFR (mL/min/1.73)    Interpretation  G1                    90 or greater        Normal or high (1)  G2                    60-89                Mild decrease (1)  G3a                   45-59                Mild to moderate decrease  G3b                   30-44                Moderate to severe decrease  G4                    15-29                Severe decrease  G5                    14 or less           Kidney failure    (1)In the absence of evidence of kidney disease, neither GFR category G1 or G2 fulfill the criteria for CKD.    eGFR calculation 2021 CKD-EPI creatinine equation, which does not include race as a factor    CBC & Differential [[id number]]  (Abnormal) Collected: 07/02/25 0405    Specimen: Blood Updated: 07/02/25 0420    Narrative:      The following orders were created for panel order CBC & Differential.  Procedure                               Abnormality         Status                     ---------                                -----------         ------                     CBC Auto Differential[[id number]]        Abnormal            Final result                 Please view results for these tests on the individual orders.    CBC Auto Differential [[id number]]  (Abnormal) Collected: 07/02/25 0405    Specimen: Blood Updated: 07/02/25 0420     WBC 8.17 10*3/mm3      RBC 3.65 10*6/mm3      Hemoglobin 9.1 g/dL      Hematocrit 28.9 %      MCV 79.2 fL      MCH 24.9 pg      MCHC 31.5 g/dL      RDW 19.6 %      RDW-SD 55.5 fl      MPV 10.0 fL      Platelets 225 10*3/mm3      Neutrophil % 67.1 %      Lymphocyte % 20.0 %      Monocyte % 9.3 %      Eosinophil % 2.7 %      Basophil % 0.5 %      Immature Grans % 0.4 %      Neutrophils, Absolute 5.49 10*3/mm3      Lymphocytes, Absolute 1.63 10*3/mm3      Monocytes, Absolute 0.76 10*3/mm3      Eosinophils, Absolute 0.22 10*3/mm3      Basophils, Absolute 0.04 10*3/mm3      Immature Grans, Absolute 0.03 10*3/mm3      nRBC 0.0 /100 WBC     Magnesium [[id number]]  (Normal) Collected: 07/01/25 1249    Specimen: Blood Updated: 07/01/25 1323     Magnesium 2.1 mg/dL     Comprehensive Metabolic Panel [[id number]]  (Abnormal) Collected: 07/01/25 0344    Specimen: Blood Updated: 07/01/25 0446     Glucose 121 mg/dL      BUN 94.4 mg/dL      Creatinine 2.13 mg/dL      Sodium 130 mmol/L      Potassium 2.8 mmol/L      Chloride 87 mmol/L      CO2 29.0 mmol/L      Calcium 8.1 mg/dL      Total Protein 5.1 g/dL      Albumin 2.2 g/dL      ALT (SGPT) 8 U/L      AST (SGOT) 15 U/L      Alkaline Phosphatase 70 U/L      Total Bilirubin 0.2 mg/dL      Globulin 2.9 gm/dL      A/G Ratio 0.8 g/dL      BUN/Creatinine Ratio 44.3     Anion Gap 14.0 mmol/L      eGFR 22.2 mL/min/1.73     Narrative:      GFR Categories in Chronic Kidney Disease (CKD)              GFR Category          GFR (mL/min/1.73)    Interpretation  G1                    90 or greater        Normal or high (1)  G2                    60-89                 Mild decrease (1)  G3a                   45-59                Mild to moderate decrease  G3b                   30-44                Moderate to severe decrease  G4                    15-29                Severe decrease  G5                    14 or less           Kidney failure    (1)In the absence of evidence of kidney disease, neither GFR category G1 or G2 fulfill the criteria for CKD.    eGFR calculation 2021 CKD-EPI creatinine equation, which does not include race as a factor    CBC & Differential [[id number]]  (Abnormal) Collected: 07/01/25 0344    Specimen: Blood Updated: 07/01/25 0424    Narrative:      The following orders were created for panel order CBC & Differential.  Procedure                               Abnormality         Status                     ---------                               -----------         ------                     CBC Auto Differential[[id number]]        Abnormal            Final result                 Please view results for these tests on the individual orders.    CBC Auto Differential [[id number]]  (Abnormal) Collected: 07/01/25 0344    Specimen: Blood Updated: 07/01/25 0424     WBC 8.91 10*3/mm3      RBC 3.66 10*6/mm3      Hemoglobin 9.3 g/dL      Hematocrit 29.4 %      MCV 80.3 fL      MCH 25.4 pg      MCHC 31.6 g/dL      RDW 19.3 %      RDW-SD 56.1 fl      MPV 10.5 fL      Platelets 270 10*3/mm3      Neutrophil % 70.1 %      Lymphocyte % 17.4 %      Monocyte % 10.9 %      Eosinophil % 1.1 %      Basophil % 0.3 %      Immature Grans % 0.2 %      Neutrophils, Absolute 6.24 10*3/mm3      Lymphocytes, Absolute 1.55 10*3/mm3      Monocytes, Absolute 0.97 10*3/mm3      Eosinophils, Absolute 0.10 10*3/mm3      Basophils, Absolute 0.03 10*3/mm3      Immature Grans, Absolute 0.02 10*3/mm3      nRBC 0.0 /100 WBC     Basic Metabolic Panel [[id number]]  (Abnormal) Collected: 06/30/25 1445    Specimen: Blood Updated: 06/30/25 1522     Glucose 169 mg/dL      BUN 96.0 mg/dL      Creatinine  "2.23 mg/dL      Sodium 130 mmol/L      Potassium 3.3 mmol/L      Chloride 88 mmol/L      CO2 29.0 mmol/L      Calcium 8.2 mg/dL      BUN/Creatinine Ratio 43.0     Anion Gap 13.0 mmol/L      eGFR 21.0 mL/min/1.73     Narrative:      GFR Categories in Chronic Kidney Disease (CKD)              GFR Category          GFR (mL/min/1.73)    Interpretation  G1                    90 or greater        Normal or high (1)  G2                    60-89                Mild decrease (1)  G3a                   45-59                Mild to moderate decrease  G3b                   30-44                Moderate to severe decrease  G4                    15-29                Severe decrease  G5                    14 or less           Kidney failure    (1)In the absence of evidence of kidney disease, neither GFR category G1 or G2 fulfill the criteria for CKD.    eGFR calculation 2021 CKD-EPI creatinine equation, which does not include race as a factor             Echo EF Estimated  Results for orders placed during the hospital encounter of 06/23/25    Adult Transthoracic Echo Complete With Contrast if Necessary Per Protocol    Interpretation Summary    Left ventricular ejection fraction appears to be 56 - 60%.    Left ventricular wall thickness is consistent with mild concentric hypertrophy.    Left ventricular diastolic function was indeterminate.    Left atrial volume is severely increased.    Moderate aortic valve regurgitation is present.    Estimated right ventricular systolic pressure from tricuspid regurgitation is normal (<35 mmHg).    Cath Ejection Fraction Quantitative  No results found for: \"CATHEF\"        Medication Review: yes  Current Facility-Administered Medications   Medication Dose Route Frequency Provider Last Rate Last Admin    acetaminophen (TYLENOL) tablet 650 mg  650 mg Oral Q4H PRN Mary Falcon APRN   650 mg at 07/02/25 0412    Or    acetaminophen (TYLENOL) 160 MG/5ML oral solution 650 mg  650 mg Oral Q4H PRN " Mary Falcon APRN        Or    acetaminophen (TYLENOL) suppository 650 mg  650 mg Rectal Q4H PRN Mary Falcon APRN        allopurinol (ZYLOPRIM) tablet 100 mg  100 mg Oral Daily Indra Zamorano MD   100 mg at 07/02/25 0845    amiodarone (PACERONE) tablet 200 mg  200 mg Oral Daily Rancho Yanez MD   200 mg at 07/02/25 0845    apixaban (ELIQUIS) tablet 2.5 mg  2.5 mg Oral BID Mary Falcon APRN   2.5 mg at 07/02/25 0845    aspirin EC tablet 81 mg  81 mg Oral Daily Indra Zamorano MD   81 mg at 07/02/25 0845    bisacodyl (DULCOLAX) EC tablet 5 mg  5 mg Oral Daily PRN Mary Falcon APRN        And    bisacodyl (DULCOLAX) suppository 10 mg  10 mg Rectal Daily PRN Mary Falcon APRN        bumetanide (BUMEX) injection 1 mg  1 mg Intravenous Once Julius Cole MD        Calcium Replacement - Follow Nurse / BPA Driven Protocol   Not Applicable PRN Mary Falcon APRN        cholecalciferol (VITAMIN D3) tablet 5,000 Units  5,000 Units Oral Daily Indra Zamorano MD   5,000 Units at 07/02/25 0846    gabapentin (NEURONTIN) capsule 300 mg  300 mg Oral Q8H Indra Zamorano MD   300 mg at 07/02/25 1517    ipratropium-albuterol (DUO-NEB) nebulizer solution 3 mL  3 mL Nebulization 4x Daily PRN Indra Zamorano MD        latanoprost (XALATAN) 0.005 % ophthalmic solution 1 drop  1 drop Both Eyes Nightly Indra Zamorano MD   1 drop at 07/01/25 2044    magnesium oxide (MAG-OX) tablet 400 mg  400 mg Oral Daily Indra Zamorano MD   400 mg at 07/02/25 0846    Magnesium Standard Dose Replacement - Follow Nurse / BPA Driven Protocol   Not Applicable PRN Mary Falcon APRN        midodrine (PROAMATINE) tablet 5 mg  5 mg Oral BID AC Indra Zamorano MD   5 mg at 07/02/25 0846    nitroglycerin (NITROSTAT) SL tablet 0.4 mg  0.4 mg Sublingual Q5 Min PRN Mary Falcon APRN        ondansetron ODT (ZOFRAN-ODT) disintegrating tablet 4 mg  4 mg Oral Q6H PRN Mary Falcon APRN   4 mg at  06/28/25 1720    Or    ondansetron (ZOFRAN) injection 4 mg  4 mg Intravenous Q6H PRN Mary Falcon APRN        pantoprazole (PROTONIX) EC tablet 40 mg  40 mg Oral Daily Indra Zamorano MD   40 mg at 07/02/25 0845    Phosphorus Replacement - Follow Nurse / BPA Driven Protocol   Not Applicable PRN Mary Falcon APRN        polyethylene glycol (MIRALAX) packet 17 g  17 g Oral Daily PRN Indra Zamorano MD        Potassium Replacement - Follow Nurse / BPA Driven Protocol   Not Applicable PRN Audelia Ortega MD        rosuvastatin (CRESTOR) tablet 5 mg  5 mg Oral Daily Indra Zamorano MD   5 mg at 07/02/25 0845    sennosides-docusate (PERICOLACE) 8.6-50 MG per tablet 2 tablet  2 tablet Oral Daily PRN Indra Zamorano MD   2 tablet at 07/01/25 0513    sodium chloride 0.9 % flush 10 mL  10 mL Intravenous Q12H Mary Falcon APRN   10 mL at 07/02/25 0851    sodium chloride 0.9 % flush 10 mL  10 mL Intravenous PRN Mary Falcon APRN        sodium chloride 0.9 % infusion 40 mL  40 mL Intravenous PRN Mary Falcon APRN        tamsulosin (FLOMAX) 24 hr capsule 0.4 mg  0.4 mg Oral Daily Indra Zamorano MD   0.4 mg at 07/02/25 0845    timolol (TIMOPTIC) 0.5 % ophthalmic solution 1 drop  1 drop Both Eyes Daily Indra Zamorano MD   1 drop at 07/02/25 0846    zinc sulfate (ZINCATE) capsule 220 mg  220 mg Oral Daily Indra Zamorano MD   220 mg at 07/02/25 0846         Assessment & Plan   -Acute on diastolic congestive heart failure: LVEF 56-60% on echo 6/24/2025. Bumex drip discontinued; patient receiving IV bumex. Nephrology following     -MICHOACANO on CKD 3b: Cr 2.25. nephrology following      -Hypokalemia: K 2.9 today     -hyponatremia: Na 129 today     -Paroxysmal atrial fibrillation: S/p cardioversion. Telemetry shows sinus rhythm rates 50's today. Continue amiodarone and eliquis. Metoprolol previously discontinued due to bradycardia     -hypertension      -LVH: evaluating for amyloid, obtained  immunofixation and light chain labs. Plan for PYP scan outpatient     -Aortic valve regurgitation: moderate on echo 6/23/2025     Plan:   - bumex drip discontinued; nephrology following and prescribing diuretics   - continue amiodarone and eliquis  - continue to monitor kidney function and electrolytes closely   - continue to monitor I/O closely   - patient to follow up in 1 month with Dr Yanez at discharge    Electronically signed by SARAH Alfredo, 07/02/25, 3:35 PM CDT.

## 2025-07-02 NOTE — PLAN OF CARE
Goal Outcome Evaluation:  Plan of Care Reviewed With: patient        Progress: no change  Outcome Evaluation: Pt is AOx4, on 2LNC.  SB 55-58 on tele. BP has been soft this shift. Bumex gtt d/c'd. Held bumex injection due to low BP. Wound care completed this shift. Plan of care ongoing. Safety maintained.

## 2025-07-02 NOTE — PLAN OF CARE
Goal Outcome Evaluation:  Plan of Care Reviewed With: patient        Progress: no change  Outcome Evaluation: Pt A&O, on O2 via NC, bilateral LE dressings changed this AM, external catheter in place. telemtry SR-SB 31-36.

## 2025-07-02 NOTE — THERAPY TREATMENT NOTE
Acute Care - Physical Therapy Treatment Note  [facility]     Patient Name: Genevieve Cerda  : 1940  MRN: [id number]  Today's Date: 2025      Visit Dx:     ICD-10-CM ICD-9-CM   1. Congestive heart failure, unspecified HF chronicity, unspecified heart failure type  I50.9 428.0   2. Chronic kidney disease, unspecified CKD stage  N18.9 585.9   3. Hyponatremia  E87.1 276.1   4. Impaired functional mobility and activity tolerance [Z74.09]  Z74.09 V49.89   5. Decreased activities of daily living (ADL)  Z78.9 V49.89     Patient Active Problem List   Diagnosis    Severe protein-calorie malnutrition    Pleural effusion    Chronic congestive heart failure    Acute on chronic diastolic CHF (congestive heart failure)    CHF (congestive heart failure)    Acute on chronic heart failure with preserved ejection fraction (HFpEF)    Hyponatremia    Atrial fibrillation, persistent    Hypercholesterolemia    Current use of long term anticoagulation    Acute renal failure superimposed on stage 3b chronic kidney disease     History reviewed. No pertinent past medical history.  No past surgical history on file.  PT Assessment (Last 12 Hours)       PT Evaluation and Treatment       Row Name 25 1511          Physical Therapy Time and Intention    Subjective Information complains of;weakness;fatigue  -     Document Type therapy note (daily note)  -     Mode of Treatment physical therapy  -       Row Name 25 1511          General Information    Existing Precautions/Restrictions fall;oxygen therapy device and L/min  -       Row Name 25 1511          Pain    Pretreatment Pain Rating 0/10 - no pain  -     Posttreatment Pain Rating 0/10 - no pain  -       Row Name 25 1511          Bed Mobility    Supine-Sit [place] (Bed Mobility) verbal cues;moderate assist (50% patient effort);maximum assist (25% patient effort)  -     Sit-Supine [place] (Bed Mobility) verbal cues;moderate assist (50%  patient effort);maximum assist (25% patient effort)  -MELISSA       Row Name 07/02/25 1511          Balance    Comment, Balance sitting EOB, pt would lean to the right, CGA/min to maintain balance  -MELISAS       Row Name 07/02/25 1511          Motor Skills    Comments, Therapeutic Exercise sitting AROM/AAROM BLE 2 x 10  -MELISSA       Row Name             Wound 06/23/25 1658 Left gluteal Pressure Injury    Wound - Properties Group Placement Date: 06/23/25  -TB Placement Time: 1658  -TB Present on Original Admission: Y  -TB Side: Left  -TB Location: gluteal  -TB Primary Wound Type: Pressure Inj  -TB    Retired Wound - Properties Group Placement Date: 06/23/25  -TB Placement Time: 1658  -TB Present on Original Admission: Y  -TB Side: Left  -TB Location: gluteal  -TB    Retired Wound - Properties Group Placement Date: 06/23/25  -TB Placement Time: 1658  -TB Present on Original Admission: Y  -TB Side: Left  -TB Location: gluteal  -TB    Retired Wound - Properties Group Date first assessed: 06/23/25  -TB Time first assessed: 1658  -TB Present on Original Admission: Y  -TB Side: Left  -TB Location: gluteal  -TB      Row Name             Wound 06/23/25 1659 Left lower leg Other (Comments)    Wound - Properties Group Placement Date: 06/23/25  -TB Placement Time: 1659  -TB Present on Original Admission: Y  -TB Side: Left  -TB Orientation: lower  -TB Location: leg  -TB Primary Wound Type: Other  -TB    Retired Wound - Properties Group Placement Date: 06/23/25  -TB Placement Time: 1659  -TB Present on Original Admission: Y  -TB Side: Left  -TB Orientation: lower  -TB Location: leg  -TB    Retired Wound - Properties Group Placement Date: 06/23/25  -TB Placement Time: 1659  -TB Present on Original Admission: Y  -TB Side: Left  -TB Orientation: lower  -TB Location: leg  -TB    Retired Wound - Properties Group Date first assessed: 06/23/25  -TB Time first assessed: 1659  -TB Present on Original Admission: Y  -TB Side: Left  -TB Location: leg   -TB      Row Name 07/02/25 1511          Positioning and Restraints    Pre-Treatment Position in bed  -MELISSA     Post Treatment Position bed  -MELISSA     In Bed fowlers;call light within reach;encouraged to call for assist;side rails up x2;with family/caregiver  -MELISSA               User Key  (r) = Recorded By, (t) = Taken By, (c) = Cosigned By      Initials Name Provider Type    MELISSA Osman Sheikh, PTA Physical Therapist Assistant    Car Tineo, RN Registered Nurse                    Physical Therapy Education       Title: PT OT SLP Therapies (Done)       Topic: Physical Therapy (Done)       Point: Mobility training (Done)       Learning Progress Summary            Patient Acceptance, E,TB,D, VU,NR by  at 6/24/2025 1604    Comment: Education re: purpose of PT/importance of activitiy, safety/falls prevention, increase awareness of upright, improved deep breathing tech, upright posture, LE ROM and positioning for edema mgmt; pacing activity allowing time for rest   Family Acceptance, E,TB,D, VU,NR by  at 6/24/2025 1604    Comment: Education re: purpose of PT/importance of activitiy, safety/falls prevention, increase awareness of upright, improved deep breathing tech, upright posture, LE ROM and positioning for edema mgmt; pacing activity allowing time for rest                      Point: Home exercise program (Done)       Learning Progress Summary            Patient Acceptance, E,TB,D, VU,NR by  at 6/24/2025 1604    Comment: Education re: purpose of PT/importance of activitiy, safety/falls prevention, increase awareness of upright, improved deep breathing tech, upright posture, LE ROM and positioning for edema mgmt; pacing activity allowing time for rest   Family Acceptance, E,TB,D, VU,NR by  at 6/24/2025 1604    Comment: Education re: purpose of PT/importance of activitiy, safety/falls prevention, increase awareness of upright, improved deep breathing tech, upright posture, LE ROM and positioning for  edema mgmt; pacing activity allowing time for rest                      Point: Precautions (Done)       Learning Progress Summary            Patient Acceptance, E,TB,D, VU,NR by  at 6/24/2025 1604    Comment: Education re: purpose of PT/importance of activitiy, safety/falls prevention, increase awareness of upright, improved deep breathing tech, upright posture, LE ROM and positioning for edema mgmt; pacing activity allowing time for rest   Family Acceptance, E,TB,D, VU,NR by  at 6/24/2025 1604    Comment: Education re: purpose of PT/importance of activitiy, safety/falls prevention, increase awareness of upright, improved deep breathing tech, upright posture, LE ROM and positioning for edema mgmt; pacing activity allowing time for rest                                      User Key       Initials Effective Dates Name Provider Type Discipline     08/02/18 -  Melania Orozco, PT Physical Therapist PT                  PT Recommendation and Plan         Outcome Measures       Row Name 07/02/25 3071             How much help from another person do you currently need...    Turning from your back to your side while in flat bed without using bedrails? 3  -MELISSA      Moving from lying on back to sitting on the side of a flat bed without bedrails? 3  -MELISSA      Moving to and from a bed to a chair (including a wheelchair)? 2  -MELISSA      Standing up from a chair using your arms (e.g., wheelchair, bedside chair)? 2  -MELISSA      Climbing 3-5 steps with a railing? 1  -MELISSA      To walk in hospital room? 1  -MELISSA      AM-PAC 6 Clicks Score (PT) 12  -MELISSA         Functional Assessment    Outcome Measure Options AM-PAC 6 Clicks Basic Mobility (PT)  -MELISSA                User Key  (r) = Recorded By, (t) = Taken By, (c) = Cosigned By      Initials Name Provider Type    Osman Santiago PTA Physical Therapist Assistant                     Time Calculation:    PT Charges       Row Name 07/02/25 9981             Time Calculation    Start Time  1511  -MELISSA      Stop Time 1551  -MELISSA      Time Calculation (min) 40 min  -MELISSA      PT Received On 07/02/25  -MELISSA         Time Calculation- PT    Total Timed Code Minutes- PT 40 minute(s)  -MELISSA         Timed Charges    35818 - PT Therapeutic Exercise Minutes 24  -MELISSA      25190 - PT Therapeutic Activity Minutes 16  -MELISSA         Total Minutes    Timed Charges Total Minutes 40  -MELISSA       Total Minutes 40  -MELISSA                User Key  (r) = Recorded By, (t) = Taken By, (c) = Cosigned By      Initials Name Provider Type    Osman Santiago PTA Physical Therapist Assistant                  Therapy Charges for Today       Code Description Service Date Service Provider Modifiers Qty    62454639373 HC PT THERAPEUTIC ACT EA 15 MIN 7/2/2025 Osman Sheikh, KAT GP 1    09567572124 HC PT THER PROC EA 15 MIN 7/2/2025 Osman Sheikh PTA GP 2            PT G-Codes  Outcome Measure Options: AM-PAC 6 Clicks Basic Mobility (PT)  AM-PAC 6 Clicks Score (PT): 12  AM-PAC 6 Clicks Score (OT): 9    Osman Sheikh PTA  7/2/2025

## 2025-07-02 NOTE — THERAPY TREATMENT NOTE
Patient Name: Genevieve Cerda  : 1940    MRN: [id number]                              Today's Date: 2025       Admit Date: 2025    Visit Dx:     ICD-10-CM ICD-9-CM   1. Congestive heart failure, unspecified HF chronicity, unspecified heart failure type  I50.9 428.0   2. Chronic kidney disease, unspecified CKD stage  N18.9 585.9   3. Hyponatremia  E87.1 276.1   4. Impaired functional mobility and activity tolerance [Z74.09]  Z74.09 V49.89   5. Decreased activities of daily living (ADL)  Z78.9 V49.89     Patient Active Problem List   Diagnosis    Severe protein-calorie malnutrition    Pleural effusion    Chronic congestive heart failure    Acute on chronic diastolic CHF (congestive heart failure)    CHF (congestive heart failure)    Acute on chronic heart failure with preserved ejection fraction (HFpEF)    Hyponatremia    Atrial fibrillation, persistent    Hypercholesterolemia    Current use of long term anticoagulation    Acute renal failure superimposed on stage 3b chronic kidney disease     History reviewed. No pertinent past medical history.  No past surgical history on file.   General Information       Row Name 25 1058          OT Time and Intention    Subjective Information complains of;weakness;fatigue  -MM     Document Type therapy note (daily note)  -MM       Row Name 25 1058          General Information    Existing Precautions/Restrictions fall;oxygen therapy device and L/min  -MM     Barriers to Rehab medically complex  -MM       Row Name 25 1058          Safety Issues/Impairments Affecting Functional Mobility    Safety Issues Affecting Function (Mobility) awareness of need for assistance;insight into deficits/self-awareness  -MM     Impairments Affecting Function (Mobility) balance;endurance/activity tolerance;shortness of breath;postural/trunk control;strength  -MM               User Key  (r) = Recorded By, (t) = Taken By, (c) = Cosigned By      Initials Name Provider  Type    MM Walker Montiel, OTR/L Occupational Therapist                     Mobility/ADL's       Row Name 07/02/25 1058          Bed Mobility    Bed Mobility scooting/bridging  -MM     Supine-Sit [place] (Bed Mobility) dependent (less than 25% patient effort);verbal cues  -MM     Assistive Device (Bed Mobility) bed rails;repositioning sheet  -MM       Row Name 07/02/25 1058          Activities of Daily Living    BADL Assessment/Intervention bathing;upper body dressing;lower body dressing;grooming  -MM       Row Name 07/02/25 1058          Grooming Assessment/Training    [place] Level (Grooming) hair care, combing/brushing;oral care regimen;wash face, hands;supervision;set up;verbal cues  max-dependent to don deoderant.  -MM     Position (Grooming) sitting up in bed  -MM       Row Name 07/02/25 1058          Lower Body Dressing Assessment/Training    [place] Level (Lower Body Dressing) don;doff;socks;dependent (less than 25% patient effort);verbal cues  -MM     Position (Lower Body Dressing) sitting up in bed  -MM       Row Name 07/02/25 1058          Bathing Assessment/Intervention    [place] Level (Bathing) other (see comments)  supervision with set up to wash underarms, dependent to wash hair with shampoo shower cap.  -MM     Position (Bathing) sitting up in bed  -MM       Row Name 07/02/25 1058          Upper Body Dressing Assessment/Training    [place] Level (Upper Body Dressing) doff;don;moderate assist (50% patient effort);verbal cues;set up  hospital gown  -MM     Position (Upper Body Dressing) sitting up in bed  -               User Key  (r) = Recorded By, (t) = Taken By, (c) = Cosigned By      Initials Name Provider Type    Walker John, OTR/L Occupational Therapist                   Obj/Interventions    No documentation.                  Goals/Plan    No documentation.                  Clinical Impression       Row Name 07/02/25 1058          Pain Assessment     Pretreatment Pain Rating 0/10 - no pain  -MM     Posttreatment Pain Rating 0/10 - no pain  -MM       Row Name 07/02/25 1058          Plan of Care Review    Plan of Care Reviewed With patient;family  -MM     Progress no change  -MM     Outcome Evaluation OT tx completed. Pt reports no complaints except weakness and fatigue. Pt's daughter and granddaughter are present at times during OT tx, but leave during OT tx. Pt was supervision with set up for brushing teeth and combing hair. Pt was max-dependent to don deoderant. Pt was mod A for doffing/donning hospital gown. Pt was dependent to don socks. Pt was supervision with set up to wash face and underarms, dependent to wash hair with shampoo shower cap. Pt was dependent to scoot to HOB with glide pad. Pt refused further functional mobility and ADLs at this time. Continue OT POC.  -MM       Row Name 07/02/25 1058          Therapy Plan Review/Discharge Plan (OT)    Anticipated Discharge Disposition (OT) skilled nursing facility  -MM       Row Name 07/02/25 1058          Vital Signs    O2 Delivery Pre Treatment supplemental O2  -MM     O2 Delivery Intra Treatment supplemental O2  -MM     O2 Delivery Post Treatment supplemental O2  -MM     Pre Patient Position --  fowlers  -MM     Intra Patient Position Supine  -MM     Post Patient Position --  fowlers  -MM       Row Name 07/02/25 1058          Positioning and Restraints    Pre-Treatment Position in bed  -MM     Post Treatment Position bed  -MM     In Bed notified nsg;fowlers;call light within reach;encouraged to call for assist;side rails up x2  -MM               User Key  (r) = Recorded By, (t) = Taken By, (c) = Cosigned By      Initials Name Provider Type    MM Walker Montiel, OTR/L Occupational Therapist                   Outcome Measures       Row Name 07/02/25 1058          How much help from another is currently needed...    Putting on and taking off regular lower body clothing? 1  -MM     Bathing (including  washing, rinsing, and drying) 3  -MM     Toileting (which includes using toilet bed pan or urinal) 1  -MM     Putting on and taking off regular upper body clothing 2  -MM     Taking care of personal grooming (such as brushing teeth) 3  -MM     Eating meals 3  -MM     AM-PAC 6 Clicks Score (OT) 13  -MM       Row Name 07/02/25 1511 07/02/25 0800       How much help from another person do you currently need...    Turning from your back to your side while in flat bed without using bedrails? 3  -MELISSA 3  -RB    Moving from lying on back to sitting on the side of a flat bed without bedrails? 3  -MELISSA 2  -RB    Moving to and from a bed to a chair (including a wheelchair)? 2  -MELISSA 2  -RB    Standing up from a chair using your arms (e.g., wheelchair, bedside chair)? 2  -MELISSA 2  -RB    Climbing 3-5 steps with a railing? 1  -MELISSA 1  -RB    To walk in hospital room? 1  -MELISSA 2  -RB    AM-PAC 6 Clicks Score (PT) 12  -MELISSA 12  -RB    Highest Level of Mobility Goal Move to Chair/Commode-4  -MELISSA Move to Chair/Commode-4  -RB      Row Name 07/02/25 1511 07/02/25 1058       Functional Assessment    Outcome Measure Options AM-PAC 6 Clicks Basic Mobility (PT)  -MELISSA AM-PAC 6 Clicks Daily Activity (OT)  -MM              User Key  (r) = Recorded By, (t) = Taken By, (c) = Cosigned By      Initials Name Provider Type    Osman Santiago, PTA Physical Therapist Assistant    Walker John E, OTR/L Occupational Therapist    RB Ochoa Tello, RN Registered Nurse                    Occupational Therapy Education       Title: PT OT SLP Therapies (Done)       Topic: Occupational Therapy (Done)       Point: ADL training (Done)       Learning Progress Summary            Patient Acceptance, E, VU,NR by MM at 7/2/2025 1618    Acceptance, E, VU,NR by BS at 6/30/2025 1131   Family Acceptance, E, VU,NR by MM at 7/2/2025 1618                      Point: Home exercise program (Done)       Learning Progress Summary            Patient Acceptance, E,TB, VU,NR by NAVDEEP at  7/1/2025 1440    Comment: Education on benefits of movement, interval exercise program   Family Acceptance, E,TB, VU,NR by NAVDEEP at 7/1/2025 1440    Comment: Education on benefits of movement, interval exercise program                      Point: Body mechanics (Done)       Learning Progress Summary            Patient Acceptance, E, VU,NR by MM at 7/2/2025 1618    Acceptance, E, VU,NR by BS at 6/30/2025 1131   Family Acceptance, E, VU,NR by MM at 7/2/2025 1618                                      User Key       Initials Effective Dates Name Provider Type Discipline     02/28/25 -  Ivania Mendoza, OTR/L Occupational Therapist OT    MM 07/11/23 -  Walker Montiel, OTR/L Occupational Therapist OT    BS 05/12/25 -  Hetal Farmer OT Student OT Student OT                  OT Recommendation and Plan     Plan of Care Review  Plan of Care Reviewed With: patient, family  Progress: no change  Outcome Evaluation: OT tx completed. Pt reports no complaints except weakness and fatigue. Pt's daughter and granddaughter are present at times during OT tx, but leave during OT tx. Pt was supervision with set up for brushing teeth and combing hair. Pt was max-dependent to don deoderant. Pt was mod A for doffing/donning hospital gown. Pt was dependent to don socks. Pt was supervision with set up to wash face and underarms, dependent to wash hair with shampoo shower cap. Pt was dependent to scoot to HOB with glide pad. Pt refused further functional mobility and ADLs at this time. Continue OT POC.     Time Calculation:         Time Calculation- OT       Row Name 07/02/25 1058             Time Calculation- OT    OT Start Time 1058  -MM      OT Stop Time 1151  -MM      OT Time Calculation (min) 53 min  -MM      Total Timed Code Minutes- OT 53 minute(s)  -MM      OT Received On 07/02/25  -MM         Timed Charges    30736 - OT Self Care/Mgmt Minutes 53  -MM         Total Minutes    Timed Charges Total Minutes 53  -MM       Total Minutes 53   -INGRID                User Key  (r) = Recorded By, (t) = Taken By, (c) = Cosigned By      Initials Name Provider Type     Walker Montiel, OTR/L Occupational Therapist                  Therapy Charges for Today       Code Description Service Date Service Provider Modifiers Qty    88695975778 HC OT SELF CARE/MGMT/TRAIN EA 15 MIN 7/2/2025 Walker Montiel OTR/L GO 4                 SUSAN Brewster/AMALIA  7/2/2025

## 2025-07-03 LAB
ALBUMIN SERPL-MCNC: 2 G/DL (ref 3.5–5.2)
ALBUMIN/GLOB SERPL: 0.7 G/DL
ALP SERPL-CCNC: 62 U/L (ref 39–117)
ALT SERPL W P-5'-P-CCNC: 13 U/L (ref 1–33)
ANION GAP SERPL CALCULATED.3IONS-SCNC: 11 MMOL/L (ref 5–15)
AST SERPL-CCNC: 18 U/L (ref 1–32)
BASOPHILS # BLD AUTO: 0.04 10*3/MM3 (ref 0–0.2)
BASOPHILS NFR BLD AUTO: 0.6 % (ref 0–1.5)
BILIRUB SERPL-MCNC: 0.2 MG/DL (ref 0–1.2)
BUN SERPL-MCNC: 99.2 MG/DL (ref 8–23)
BUN/CREAT SERPL: 45.1 (ref 7–25)
CALCIUM SPEC-SCNC: 7.9 MG/DL (ref 8.6–10.5)
CHLORIDE SERPL-SCNC: 86 MMOL/L (ref 98–107)
CO2 SERPL-SCNC: 33 MMOL/L (ref 22–29)
CREAT SERPL-MCNC: 2.2 MG/DL (ref 0.57–1)
DEPRECATED RDW RBC AUTO: 56.6 FL (ref 37–54)
EGFRCR SERPLBLD CKD-EPI 2021: 21.3 ML/MIN/1.73
EOSINOPHIL # BLD AUTO: 0.22 10*3/MM3 (ref 0–0.4)
EOSINOPHIL NFR BLD AUTO: 3.1 % (ref 0.3–6.2)
ERYTHROCYTE [DISTWIDTH] IN BLOOD BY AUTOMATED COUNT: 19.7 % (ref 12.3–15.4)
GLOBULIN UR ELPH-MCNC: 2.8 GM/DL
GLUCOSE SERPL-MCNC: 110 MG/DL (ref 65–99)
HCT VFR BLD AUTO: 27.5 % (ref 34–46.6)
HGB BLD-MCNC: 8.8 G/DL (ref 12–15.9)
IMM GRANULOCYTES # BLD AUTO: 0.03 10*3/MM3 (ref 0–0.05)
IMM GRANULOCYTES NFR BLD AUTO: 0.4 % (ref 0–0.5)
LYMPHOCYTES # BLD AUTO: 1.44 10*3/MM3 (ref 0.7–3.1)
LYMPHOCYTES NFR BLD AUTO: 20.5 % (ref 19.6–45.3)
MCH RBC QN AUTO: 25.6 PG (ref 26.6–33)
MCHC RBC AUTO-ENTMCNC: 32 G/DL (ref 31.5–35.7)
MCV RBC AUTO: 79.9 FL (ref 79–97)
MONOCYTES # BLD AUTO: 0.73 10*3/MM3 (ref 0.1–0.9)
MONOCYTES NFR BLD AUTO: 10.4 % (ref 5–12)
NEUTROPHILS NFR BLD AUTO: 4.55 10*3/MM3 (ref 1.7–7)
NEUTROPHILS NFR BLD AUTO: 65 % (ref 42.7–76)
NRBC BLD AUTO-RTO: 0 /100 WBC (ref 0–0.2)
PLATELET # BLD AUTO: 243 10*3/MM3 (ref 140–450)
PMV BLD AUTO: 10.4 FL (ref 6–12)
POTASSIUM SERPL-SCNC: 3.4 MMOL/L (ref 3.5–5.2)
POTASSIUM SERPL-SCNC: 3.4 MMOL/L (ref 3.5–5.2)
PROT SERPL-MCNC: 4.8 G/DL (ref 6–8.5)
RBC # BLD AUTO: 3.44 10*6/MM3 (ref 3.77–5.28)
SODIUM SERPL-SCNC: 130 MMOL/L (ref 136–145)
TSH SERPL DL<=0.05 MIU/L-ACNC: 29.91 UIU/ML (ref 0.27–4.2)
VIT B12 BLD-MCNC: 752 PG/ML (ref 211–946)
WBC NRBC COR # BLD AUTO: 7.01 10*3/MM3 (ref 3.4–10.8)

## 2025-07-03 PROCEDURE — 84443 ASSAY THYROID STIM HORMONE: CPT | Performed by: HOSPITALIST

## 2025-07-03 PROCEDURE — 85025 COMPLETE CBC W/AUTO DIFF WBC: CPT | Performed by: HOSPITALIST

## 2025-07-03 PROCEDURE — 97530 THERAPEUTIC ACTIVITIES: CPT

## 2025-07-03 PROCEDURE — 97168 OT RE-EVAL EST PLAN CARE: CPT | Performed by: OCCUPATIONAL THERAPIST

## 2025-07-03 PROCEDURE — 84132 ASSAY OF SERUM POTASSIUM: CPT | Performed by: HOSPITALIST

## 2025-07-03 PROCEDURE — 82607 VITAMIN B-12: CPT | Performed by: HOSPITALIST

## 2025-07-03 PROCEDURE — 80053 COMPREHEN METABOLIC PANEL: CPT | Performed by: HOSPITALIST

## 2025-07-03 PROCEDURE — 97535 SELF CARE MNGMENT TRAINING: CPT | Performed by: OCCUPATIONAL THERAPIST

## 2025-07-03 PROCEDURE — 97110 THERAPEUTIC EXERCISES: CPT

## 2025-07-03 PROCEDURE — 99232 SBSQ HOSP IP/OBS MODERATE 35: CPT | Performed by: NURSE PRACTITIONER

## 2025-07-03 RX ORDER — POTASSIUM CHLORIDE 1500 MG/1
20 TABLET, EXTENDED RELEASE ORAL ONCE
Status: COMPLETED | OUTPATIENT
Start: 2025-07-03 | End: 2025-07-03

## 2025-07-03 RX ORDER — POTASSIUM CHLORIDE 1500 MG/1
40 TABLET, EXTENDED RELEASE ORAL ONCE
Status: COMPLETED | OUTPATIENT
Start: 2025-07-03 | End: 2025-07-03

## 2025-07-03 RX ADMIN — MIDODRINE HYDROCHLORIDE 5 MG: 2.5 TABLET ORAL at 17:36

## 2025-07-03 RX ADMIN — ALLOPURINOL 100 MG: 100 TABLET ORAL at 09:21

## 2025-07-03 RX ADMIN — ASPIRIN 81 MG: 81 TABLET, COATED ORAL at 09:21

## 2025-07-03 RX ADMIN — PANTOPRAZOLE SODIUM 40 MG: 40 TABLET, DELAYED RELEASE ORAL at 09:21

## 2025-07-03 RX ADMIN — APIXABAN 2.5 MG: 2.5 TABLET, FILM COATED ORAL at 09:22

## 2025-07-03 RX ADMIN — MIDODRINE HYDROCHLORIDE 5 MG: 2.5 TABLET ORAL at 09:21

## 2025-07-03 RX ADMIN — Medication 10 ML: at 20:39

## 2025-07-03 RX ADMIN — LATANOPROST 1 DROP: 50 SOLUTION OPHTHALMIC at 20:38

## 2025-07-03 RX ADMIN — ROSUVASTATIN 5 MG: 10 TABLET, FILM COATED ORAL at 09:21

## 2025-07-03 RX ADMIN — GABAPENTIN 300 MG: 300 CAPSULE ORAL at 05:45

## 2025-07-03 RX ADMIN — Medication 400 MG: at 09:22

## 2025-07-03 RX ADMIN — Medication 220 MG: at 09:21

## 2025-07-03 RX ADMIN — GABAPENTIN 300 MG: 300 CAPSULE ORAL at 15:43

## 2025-07-03 RX ADMIN — TIMOLOL MALEATE 1 DROP: 5 SOLUTION/ DROPS OPHTHALMIC at 09:25

## 2025-07-03 RX ADMIN — Medication 5000 UNITS: at 09:21

## 2025-07-03 RX ADMIN — Medication 5 MG: at 20:37

## 2025-07-03 RX ADMIN — POTASSIUM CHLORIDE 40 MEQ: 1500 TABLET, EXTENDED RELEASE ORAL at 15:43

## 2025-07-03 RX ADMIN — APIXABAN 2.5 MG: 2.5 TABLET, FILM COATED ORAL at 20:37

## 2025-07-03 RX ADMIN — Medication 10 ML: at 09:25

## 2025-07-03 RX ADMIN — TAMSULOSIN HYDROCHLORIDE 0.4 MG: 0.4 CAPSULE ORAL at 09:21

## 2025-07-03 RX ADMIN — GABAPENTIN 300 MG: 300 CAPSULE ORAL at 21:01

## 2025-07-03 RX ADMIN — AMIODARONE HYDROCHLORIDE 200 MG: 200 TABLET ORAL at 09:22

## 2025-07-03 RX ADMIN — POTASSIUM CHLORIDE 20 MEQ: 1500 TABLET, EXTENDED RELEASE ORAL at 05:45

## 2025-07-03 NOTE — THERAPY PROGRESS REPORT/RE-CERT
Patient Name: Genevieve Cerda  : 1940    MRN: [id number]                              Today's Date: 7/3/2025       Admit Date: 2025    Visit Dx:     ICD-10-CM ICD-9-CM   1. Congestive heart failure, unspecified HF chronicity, unspecified heart failure type  I50.9 428.0   2. Chronic kidney disease, unspecified CKD stage  N18.9 585.9   3. Hyponatremia  E87.1 276.1   4. Impaired functional mobility and activity tolerance [Z74.09]  Z74.09 V49.89   5. Decreased activities of daily living (ADL)  Z78.9 V49.89     Patient Active Problem List   Diagnosis    Severe protein-calorie malnutrition    Pleural effusion    Chronic congestive heart failure    Acute on chronic diastolic CHF (congestive heart failure)    CHF (congestive heart failure)    Acute on chronic heart failure with preserved ejection fraction (HFpEF)    Hyponatremia    Atrial fibrillation, persistent    Hypercholesterolemia    Current use of long term anticoagulation    Acute renal failure superimposed on stage 3b chronic kidney disease     History reviewed. No pertinent past medical history.  No past surgical history on file.   General Information       Row Name 25 1438          OT Time and Intention    Subjective Information complains of;dyspnea;fatigue  -     Document Type progress note/recertification  -     Mode of Treatment occupational therapy  -     Patient Effort good  -       Row Name 25 1438          General Information    Patient Profile Reviewed yes  -     Existing Precautions/Restrictions fall;oxygen therapy device and L/min  -     Barriers to Rehab medically complex;physical barrier  -       Row Name 25 1438          Living Environment    Current Living Arrangements extended care facility  -     People in Home facility resident  -       Row Name 25 1438          Cognition    Orientation Status (Cognition) oriented x 4  -       Row Name 25 1438          Safety Issues/Impairments  Affecting Functional Mobility    Safety Issues Affecting Function (Mobility) at risk behavior observed  -     Impairments Affecting Function (Mobility) balance;endurance/activity tolerance;shortness of breath;postural/trunk control;strength  -               User Key  (r) = Recorded By, (t) = Taken By, (c) = Cosigned By      Initials Name Provider Type     Johana Enriquez OTR/L Occupational Therapist                     Mobility/ADL's       Row Name 07/03/25 1438          Bed Mobility    Scooting/Bridging [place] (Bed Mobility) moderate assist (50% patient effort);2 person assist;verbal cues  -     Supine-Sit [place] (Bed Mobility) moderate assist (50% patient effort);verbal cues;nonverbal cues (demo/gesture)  -     Sit-Supine [place] (Bed Mobility) moderate assist (50% patient effort)  -       Row Name 07/03/25 1438          Transfers    Transfers sit-stand transfer;stand-sit transfer  -       Row Name 07/03/25 1438          Sit-Stand Transfer    Sit-Stand [place] (Transfers) moderate assist (50% patient effort);2 person assist;verbal cues  -       Row Name 07/03/25 1438          Stand-Sit Transfer    Stand-Sit [place] (Transfers) moderate assist (50% patient effort);2 person assist;verbal cues  -       Row Name 07/03/25 1438          Activities of Daily Living    BADL Assessment/Intervention grooming  -       Row Name 07/03/25 1438          Grooming Assessment/Training    [place] Level (Grooming) set up;wash face, hands;hair care, combing/brushing  -       Row Name 07/03/25 1438          Lower Body Dressing Assessment/Training    [place] Level (Lower Body Dressing) don;doff;socks;dependent (less than 25% patient effort);verbal cues  -     Position (Lower Body Dressing) edge of bed sitting  -               User Key  (r) = Recorded By, (t) = Taken By, (c) = Cosigned By      Initials Name Provider Type    Johana Arce OTR/L Occupational Therapist                    Obj/Interventions       [facility] Name 07/03/25 1438          Balance    Balance Assessment sitting static balance;sitting dynamic balance;sit to stand dynamic balance;standing static balance  -     Static Sitting Balance standby assist  -     Dynamic Sitting Balance contact guard;standby assist  -     Position, Sitting Balance unsupported;sitting edge of bed  -     Static Standing Balance moderate assist;2-person assist;maximum assist;verbal cues;non-verbal cues (demo/gesture)  -     Balance Interventions sitting;standing;sit to stand;supported;static  -     Comment, Balance Pt benefits from unilat UE assist for self balance o  -               User Key  (r) = Recorded By, (t) = Taken By, (c) = Cosigned By      Initials Name Provider Type     Johana Enriquez, OTR/L Occupational Therapist                   Goals/Plan       Row Name 07/03/25 1438          Transfer Goal 1 (OT)    Activity/Assistive Device (Transfer Goal 1, OT) wheelchair transfer  -     [place] Level/Cues Needed (Transfer Goal 1, OT) moderate assist (50-74% patient effort)  -     Time Frame (Transfer Goal 1, OT) long term goal (LTG);by discharge  -     Progress/Outcome (Transfer Goal 1, OT) goal ongoing  -CH       Row Name 07/03/25 1438          Dressing Goal 1 (OT)    Activity/Device (Dressing Goal 1, OT) upper body dressing  -     [place]/Cues Needed (Dressing Goal 1, OT) moderate assist (50-74% patient effort)  -     Time Frame (Dressing Goal 1, OT) long term goal (LTG);by discharge  -     Progress/Outcome (Dressing Goal 1, OT) goal ongoing  -CH       Row Name 07/03/25 1438          Problem Specific Goal 1 (OT)    Problem Specific Goal 1 (OT) Pt will independently implement one energy conservation technique to improve functional adl performance.  -     Time Frame (Problem Specific Goal 1, OT) long term goal (LTG);by discharge  -     Progress/Outcome (Problem Specific Goal 1, OT) goal ongoing  -        Row Name 07/03/25 1438          Therapy Assessment/Plan (OT)    Planned Therapy Interventions (OT) activity tolerance training;adaptive equipment training;BADL retraining;edema control/reduction;functional balance retraining;occupation/activity based interventions;patient/caregiver education/training;ROM/therapeutic exercise;strengthening exercise;transfer/mobility retraining;passive ROM/stretching  -               User Key  (r) = Recorded By, (t) = Taken By, (c) = Cosigned By      Initials Name Provider Type     Johana Enriquez, OTR/L Occupational Therapist                   Clinical Impression       Row Name 07/03/25 1438          Pain Assessment    Pretreatment Pain Rating 0/10 - no pain  -     Posttreatment Pain Rating 0/10 - no pain  -       Row Name 07/03/25 1438          Plan of Care Review    Plan of Care Reviewed With patient  -     Outcome Evaluation OT re eval complete.  Pt. is AxO x 4 & c/o mild fatigue.  Ms. Fan demo's flat affect, generalized weakness, imbalance, & overall debility.  She required Mod A to come to EOB and benefits from unilat UE assist for self balance.  Attempted sit to stand with Mod A x 2.  Max cues to improve pt body mechs and posture.  Cont OT tx and all goals cont to be appropriate.  Anticipate DC to SNF  -       Row Name 07/03/25 1438          Therapy Assessment/Plan (OT)    Patient/Family Therapy Goal Statement (OT) improve fxn  -     Rehab Potential (OT) fair  -     Criteria for Skilled Therapeutic Interventions Met (OT) yes;skilled treatment is necessary  -     Therapy Frequency (OT) 5 times/wk  -       Row Name 07/03/25 1438          Therapy Plan Review/Discharge Plan (OT)    Anticipated Discharge Disposition (OT) skilled nursing facility  -       Row Name 07/03/25 1438          Positioning and Restraints    Pre-Treatment Position in bed  -     Post Treatment Position bed  -     In Bed fowlers;call light within reach;encouraged to call for  assist;side rails up x2;exit alarm on;notified nsg  -               User Key  (r) = Recorded By, (t) = Taken By, (c) = Cosigned By      Initials Name Provider Type    Johana Arce, OTR/L Occupational Therapist                   Outcome Measures       Row Name 07/03/25 1438          How much help from another is currently needed...    Putting on and taking off regular lower body clothing? 1  -CH     Bathing (including washing, rinsing, and drying) 2  -CH     Toileting (which includes using toilet bed pan or urinal) 1  -CH     Putting on and taking off regular upper body clothing 2  -CH     Taking care of personal grooming (such as brushing teeth) 3  -CH     Eating meals 3  -CH     AM-PAC 6 Clicks Score (OT) 12  -CH       Row Name 07/03/25 0800          How much help from another person do you currently need...    Turning from your back to your side while in flat bed without using bedrails? 3  -RB     Moving from lying on back to sitting on the side of a flat bed without bedrails? 3  -RB     Moving to and from a bed to a chair (including a wheelchair)? 2  -RB     Standing up from a chair using your arms (e.g., wheelchair, bedside chair)? 2  -RB     Climbing 3-5 steps with a railing? 1  -RB     To walk in hospital room? 1  -RB     AM-PAC 6 Clicks Score (PT) 12  -RB     Highest Level of Mobility Goal Move to Chair/Commode-4  -RB       Row Name 07/03/25 1438          Functional Assessment    Outcome Measure Options AM-PAC 6 Clicks Daily Activity (OT)  -               User Key  (r) = Recorded By, (t) = Taken By, (c) = Cosigned By      Initials Name Provider Type    Johana Arce, OTR/L Occupational Therapist    RB Ochoa Tello RN Registered Nurse                    Occupational Therapy Education       Title: PT OT SLP Therapies (In Progress)       Topic: Occupational Therapy (In Progress)       Point: ADL training (In Progress)       Learning Progress Summary            Patient Acceptance, E, VU,NR by MM at  7/2/2025 1618    Acceptance, E, VU,NR by BS at 6/30/2025 1131   Family Acceptance, E, VU,NR by MM at 7/2/2025 1618                      Point: Home exercise program (In Progress)       Learning Progress Summary            Patient Acceptance, E,TB, VU,NR by NAVDEEP at 7/1/2025 1440    Comment: Education on benefits of movement, interval exercise program   Family Acceptance, E,TB, VU,NR by NAVDEEP at 7/1/2025 1440    Comment: Education on benefits of movement, interval exercise program                      Point: Body mechanics (In Progress)       Learning Progress Summary            Patient Acceptance, E, VU,NR by MM at 7/2/2025 1618    Acceptance, E, VU,NR by BS at 6/30/2025 1131   Family Acceptance, E, VU,NR by MM at 7/2/2025 1618                                      User Key       Initials Effective Dates Name Provider Type Discipline    NAVDEEP 02/28/25 -  Ivania Mendoza OTR/L Occupational Therapist OT    MM 07/11/23 -  Walker Monitel OTR/L Occupational Therapist OT    BS 05/12/25 -  Hetal Farmer OT Student OT Student OT                  OT Recommendation and Plan  Planned Therapy Interventions (OT): activity tolerance training, adaptive equipment training, BADL retraining, edema control/reduction, functional balance retraining, occupation/activity based interventions, patient/caregiver education/training, ROM/therapeutic exercise, strengthening exercise, transfer/mobility retraining, passive ROM/stretching  Therapy Frequency (OT): 5 times/wk  Plan of Care Review  Plan of Care Reviewed With: patient  Outcome Evaluation: OT re eval complete.  Pt. is AxO x 4 & c/o mild fatigue.  Ms. Meng padilla's flat affect, generalized weakness, imbalance, & overall debility.  She required Mod A to come to EOB and benefits from unilat UE assist for self balance.  Attempted sit to stand with Mod A x 2.  Max cues to improve pt body mechs and posture.  Cont OT tx and all goals cont to be appropriate.  Anticipate DC to SNF     Time  Calculation:         Time Calculation- OT       Row Name 07/03/25 1438             Time Calculation- OT    OT Start Time 1438  -CH      OT Stop Time 1520  -CH      OT Time Calculation (min) 42 min  -CH      Total Timed Code Minutes- OT 12 minute(s)  -CH      OT Received On 07/03/25  -CH      OT Goal Re-Cert Due Date 07/13/25  -CH         Timed Charges    52667 - OT Self Care/Mgmt Minutes 12  -CH         Untimed Charges    OT Eval/Re-eval Minutes 30  -CH         Total Minutes    Timed Charges Total Minutes 12  -CH      Untimed Charges Total Minutes 30  -CH       Total Minutes 42  -CH                User Key  (r) = Recorded By, (t) = Taken By, (c) = Cosigned By      Initials Name Provider Type     Johana Enriquez OTR/L Occupational Therapist                  Therapy Charges for Today       Code Description Service Date Service Provider Modifiers Qty    99717026420 HC OT SELF CARE/MGMT/TRAIN EA 15 MIN 7/3/2025 Johana Enriquez OTR/L GO 1    57992214505 HC OT RE-EVAL 2 7/3/2025 Johana Enriquez OTR/L GO 1                 Johana Enriquez OTR/AMALIA  7/3/2025

## 2025-07-03 NOTE — PLAN OF CARE
Goal Outcome Evaluation:  Plan of Care Reviewed With: patient           Outcome Evaluation: OT re eval complete.  Pt. is AxO x 4 & c/o mild fatigue.  Ms. Fan demo's flat affect, generalized weakness, imbalance, & overall debility.  She required Mod A to come to EOB and benefits from unilat UE assist for self balance.  Attempted sit to stand with Mod A x 2.  Max cues to improve pt body mechs and posture.  Cont OT tx and all goals cont to be appropriate.  Anticipate DC to SNF    Anticipated Discharge Disposition (OT): skilled nursing facility

## 2025-07-03 NOTE — THERAPY TREATMENT NOTE
Acute Care - Physical Therapy Treatment Note  [facility]     Patient Name: Genevieve Cerda  : 1940  MRN: [id number]  Today's Date: 7/3/2025      Visit Dx:     ICD-10-CM ICD-9-CM   1. Congestive heart failure, unspecified HF chronicity, unspecified heart failure type  I50.9 428.0   2. Chronic kidney disease, unspecified CKD stage  N18.9 585.9   3. Hyponatremia  E87.1 276.1   4. Impaired functional mobility and activity tolerance [Z74.09]  Z74.09 V49.89   5. Decreased activities of daily living (ADL)  Z78.9 V49.89     Patient Active Problem List   Diagnosis    Severe protein-calorie malnutrition    Pleural effusion    Chronic congestive heart failure    Acute on chronic diastolic CHF (congestive heart failure)    CHF (congestive heart failure)    Acute on chronic heart failure with preserved ejection fraction (HFpEF)    Hyponatremia    Atrial fibrillation, persistent    Hypercholesterolemia    Current use of long term anticoagulation    Acute renal failure superimposed on stage 3b chronic kidney disease     History reviewed. No pertinent past medical history.  No past surgical history on file.  PT Assessment (Last 12 Hours)       PT Evaluation and Treatment       Row Name 25 1350 25 1135       Physical Therapy Time and Intention    Subjective Information complains of;weakness  -KJ --    Document Type therapy note (daily note)  -KJ therapy note (daily note)  -LY    Mode of Treatment physical therapy  -KJ physical therapy  -LY    Patient Effort good  -KJ --      Row Name 25 1350 25 1135       General Information    Existing Precautions/Restrictions fall;oxygen therapy device and L/min  -KJ fall;oxygen therapy device and L/min  -LY      Row Name 25 1350          Pain    Pretreatment Pain Rating 0/10 - no pain  -KJ     Posttreatment Pain Rating 0/10 - no pain  -KJ       Row Name 25 1350          Bed Mobility    Supine-Sit [place] (Bed Mobility) minimum assist (75% patient  effort);moderate assist (50% patient effort)  -KJ     Sit-Supine [place] (Bed Mobility) moderate assist (50% patient effort)  -KJ       Row Name 07/03/25 1350          Gait/Stairs (Locomotion)    Comment, (Gait/Stairs) attempted standing but too weak. Will ask lift team to get into chair this pm. 2 people needed for standing/safety  -KJ       Row Name 07/03/25 1350          Balance    Balance Assessment sitting static balance  -KJ     Static Sitting Balance standby assist  -KJ     Dynamic Sitting Balance supervision;contact guard  -KJ     Position, Sitting Balance unsupported;sitting edge of bed  -KJ       Row Name 07/03/25 1350          Motor Skills    Comments, Therapeutic Exercise AROM BLE's  -KJ       Row Name             Wound 06/23/25 1658 Left gluteal Pressure Injury    Wound - Properties Group Placement Date: 06/23/25  -TB Placement Time: 1658  -TB Present on Original Admission: Y  -TB Side: Left  -TB Location: gluteal  -TB Primary Wound Type: Pressure Inj  -TB    Retired Wound - Properties Group Placement Date: 06/23/25  -TB Placement Time: 1658  -TB Present on Original Admission: Y  -TB Side: Left  -TB Location: gluteal  -TB    Retired Wound - Properties Group Placement Date: 06/23/25  -TB Placement Time: 1658  -TB Present on Original Admission: Y  -TB Side: Left  -TB Location: gluteal  -TB    Retired Wound - Properties Group Date first assessed: 06/23/25  -TB Time first assessed: 1658  -TB Present on Original Admission: Y  -TB Side: Left  -TB Location: gluteal  -TB      Row Name             Wound 06/23/25 1659 Left lower leg Other (Comments)    Wound - Properties Group Placement Date: 06/23/25  -TB Placement Time: 1659  -TB Present on Original Admission: Y  -TB Side: Left  -TB Orientation: lower  -TB Location: leg  -TB Primary Wound Type: Other  -TB    Retired Wound - Properties Group Placement Date: 06/23/25  -TB Placement Time: 1659  -TB Present on Original Admission: Y  -TB Side: Left  -TB  Orientation: lower  -TB Location: leg  -TB    Retired Wound - Properties Group Placement Date: 06/23/25  -TB Placement Time: 1659  -TB Present on Original Admission: Y  -TB Side: Left  -TB Orientation: lower  -TB Location: leg  -TB    Retired Wound - Properties Group Date first assessed: 06/23/25  -TB Time first assessed: 1659  -TB Present on Original Admission: Y  -TB Side: Left  -TB Location: leg  -TB      Row Name 07/03/25 1350          Positioning and Restraints    Pre-Treatment Position in bed  -KJ     Post Treatment Position bed  -KJ     In Bed fowlers;call light within reach  -KJ               User Key  (r) = Recorded By, (t) = Taken By, (c) = Cosigned By      Initials Name Provider Type    Aura Grant, PTA Physical Therapist Assistant    Lupe Wong, PTA Physical Therapist Assistant    Car Tineo, RN Registered Nurse                    Physical Therapy Education       Title: PT OT SLP Therapies (In Progress)       Topic: Physical Therapy (In Progress)       Point: Mobility training (In Progress)       Learning Progress Summary            Patient Acceptance, E, NR by RB at 7/2/2025 1728    Acceptance, E,TB,D, VU,NR by CARLOS ALBERTO at 6/24/2025 1604    Comment: Education re: purpose of PT/importance of activitiy, safety/falls prevention, increase awareness of upright, improved deep breathing tech, upright posture, LE ROM and positioning for edema mgmt; pacing activity allowing time for rest   Family Acceptance, E,TB,D, VU,NR by CARLOS ALBERTO at 6/24/2025 1604    Comment: Education re: purpose of PT/importance of activitiy, safety/falls prevention, increase awareness of upright, improved deep breathing tech, upright posture, LE ROM and positioning for edema mgmt; pacing activity allowing time for rest                      Point: Home exercise program (In Progress)       Learning Progress Summary            Patient Acceptance, E, NR by RB at 7/2/2025 1728    Acceptance, E,TB,D, VU,NR by CARLOS ALBERTO at 6/24/2025 1604     Comment: Education re: purpose of PT/importance of activitiy, safety/falls prevention, increase awareness of upright, improved deep breathing tech, upright posture, LE ROM and positioning for edema mgmt; pacing activity allowing time for rest   Family Acceptance, E,TB,D, VU,NR by  at 6/24/2025 1604    Comment: Education re: purpose of PT/importance of activitiy, safety/falls prevention, increase awareness of upright, improved deep breathing tech, upright posture, LE ROM and positioning for edema mgmt; pacing activity allowing time for rest                      Point: Precautions (In Progress)       Learning Progress Summary            Patient Acceptance, E, NR by RB at 7/2/2025 1728    Acceptance, E,TB,D, VU,NR by  at 6/24/2025 1604    Comment: Education re: purpose of PT/importance of activitiy, safety/falls prevention, increase awareness of upright, improved deep breathing tech, upright posture, LE ROM and positioning for edema mgmt; pacing activity allowing time for rest   Family Acceptance, E,TB,D, VU,NR by  at 6/24/2025 1604    Comment: Education re: purpose of PT/importance of activitiy, safety/falls prevention, increase awareness of upright, improved deep breathing tech, upright posture, LE ROM and positioning for edema mgmt; pacing activity allowing time for rest                                      User Key       Initials Effective Dates Name Provider Type Discipline     08/02/18 -  Melania Orozco, PT Physical Therapist PT     02/10/25 -  Ochoa Tello, RN Registered Nurse Nurse                  PT Recommendation and Plan     Progress: improving  Outcome Evaluation: PT tx completed. Pt A&O x 4, no c/o. [place] sup>sit, sit EOB S x 20 minutes. A/AAROM BLE's sitting and supine.   Outcome Measures       Row Name 07/02/25 1511             How much help from another person do you currently need...    Turning from your back to your side while in flat bed without using bedrails? 3  -MELISSA      Moving from  lying on back to sitting on the side of a flat bed without bedrails? 3  -MELISSA      Moving to and from a bed to a chair (including a wheelchair)? 2  -MELISSA      Standing up from a chair using your arms (e.g., wheelchair, bedside chair)? 2  -MELISSA      Climbing 3-5 steps with a railing? 1  -MELISSA      To walk in hospital room? 1  -MELISSA      AM-PAC 6 Clicks Score (PT) 12  -MELISSA         Functional Assessment    Outcome Measure Options AM-PAC 6 Clicks Basic Mobility (PT)  -MELISSA                User Key  (r) = Recorded By, (t) = Taken By, (c) = Cosigned By      Initials Name Provider Type    Osman Santiago PTA Physical Therapist Assistant                     Time Calculation:    PT Charges       Row Name 07/03/25 1427             Time Calculation    Start Time 1350  -KJ      Stop Time 1428  -KJ      Time Calculation (min) 38 min  -KJ      PT Received On 07/03/25  -KJ      PT Goal Re-Cert Due Date 07/04/25  -KJ         Time Calculation- PT    Total Timed Code Minutes- PT 38 minute(s)  -KJ                User Key  (r) = Recorded By, (t) = Taken By, (c) = Cosigned By      Initials Name Provider Type    Aura Grant PTA Physical Therapist Assistant                  Therapy Charges for Today       Code Description Service Date Service Provider Modifiers Qty    15717990950 HC PT THER PROC EA 15 MIN 7/3/2025 Aura Gray PTA GP 1    38868454955 HC PT THERAPEUTIC ACT EA 15 MIN 7/3/2025 Aura Gray PTA GP 2            PT G-Codes  Outcome Measure Options: AM-PAC 6 Clicks Basic Mobility (PT)  AM-PAC 6 Clicks Score (PT): 12  AM-PAC 6 Clicks Score (OT): 13    Aura Gray PTA  7/3/2025

## 2025-07-03 NOTE — PLAN OF CARE
Goal Outcome Evaluation:  Plan of Care Reviewed With: patient        Progress: no change  Outcome Evaluation: A&O x 4, on 2L NC, SB 55-65, Pt turned when she allows, dressing changed this AM, no c/o pain or distress at this time.

## 2025-07-03 NOTE — PROGRESS NOTES
Nephrology ([facility]) Progress Note      Patient:  Genevieve Cerda  YOB: 1940  Date of Service: 7/3/2025  MRN: [id number]   Acct: [id number]   Primary Care Physician: Germaine Diego APRN  Advance Directive:   Code Status and Medical Interventions: No CPR (Do Not Attempt to Resuscitate); Limited Support; No intubation (DNI)   Ordered at: 06/23/25 1629     Code Status (Patient has no pulse and is not breathing):    No CPR (Do Not Attempt to Resuscitate)     Medical Interventions (Patient has pulse or is breathing):    Limited Support     Medical Intervention Limits:    No intubation (DNI)     Level Of Support Discussed With:    Health Care Surrogate       Patient     Admit Date: 6/23/2025       Hospital Day: 10  Referring Provider: No ref. provider found      Patient personally seen and examined.  Complete chart including Consults, Notes, Operative Reports, Labs, Cardiology, and Radiology studies reviewed as able.    Chief complaint: Abnormal labs.    Subjective:  Genevieve Cerda is a 85 y.o. female for whom we were consulted for evaluation and treatment of acute kidney injury. Baseline chronic kidney disease stage 3b, baseline creatinine approximately 1.6. Has followed in our office in the past and more recently has also followed with Dr An in [place]. History of prior nephrectomy. Other history includes congestive heart failure, atrial fibrillation, hypertension. Admitted on 6/23 for CHF exacerbation. Creatinine 2.10 at time of admission. Hospital course remarkable for treatment with a Lasix infusion.  Nephrology consulted on 6/26.  Denied changes to urination, no dysuria or hematuria. Denied recent n/v/d. Metolazone added on 6/26.  Metolazone has been discontinued due to persistent hyponatremia    This afternoon patient feels good.  She is more alert and awake and was able to answer all the questions.  She has good urine output.  She reports little  swelling    Allergies:  Codeine    Home Meds:  Medications Prior to Admission   Medication Sig Dispense Refill Last Dose/Taking    allopurinol (ZYLOPRIM) 100 MG tablet Take 1 tablet by mouth Daily.   Taking    amiodarone (PACERONE) 200 MG tablet Take 2 tablets by mouth 2 (Two) Times a Day.   Taking    apixaban (ELIQUIS) 2.5 MG tablet tablet Take 1 tablet by mouth Every 12 (Twelve) Hours.   Taking    aspirin 81 MG EC tablet Take 1 tablet by mouth Daily.   Taking    [] cefdinir (OMNICEF) 300 MG capsule Take 1 capsule by mouth 2 (Two) Times a Day for 7 days. 14 capsule 0 Taking    citalopram (CeleXA) 10 MG tablet Take 1 tablet by mouth Daily.   Taking    furosemide (LASIX) 20 MG tablet Take 3 tablets by mouth 2 (Two) Times a Day.   Taking    gabapentin (NEURONTIN) 300 MG capsule Take 1 capsule by mouth Every 8 (Eight) Hours.   Taking    ipratropium-albuterol (DUO-NEB) 0.5-2.5 mg/3 ml nebulizer Take 3 mL by nebulization 4 (Four) Times a Day As Needed for Shortness of Air.   Taking As Needed    latanoprost (XALATAN) 0.005 % ophthalmic solution Administer 1 drop to both eyes Daily.   Taking    magnesium oxide (MAG-OX) 400 MG tablet Take 1 tablet by mouth Daily.   Taking    metoprolol tartrate (LOPRESSOR) 25 MG tablet Take 1 tablet by mouth 2 (Two) Times a Day. Hold for systolic blood pressure <100 or pulse <60   Taking    midodrine (PROAMATINE) 5 MG tablet Take 1 tablet by mouth 2 (Two) Times a Day. Hold if systolic blood pressure is >100   Taking    pantoprazole (PROTONIX) 40 MG EC tablet Take 1 tablet by mouth Daily.   Taking    rosuvastatin (CRESTOR) 5 MG tablet Take 1 tablet by mouth Daily.   Taking    Skin Protectants, Misc. (Eucerin) cream Apply 1 Application topically to the appropriate area as directed Daily. Apply to bilateral upper and lower extremities for dryness once daily and prn   Taking    tamsulosin (FLOMAX) 0.4 MG capsule 24 hr capsule Take 1 capsule by mouth Daily.   Taking    timolol (TIMOPTIC)  0.5 % ophthalmic solution Administer 1 drop to both eyes Daily.   Taking    torsemide (DEMADEX) 20 MG tablet Take 4 tablets by mouth 2 (Two) Times a Day. 240 tablet 11 Taking    vitamin D (ERGOCALCIFEROL) 1.25 MG (69011 UT) capsule capsule Take 1 capsule by mouth Every 7 (Seven) Days. Takes on Monday   Taking    zinc sulfate (ZINCATE) 220 (50 Zn) MG capsule Take 1 capsule by mouth Daily.   Taking    acetaminophen (TYLENOL) 650 MG 8 hr tablet Take 1 tablet by mouth Every 6 (Six) Hours As Needed for Mild Pain.       polyethylene glycol (MiraLax) 17 g packet Take 17 g by mouth Daily As Needed (constipation).       sennosides-docusate (senna-docusate sodium) 8.6-50 MG per tablet Take 2 tablets by mouth Daily As Needed for Constipation.          Medicines:  Current Facility-Administered Medications   Medication Dose Route Frequency Provider Last Rate Last Admin    acetaminophen (TYLENOL) tablet 650 mg  650 mg Oral Q4H PRN Mary Falcon APRN   650 mg at 07/02/25 2056    Or    acetaminophen (TYLENOL) 160 MG/5ML oral solution 650 mg  650 mg Oral Q4H PRN Mary Falcon APRN        Or    acetaminophen (TYLENOL) suppository 650 mg  650 mg Rectal Q4H PRN Mary Falcon APRN        allopurinol (ZYLOPRIM) tablet 100 mg  100 mg Oral Daily Indra Zamorano MD   100 mg at 07/03/25 0921    amiodarone (PACERONE) tablet 200 mg  200 mg Oral Daily Rancho Yanez MD   200 mg at 07/03/25 0922    apixaban (ELIQUIS) tablet 2.5 mg  2.5 mg Oral BID Mary Falcon APRN   2.5 mg at 07/03/25 0922    aspirin EC tablet 81 mg  81 mg Oral Daily Indra Zamorano MD   81 mg at 07/03/25 0921    bisacodyl (DULCOLAX) EC tablet 5 mg  5 mg Oral Daily PRN Mary Falcon APRN        And    bisacodyl (DULCOLAX) suppository 10 mg  10 mg Rectal Daily PRN Mary Falcon APRN        bumetanide (BUMEX) injection 1 mg  1 mg Intravenous Once Ali, Julius, MD        Calcium Replacement - Follow Nurse / BPA Driven Protocol   Not Applicable PRN Falcon,  SARAH Blakely        cholecalciferol (VITAMIN D3) tablet 5,000 Units  5,000 Units Oral Daily Indra Zamorano MD   5,000 Units at 07/03/25 0921    gabapentin (NEURONTIN) capsule 300 mg  300 mg Oral Q8H Indra Zamorano MD   300 mg at 07/03/25 0545    ipratropium-albuterol (DUO-NEB) nebulizer solution 3 mL  3 mL Nebulization 4x Daily PRN Indra Zamorano MD        latanoprost (XALATAN) 0.005 % ophthalmic solution 1 drop  1 drop Both Eyes Nightly Indra Zamorano MD   1 drop at 07/02/25 2057    magnesium oxide (MAG-OX) tablet 400 mg  400 mg Oral Daily Indra Zamorano MD   400 mg at 07/03/25 0922    Magnesium Standard Dose Replacement - Follow Nurse / BPA Driven Protocol   Not Applicable PRN Mary Falcon APRN        midodrine (PROAMATINE) tablet 5 mg  5 mg Oral BID AC Indra Zamorano MD   5 mg at 07/03/25 0921    nitroglycerin (NITROSTAT) SL tablet 0.4 mg  0.4 mg Sublingual Q5 Min PRN Mary Falcon APRN        ondansetron ODT (ZOFRAN-ODT) disintegrating tablet 4 mg  4 mg Oral Q6H PRN Mary Falcon APRN   4 mg at 06/28/25 1720    Or    ondansetron (ZOFRAN) injection 4 mg  4 mg Intravenous Q6H PRN Mary Falcon APRN        pantoprazole (PROTONIX) EC tablet 40 mg  40 mg Oral Daily Indra Zamorano MD   40 mg at 07/03/25 0921    Phosphorus Replacement - Follow Nurse / BPA Driven Protocol   Not Applicable PRN Mary Falcon APRN        polyethylene glycol (MIRALAX) packet 17 g  17 g Oral Daily PRN Indra Zamorano MD        Potassium Replacement - Follow Nurse / BPA Driven Protocol   Not Applicable PRN Audelia Ortega MD        rosuvastatin (CRESTOR) tablet 5 mg  5 mg Oral Daily Indra Zamorano MD   5 mg at 07/03/25 0921    sennosides-docusate (PERICOLACE) 8.6-50 MG per tablet 2 tablet  2 tablet Oral Daily ALEJANDRAN Indra Zamorano MD   2 tablet at 07/01/25 0513    sodium chloride 0.9 % flush 10 mL  10 mL Intravenous Q12H Mary Falcon APRN   10 mL at 07/03/25 0925    sodium  chloride 0.9 % flush 10 mL  10 mL Intravenous PRN Mary Falcon APRN        sodium chloride 0.9 % infusion 40 mL  40 mL Intravenous PRN Mary Falcon APRN        tamsulosin (FLOMAX) 24 hr capsule 0.4 mg  0.4 mg Oral Daily Indra Zamorano MD   0.4 mg at 07/03/25 0921    timolol (TIMOPTIC) 0.5 % ophthalmic solution 1 drop  1 drop Both Eyes Daily Indra Zamorano MD   1 drop at 07/03/25 0925    zinc sulfate (ZINCATE) capsule 220 mg  220 mg Oral Daily Indra Zamorano MD   220 mg at 07/03/25 0921       Past Medical History:  History reviewed. No pertinent past medical history.    Past Surgical History:  No past surgical history on file.    Family History  History reviewed. No pertinent family history.    Social History  Social History     Socioeconomic History    Marital status:    Tobacco Use    Smoking status: Never    Smokeless tobacco: Never   Vaping Use    Vaping status: Never Used   Substance and Sexual Activity    Alcohol use: Yes     Comment: rarely    Drug use: Never    Sexual activity: Defer       Review of Systems:  History obtained from chart review and the patient  General ROS: No fever or chills  Respiratory ROS: positive for - shortness of breath  Cardiovascular ROS: No chest pain or palpitations  Gastrointestinal ROS: No abdominal pain or melena  Genito-Urinary ROS: No dysuria or hematuria  Psych ROS: No anxiety and depression  14 point ROS reviewed with the patient and negative except as noted above and in the HPI unless unable to obtain.    Objective:  Patient Vitals for the past 24 hrs:   BP Temp Temp src Pulse Resp SpO2 Weight   07/03/25 1227 105/40 97.8 °F (36.6 °C) Oral 57 16 100 % --   07/03/25 0843 113/44 97.7 °F (36.5 °C) Oral 58 16 97 % --   07/03/25 0614 -- -- -- -- -- -- 74.7 kg (164 lb 11.2 oz)   07/03/25 0330 115/40 97.7 °F (36.5 °C) Oral 54 16 95 % --   07/02/25 1929 128/45 98.4 °F (36.9 °C) Oral 59 16 95 % --   07/02/25 1500 127/42 -- -- 58 16 93 % --  "      Intake/Output Summary (Last 24 hours) at 7/3/2025 1420  Last data filed at 7/3/2025 0800  Gross per 24 hour   Intake 360 ml   Output 1700 ml   Net -1340 ml     General: awake/alert   HEENT: Normocephalic atraumatic head  Neck: Supple with no JVD or carotid base.  Chest:  clear to auscultation bilaterally without respiratory distress  CVS: regular rate and rhythm  Abdominal: soft, nontender, positive bowel sounds  Extremities: 1+ pedal edema  Skin: No discoloration      Labs:  Results from last 7 days   Lab Units 07/03/25  0430 07/02/25  0405 07/01/25  0344   WBC 10*3/mm3 7.01 8.17 8.91   HEMOGLOBIN g/dL 8.8* 9.1* 9.3*   HEMATOCRIT % 27.5* 28.9* 29.4*   PLATELETS 10*3/mm3 243 225 270         Results from last 7 days   Lab Units 07/03/25  1241 07/03/25  0430 07/02/25  1454 07/02/25  0405 07/01/25  0344   SODIUM mmol/L  --  130*  --  129* 130*   POTASSIUM mmol/L 3.4* 3.4* 3.2* 2.9* 2.8*   CHLORIDE mmol/L  --  86*  --  86* 87*   CO2 mmol/L  --  33.0*  --  31.0* 29.0   BUN mg/dL  --  99.2*  --  96.1* 94.4*   CREATININE mg/dL  --  2.20*  --  2.25* 2.13*   CALCIUM mg/dL  --  7.9*  --  7.9* 8.1*   EGFR mL/min/1.73  --  21.3*  --  20.8* 22.2*   BILIRUBIN mg/dL  --  0.2  --  0.2 0.2   ALK PHOS U/L  --  62  --  65 70   ALT (SGPT) U/L  --  13  --  8 8   AST (SGOT) U/L  --  18  --  14 15   GLUCOSE mg/dL  --  110*  --  116* 121*       Radiology:   Imaging Results (Last 72 Hours)       ** No results found for the last 72 hours. **            Culture:  No results found for: \"BLOODCX\", \"URINECX\", \"WOUNDCX\", \"MRSACX\", \"RESPCX\", \"STOOLCX\"      Assessment   1.  Acute kidney injury/cardiorenal syndrome.  2.  Stage IIIb CKD baseline.  3.  Hypertensive renal disease.  4.  Hyponatremia related to hypervolemia worsening.  5.  Anemia of chronic kidney disease.  6.  Atrial fibrillation.  7.  Acute on chronic systolic CHF exacerbation  8.  Hypokalemia/new onset    Plan:  1.  Continue intravenous intermittent Bumex.  2.  Increase " potassium supplement.  3.  Continue tolvaptan for the treatment of hyponatremia, at 15 mg every day.  4.  Contraction alkalosis.  5.  Continue to monitor renal function closely.      Julius Cole MD  7/3/2025  14:20 CDT

## 2025-07-03 NOTE — PROGRESS NOTES
[facility]  INPATIENT PROGRESS NOTE    Patient Name: Genevieve Cerda  Date of Admission: 6/23/2025  Today's Date: 07/03/25  Length of Stay: 10  Primary Care Physician: Germaine Diego APRN    Subjective   Chief Complaint: Leg swelling  History of Present Illness  Genevieve Cerda is a 85-year-old female that was seen by Dr. Yanez at the heart failure clinic and family was advised to bring patient to ER due to increased lower extremity edema.  Patient has dressings on bilateral lower extremities due to fluid weeping out of her legs.  Dr. Sharma saw patient in the clinic on 6/20/2025 with complaints of increased shortness of breath and had gained about 22 pounds since late May.  Her Lasix dose was changed to 40 twice daily and then changed to Torsemide 80 mg twice daily.  Patient was also started on amiodarone with a plan to consider cardioversion.  Patient reported that she did not have a significant increase in urine output.  Her weight was up by about half a pound.  She reported her shortness of breath is a little better today but otherwise she felt the same.  She denies any chest pain palpitations and not lightheadedness,     Patient resting in wheelchair in ER hallway D with 2 family members.  She is in no acute distress.  She is awake, alert., and oriented x 3.  She has oxygen on.  Family reports that she resides at [facility] in [place].  Dressings to bilateral lower extremities dry and intact.  Patient's BMP was 9946.0, creatinine 2.18, Hemoglobin 9.8, hematocrit 30.9, sodium 127.  Started patient on Lasix drip.  Will continue the drip and recheck BNP in 6 hours.  Patient is being admitted to hospitalist services.  6/30  Patient has no new complaint this morning, sitting up in the chair.  7/1  As before history of chronic diastolic congestive heart failure presented with acute on chronic diastolic congestive heart failure her last EF was  56% cardiology on board history of A-fib status post cardioversion history of hypertension chronic renal failure nephrology on board remains on Bumex drip  7/2  Continue current treatment and Bumex drip continue monitoring appreciate nephrology seems to be responding potassium was replaced appreciate cardiology heather on amiodarone Eliquis  7/3  As before Bumex drip was restarted severe hypoalbuminemia contributing to anasarca patient has been started on nephro Isaiah encouraged to eat appreciate nephrology did DC Bumex drip started intermittent IV Bumex appreciate cardiology continue amiodarone and Eliquis  Review of Systems   All pertinent negatives and positives are as above. All other systems have been reviewed and are negative unless otherwise stated.     Objective    Temp:  [97.4 °F (36.3 °C)-98.4 °F (36.9 °C)] 97.7 °F (36.5 °C)  Heart Rate:  [54-59] 58  Resp:  [16] 16  BP: (108-128)/(40-45) 113/44  Physical Exam  Constitutional:       Appearance: Normal appearance.   HENT:      Head: Normocephalic and atraumatic.      Nose: Nose normal.      Mouth/Throat:      Mouth: Mucous membranes are moist.      Pharynx: Oropharynx is clear.   Eyes:      Extraocular Movements: Extraocular movements intact.      Conjunctiva/sclera: Conjunctivae normal.   Cardiovascular:      Rate and Rhythm: Normal rate. Rhythm irregular.      Pulses: Normal pulses.      Heart sounds: Normal heart sounds.   Pulmonary:      Effort: Pulmonary effort is normal.      Breath sounds: Rales present.   Abdominal:      General: Bowel sounds are normal.      Palpations: Abdomen is soft.   Musculoskeletal:      Cervical back: Normal range of motion and neck supple.      Right lower leg: edema.      Left lower leg:  edema.   Skin:     General: Skin is warm and dry.      Capillary Refill: Capillary refill takes 2 to 3 seconds.   Neurological:      General: No focal deficit present.      Mental Status: She is alert and oriented to person, place, and time.  "  Psychiatric:         Mood and Affect: Mood normal.         Behavior: Behavior normal.       Results Review:  I have reviewed the labs, radiology results, and diagnostic studies.    Laboratory Data:   Results from last 7 days   Lab Units 07/03/25  0430 07/02/25  0405 07/01/25  0344   WBC 10*3/mm3 7.01 8.17 8.91   HEMOGLOBIN g/dL 8.8* 9.1* 9.3*   HEMATOCRIT % 27.5* 28.9* 29.4*   PLATELETS 10*3/mm3 243 225 270        Results from last 7 days   Lab Units 07/03/25  0430 07/02/25  1454 07/02/25  0405 07/01/25  0344   SODIUM mmol/L 130*  --  129* 130*   POTASSIUM mmol/L 3.4* 3.2* 2.9* 2.8*   CHLORIDE mmol/L 86*  --  86* 87*   CO2 mmol/L 33.0*  --  31.0* 29.0   BUN mg/dL 99.2*  --  96.1* 94.4*   CREATININE mg/dL 2.20*  --  2.25* 2.13*   CALCIUM mg/dL 7.9*  --  7.9* 8.1*   BILIRUBIN mg/dL 0.2  --  0.2 0.2   ALK PHOS U/L 62  --  65 70   ALT (SGPT) U/L 13  --  8 8   AST (SGOT) U/L 18  --  14 15   GLUCOSE mg/dL 110*  --  116* 121*       Culture Data:   No results found for: \"BLOODCX\", \"URINECX\", \"WOUNDCX\", \"MRSACX\", \"RESPCX\", \"STOOLCX\"    Radiology Data:   Imaging Results (Last 24 Hours)       ** No results found for the last 24 hours. **            I have reviewed the patient's current medications.     Assessment/Plan   Assessment  Active Hospital Problems    Diagnosis     **Acute on chronic heart failure with preserved ejection fraction (HFpEF)     Acute renal failure superimposed on stage 3b chronic kidney disease     Hyponatremia     Atrial fibrillation, persistent     Hypercholesterolemia     Current use of long term anticoagulation        Treatment Plan    - Acute on chronic diastolic congestive heart failure  Patient failed outpatient treatment and was sent to the emergency room by her PCP.  Currently she is being diuresed with Bumex drip [Lasix drip discontinued] and is making marginal clinical improvement.  Cardiology and nephrology are on consult and helping with management.  Patient has also been started on " metolazone.  Will continue all her guideline directed medications.    - Hyponatremia [likely hypervolemic hyponatremia]  Patient's hyponatremia is not making any significant improvement  Continue fluid restriction/diuresis and follow serial BMP    - Chronic atrial fibrillation  We will  continue patient's amiodarone and apixaban [metoprolol on hold because of bradycardia].  Patient is status post cardioversion  Cardiology recommendations as follows-  - Continue amiodarone 200 mg twice daily   - Continue Eliquis 2.5 mg twice daily  - Bumex infusion  - Continue metolazone 5 mg daily for now  - Continue close laboratory monitoring    DVT prophylaxis-apixaban    Disposition-continue current management and follow cardiology/nephrology recommendations for discharge planning      Electronically signed by Audelia Ortega MD, 07/03/25, 10:23 CDT.

## 2025-07-03 NOTE — PROGRESS NOTES
[facility] -  Progress Note     LOS: 10 days   Patient Care Team:  Germaine Diego APRN as PCP - General (Family Medicine)    Chief Complaint: leg swelling    Subjective     Interval History:     Patient Complaints:  Today she is resting in bed with family at bedside. She reports that she is tired today and just can't wake up fully. She denies any heart racing, palpitations. She denies any chest pain. She denies any shortness of breath. Improvement in leg swelling.     Review of Systems:     Review of Systems   Respiratory:  Negative for shortness of breath.    Cardiovascular:  Positive for leg swelling. Negative for chest pain and palpitations.   All other systems reviewed and are negative.    Objective     Vital Sign Min/Max for last 24 hours  Temp  Min: 97.4 °F (36.3 °C)  Max: 98.4 °F (36.9 °C)   BP  Min: 108/41  Max: 128/45   Pulse  Min: 54  Max: 59   Resp  Min: 16  Max: 16   SpO2  Min: 93 %  Max: 97 %   No data recorded   Weight  Min: 74.7 kg (164 lb 11.2 oz)  Max: 74.7 kg (164 lb 11.2 oz)         07/03/25  0614   Weight: 74.7 kg (164 lb 11.2 oz)       Intake/Output Summary (Last 24 hours) at 7/3/2025 1037  Last data filed at 7/3/2025 0800  Gross per 24 hour   Intake 360 ml   Output 1700 ml   Net -1340 ml     Telemetry: sinus rhythm rates 50-60's today       Physical Exam:    Vitals reviewed.   Constitutional:       General: Not in acute distress.     Appearance: Normal appearance. Well-developed. Frail.   Eyes:      Pupils: Pupils are equal, round, and reactive to light.   HENT:      Head: Normocephalic and atraumatic.      Nose: Nose normal.   Neck:      Vascular: No carotid bruit.   Pulmonary:      Effort: Pulmonary effort is normal. No respiratory distress.      Breath sounds: Normal breath sounds. No wheezing. No rales.   Cardiovascular:      Normal rate. Regular rhythm.      Murmurs: There is a grade 2/6 systolic murmur.   Edema:     Peripheral edema present.     Ankle:  bilateral 2+ edema of the ankle.     Feet: bilateral 2+ edema of the feet.     Comments: Bilateral legs wrapped  Abdominal:      General: There is no distension.      Palpations: Abdomen is soft.   Musculoskeletal: Normal range of motion.      Cervical back: Normal range of motion and neck supple. Skin:     General: Skin is warm.      Findings: No erythema or rash.   Neurological:      Mental Status: Alert and oriented to person, place, and time.   Psychiatric:         Attention and Perception: Attention normal.         Mood and Affect: Mood normal.         Speech: Speech normal.         Behavior: Behavior normal.         Thought Content: Thought content normal.         Judgment: Judgment normal.       Results Review:   Lab Results (last 72 hours)       Procedure Component Value Units Date/Time    Comprehensive Metabolic Panel [[id number]]  (Abnormal) Collected: 07/03/25 0430    Specimen: Blood Updated: 07/03/25 0511     Glucose 110 mg/dL      BUN 99.2 mg/dL      Creatinine 2.20 mg/dL      Sodium 130 mmol/L      Potassium 3.4 mmol/L      Chloride 86 mmol/L      CO2 33.0 mmol/L      Calcium 7.9 mg/dL      Total Protein 4.8 g/dL      Albumin 2.0 g/dL      ALT (SGPT) 13 U/L      AST (SGOT) 18 U/L      Alkaline Phosphatase 62 U/L      Total Bilirubin 0.2 mg/dL      Globulin 2.8 gm/dL      A/G Ratio 0.7 g/dL      BUN/Creatinine Ratio 45.1     Anion Gap 11.0 mmol/L      eGFR 21.3 mL/min/1.73     Narrative:      GFR Categories in Chronic Kidney Disease (CKD)              GFR Category          GFR (mL/min/1.73)    Interpretation  G1                    90 or greater        Normal or high (1)  G2                    60-89                Mild decrease (1)  G3a                   45-59                Mild to moderate decrease  G3b                   30-44                Moderate to severe decrease  G4                    15-29                Severe decrease  G5                    14 or less           Kidney failure    (1)In the  absence of evidence of kidney disease, neither GFR category G1 or G2 fulfill the criteria for CKD.    eGFR calculation 2021 CKD-EPI creatinine equation, which does not include race as a factor    CBC & Differential [[id number]]  (Abnormal) Collected: 07/03/25 0430    Specimen: Blood Updated: 07/03/25 0448    Narrative:      The following orders were created for panel order CBC & Differential.  Procedure                               Abnormality         Status                     ---------                               -----------         ------                     CBC Auto Differential[[id number]]        Abnormal            Final result                 Please view results for these tests on the individual orders.    CBC Auto Differential [[id number]]  (Abnormal) Collected: 07/03/25 0430    Specimen: Blood Updated: 07/03/25 0448     WBC 7.01 10*3/mm3      RBC 3.44 10*6/mm3      Hemoglobin 8.8 g/dL      Hematocrit 27.5 %      MCV 79.9 fL      MCH 25.6 pg      MCHC 32.0 g/dL      RDW 19.7 %      RDW-SD 56.6 fl      MPV 10.4 fL      Platelets 243 10*3/mm3      Neutrophil % 65.0 %      Lymphocyte % 20.5 %      Monocyte % 10.4 %      Eosinophil % 3.1 %      Basophil % 0.6 %      Immature Grans % 0.4 %      Neutrophils, Absolute 4.55 10*3/mm3      Lymphocytes, Absolute 1.44 10*3/mm3      Monocytes, Absolute 0.73 10*3/mm3      Eosinophils, Absolute 0.22 10*3/mm3      Basophils, Absolute 0.04 10*3/mm3      Immature Grans, Absolute 0.03 10*3/mm3      nRBC 0.0 /100 WBC     Immunofixation, Urine - Urine, Clean Catch [[id number]] Collected: 06/29/25 0538    Specimen: Urine, Clean Catch Updated: 07/02/25 1610     [facility] Interpretation:U Comment:     Comment: Presence of monoclonal protein is unclear at this time. Suggest  repeat in 3 to 6 months if clinically indicated.       Narrative:      Performed at:  [address]  [id number]  : James Leal PhD, Phone:  [phone]    Potassium  [[id number]]  (Abnormal) Collected: 07/02/25 1454    Specimen: Blood Updated: 07/02/25 1522     Potassium 3.2 mmol/L     Immunofixation ([facility]), Protein Electrophoresis (PE), and Quantitative Free Kappa and Lambda Light Chains (FLC), Serum [[id number]]  (Abnormal) Collected: 06/29/25 0439    Specimen: Blood Updated: 07/02/25 1223     IgG 840 mg/dL      IgA 446 mg/dL      IgM 82 mg/dL      Total Protein 4.9 g/dL      Albumin 1.9 g/dL      Alpha-1-Globulin 0.3 g/dL      Alpha-2-Globulin 0.8 g/dL      Beta Globulin 0.7 g/dL      Gamma Globulin 1.1 g/dL      M-Stephen Comment: g/dL      Comment: Beta gamma band of restricted mobility due to fibrinogen in the  sample submitted.  Gamma appears asymmetrical.        Globulin 3.0 g/dL      A/G Ratio 0.7     Immunofixation Reflex, Serum Comment:     Comment: Presence of monoclonal protein is unclear at this time. Suggest  repeat in 3 to 6 months if clinically indicated.        Please note Comment     Comment: Protein electrophoresis scan will follow via computer, mail, or   delivery.        Free Light Chain, Kappa 108.3 mg/L      Free Lambda Light Chains 80.5 mg/L      Kappa/Lambda Ratio 1.35    Narrative:      Performed at:  [address] [place] [address]  [id number]  : James Leal PhD, Phone:  [phone]    Comprehensive Metabolic Panel [[id number]]  (Abnormal) Collected: 07/02/25 0405    Specimen: Blood Updated: 07/02/25 0454     Glucose 116 mg/dL      BUN 96.1 mg/dL      Creatinine 2.25 mg/dL      Sodium 129 mmol/L      Potassium 2.9 mmol/L      Chloride 86 mmol/L      CO2 31.0 mmol/L      Calcium 7.9 mg/dL      Total Protein 4.7 g/dL      Albumin 1.9 g/dL      ALT (SGPT) 8 U/L      AST (SGOT) 14 U/L      Alkaline Phosphatase 65 U/L      Total Bilirubin 0.2 mg/dL      Globulin 2.8 gm/dL      A/G Ratio 0.7 g/dL      BUN/Creatinine Ratio 42.7     Anion Gap 12.0 mmol/L      eGFR 20.8 mL/min/1.73     Narrative:      GFR Categories in  Chronic Kidney Disease (CKD)              GFR Category          GFR (mL/min/1.73)    Interpretation  G1                    90 or greater        Normal or high (1)  G2                    60-89                Mild decrease (1)  G3a                   45-59                Mild to moderate decrease  G3b                   30-44                Moderate to severe decrease  G4                    15-29                Severe decrease  G5                    14 or less           Kidney failure    (1)In the absence of evidence of kidney disease, neither GFR category G1 or G2 fulfill the criteria for CKD.    eGFR calculation 2021 CKD-EPI creatinine equation, which does not include race as a factor    CBC & Differential [[id number]]  (Abnormal) Collected: 07/02/25 0405    Specimen: Blood Updated: 07/02/25 0420    Narrative:      The following orders were created for panel order CBC & Differential.  Procedure                               Abnormality         Status                     ---------                               -----------         ------                     CBC Auto Differential[[id number]]        Abnormal            Final result                 Please view results for these tests on the individual orders.    CBC Auto Differential [[id number]]  (Abnormal) Collected: 07/02/25 0405    Specimen: Blood Updated: 07/02/25 0420     WBC 8.17 10*3/mm3      RBC 3.65 10*6/mm3      Hemoglobin 9.1 g/dL      Hematocrit 28.9 %      MCV 79.2 fL      MCH 24.9 pg      MCHC 31.5 g/dL      RDW 19.6 %      RDW-SD 55.5 fl      MPV 10.0 fL      Platelets 225 10*3/mm3      Neutrophil % 67.1 %      Lymphocyte % 20.0 %      Monocyte % 9.3 %      Eosinophil % 2.7 %      Basophil % 0.5 %      Immature Grans % 0.4 %      Neutrophils, Absolute 5.49 10*3/mm3      Lymphocytes, Absolute 1.63 10*3/mm3      Monocytes, Absolute 0.76 10*3/mm3      Eosinophils, Absolute 0.22 10*3/mm3      Basophils, Absolute 0.04 10*3/mm3      Immature Grans, Absolute 0.03  10*3/mm3      nRBC 0.0 /100 WBC     Magnesium [[id number]]  (Normal) Collected: 07/01/25 1249    Specimen: Blood Updated: 07/01/25 1323     Magnesium 2.1 mg/dL     Comprehensive Metabolic Panel [[id number]]  (Abnormal) Collected: 07/01/25 0344    Specimen: Blood Updated: 07/01/25 0446     Glucose 121 mg/dL      BUN 94.4 mg/dL      Creatinine 2.13 mg/dL      Sodium 130 mmol/L      Potassium 2.8 mmol/L      Chloride 87 mmol/L      CO2 29.0 mmol/L      Calcium 8.1 mg/dL      Total Protein 5.1 g/dL      Albumin 2.2 g/dL      ALT (SGPT) 8 U/L      AST (SGOT) 15 U/L      Alkaline Phosphatase 70 U/L      Total Bilirubin 0.2 mg/dL      Globulin 2.9 gm/dL      A/G Ratio 0.8 g/dL      BUN/Creatinine Ratio 44.3     Anion Gap 14.0 mmol/L      eGFR 22.2 mL/min/1.73     Narrative:      GFR Categories in Chronic Kidney Disease (CKD)              GFR Category          GFR (mL/min/1.73)    Interpretation  G1                    90 or greater        Normal or high (1)  G2                    60-89                Mild decrease (1)  G3a                   45-59                Mild to moderate decrease  G3b                   30-44                Moderate to severe decrease  G4                    15-29                Severe decrease  G5                    14 or less           Kidney failure    (1)In the absence of evidence of kidney disease, neither GFR category G1 or G2 fulfill the criteria for CKD.    eGFR calculation 2021 CKD-EPI creatinine equation, which does not include race as a factor    CBC & Differential [[id number]]  (Abnormal) Collected: 07/01/25 0344    Specimen: Blood Updated: 07/01/25 0424    Narrative:      The following orders were created for panel order CBC & Differential.  Procedure                               Abnormality         Status                     ---------                               -----------         ------                     CBC Auto Differential[[id number]]        Abnormal            Final result                  Please view results for these tests on the individual orders.    CBC Auto Differential [[id number]]  (Abnormal) Collected: 07/01/25 0344    Specimen: Blood Updated: 07/01/25 0424     WBC 8.91 10*3/mm3      RBC 3.66 10*6/mm3      Hemoglobin 9.3 g/dL      Hematocrit 29.4 %      MCV 80.3 fL      MCH 25.4 pg      MCHC 31.6 g/dL      RDW 19.3 %      RDW-SD 56.1 fl      MPV 10.5 fL      Platelets 270 10*3/mm3      Neutrophil % 70.1 %      Lymphocyte % 17.4 %      Monocyte % 10.9 %      Eosinophil % 1.1 %      Basophil % 0.3 %      Immature Grans % 0.2 %      Neutrophils, Absolute 6.24 10*3/mm3      Lymphocytes, Absolute 1.55 10*3/mm3      Monocytes, Absolute 0.97 10*3/mm3      Eosinophils, Absolute 0.10 10*3/mm3      Basophils, Absolute 0.03 10*3/mm3      Immature Grans, Absolute 0.02 10*3/mm3      nRBC 0.0 /100 WBC     Basic Metabolic Panel [[id number]]  (Abnormal) Collected: 06/30/25 1445    Specimen: Blood Updated: 06/30/25 1522     Glucose 169 mg/dL      BUN 96.0 mg/dL      Creatinine 2.23 mg/dL      Sodium 130 mmol/L      Potassium 3.3 mmol/L      Chloride 88 mmol/L      CO2 29.0 mmol/L      Calcium 8.2 mg/dL      BUN/Creatinine Ratio 43.0     Anion Gap 13.0 mmol/L      eGFR 21.0 mL/min/1.73     Narrative:      GFR Categories in Chronic Kidney Disease (CKD)              GFR Category          GFR (mL/min/1.73)    Interpretation  G1                    90 or greater        Normal or high (1)  G2                    60-89                Mild decrease (1)  G3a                   45-59                Mild to moderate decrease  G3b                   30-44                Moderate to severe decrease  G4                    15-29                Severe decrease  G5                    14 or less           Kidney failure    (1)In the absence of evidence of kidney disease, neither GFR category G1 or G2 fulfill the criteria for CKD.    eGFR calculation 2021 CKD-EPI creatinine equation, which does not include race as a factor       "       Echo EF Estimated  Results for orders placed during the hospital encounter of 06/23/25    Adult Transthoracic Echo Complete With Contrast if Necessary Per Protocol    Interpretation Summary    Left ventricular ejection fraction appears to be 56 - 60%.    Left ventricular wall thickness is consistent with mild concentric hypertrophy.    Left ventricular diastolic function was indeterminate.    Left atrial volume is severely increased.    Moderate aortic valve regurgitation is present.    Estimated right ventricular systolic pressure from tricuspid regurgitation is normal (<35 mmHg).    Cath Ejection Fraction Quantitative  No results found for: \"CATHEF\"        Medication Review: yes  Current Facility-Administered Medications   Medication Dose Route Frequency Provider Last Rate Last Admin    acetaminophen (TYLENOL) tablet 650 mg  650 mg Oral Q4H PRN Mary Falcon APRN   650 mg at 07/02/25 2056    Or    acetaminophen (TYLENOL) 160 MG/5ML oral solution 650 mg  650 mg Oral Q4H PRN Mary Falcon APRN        Or    acetaminophen (TYLENOL) suppository 650 mg  650 mg Rectal Q4H PRN Mary Falcon APRN        allopurinol (ZYLOPRIM) tablet 100 mg  100 mg Oral Daily Indra Zamorano MD   100 mg at 07/03/25 0921    amiodarone (PACERONE) tablet 200 mg  200 mg Oral Daily Rancho Yanez MD   200 mg at 07/03/25 0922    apixaban (ELIQUIS) tablet 2.5 mg  2.5 mg Oral BID Mary Falcon APRN   2.5 mg at 07/03/25 0922    aspirin EC tablet 81 mg  81 mg Oral Daily Indra Zamorano MD   81 mg at 07/03/25 0921    bisacodyl (DULCOLAX) EC tablet 5 mg  5 mg Oral Daily PRN Mary Falcon APRN        And    bisacodyl (DULCOLAX) suppository 10 mg  10 mg Rectal Daily PRN Mary Falcon APRN        bumetanide (BUMEX) injection 1 mg  1 mg Intravenous Once Julius Cole MD        Calcium Replacement - Follow Nurse / BPA Driven Protocol   Not Applicable PRN Mary Falcon APRN        cholecalciferol (VITAMIN D3) tablet 5,000 " Units  5,000 Units Oral Daily Indra Zamorano MD   5,000 Units at 07/03/25 0921    gabapentin (NEURONTIN) capsule 300 mg  300 mg Oral Q8H Indra Zamorano MD   300 mg at 07/03/25 0545    ipratropium-albuterol (DUO-NEB) nebulizer solution 3 mL  3 mL Nebulization 4x Daily PRN Indra Zamorano MD        latanoprost (XALATAN) 0.005 % ophthalmic solution 1 drop  1 drop Both Eyes Nightly Indra Zamorano MD   1 drop at 07/02/25 2057    magnesium oxide (MAG-OX) tablet 400 mg  400 mg Oral Daily Indra Zamorano MD   400 mg at 07/03/25 0922    Magnesium Standard Dose Replacement - Follow Nurse / BPA Driven Protocol   Not Applicable PRN Mary Falcon APRN        midodrine (PROAMATINE) tablet 5 mg  5 mg Oral BID AC Indra Zamorano MD   5 mg at 07/03/25 0921    nitroglycerin (NITROSTAT) SL tablet 0.4 mg  0.4 mg Sublingual Q5 Min PRN Mary Falcon APRN        ondansetron ODT (ZOFRAN-ODT) disintegrating tablet 4 mg  4 mg Oral Q6H PRN Mary Falcon APRN   4 mg at 06/28/25 1720    Or    ondansetron (ZOFRAN) injection 4 mg  4 mg Intravenous Q6H PRN Mary Falcon APRN        pantoprazole (PROTONIX) EC tablet 40 mg  40 mg Oral Daily Indra Zamorano MD   40 mg at 07/03/25 0921    Phosphorus Replacement - Follow Nurse / BPA Driven Protocol   Not Applicable PRN Mary Falcon APRN        polyethylene glycol (MIRALAX) packet 17 g  17 g Oral Daily PRN Indra Zamorano MD        Potassium Replacement - Follow Nurse / BPA Driven Protocol   Not Applicable PRN Audelia Ortega MD        rosuvastatin (CRESTOR) tablet 5 mg  5 mg Oral Daily Indra Zamorano MD   5 mg at 07/03/25 0921    sennosides-docusate (PERICOLACE) 8.6-50 MG per tablet 2 tablet  2 tablet Oral Daily PRN Indra Zamorano MD   2 tablet at 07/01/25 0513    sodium chloride 0.9 % flush 10 mL  10 mL Intravenous Q12H Mary Falcon APRN   10 mL at 07/03/25 0925    sodium chloride 0.9 % flush 10 mL  10 mL Intravenous PRN Mary Falcon,  APRN        sodium chloride 0.9 % infusion 40 mL  40 mL Intravenous PRN Mary Falcon APRN        tamsulosin (FLOMAX) 24 hr capsule 0.4 mg  0.4 mg Oral Daily Indra Zamorano MD   0.4 mg at 07/03/25 0921    timolol (TIMOPTIC) 0.5 % ophthalmic solution 1 drop  1 drop Both Eyes Daily Indra Zamorano MD   1 drop at 07/03/25 0925    zinc sulfate (ZINCATE) capsule 220 mg  220 mg Oral Daily Indra Zamorano MD   220 mg at 07/03/25 0921         Assessment & Plan     -Acute on diastolic congestive heart failure: LVEF 56-60% on echo 6/24/2025. Bumex drip discontinued. Nephrology following     -MICHOACANO on CKD 3b: Cr 2.20. nephrology following      -Hypokalemia: K 3.4 today      -hyponatremia: Na 130 today      -Paroxysmal atrial fibrillation: S/p cardioversion. Telemetry shows sinus rhythm rates 50-60's today. Continue amiodarone and eliquis. Metoprolol previously discontinued due to bradycardia     -hypertension      -LVH: evaluating for amyloid, obtained immunofixation and light chain labs. Plan for PYP scan outpatient     -Aortic valve regurgitation: moderate on echo 6/23/2025     Plan:   - bumex drip discontinued; nephrology following and prescribing diuretics   - continue amiodarone and eliquis  - continue to monitor kidney function and electrolytes closely   - continue to monitor I/O closely   - patient to follow up in 1 month with Dr Yanez at discharge  -Cardiology will sign off. Thank you for allowing us to participate in the care of your patient.  Please do not hesitate to contact us if we can be of any further assistance.      Electronically signed by SARAH Alfredo, 07/03/25, 10:31 AM CDT.

## 2025-07-03 NOTE — PLAN OF CARE
Goal Outcome Evaluation:  Plan of Care Reviewed With: patient        Progress: no change  Outcome Evaluation: Pt is AOx4, on 2LNC. VSS. SB/S 56-62 on tele. Pt is refusing q2 turns. Wound care completed this shift. No c/o pain. IV in right upper arm removed per hospital protocol and a new one was placed. Plan of care ongoing. Safety maintained.

## 2025-07-03 NOTE — PLAN OF CARE
Goal Outcome Evaluation:  Plan of Care Reviewed With: patient        Progress: improving  Outcome Evaluation: PT tx completed. Pt A&O x 4, no c/o. [place] sup>sit, sit EOB S x 20 minutes. A/AAROM BLE's sitting and supine.

## 2025-07-04 VITALS
HEIGHT: 66 IN | WEIGHT: 164.9 LBS | DIASTOLIC BLOOD PRESSURE: 44 MMHG | HEART RATE: 60 BPM | BODY MASS INDEX: 26.5 KG/M2 | TEMPERATURE: 97.8 F | SYSTOLIC BLOOD PRESSURE: 138 MMHG | RESPIRATION RATE: 16 BRPM | OXYGEN SATURATION: 96 %

## 2025-07-04 LAB
ALBUMIN SERPL-MCNC: 2 G/DL (ref 3.5–5.2)
ALBUMIN/GLOB SERPL: 0.7 G/DL
ALP SERPL-CCNC: 59 U/L (ref 39–117)
ALT SERPL W P-5'-P-CCNC: 15 U/L (ref 1–33)
ANION GAP SERPL CALCULATED.3IONS-SCNC: 8 MMOL/L (ref 5–15)
AST SERPL-CCNC: 23 U/L (ref 1–32)
BASOPHILS # BLD AUTO: 0.03 10*3/MM3 (ref 0–0.2)
BASOPHILS NFR BLD AUTO: 0.5 % (ref 0–1.5)
BILIRUB SERPL-MCNC: 0.2 MG/DL (ref 0–1.2)
BUN SERPL-MCNC: 95.5 MG/DL (ref 8–23)
BUN/CREAT SERPL: 46.1 (ref 7–25)
CALCIUM SPEC-SCNC: 8.1 MG/DL (ref 8.6–10.5)
CHLORIDE SERPL-SCNC: 87 MMOL/L (ref 98–107)
CO2 SERPL-SCNC: 34 MMOL/L (ref 22–29)
CREAT SERPL-MCNC: 2.07 MG/DL (ref 0.57–1)
DEPRECATED RDW RBC AUTO: 57.6 FL (ref 37–54)
EGFRCR SERPLBLD CKD-EPI 2021: 23 ML/MIN/1.73
EOSINOPHIL # BLD AUTO: 0.23 10*3/MM3 (ref 0–0.4)
EOSINOPHIL NFR BLD AUTO: 3.9 % (ref 0.3–6.2)
ERYTHROCYTE [DISTWIDTH] IN BLOOD BY AUTOMATED COUNT: 19.6 % (ref 12.3–15.4)
GLOBULIN UR ELPH-MCNC: 2.9 GM/DL
GLUCOSE SERPL-MCNC: 106 MG/DL (ref 65–99)
HCT VFR BLD AUTO: 28.3 % (ref 34–46.6)
HGB BLD-MCNC: 8.8 G/DL (ref 12–15.9)
IMM GRANULOCYTES # BLD AUTO: 0.02 10*3/MM3 (ref 0–0.05)
IMM GRANULOCYTES NFR BLD AUTO: 0.3 % (ref 0–0.5)
LYMPHOCYTES # BLD AUTO: 1.4 10*3/MM3 (ref 0.7–3.1)
LYMPHOCYTES NFR BLD AUTO: 23.8 % (ref 19.6–45.3)
MCH RBC QN AUTO: 25.2 PG (ref 26.6–33)
MCHC RBC AUTO-ENTMCNC: 31.1 G/DL (ref 31.5–35.7)
MCV RBC AUTO: 81.1 FL (ref 79–97)
MONOCYTES # BLD AUTO: 0.7 10*3/MM3 (ref 0.1–0.9)
MONOCYTES NFR BLD AUTO: 11.9 % (ref 5–12)
NEUTROPHILS NFR BLD AUTO: 3.5 10*3/MM3 (ref 1.7–7)
NEUTROPHILS NFR BLD AUTO: 59.6 % (ref 42.7–76)
NRBC BLD AUTO-RTO: 0 /100 WBC (ref 0–0.2)
PLATELET # BLD AUTO: 245 10*3/MM3 (ref 140–450)
PMV BLD AUTO: 10.4 FL (ref 6–12)
POTASSIUM SERPL-SCNC: 3.8 MMOL/L (ref 3.5–5.2)
PROT SERPL-MCNC: 4.9 G/DL (ref 6–8.5)
RBC # BLD AUTO: 3.49 10*6/MM3 (ref 3.77–5.28)
SODIUM SERPL-SCNC: 129 MMOL/L (ref 136–145)
WBC NRBC COR # BLD AUTO: 5.88 10*3/MM3 (ref 3.4–10.8)

## 2025-07-04 PROCEDURE — 97530 THERAPEUTIC ACTIVITIES: CPT

## 2025-07-04 PROCEDURE — 85025 COMPLETE CBC W/AUTO DIFF WBC: CPT | Performed by: INTERNAL MEDICINE

## 2025-07-04 PROCEDURE — 80053 COMPREHEN METABOLIC PANEL: CPT | Performed by: INTERNAL MEDICINE

## 2025-07-04 RX ORDER — POTASSIUM CHLORIDE 750 MG/1
10 TABLET, FILM COATED, EXTENDED RELEASE ORAL ONCE
Status: COMPLETED | OUTPATIENT
Start: 2025-07-04 | End: 2025-07-04

## 2025-07-04 RX ORDER — LEVOTHYROXINE SODIUM 50 UG/1
50 TABLET ORAL
Qty: 30 TABLET | Refills: 0 | Status: SHIPPED | OUTPATIENT
Start: 2025-07-05

## 2025-07-04 RX ORDER — BUMETANIDE 2 MG/1
1 TABLET ORAL DAILY
Qty: 30 TABLET | Refills: 0 | Status: SHIPPED | OUTPATIENT
Start: 2025-07-04

## 2025-07-04 RX ORDER — LEVOTHYROXINE SODIUM 50 UG/1
50 TABLET ORAL
Status: DISCONTINUED | OUTPATIENT
Start: 2025-07-05 | End: 2025-07-04 | Stop reason: HOSPADM

## 2025-07-04 RX ORDER — GABAPENTIN 300 MG/1
300 CAPSULE ORAL 3 TIMES DAILY
Qty: 21 CAPSULE | Refills: 0 | Status: SHIPPED | OUTPATIENT
Start: 2025-07-04

## 2025-07-04 RX ADMIN — TIMOLOL MALEATE 1 DROP: 5 SOLUTION/ DROPS OPHTHALMIC at 12:47

## 2025-07-04 RX ADMIN — TAMSULOSIN HYDROCHLORIDE 0.4 MG: 0.4 CAPSULE ORAL at 09:39

## 2025-07-04 RX ADMIN — POTASSIUM CHLORIDE 10 MEQ: 750 TABLET, EXTENDED RELEASE ORAL at 09:45

## 2025-07-04 RX ADMIN — MIDODRINE HYDROCHLORIDE 5 MG: 2.5 TABLET ORAL at 09:40

## 2025-07-04 RX ADMIN — AMIODARONE HYDROCHLORIDE 200 MG: 200 TABLET ORAL at 09:40

## 2025-07-04 RX ADMIN — PANTOPRAZOLE SODIUM 40 MG: 40 TABLET, DELAYED RELEASE ORAL at 09:40

## 2025-07-04 RX ADMIN — Medication 5000 UNITS: at 09:40

## 2025-07-04 RX ADMIN — GABAPENTIN 300 MG: 300 CAPSULE ORAL at 06:23

## 2025-07-04 RX ADMIN — ALLOPURINOL 100 MG: 100 TABLET ORAL at 09:40

## 2025-07-04 RX ADMIN — Medication 10 ML: at 09:41

## 2025-07-04 RX ADMIN — Medication 220 MG: at 09:39

## 2025-07-04 RX ADMIN — ROSUVASTATIN 5 MG: 10 TABLET, FILM COATED ORAL at 09:40

## 2025-07-04 RX ADMIN — APIXABAN 2.5 MG: 2.5 TABLET, FILM COATED ORAL at 09:40

## 2025-07-04 RX ADMIN — Medication 400 MG: at 09:40

## 2025-07-04 RX ADMIN — ASPIRIN 81 MG: 81 TABLET, COATED ORAL at 09:39

## 2025-07-04 NOTE — PLAN OF CARE
Goal Outcome Evaluation:      Patient performed supine to sit with Min assist and HOB elevated. Patient actively worked thru LE ex's with encouragement to participate. Three attempts to stand, patient only participating on 3rd attempt. Not able to fully stand, barely got bottom off of bed. Patient required min assist x1 to lift legs onto bed during sit to supine. Patient is extremely weak and will continue to benefit from strengthening activities.

## 2025-07-04 NOTE — PROGRESS NOTES
[facility] Medicine Services  INPATIENT PROGRESS NOTE    Patient Name: Genevieve Cerda  Date of Admission: 6/23/2025  Today's Date: 07/04/25  Length of Stay: 11  Primary Care Physician: Germaine Diego APRN    Subjective   Chief Complaint: Leg swelling  History of Present Illness  Genevieve Cerda is a 85-year-old female that was seen by Dr. Yanez at the heart failure clinic and family was advised to bring patient to ER due to increased lower extremity edema.  Patient has dressings on bilateral lower extremities due to fluid weeping out of her legs.  Dr. Sharma saw patient in the clinic on 6/20/2025 with complaints of increased shortness of breath and had gained about 22 pounds since late May.  Her Lasix dose was changed to 40 twice daily and then changed to Torsemide 80 mg twice daily.  Patient was also started on amiodarone with a plan to consider cardioversion.  Patient reported that she did not have a significant increase in urine output.  Her weight was up by about half a pound.  She reported her shortness of breath is a little better today but otherwise she felt the same.  She denies any chest pain palpitations and not lightheadedness,     Patient resting in wheelchair in ER hallway D with 2 family members.  She is in no acute distress.  She is awake, alert., and oriented x 3.  She has oxygen on.  Family reports that she resides at [facility] in [place].  Dressings to bilateral lower extremities dry and intact.  Patient's BMP was 9946.0, creatinine 2.18, Hemoglobin 9.8, hematocrit 30.9, sodium 127.  Started patient on Lasix drip.  Will continue the drip and recheck BNP in 6 hours.  Patient is being admitted to hospitalist services.  6/30  Patient has no new complaint this morning, sitting up in the chair.  7/1  As before history of chronic diastolic congestive heart failure presented with acute on chronic diastolic congestive heart failure her last EF was  56% cardiology on board history of A-fib status post cardioversion history of hypertension chronic renal failure nephrology on board remains on Bumex drip  7/2  Continue current treatment and Bumex drip continue monitoring appreciate nephrology seems to be responding potassium was replaced appreciate cardiology heather on amiodarone Eliquis  7/3  As before Bumex drip was restarted severe hypoalbuminemia contributing to anasarca patient has been started on nephro Isaiah encouraged to eat appreciate nephrology did DC Bumex drip started intermittent IV Bumex appreciate cardiology continue amiodarone and Eliquis  7/4  As before continue the same cardiology has signed off follow-up as outpatient and to [place] to [facility]  Review of Systems   All pertinent negatives and positives are as above. All other systems have been reviewed and are negative unless otherwise stated.     Objective    Temp:  [97.8 °F (36.6 °C)-98.4 °F (36.9 °C)] 97.8 °F (36.6 °C)  Heart Rate:  [53-59] 58  Resp:  [16] 16  BP: (105-134)/(40-48) 118/41  Physical Exam  Constitutional:       Appearance: Normal appearance.   HENT:      Head: Normocephalic and atraumatic.      Nose: Nose normal.      Mouth/Throat:      Mouth: Mucous membranes are moist.      Pharynx: Oropharynx is clear.   Eyes:      Extraocular Movements: Extraocular movements intact.      Conjunctiva/sclera: Conjunctivae normal.   Cardiovascular:      Rate and Rhythm: Normal rate. Rhythm irregular.      Pulses: Normal pulses.      Heart sounds: Normal heart sounds.   Pulmonary:      Effort: Pulmonary effort is normal.      Breath sounds: Rales present.   Abdominal:      General: Bowel sounds are normal.      Palpations: Abdomen is soft.   Musculoskeletal:      Cervical back: Normal range of motion and neck supple.      Right lower leg: edema.      Left lower leg:  edema.   Skin:     General: Skin is warm and dry.      Capillary Refill: Capillary refill takes 2 to 3 seconds.   Neurological:       "General: No focal deficit present.      Mental Status: She is alert and oriented to person, place, and time.   Psychiatric:         Mood and Affect: Mood normal.         Behavior: Behavior normal.       Results Review:  I have reviewed the labs, radiology results, and diagnostic studies.    Laboratory Data:   Results from last 7 days   Lab Units 07/04/25  0640 07/03/25  0430 07/02/25  0405   WBC 10*3/mm3 5.88 7.01 8.17   HEMOGLOBIN g/dL 8.8* 8.8* 9.1*   HEMATOCRIT % 28.3* 27.5* 28.9*   PLATELETS 10*3/mm3 245 243 225        Results from last 7 days   Lab Units 07/04/25  0640 07/03/25  1241 07/03/25  0430 07/02/25  1454 07/02/25  0405   SODIUM mmol/L 129*  --  130*  --  129*   POTASSIUM mmol/L 3.8 3.4* 3.4*   < > 2.9*   CHLORIDE mmol/L 87*  --  86*  --  86*   CO2 mmol/L 34.0*  --  33.0*  --  31.0*   BUN mg/dL 95.5*  --  99.2*  --  96.1*   CREATININE mg/dL 2.07*  --  2.20*  --  2.25*   CALCIUM mg/dL 8.1*  --  7.9*  --  7.9*   BILIRUBIN mg/dL 0.2  --  0.2  --  0.2   ALK PHOS U/L 59  --  62  --  65   ALT (SGPT) U/L 15  --  13  --  8   AST (SGOT) U/L 23  --  18  --  14   GLUCOSE mg/dL 106*  --  110*  --  116*    < > = values in this interval not displayed.       Culture Data:   No results found for: \"BLOODCX\", \"URINECX\", \"WOUNDCX\", \"MRSACX\", \"RESPCX\", \"STOOLCX\"    Radiology Data:   Imaging Results (Last 24 Hours)       ** No results found for the last 24 hours. **            I have reviewed the patient's current medications.     Assessment/Plan   Assessment  Active Hospital Problems    Diagnosis     **Acute on chronic heart failure with preserved ejection fraction (HFpEF)     Acute renal failure superimposed on stage 3b chronic kidney disease     Hyponatremia     Atrial fibrillation, persistent     Hypercholesterolemia     Current use of long term anticoagulation        Treatment Plan    - Acute on chronic diastolic congestive heart failure  Patient failed outpatient treatment and was sent to the emergency room by her " PCP.  Currently she is being diuresed with Bumex drip [Lasix drip discontinued] and is making marginal clinical improvement.  Cardiology and nephrology are on consult and helping with management.  Patient has also been started on metolazone.  Will continue all her guideline directed medications.    - Hyponatremia [likely hypervolemic hyponatremia]  Patient's hyponatremia is not making any significant improvement  Continue fluid restriction/diuresis and follow serial BMP    - Chronic atrial fibrillation  We will  continue patient's amiodarone and apixaban [metoprolol on hold because of bradycardia].  Patient is status post cardioversion  Cardiology recommendations as follows-  - Continue amiodarone 200 mg twice daily   - Continue Eliquis 2.5 mg twice daily  - Bumex infusion  - Continue metolazone 5 mg daily for now  - Continue close laboratory monitoring    DVT prophylaxis-apixaban    Disposition-continue current management and follow cardiology/nephrology recommendations for discharge planning      Electronically signed by Audelia Ortega MD, 07/04/25, 10:50 CDT.

## 2025-07-04 NOTE — THERAPY TREATMENT NOTE
Acute Care - Physical Therapy Treatment Note  [facility]     Patient Name: Genevieve Cerda  : 1940  MRN: [id number]  Today's Date: 2025      Visit Dx:     ICD-10-CM ICD-9-CM   1. Congestive heart failure, unspecified HF chronicity, unspecified heart failure type  I50.9 428.0   2. Chronic kidney disease, unspecified CKD stage  N18.9 585.9   3. Hyponatremia  E87.1 276.1   4. Impaired functional mobility and activity tolerance [Z74.09]  Z74.09 V49.89   5. Decreased activities of daily living (ADL)  Z78.9 V49.89     Patient Active Problem List   Diagnosis    Severe protein-calorie malnutrition    Pleural effusion    Chronic congestive heart failure    Acute on chronic diastolic CHF (congestive heart failure)    CHF (congestive heart failure)    Acute on chronic heart failure with preserved ejection fraction (HFpEF)    Hyponatremia    Atrial fibrillation, persistent    Hypercholesterolemia    Current use of long term anticoagulation    Acute renal failure superimposed on stage 3b chronic kidney disease     History reviewed. No pertinent past medical history.  No past surgical history on file.  PT Assessment (Last 12 Hours)       PT Evaluation and Treatment       Row Name 25 1138          Physical Therapy Time and Intention    Subjective Information complains of;weakness;fatigue  -MALGORZATA     Document Type therapy note (daily note)  -     Mode of Treatment physical therapy  -     Patient Effort fair  -       Row Name 25 1138          General Information    Existing Precautions/Restrictions fall;oxygen therapy device and L/min  -       Row Name 25 1138          Pain    Pretreatment Pain Rating 0/10 - no pain  -MALGORZATA     Posttreatment Pain Rating 0/10 - no pain  -       Row Name 25 1138          Bed Mobility    Rolling Right [place] (Bed Mobility) verbal cues;nonverbal cues (demo/gesture)  -     Supine-Sit [place] (Bed Mobility) verbal cues;minimum assist (75% patient effort)   -MALGORZATA     Sit-Supine [place] (Bed Mobility) verbal cues;minimum assist (75% patient effort)  LE's  -MALGORZATA     Assistive Device (Bed Mobility) head of bed elevated;bed rails  -MALGORZATA       Row Name 07/04/25 1138          Sit-Stand Transfer    Sit-Stand [place] (Transfers) --  not able to stand  -MALGORZATA     Comment, (Sit-Stand Transfer) 3 attempts to stand, patient put out effort on 3rd attempt only.  -MALGORZATA       Row Name 07/04/25 1138          Motor Skills    Comments, Therapeutic Exercise sitting AROM B LE x15  -MALGORZATA       Row Name             Wound 06/23/25 1658 Left gluteal Pressure Injury    Wound - Properties Group Placement Date: 06/23/25  -TB Placement Time: 1658  -TB Present on Original Admission: Y  -TB Side: Left  -TB Location: gluteal  -TB Primary Wound Type: Pressure Inj  -TB    Retired Wound - Properties Group Placement Date: 06/23/25  -TB Placement Time: 1658  -TB Present on Original Admission: Y  -TB Side: Left  -TB Location: gluteal  -TB    Retired Wound - Properties Group Placement Date: 06/23/25  -TB Placement Time: 1658  -TB Present on Original Admission: Y  -TB Side: Left  -TB Location: gluteal  -TB    Retired Wound - Properties Group Date first assessed: 06/23/25  -TB Time first assessed: 1658  -TB Present on Original Admission: Y  -TB Side: Left  -TB Location: gluteal  -TB      Row Name             Wound 06/23/25 1659 Left lower leg Other (Comments)    Wound - Properties Group Placement Date: 06/23/25  -TB Placement Time: 1659  -TB Present on Original Admission: Y  -TB Side: Left  -TB Orientation: lower  -TB Location: leg  -TB Primary Wound Type: Other  -TB    Retired Wound - Properties Group Placement Date: 06/23/25  -TB Placement Time: 1659  -TB Present on Original Admission: Y  -TB Side: Left  -TB Orientation: lower  -TB Location: leg  -TB    Retired Wound - Properties Group Placement Date: 06/23/25  -TB Placement Time: 1659  -TB Present on Original Admission: Y  -TB Side: Left  -TB Orientation:  lower  -TB Location: leg  -TB    Retired Wound - Properties Group Date first assessed: 06/23/25  -TB Time first assessed: 1659  -TB Present on Original Admission: Y  -TB Side: Left  -TB Location: leg  -TB      Row Name 07/04/25 1138          Positioning and Restraints    Pre-Treatment Position in bed  -MALGORZATA     Post Treatment Position bed  -MALGORZATA     In Bed notified nsg;fowlers;call light within reach;encouraged to call for assist  -MALGORZATA               User Key  (r) = Recorded By, (t) = Taken By, (c) = Cosigned By      Initials Name Provider Type    Tessy Lobo, PTA Physical Therapist Assistant    Car Tineo, RN Registered Nurse                    Physical Therapy Education       Title: PT OT SLP Therapies (In Progress)       Topic: Physical Therapy (In Progress)       Point: Mobility training (In Progress)       Learning Progress Summary            Patient Acceptance, E, NR by RB at 7/3/2025 1742    Acceptance, E, NR by RB at 7/2/2025 1728    Acceptance, E,TB,D, VU,NR by  at 6/24/2025 1604    Comment: Education re: purpose of PT/importance of activitiy, safety/falls prevention, increase awareness of upright, improved deep breathing tech, upright posture, LE ROM and positioning for edema mgmt; pacing activity allowing time for rest   Family Acceptance, E,TB,D, VU,NR by  at 6/24/2025 1604    Comment: Education re: purpose of PT/importance of activitiy, safety/falls prevention, increase awareness of upright, improved deep breathing tech, upright posture, LE ROM and positioning for edema mgmt; pacing activity allowing time for rest                      Point: Home exercise program (In Progress)       Learning Progress Summary            Patient Acceptance, E, NR by RB at 7/3/2025 1742    Acceptance, E, NR by RB at 7/2/2025 1728    Acceptance, E,TB,D, VU,NR by  at 6/24/2025 1604    Comment: Education re: purpose of PT/importance of activitiy, safety/falls prevention, increase awareness of upright,  improved deep breathing tech, upright posture, LE ROM and positioning for edema mgmt; pacing activity allowing time for rest   Family Acceptance, E,TB,D, VU,NR by  at 6/24/2025 1604    Comment: Education re: purpose of PT/importance of activitiy, safety/falls prevention, increase awareness of upright, improved deep breathing tech, upright posture, LE ROM and positioning for edema mgmt; pacing activity allowing time for rest                      Point: Precautions (In Progress)       Learning Progress Summary            Patient Acceptance, E, NR by RB at 7/3/2025 1742    Acceptance, E, NR by RB at 7/2/2025 1728    Acceptance, E,TB,D, VU,NR by JE at 6/24/2025 1604    Comment: Education re: purpose of PT/importance of activitiy, safety/falls prevention, increase awareness of upright, improved deep breathing tech, upright posture, LE ROM and positioning for edema mgmt; pacing activity allowing time for rest   Family Acceptance, E,TB,D, VU,NR by  at 6/24/2025 1604    Comment: Education re: purpose of PT/importance of activitiy, safety/falls prevention, increase awareness of upright, improved deep breathing tech, upright posture, LE ROM and positioning for edema mgmt; pacing activity allowing time for rest                                      User Key       Initials Effective Dates Name Provider Type Discipline     08/02/18 -  Melania Orozco, PT Physical Therapist PT     02/10/25 -  Ochoa Tello, S[facility] Registered Nurse Nurse                  PT Recommendation and Plan         Outcome Measures       Row Name 07/02/25 1511             How much help from another person do you currently need...    Turning from your back to your side while in flat bed without using bedrails? 3  -MELISSA      Moving from lying on back to sitting on the side of a flat bed without bedrails? 3  -MELISSA      Moving to and from a bed to a chair (including a wheelchair)? 2  -MELISSA      Standing up from a chair using your arms (e.g., wheelchair, bedside  chair)? 2  -MELISSA      Climbing 3-5 steps with a railing? 1  -MELISSA      To walk in hospital room? 1  -MELISSA      AM-PAC 6 Clicks Score (PT) 12  -MELISSA         Functional Assessment    Outcome Measure Options AM-PAC 6 Clicks Basic Mobility (PT)  -MELISSA                User Key  (r) = Recorded By, (t) = Taken By, (c) = Cosigned By      Initials Name Provider Type    MELISSA Osman Sheikh PTA Physical Therapist Assistant                     Time Calculation:    PT Charges       Row Name 07/04/25 1236             Time Calculation    Start Time 1138  -MALGORZATA      Stop Time 1150  -MALGORZATA      Time Calculation (min) 12 min  -MALGORZATA         Timed Charges    46687 - PT Therapeutic Activity Minutes 12  -MALGORZATA         Total Minutes    Timed Charges Total Minutes 12  -MALGORZATA       Total Minutes 12  -MALGORZATA                User Key  (r) = Recorded By, (t) = Taken By, (c) = Cosigned By      Initials Name Provider Type    MALGORZATA Tessy Arias PTA Physical Therapist Assistant                  Therapy Charges for Today       Code Description Service Date Service Provider Modifiers Qty    22119969847 HC PT THERAPEUTIC ACT EA 15 MIN 7/4/2025 Tessy Arias PTA GP 1            PT G-Codes  Outcome Measure Options: AM-PAC 6 Clicks Daily Activity (OT)  AM-PAC 6 Clicks Score (PT): 9  AM-PAC 6 Clicks Score (OT): 12    Tessy Arias PTA  7/4/2025

## 2025-07-04 NOTE — DISCHARGE SUMMARY
[facility] Medicine Services  DISCHARGE SUMMARY       Date of Admission: 6/23/2025  Date of Discharge:  7/4/2025  Primary Care Physician: Germaine Diego APRN    Presenting Problem/History of Present Illness:  Genevieve Cerda is a 85-year-old female that was seen by Dr. Yanez at the heart failure clinic and family was advised to bring patient to ER due to increased lower extremity edema.  Patient has dressings on bilateral lower extremities due to fluid weeping out of her legs.  Dr. Sharma saw patient in the clinic on 6/20/2025 with complaints of increased shortness of breath and had gained about 22 pounds since late May.  Her Lasix dose was changed to 40 twice daily and then changed to Torsemide 80 mg twice daily.  Patient was also started on amiodarone with a plan to consider cardioversion.  Patient reported that she did not have a significant increase in urine output.  Her weight was up by about half a pound.  She reported her shortness of breath is a little better today but otherwise she felt the same.  She denies any chest pain palpitations and not lightheadedness,     Patient resting in wheelchair in ER hallway D with 2 family members.  She is in no acute distress.  She is awake, alert., and oriented x 3.  She has oxygen on.  Family reports that she resides at [facility] in [place].  Dressings to bilateral lower extremities dry and intact.  Patient's BMP was 9946.0, creatinine 2.18, Hemoglobin 9.8, hematocrit 30.9, sodium 127.  Started patient on Lasix drip.  Will continue the drip and recheck BNP in 6 hours.  Patient is being admitted to hospitalist services.  6/30  Patient has no new complaint this morning, sitting up in the chair.  7/1  As before history of chronic diastolic congestive heart failure presented with acute on chronic diastolic congestive heart failure her last EF was 56% cardiology on board history of A-fib status post cardioversion  history of hypertension chronic renal failure nephrology on board remains on Bumex drip  7/2  Continue current treatment and Bumex drip continue monitoring appreciate nephrology seems to be responding potassium was replaced appreciate cardiology heather on amiodarone Eliquis  7/3  As before Bumex drip was restarted severe hypoalbuminemia contributing to anasarca patient has been started on nephro Isaiah encouraged to eat appreciate nephrology did DC Bumex drip started intermittent IV Bumex appreciate cardiology continue amiodarone and Eliquis  7/4  As before continue the same cardiology has signed off follow-up as outpatient and to [facility] to [facility]    Final Discharge Diagnoses:  Active Hospital Problems    Diagnosis     **Acute on chronic heart failure with preserved ejection fraction (HFpEF)     Acute renal failure superimposed on stage 3b chronic kidney disease     Hyponatremia     Atrial fibrillation, persistent     Hypercholesterolemia     Current use of long term anticoagulation        Consults: Neurology and cardiology    Procedures Performed: As above    Pertinent Test Results:   Results for orders placed during the hospital encounter of 06/23/25    Adult Transthoracic Echo Complete With Contrast if Necessary Per Protocol    Interpretation Summary    Left ventricular ejection fraction appears to be 56 - 60%.    Left ventricular wall thickness is consistent with mild concentric hypertrophy.    Left ventricular diastolic function was indeterminate.    Left atrial volume is severely increased.    Moderate aortic valve regurgitation is present.    Estimated right ventricular systolic pressure from tricuspid regurgitation is normal (<35 mmHg).      Imaging Results (All)       None          LAB RESULTS:      Lab 07/04/25  0640 07/03/25  0430 07/02/25  0405 07/01/25  0344   WBC 5.88 7.01 8.17 8.91   HEMOGLOBIN 8.8* 8.8* 9.1* 9.3*   HEMATOCRIT 28.3* 27.5* 28.9* 29.4*   PLATELETS 245 243 225 270   NEUTROS ABS 3.50 4.55  5.49 6.24   IMMATURE GRANS (ABS) 0.02 0.03 0.03 0.02   LYMPHS ABS 1.40 1.44 1.63 1.55   MONOS ABS 0.70 0.73 0.76 0.97*   EOS ABS 0.23 0.22 0.22 0.10   MCV 81.1 79.9 79.2 80.3         Lab 07/04/25  0640 07/03/25  1241 07/03/25  0430 07/02/25  1454 07/02/25  0405 07/01/25  1249 07/01/25  0344 06/30/25  1445 06/27/25  1915 06/27/25  1518   SODIUM 129*  --  130*  --  129*  --  130* 130*   < > 125*   POTASSIUM 3.8 3.4* 3.4* 3.2* 2.9*  --  2.8* 3.3*   < > 4.9   CHLORIDE 87*  --  86*  --  86*  --  87* 88*   < > 89*   CO2 34.0*  --  33.0*  --  31.0*  --  29.0 29.0   < > 25.0   ANION GAP 8.0  --  11.0  --  12.0  --  14.0 13.0   < > 11.0   BUN 95.5*  --  99.2*  --  96.1*  --  94.4* 96.0*   < > 92.9*   CREATININE 2.07*  --  2.20*  --  2.25*  --  2.13* 2.23*   < > 2.02*   EGFR 23.0*  --  21.3*  --  20.8*  --  22.2* 21.0*   < > 23.6*   GLUCOSE 106*  --  110*  --  116*  --  121* 169*   < > 108*   CALCIUM 8.1*  --  7.9*  --  7.9*  --  8.1* 8.2*   < > 8.3*   MAGNESIUM  --   --   --   --   --  2.1  --   --   --  2.2   TSH  --   --  29.910*  --   --   --   --   --   --   --     < > = values in this interval not displayed.         Lab 07/04/25  0640 07/03/25  0430 07/02/25  0405 07/01/25  0344 06/29/25  0439   TOTAL PROTEIN 4.9* 4.8* 4.7* 5.1* 4.9*   ALBUMIN 2.0* 2.0* 1.9* 2.2* 1.9*   GLOBULIN 2.9 2.8 2.8 2.9  --    GLOBULINREF  --   --   --   --  3.0   ALT (SGPT) 15 13 8 8  --    AST (SGOT) 23 18 14 15  --    BILIRUBIN 0.2 0.2 0.2 0.2  --    ALK PHOS 59 62 65 70  --                  Lab 07/03/25  1241   VITAMIN B 12 752         Brief Urine Lab Results  (Last result in the past 365 days)        Color   Clarity   Blood   Leuk Est   Nitrite   Protein   CREAT   Urine HCG        06/28/25 1353 Yellow   Clear   Negative   Small (1+)   Negative   30 mg/dL (1+)                 Microbiology Results (last 10 days)       ** No results found for the last 240 hours. **            Hospital Course:   Genevieve Cerda is a 85-year-old female that was  seen by Dr. Yanez at the heart failure clinic and family was advised to bring patient to ER due to increased lower extremity edema.  Patient has dressings on bilateral lower extremities due to fluid weeping out of her legs.  Dr. Sharma saw patient in the clinic on 6/20/2025 with complaints of increased shortness of breath and had gained about 22 pounds since late May.  Her Lasix dose was changed to 40 twice daily and then changed to Torsemide 80 mg twice daily.  Patient was also started on amiodarone with a plan to consider cardioversion.  Patient reported that she did not have a significant increase in urine output.  Her weight was up by about half a pound.  She reported her shortness of breath is a little better today but otherwise she felt the same.  She denies any chest pain palpitations and not lightheadedness,     Patient resting in wheelchair in ER hallway D with 2 family members.  She is in no acute distress.  She is awake, alert., and oriented x 3.  She has oxygen on.  Family reports that she resides at [facility] in [place].  Dressings to bilateral lower extremities dry and intact.  Patient's BMP was 9946.0, creatinine 2.18, Hemoglobin 9.8, hematocrit 30.9, sodium 127.  Started patient on Lasix drip.  Will continue the drip and recheck BNP in 6 hours.  Patient is being admitted to hospitalist services.  6/30  Patient has no new complaint this morning, sitting up in the chair.  7/1  As before history of chronic diastolic congestive heart failure presented with acute on chronic diastolic congestive heart failure her last EF was 56% cardiology on board history of A-fib status post cardioversion history of hypertension chronic renal failure nephrology on board remains on Bumex drip  7/2  Continue current treatment and Bumex drip continue monitoring appreciate nephrology seems to be responding potassium was replaced appreciate cardiology heather on amiodarone Eliquis  7/3  As before Bumex drip was  "restarted severe hypoalbuminemia contributing to anasarca patient has been started on nephro Isaiah encouraged to eat appreciate nephrology did DC Bumex drip started intermittent IV Bumex appreciate cardiology continue amiodarone and Eliquis  7/4  As before continue the same cardiology has signed off follow-up as outpatient and to [place] to [facility] [place]    Physical Exam on Discharge:  /44 (BP Location: Left arm, Patient Position: Lying)   Pulse 60   Temp 97.8 °F (36.6 °C) (Axillary)   Resp 16   Ht 166.4 cm (65.5\")   Wt 74.8 kg (164 lb 14.4 oz)   SpO2 96%   BMI 27.02 kg/m²   Physical Exam      Condition on Discharge: stable     Discharge Disposition:  Skilled Nursing Facility (DC - External)    Discharge Medications:     Discharge Medications        New Medications        Instructions Start Date   bumetanide 2 MG tablet  Commonly known as: BUMEX   1 mg, Oral, Daily      levothyroxine 50 MCG tablet  Commonly known as: SYNTHROID, LEVOTHROID   50 mcg, Oral, Every Early Morning   Start Date: July 5, 2025            Changes to Medications        Instructions Start Date   gabapentin 300 MG capsule  Commonly known as: NEURONTIN  What changed: when to take this   300 mg, Oral, 3 Times Daily             Continue These Medications        Instructions Start Date   acetaminophen 650 MG 8 hr tablet  Commonly known as: TYLENOL   650 mg, Oral, Every 6 Hours PRN      allopurinol 100 MG tablet  Commonly known as: ZYLOPRIM   100 mg, Oral, Daily      amiodarone 200 MG tablet  Commonly known as: PACERONE   400 mg, Oral, 2 Times Daily      apixaban 2.5 MG tablet tablet  Commonly known as: ELIQUIS   2.5 mg, Oral, Every 12 Hours Scheduled      aspirin 81 MG EC tablet   81 mg, Oral, Daily      citalopram 10 MG tablet  Commonly known as: CeleXA   10 mg, Oral, Daily      Eucerin cream   1 Application, Topical, Daily, Apply to bilateral upper and lower extremities for dryness once daily and prn      ipratropium-albuterol 0.5-2.5 mg/3 ml " nebulizer  Commonly known as: DUO-NEB   3 mL, Nebulization, 4 Times Daily PRN      latanoprost 0.005 % ophthalmic solution  Commonly known as: XALATAN   1 drop, Both Eyes, Daily      magnesium oxide 400 MG tablet  Commonly known as: MAG-OX   400 mg, Oral, Daily      metoprolol tartrate 25 MG tablet  Commonly known as: LOPRESSOR   25 mg, Oral, 2 Times Daily, Hold for systolic blood pressure <100 or pulse <60      midodrine 5 MG tablet  Commonly known as: PROAMATINE   5 mg, Oral, 2 Times Daily, Hold if systolic blood pressure is >100      MiraLax 17 g packet  Generic drug: polyethylene glycol   17 g, Oral, Daily PRN      pantoprazole 40 MG EC tablet  Commonly known as: PROTONIX   40 mg, Oral, Daily      rosuvastatin 5 MG tablet  Commonly known as: CRESTOR   5 mg, Oral, Daily      sennosides-docusate 8.6-50 MG per tablet  Commonly known as: PERICOLACE   2 tablets, Oral, Daily PRN      tamsulosin 0.4 MG capsule 24 hr capsule  Commonly known as: FLOMAX   1 capsule, Oral, Daily      timolol 0.5 % ophthalmic solution  Commonly known as: TIMOPTIC   1 drop, Both Eyes, Daily      torsemide 20 MG tablet  Commonly known as: DEMADEX   80 mg, Oral, 2 Times Daily      vitamin D 1.25 MG (10578 UT) capsule capsule  Commonly known as: ERGOCALCIFEROL   50,000 Units, Oral, Every 7 Days, Takes on Monday      zinc sulfate 220 (50 Zn) MG capsule  Commonly known as: ZINCATE   220 mg, Oral, Daily             Stop These Medications      cefdinir 300 MG capsule  Commonly known as: OMNICEF     furosemide 20 MG tablet  Commonly known as: LASIX                  Discharge Diet:     Activity at Discharge:     Follow-up Appointments:   No future appointments.    Test Results Pending at Discharge: no    Electronically signed by Audelia Ortega MD, 07/04/25, 12:44 CDT.    Time: 45 minutes.

## 2025-07-04 NOTE — PLAN OF CARE
Goal Outcome Evaluation:  Plan of Care Reviewed With: patient        Progress: no change  Outcome Evaluation: VSS.  SB/S 53-60, per tele.  No c/o pain.  Pt remains on 2L NC.  Pt is refusing q2h turns.  Bed alarm in place.  Safety maintained.

## 2025-07-04 NOTE — PROGRESS NOTES
Nephrology ([facility]) Progress Note      Patient:  Genevieve Cerda  YOB: 1940  Date of Service: 7/4/2025  MRN: [id number]   Acct: [id number]   Primary Care Physician: Germaine Diego APRN  Advance Directive:   Code Status and Medical Interventions: No CPR (Do Not Attempt to Resuscitate); Limited Support; No intubation (DNI)   Ordered at: 06/23/25 1629     Code Status (Patient has no pulse and is not breathing):    No CPR (Do Not Attempt to Resuscitate)     Medical Interventions (Patient has pulse or is breathing):    Limited Support     Medical Intervention Limits:    No intubation (DNI)     Level Of Support Discussed With:    Health Care Surrogate       Patient     Admit Date: 6/23/2025       Hospital Day: 11  Referring Provider: No ref. provider found      Patient personally seen and examined.  Complete chart including Consults, Notes, Operative Reports, Labs, Cardiology, and Radiology studies reviewed as able.    Chief complaint: Abnormal labs.    Subjective:  Genevieve Cerda is a 85 y.o. female for whom we were consulted for evaluation and treatment of acute kidney injury. Baseline chronic kidney disease stage 3b, baseline creatinine approximately 1.6. Has followed in our office in the past and more recently has also followed with Dr An in [place]. History of prior nephrectomy. Other history includes congestive heart failure, atrial fibrillation, hypertension. Admitted on 6/23 for CHF exacerbation. Creatinine 2.10 at time of admission. Hospital course remarkable for treatment with a Lasix infusion.  Nephrology consulted on 6/26.  Denied changes to urination, no dysuria or hematuria. Denied recent n/v/d. Metolazone added on 6/26.  Metolazone has been discontinued due to persistent hyponatremia    This afternoon patient feels well.  She denies any shortness of breath.  She has lost lots of fluid weight her serum sodium is improving as  well.    Allergies:  Codeine    Home Meds:  Medications Prior to Admission   Medication Sig Dispense Refill Last Dose/Taking    allopurinol (ZYLOPRIM) 100 MG tablet Take 1 tablet by mouth Daily.   Taking    amiodarone (PACERONE) 200 MG tablet Take 2 tablets by mouth 2 (Two) Times a Day.   Taking    apixaban (ELIQUIS) 2.5 MG tablet tablet Take 1 tablet by mouth Every 12 (Twelve) Hours.   Taking    aspirin 81 MG EC tablet Take 1 tablet by mouth Daily.   Taking    [] cefdinir (OMNICEF) 300 MG capsule Take 1 capsule by mouth 2 (Two) Times a Day for 7 days. 14 capsule 0 Taking    citalopram (CeleXA) 10 MG tablet Take 1 tablet by mouth Daily.   Taking    furosemide (LASIX) 20 MG tablet Take 3 tablets by mouth 2 (Two) Times a Day.   Taking    ipratropium-albuterol (DUO-NEB) 0.5-2.5 mg/3 ml nebulizer Take 3 mL by nebulization 4 (Four) Times a Day As Needed for Shortness of Air.   Taking As Needed    latanoprost (XALATAN) 0.005 % ophthalmic solution Administer 1 drop to both eyes Daily.   Taking    magnesium oxide (MAG-OX) 400 MG tablet Take 1 tablet by mouth Daily.   Taking    metoprolol tartrate (LOPRESSOR) 25 MG tablet Take 1 tablet by mouth 2 (Two) Times a Day. Hold for systolic blood pressure <100 or pulse <60   Taking    midodrine (PROAMATINE) 5 MG tablet Take 1 tablet by mouth 2 (Two) Times a Day. Hold if systolic blood pressure is >100   Taking    pantoprazole (PROTONIX) 40 MG EC tablet Take 1 tablet by mouth Daily.   Taking    rosuvastatin (CRESTOR) 5 MG tablet Take 1 tablet by mouth Daily.   Taking    Skin Protectants, Misc. (Eucerin) cream Apply 1 Application topically to the appropriate area as directed Daily. Apply to bilateral upper and lower extremities for dryness once daily and prn   Taking    tamsulosin (FLOMAX) 0.4 MG capsule 24 hr capsule Take 1 capsule by mouth Daily.   Taking    timolol (TIMOPTIC) 0.5 % ophthalmic solution Administer 1 drop to both eyes Daily.   Taking    torsemide (DEMADEX) 20  MG tablet Take 4 tablets by mouth 2 (Two) Times a Day. 240 tablet 11 Taking    vitamin D (ERGOCALCIFEROL) 1.25 MG (10276 UT) capsule capsule Take 1 capsule by mouth Every 7 (Seven) Days. Takes on Monday   Taking    zinc sulfate (ZINCATE) 220 (50 Zn) MG capsule Take 1 capsule by mouth Daily.   Taking    [DISCONTINUED] gabapentin (NEURONTIN) 300 MG capsule Take 1 capsule by mouth Every 8 (Eight) Hours.   Taking    acetaminophen (TYLENOL) 650 MG 8 hr tablet Take 1 tablet by mouth Every 6 (Six) Hours As Needed for Mild Pain.       polyethylene glycol (MiraLax) 17 g packet Take 17 g by mouth Daily As Needed (constipation).       sennosides-docusate (senna-docusate sodium) 8.6-50 MG per tablet Take 2 tablets by mouth Daily As Needed for Constipation.          Medicines:  Current Facility-Administered Medications   Medication Dose Route Frequency Provider Last Rate Last Admin    acetaminophen (TYLENOL) tablet 650 mg  650 mg Oral Q4H PRN Mary Falcon APRN   650 mg at 07/02/25 2056    Or    acetaminophen (TYLENOL) 160 MG/5ML oral solution 650 mg  650 mg Oral Q4H PRN Mary Falcon APRN        Or    acetaminophen (TYLENOL) suppository 650 mg  650 mg Rectal Q4H PRN Mary Falcon APRN        allopurinol (ZYLOPRIM) tablet 100 mg  100 mg Oral Daily Indra Zamorano MD   100 mg at 07/04/25 0940    amiodarone (PACERONE) tablet 200 mg  200 mg Oral Daily Rancho Yanez MD   200 mg at 07/04/25 0940    apixaban (ELIQUIS) tablet 2.5 mg  2.5 mg Oral BID Mary Falcon APRN   2.5 mg at 07/04/25 0940    aspirin EC tablet 81 mg  81 mg Oral Daily Indra Zamorano MD   81 mg at 07/04/25 0939    bisacodyl (DULCOLAX) EC tablet 5 mg  5 mg Oral Daily PRN Mary Falcon APRN        And    bisacodyl (DULCOLAX) suppository 10 mg  10 mg Rectal Daily PRN Mary Falcon APRN        bumetanide (BUMEX) injection 1 mg  1 mg Intravenous Once Julius Cole MD        Calcium Replacement - Follow Nurse / BPA Driven Protocol   Not  Applicable PRN Mary Falcon APRN        cholecalciferol (VITAMIN D3) tablet 5,000 Units  5,000 Units Oral Daily Indra Zamorano MD   5,000 Units at 07/04/25 0940    gabapentin (NEURONTIN) capsule 300 mg  300 mg Oral Q8H Indra Zamorano MD   300 mg at 07/04/25 0623    ipratropium-albuterol (DUO-NEB) nebulizer solution 3 mL  3 mL Nebulization 4x Daily PRN Indra Zamorano MD        latanoprost (XALATAN) 0.005 % ophthalmic solution 1 drop  1 drop Both Eyes Nightly Indra Zamorano MD   1 drop at 07/03/25 2038    [START ON 7/5/2025] levothyroxine (SYNTHROID, LEVOTHROID) tablet 50 mcg  50 mcg Oral Q AM Audelia Ortega MD        magnesium oxide (MAG-OX) tablet 400 mg  400 mg Oral Daily Indra Zamorano MD   400 mg at 07/04/25 0940    Magnesium Standard Dose Replacement - Follow Nurse / BPA Driven Protocol   Not Applicable PRN Mary Falcon APRN        melatonin tablet 5 mg  5 mg Oral Nightly PRN Twan Nicole DO   5 mg at 07/03/25 2037    midodrine (PROAMATINE) tablet 5 mg  5 mg Oral BID AC Indra Zamorano MD   5 mg at 07/04/25 0940    nitroglycerin (NITROSTAT) SL tablet 0.4 mg  0.4 mg Sublingual Q5 Min PRN Mary Falcon APRN        ondansetron ODT (ZOFRAN-ODT) disintegrating tablet 4 mg  4 mg Oral Q6H PRN Mary Falcon APRN   4 mg at 06/28/25 1720    Or    ondansetron (ZOFRAN) injection 4 mg  4 mg Intravenous Q6H PRN Mary Falcon APRN        pantoprazole (PROTONIX) EC tablet 40 mg  40 mg Oral Daily Indra Zamorano MD   40 mg at 07/04/25 0940    Phosphorus Replacement - Follow Nurse / BPA Driven Protocol   Not Applicable PRN Mary Falcon APRN        polyethylene glycol (MIRALAX) packet 17 g  17 g Oral Daily PRN Indra Zamorano MD        Potassium Replacement - Follow Nurse / BPA Driven Protocol   Not Applicable PRN Audelia Ortega MD        rosuvastatin (CRESTOR) tablet 5 mg  5 mg Oral Daily Indra Zamorano MD   5 mg at 07/04/25 0986    sennosides-docusate  (PERICOLACE) 8.6-50 MG per tablet 2 tablet  2 tablet Oral Daily PRN Indra Zamorano MD   2 tablet at 07/01/25 0513    sodium chloride 0.9 % flush 10 mL  10 mL Intravenous Q12H Mary Falcon APRN   10 mL at 07/04/25 0941    sodium chloride 0.9 % flush 10 mL  10 mL Intravenous PRN Mary Falcon APRN        sodium chloride 0.9 % infusion 40 mL  40 mL Intravenous PRN Mary Falcon APRN        tamsulosin (FLOMAX) 24 hr capsule 0.4 mg  0.4 mg Oral Daily Indra Zamorano MD   0.4 mg at 07/04/25 0939    timolol (TIMOPTIC) 0.5 % ophthalmic solution 1 drop  1 drop Both Eyes Daily Indra Zamorano MD   1 drop at 07/04/25 1247    zinc sulfate (ZINCATE) capsule 220 mg  220 mg Oral Daily Indra Zamorano MD   220 mg at 07/04/25 0939       Past Medical History:  History reviewed. No pertinent past medical history.    Past Surgical History:  No past surgical history on file.    Family History  History reviewed. No pertinent family history.    Social History  Social History     Socioeconomic History    Marital status:    Tobacco Use    Smoking status: Never    Smokeless tobacco: Never   Vaping Use    Vaping status: Never Used   Substance and Sexual Activity    Alcohol use: Yes     Comment: rarely    Drug use: Never    Sexual activity: Defer       Review of Systems:  History obtained from chart review and the patient  General ROS: No fever or chills  Respiratory ROS: positive for - shortness of breath  Cardiovascular ROS: No chest pain or palpitations  Gastrointestinal ROS: No abdominal pain or melena  Genito-Urinary ROS: No dysuria or hematuria  Psych ROS: No anxiety and depression  14 point ROS reviewed with the patient and negative except as noted above and in the HPI unless unable to obtain.    Objective:  Patient Vitals for the past 24 hrs:   BP Temp Temp src Pulse Resp SpO2 Weight   07/04/25 1214 138/44 -- -- 60 16 96 % --   07/04/25 0813 118/41 -- -- -- -- -- --   07/04/25 0804 -- 97.8 °F (36.6 °C)  "Axillary 58 16 98 % --   07/04/25 0508 -- -- -- -- -- -- 74.8 kg (164 lb 14.4 oz)   07/04/25 0340 117/42 98.1 °F (36.7 °C) Oral 53 16 95 % --   07/03/25 1946 127/47 98.4 °F (36.9 °C) Oral 59 16 97 % --   07/03/25 1748 134/48 -- -- 59 16 99 % --       Intake/Output Summary (Last 24 hours) at 7/4/2025 1458  Last data filed at 7/4/2025 0804  Gross per 24 hour   Intake 300 ml   Output 1850 ml   Net -1550 ml     General: awake/alert   HEENT: Normocephalic atraumatic head  Neck: Supple with no JVD or carotid base.  Chest:  clear to auscultation bilaterally without respiratory distress  CVS: regular rate and rhythm  Abdominal: soft, nontender, positive bowel sounds  Extremities: 1+ pedal edema  Skin: No discoloration      Labs:  Results from last 7 days   Lab Units 07/04/25  0640 07/03/25  0430 07/02/25  0405   WBC 10*3/mm3 5.88 7.01 8.17   HEMOGLOBIN g/dL 8.8* 8.8* 9.1*   HEMATOCRIT % 28.3* 27.5* 28.9*   PLATELETS 10*3/mm3 245 243 225         Results from last 7 days   Lab Units 07/04/25  0640 07/03/25  1241 07/03/25  0430 07/02/25  1454 07/02/25  0405   SODIUM mmol/L 129*  --  130*  --  129*   POTASSIUM mmol/L 3.8 3.4* 3.4*   < > 2.9*   CHLORIDE mmol/L 87*  --  86*  --  86*   CO2 mmol/L 34.0*  --  33.0*  --  31.0*   BUN mg/dL 95.5*  --  99.2*  --  96.1*   CREATININE mg/dL 2.07*  --  2.20*  --  2.25*   CALCIUM mg/dL 8.1*  --  7.9*  --  7.9*   EGFR mL/min/1.73 23.0*  --  21.3*  --  20.8*   BILIRUBIN mg/dL 0.2  --  0.2  --  0.2   ALK PHOS U/L 59  --  62  --  65   ALT (SGPT) U/L 15  --  13  --  8   AST (SGOT) U/L 23  --  18  --  14   GLUCOSE mg/dL 106*  --  110*  --  116*    < > = values in this interval not displayed.       Radiology:   Imaging Results (Last 72 Hours)       ** No results found for the last 72 hours. **            Culture:  No results found for: \"BLOODCX\", \"URINECX\", \"WOUNDCX\", \"MRSACX\", \"RESPCX\", \"STOOLCX\"      Assessment   1.  Acute kidney injury/cardiorenal syndrome.  2.  Stage IIIb CKD baseline.  3.  " Hypertensive renal disease.  4.  Hyponatremia related to hypervolemia worsening.  5.  Anemia of chronic kidney disease.  6.  Atrial fibrillation.  7.  Acute on chronic systolic CHF exacerbation  8.  Hypokalemia/new onset    Plan:  1.  Change to p.o. Bumex 1 mg twice daily  2.  Increase potassium supplement.  3.  Discontinue tolvaptan.  4.  Contraction alkalosis.  5.  Okay to discharge.  Follow-up needed in 2 weeks    Julius Cole MD  7/4/2025  14:58 CDT

## 2025-07-05 NOTE — THERAPY DISCHARGE NOTE
Acute Care - Physical Therapy Discharge Summary  [facility]       Patient Name: Genevieve Creda  : 1940  MRN: [id number]    Today's Date: 2025                 Admit Date: 2025      PT Recommendation and Plan    Visit Dx:    ICD-10-CM ICD-9-CM   1. Congestive heart failure, unspecified HF chronicity, unspecified heart failure type  I50.9 428.0   2. Chronic kidney disease, unspecified CKD stage  N18.9 585.9   3. Hyponatremia  E87.1 276.1   4. Impaired functional mobility and activity tolerance [Z74.09]  Z74.09 V49.89   5. Decreased activities of daily living (ADL)  Z78.9 V49.89        Outcome Measures       Row Name 25 1511             How much help from another person do you currently need...    Turning from your back to your side while in flat bed without using bedrails? 3  -MELISSA      Moving from lying on back to sitting on the side of a flat bed without bedrails? 3  -MELISSA      Moving to and from a bed to a chair (including a wheelchair)? 2  -MELISSA      Standing up from a chair using your arms (e.g., wheelchair, bedside chair)? 2  -MELISSA      Climbing 3-5 steps with a railing? 1  -MELISSA      To walk in hospital room? 1  -MELISSA      AM-PAC 6 Clicks Score (PT) 12  -MELISSA         Functional Assessment    Outcome Measure Options AM-PAC 6 Clicks Basic Mobility (PT)  -MELISSA                User Key  (r) = Recorded By, (t) = Taken By, (c) = Cosigned By      Initials Name Provider Type    Osman Santiago PTA Physical Therapist Assistant                         PT Rehab Goals       Row Name 25 0814             Bed Mobility Goal 1 (PT)    Activity/Assistive Device (Bed Mobility Goal 1, PT) rolling to left;rolling to right;bridging;scooting;sit to supine/supine to sit;sidelying to sit/sit to sidelying  -WK      [place] Level/Cues Needed (Bed Mobility Goal 1, PT) standby assist  -WK      Time Frame (Bed Mobility Goal 1, PT) long term goal (LTG);10 days  -WK      Progress/Outcomes (Bed Mobility Goal 1, PT) goal  not met  -WK         Transfer Goal 1 (PT)    Activity/Assistive Device (Transfer Goal 1, PT) sit-to-stand/stand-to-sit;bed-to-chair/chair-to-bed;other (see comments)  rwx for bed to/from chair tfer  -WK      [place] Level/Cues Needed (Transfer Goal 1, PT) contact guard required  -WK      Time Frame (Transfer Goal 1, PT) long term goal (LTG);10 days  -WK      Progress/Outcome (Transfer Goal 1, PT) goal not met  -WK         Gait Training Goal 1 (PT)    Activity/Assistive Device (Gait Training Goal 1, PT) gait (walking locomotion);assistive device use;decrease fall risk;diminish gait deviation;improve balance and speed;increase endurance/gait distance;walker, rolling  -WK      [place] Level (Gait Training Goal 1, PT) contact guard required  -WK      Distance (Gait Training Goal 1, PT) 15-20 ft  -WK      Time Frame (Gait Training Goal 1, PT) long term goal (LTG);10 days  -WK      Progress/Outcome (Gait Training Goal 1, PT) goal not met  -WK         Balance Goal 1 (PT)    Activity/Assistive Device (Balance Goal) standing static balance;walker, rolling  -WK      [place] Level/Cues Needed (Balance Goal 1, PT) contact guard required;supervision required  -WK      Time Frame (Balance Goal 1, PT) long-term goal (LTG);other (see comments)  10 days  -WK      Progress/Outcomes (Balance Goal 1, PT) goal not met  -WK                User Key  (r) = Recorded By, (t) = Taken By, (c) = Cosigned By      Initials Name Provider Type Discipline    WK Sherin Chavarria PTA Physical Therapist Assistant PT                        PT Discharge Summary  Anticipated Discharge Disposition (PT): skilled nursing facility  Reason for Discharge: Discharge from facility  Outcomes Achieved: Refer to plan of care for updates on goals achieved  Discharge Destination: SNF      Sherin Chavarria PTA   7/5/2025

## 2025-07-05 NOTE — THERAPY DISCHARGE NOTE
Acute Care - Occupational Therapy Discharge Summary  [facility]     Patient Name: Genevieve Cerda  : 1940  MRN: [id number]    Today's Date: 2025                 Admit Date: 2025        OT Recommendation and Plan    Visit Dx:    ICD-10-CM ICD-9-CM   1. Congestive heart failure, unspecified HF chronicity, unspecified heart failure type  I50.9 428.0   2. Chronic kidney disease, unspecified CKD stage  N18.9 585.9   3. Hyponatremia  E87.1 276.1   4. Impaired functional mobility and activity tolerance [Z74.09]  Z74.09 V49.89   5. Decreased activities of daily living (ADL)  Z78.9 V49.89                OT Rehab Goals       Row Name 25 0800             Transfer Goal 1 (OT)    Activity/Assistive Device (Transfer Goal 1, OT) wheelchair transfer  -LS      [place] Level/Cues Needed (Transfer Goal 1, OT) moderate assist (50-74% patient effort)  -LS      Time Frame (Transfer Goal 1, OT) long term goal (LTG);by discharge  -LS      Progress/Outcome (Transfer Goal 1, OT) goal not met  -LS         Dressing Goal 1 (OT)    Activity/Device (Dressing Goal 1, OT) upper body dressing  -LS      [place]/Cues Needed (Dressing Goal 1, OT) moderate assist (50-74% patient effort)  -LS      Time Frame (Dressing Goal 1, OT) long term goal (LTG);by discharge  -LS      Progress/Outcome (Dressing Goal 1, OT) goal not met  -LS         Balance Goal 1 (OT)    [place] Level/Cues Needed (Balance Goal 1, OT) verbal cues required;tactile cues required;other (see comments)  -LS         Problem Specific Goal 1 (OT)    Problem Specific Goal 1 (OT) Pt will independently implement one energy conservation technique to improve functional adl performance.  -LS      Time Frame (Problem Specific Goal 1, OT) long term goal (LTG);by discharge  -LS      Progress/Outcome (Problem Specific Goal 1, OT) goal not met  -LS                User Key  (r) = Recorded By, (t) = Taken By, (c) = Cosigned By      Initials Name Provider Type  Discipline    Agueda Goetz COTA Occupational Therapist Assistant THERAPIES                     Outcome Measures       Row Name 07/02/25 1511             How much help from another person do you currently need...    Turning from your back to your side while in flat bed without using bedrails? 3  -MELISSA      Moving from lying on back to sitting on the side of a flat bed without bedrails? 3  -MELISSA      Moving to and from a bed to a chair (including a wheelchair)? 2  -MELISSA      Standing up from a chair using your arms (e.g., wheelchair, bedside chair)? 2  -MELISSA      Climbing 3-5 steps with a railing? 1  -MELISSA      To walk in hospital room? 1  -MELISSA      AM-PAC 6 Clicks Score (PT) 12  -MELISSA         Functional Assessment    Outcome Measure Options AM-PAC 6 Clicks Basic Mobility (PT)  -MELISSA                User Key  (r) = Recorded By, (t) = Taken By, (c) = Cosigned By      Initials Name Provider Type    Osman Santiago, PTA Physical Therapist Assistant                            OT Discharge Summary  Anticipated Discharge Disposition (OT): skilled nursing facility  Reason for Discharge: Discharge from facility  Outcomes Achieved: Refer to plan of care for updates on goals achieved  Discharge Destination: SNF      KYA Oliver  7/5/2025

## 2025-07-17 ENCOUNTER — TELEPHONE (OUTPATIENT)
Dept: CARDIOLOGY | Facility: HOSPITAL | Age: 85
End: 2025-07-17

## 2025-07-22 ENCOUNTER — HOSPITAL ENCOUNTER (OUTPATIENT)
Dept: CARDIOLOGY | Facility: HOSPITAL | Age: 85
Discharge: HOME | End: 2025-07-22
Admitting: NURSE PRACTITIONER
Payer: MEDICARE

## 2025-07-22 VITALS
HEART RATE: 58 BPM | OXYGEN SATURATION: 87 % | SYSTOLIC BLOOD PRESSURE: 125 MMHG | WEIGHT: 166 LBS | DIASTOLIC BLOOD PRESSURE: 52 MMHG | BODY MASS INDEX: 27.2 KG/M2

## 2025-07-22 DIAGNOSIS — I50.9 CONGESTIVE HEART FAILURE, UNSPECIFIED HF CHRONICITY, UNSPECIFIED HEART FAILURE TYPE: Primary | ICD-10-CM

## 2025-07-22 LAB
ANION GAP SERPL CALCULATED.3IONS-SCNC: 14 MMOL/L (ref 5–15)
BUN SERPL-MCNC: 90.8 MG/DL (ref 8–23)
BUN/CREAT SERPL: 39.3 (ref 7–25)
CALCIUM SPEC-SCNC: 8.5 MG/DL (ref 8.6–10.5)
CHLORIDE SERPL-SCNC: 89 MMOL/L (ref 98–107)
CO2 SERPL-SCNC: 30 MMOL/L (ref 22–29)
CREAT SERPL-MCNC: 2.31 MG/DL (ref 0.57–1)
EGFRCR SERPLBLD CKD-EPI 2021: 20.1 ML/MIN/1.73
GLUCOSE SERPL-MCNC: 104 MG/DL (ref 65–99)
POTASSIUM SERPL-SCNC: 3.3 MMOL/L (ref 3.5–5.2)
SODIUM SERPL-SCNC: 133 MMOL/L (ref 136–145)

## 2025-07-22 PROCEDURE — G0463 HOSPITAL OUTPT CLINIC VISIT: HCPCS | Performed by: NURSE PRACTITIONER

## 2025-07-22 PROCEDURE — 80048 BASIC METABOLIC PNL TOTAL CA: CPT | Performed by: NURSE PRACTITIONER

## 2025-07-22 RX ORDER — POTASSIUM CHLORIDE 750 MG/1
40 CAPSULE, EXTENDED RELEASE ORAL ONCE
Status: COMPLETED | OUTPATIENT
Start: 2025-07-22 | End: 2025-07-22

## 2025-07-22 RX ADMIN — POTASSIUM CHLORIDE 40 MEQ: 750 TABLET, EXTENDED RELEASE ORAL at 11:25

## 2025-07-29 ENCOUNTER — TELEPHONE (OUTPATIENT)
Dept: CARDIOLOGY | Facility: CLINIC | Age: 85
End: 2025-07-29
Payer: MEDICARE

## 2025-07-31 ENCOUNTER — TELEPHONE (OUTPATIENT)
Dept: CARDIOLOGY | Facility: HOSPITAL | Age: 85
End: 2025-07-31

## 2025-07-31 ENCOUNTER — HOSPITAL ENCOUNTER (OUTPATIENT)
Dept: CARDIOLOGY | Facility: HOSPITAL | Age: 85
Discharge: HOME | End: 2025-07-31
Admitting: HOSPITALIST
Payer: MEDICARE

## 2025-07-31 VITALS
HEART RATE: 70 BPM | DIASTOLIC BLOOD PRESSURE: 40 MMHG | WEIGHT: 160.56 LBS | SYSTOLIC BLOOD PRESSURE: 113 MMHG | BODY MASS INDEX: 26.31 KG/M2 | OXYGEN SATURATION: 90 %

## 2025-07-31 DIAGNOSIS — I50.32 CHRONIC HEART FAILURE WITH PRESERVED EJECTION FRACTION (HFPEF): Primary | ICD-10-CM

## 2025-07-31 DIAGNOSIS — I48.19 ATRIAL FIBRILLATION, PERSISTENT: ICD-10-CM

## 2025-07-31 DIAGNOSIS — E88.09 HYPOALBUMINEMIA: ICD-10-CM

## 2025-07-31 DIAGNOSIS — N18.32 STAGE 3B CHRONIC KIDNEY DISEASE: ICD-10-CM

## 2025-07-31 LAB
ANION GAP SERPL CALCULATED.3IONS-SCNC: 11 MMOL/L (ref 5–15)
BUN SERPL-MCNC: 102.2 MG/DL (ref 8–23)
BUN/CREAT SERPL: 36.9 (ref 7–25)
CALCIUM SPEC-SCNC: 8.5 MG/DL (ref 8.6–10.5)
CHLORIDE SERPL-SCNC: 88 MMOL/L (ref 98–107)
CO2 SERPL-SCNC: 32 MMOL/L (ref 22–29)
CREAT SERPL-MCNC: 2.77 MG/DL (ref 0.57–1)
EGFRCR SERPLBLD CKD-EPI 2021: 16.2 ML/MIN/1.73
GLUCOSE SERPL-MCNC: 111 MG/DL (ref 65–99)
POTASSIUM SERPL-SCNC: 3.7 MMOL/L (ref 3.5–5.2)
QT INTERVAL: 238 MS
QTC INTERVAL: 197 MS
SODIUM SERPL-SCNC: 131 MMOL/L (ref 136–145)

## 2025-07-31 PROCEDURE — 80048 BASIC METABOLIC PNL TOTAL CA: CPT | Performed by: HOSPITALIST

## 2025-07-31 PROCEDURE — 93005 ELECTROCARDIOGRAM TRACING: CPT | Performed by: HOSPITALIST

## 2025-07-31 PROCEDURE — G0463 HOSPITAL OUTPT CLINIC VISIT: HCPCS | Performed by: HOSPITALIST

## 2025-07-31 PROCEDURE — 94726 PLETHYSMOGRAPHY LUNG VOLUMES: CPT | Performed by: HOSPITALIST

## 2025-07-31 RX ORDER — TORSEMIDE 20 MG/1
80 TABLET ORAL 2 TIMES DAILY
Start: 2025-07-31

## 2025-07-31 RX ORDER — AMIODARONE HYDROCHLORIDE 200 MG/1
200 TABLET ORAL DAILY
Start: 2025-07-31

## 2025-07-31 NOTE — PROGRESS NOTES
Reason For Visit:  CHF, atrial fibrillation    Subjective        Genevieve Cerda is a 85 y.o. female with the below pertinent PMH who presents for follow-up of CHF and atrial fibrillation.    Genevieve Cerda was most recently seen by me in the CHF clinic 6/23/2025 at which time she was quite volume overloaded but also had worsening renal function.  She was sent to the ED for hospital admission and closely monitor diuresis.  Hospital she was initially diuresed with a Bumex infusion and was eventually transitioned to intermittent IV Bumex.  She received metolazone for several days while in the hospital, but this was discontinued due to persistent hyponatremia for which she also did receive tolvaptan.  Did undergo cardioversion 6/27/2025.  She was continued on amiodarone but metoprolol was discontinued due to bradycardia.  Amyloid evaluation was initiated.  It was felt that hypoalbuminemia was contributing to some of her anasarca.  She was discharged on 7/4/2025 on Bumex 1 mg daily although it appears that her prescription for torsemide 80 mg BID remained active on her discharge summary as well.  She also started on levothyroxine due to hypothyroidism diagnosed in the hospital.    The patient did reach out to our clinic after her hospitalization due to some increased leg swelling and took some extra Bumex.  She subsequently was seen in diuresis clinic and was noted to be on both loop diuretics at which time torsemide was increased to 100 mg twice daily along with stopping her Bumex.    Today, she reports doing a little better.  She had weight gain and leg swelling after her hospitalization that has improved again.  She does still have some leg swelling but feels like it is better.  Breathing is stable.  She denies chest pain, palpitations, and lightheadedness.    ROS: Pertinent findings are included above.    Cardiac Studies  Echo 4/28/25: LVEF 60%, G2 DD, mild LVH, normal RV size/function, LA mildly to moderately  dilated, RA mildly dilated, mild to moderate AI, moderate MR, mild to moderate TR, severe pulmonary hypertension (RVSP 63)     Pertinent PMH  Chronic diastolic CHF  Paroxysmal atrial fibrillation  Hypertension  Hyperlipidemia  Left renal artery aneurysm  CKD IV with prior left nephrectomy    Pertinent past medical, surgical, family, and social history were reviewed.      Current Outpatient Medications:     acetaminophen (TYLENOL) 650 MG 8 hr tablet, Take 1 tablet by mouth Every 6 (Six) Hours As Needed for Mild Pain., Disp: , Rfl:     allopurinol (ZYLOPRIM) 100 MG tablet, Take 1 tablet by mouth Daily., Disp: , Rfl:     amiodarone (PACERONE) 200 MG tablet, Take 2 tablets by mouth 2 (Two) Times a Day., Disp: , Rfl:     apixaban (ELIQUIS) 2.5 MG tablet tablet, Take 1 tablet by mouth Every 12 (Twelve) Hours., Disp: , Rfl:     aspirin 81 MG EC tablet, Take 1 tablet by mouth Daily., Disp: , Rfl:     bumetanide (BUMEX) 2 MG tablet, Take 0.5 tablets by mouth Daily., Disp: 30 tablet, Rfl: 0    citalopram (CeleXA) 10 MG tablet, Take 1 tablet by mouth Daily., Disp: , Rfl:     gabapentin (NEURONTIN) 300 MG capsule, Take 1 capsule by mouth 3 (Three) Times a Day., Disp: 21 capsule, Rfl: 0    ipratropium-albuterol (DUO-NEB) 0.5-2.5 mg/3 ml nebulizer, Take 3 mL by nebulization 4 (Four) Times a Day As Needed for Shortness of Air., Disp: , Rfl:     latanoprost (XALATAN) 0.005 % ophthalmic solution, Administer 1 drop to both eyes Daily., Disp: , Rfl:     levothyroxine (SYNTHROID, LEVOTHROID) 50 MCG tablet, Take 1 tablet by mouth Every Morning., Disp: 30 tablet, Rfl: 0    magnesium oxide (MAG-OX) 400 MG tablet, Take 1 tablet by mouth Daily., Disp: , Rfl:     metoprolol tartrate (LOPRESSOR) 25 MG tablet, Take 1 tablet by mouth 2 (Two) Times a Day. Hold for systolic blood pressure <100 or pulse <60, Disp: , Rfl:     midodrine (PROAMATINE) 5 MG tablet, Take 1 tablet by mouth 2 (Two) Times a Day. Hold if systolic blood pressure is >100,  "Disp: , Rfl:     pantoprazole (PROTONIX) 40 MG EC tablet, Take 1 tablet by mouth Daily., Disp: , Rfl:     polyethylene glycol (MiraLax) 17 g packet, Take 17 g by mouth Daily As Needed (constipation)., Disp: , Rfl:     rosuvastatin (CRESTOR) 5 MG tablet, Take 1 tablet by mouth Daily., Disp: , Rfl:     sennosides-docusate (senna-docusate sodium) 8.6-50 MG per tablet, Take 2 tablets by mouth Daily As Needed for Constipation., Disp: , Rfl:     Skin Protectants, Misc. (Eucerin) cream, Apply 1 Application topically to the appropriate area as directed Daily. Apply to bilateral upper and lower extremities for dryness once daily and prn, Disp: , Rfl:     tamsulosin (FLOMAX) 0.4 MG capsule 24 hr capsule, Take 1 capsule by mouth Daily., Disp: , Rfl:     timolol (TIMOPTIC) 0.5 % ophthalmic solution, Administer 1 drop to both eyes Daily., Disp: , Rfl:     torsemide (DEMADEX) 20 MG tablet, Take 4 tablets by mouth 2 (Two) Times a Day., Disp: 240 tablet, Rfl: 11    vitamin D (ERGOCALCIFEROL) 1.25 MG (97518 UT) capsule capsule, Take 1 capsule by mouth Every 7 (Seven) Days. Takes on Monday, Disp: , Rfl:     zinc sulfate (ZINCATE) 220 (50 Zn) MG capsule, Take 1 capsule by mouth Daily., Disp: , Rfl:      Objective   Vital Signs:  /40 (BP Location: Left arm, Patient Position: Sitting)   Pulse 70   Wt 72.8 kg (160 lb 9 oz)   SpO2 90% Comment: 2L O2  BMI 26.31 kg/m²   Estimated body mass index is 26.31 kg/m² as calculated from the following:    Height as of 7/1/25: 166.4 cm (65.5\").    Weight as of this encounter: 72.8 kg (160 lb 9 oz).      Constitutional:       Appearance: Not in distress.   Neck:      Vascular: JVD elevated.   Pulmonary:      Effort: Pulmonary effort is normal.      Breath sounds: Normal breath sounds.   Cardiovascular:      Normal rate. Regular rhythm.      Murmurs: There is a grade 3/6 systolic murmur at the LLSB.      No gallop.  No click. No rub.   Edema:     Pretibial: bilateral 1+ edema of the pretibial " area.  Abdominal:      General: There is no distension.      Palpations: Abdomen is soft.      Tenderness: There is no abdominal tenderness.   Skin:     General: Skin is warm and dry.   Neurological:      Mental Status: Alert and oriented to person, place and time.        Result Review :  The following data was reviewed by: Rancho Yanez MD on 07/31/2025:  CMP   CMP          7/17/2025    09:49 7/22/2025    10:29 7/31/2025    09:35   CMP   Glucose 108  104  111    .5  90.8  102.2    Creatinine 2.47  2.31  2.77    EGFR 18.6  20.1  16.2    Sodium 138  133  131    Potassium 3.6  3.3  3.7    Chloride 92  89  88    Calcium 8.2  8.5  8.5    BUN/Creatinine Ratio 42.7  39.3  36.9    Anion Gap 20.0  14.0  11.0           Assessment and Plan   Diagnoses and all orders for this visit:    1. Chronic heart failure with preserved ejection fraction (HFpEF) (Primary)  2. Atrial fibrillation, persistent  3. Hypoalbuminemia  4. Stage 3b chronic kidney disease  - Overall she does appear volume up although with improved volume status compared to when I last saw her in the hospital.  Renal function has worsened.  I do suspect that she has accommodation of issues contributing to her hypervolemia including her CHF but also hypoalbuminemia, which likely contributes to some intravascular volume depletion with total body volume overload complicating management with diuretics.  I also have some concern for amyloid given her LVH but very low voltage on ECG.  She did have immunofixation and light chains in the hospital; immunofixation was nonspecific and light chains were elevated although with normal ratio likely related to renal insufficiency.  I will plan to repeat her immunofixation and light chain labs again in the future.  In the meantime, I will also obtain a PYP scan.  - Reduce torsemide to 80 mg twice daily  - It appears that she has been receiving amiodarone for 100 mg twice daily; we will transition this to 200 mg once daily.   She is in sinus rhythm today.  - It appears that she ended up back on metoprolol 25 mg twice daily; plan to discontinue this given bradycardia today  - Continue midodrine 5 mg twice daily  - Continue Eliquis 2.5 mg twice daily  - I encouraged protein shakes 3 times daily  - Continue nephrology follow-up  - Scheduling PYP scan    Follow Up   Return in about 2 weeks (around 8/14/2025).  Patient was given instructions and counseling regarding her condition or for health maintenance advice. Please see specific information pulled into the AVS if appropriate.     I spent 45 minutes caring for Genevieve on this date of service. This time includes time spent by me in the following activities:preparing for the visit, reviewing tests, performing a medically appropriate examination and/or evaluation , counseling and educating the patient/family/caregiver, ordering medications, tests, or procedures, documenting information in the medical record, and care coordination  Time spent on the separately reported service of ECG interpretation/documentation is not included in the time used to support the E/M service also reported today.  Part of this note may be an electronic transcription/translation of spoken language to printed text using the Dragon Dictation System.

## 2025-08-07 ENCOUNTER — TELEPHONE (OUTPATIENT)
Dept: CARDIOLOGY | Facility: CLINIC | Age: 85
End: 2025-08-07
Payer: MEDICARE

## 2025-08-13 ENCOUNTER — HOSPITAL ENCOUNTER (OUTPATIENT)
Dept: CARDIOLOGY | Facility: HOSPITAL | Age: 85
Discharge: HOME | End: 2025-08-13
Payer: MEDICARE

## 2025-08-13 DIAGNOSIS — I50.32 CHRONIC HEART FAILURE WITH PRESERVED EJECTION FRACTION (HFPEF): ICD-10-CM

## 2025-08-13 PROCEDURE — 78803 RP LOCLZJ TUM SPECT 1 AREA: CPT

## 2025-08-13 PROCEDURE — 34310000005 TECHNETIUM OXIDRONATE KIT: Performed by: HOSPITALIST

## 2025-08-13 PROCEDURE — A9561 TC99M OXIDRONATE: HCPCS | Performed by: HOSPITALIST

## 2025-08-13 RX ADMIN — TECHNETIUM TC 99M OXIDRONATE 1 DOSE: 3.15 INJECTION, POWDER, LYOPHILIZED, FOR SOLUTION INTRAVENOUS at 09:40

## 2025-08-15 ENCOUNTER — HOSPITAL ENCOUNTER (OUTPATIENT)
Dept: CARDIOLOGY | Facility: HOSPITAL | Age: 85
Discharge: HOME | End: 2025-08-15
Payer: MEDICARE

## 2025-08-15 VITALS
OXYGEN SATURATION: 91 % | WEIGHT: 159 LBS | HEART RATE: 84 BPM | DIASTOLIC BLOOD PRESSURE: 53 MMHG | SYSTOLIC BLOOD PRESSURE: 139 MMHG | BODY MASS INDEX: 26.06 KG/M2

## 2025-08-15 DIAGNOSIS — E88.09 HYPOALBUMINEMIA: ICD-10-CM

## 2025-08-15 DIAGNOSIS — I48.19 ATRIAL FIBRILLATION, PERSISTENT: ICD-10-CM

## 2025-08-15 DIAGNOSIS — N18.32 STAGE 3B CHRONIC KIDNEY DISEASE: ICD-10-CM

## 2025-08-15 DIAGNOSIS — I50.32 CHRONIC HEART FAILURE WITH PRESERVED EJECTION FRACTION (HFPEF): Primary | ICD-10-CM

## 2025-08-15 LAB
ANION GAP SERPL CALCULATED.3IONS-SCNC: 12 MMOL/L (ref 5–15)
BUN SERPL-MCNC: 72 MG/DL (ref 8–23)
BUN/CREAT SERPL: 29.4 (ref 7–25)
CALCIUM SPEC-SCNC: 8 MG/DL (ref 8.6–10.5)
CHLORIDE SERPL-SCNC: 89 MMOL/L (ref 98–107)
CO2 SERPL-SCNC: 29 MMOL/L (ref 22–29)
CREAT SERPL-MCNC: 2.45 MG/DL (ref 0.57–1)
EGFRCR SERPLBLD CKD-EPI 2021: 18.8 ML/MIN/1.73
GLUCOSE SERPL-MCNC: 96 MG/DL (ref 65–99)
POTASSIUM SERPL-SCNC: 3.9 MMOL/L (ref 3.5–5.2)
SODIUM SERPL-SCNC: 130 MMOL/L (ref 136–145)

## 2025-08-15 PROCEDURE — G0463 HOSPITAL OUTPT CLINIC VISIT: HCPCS | Performed by: NURSE PRACTITIONER

## 2025-08-15 PROCEDURE — 80048 BASIC METABOLIC PNL TOTAL CA: CPT | Performed by: NURSE PRACTITIONER

## 2025-08-15 RX ORDER — TORSEMIDE 20 MG/1
100 TABLET ORAL 2 TIMES DAILY
Start: 2025-08-15

## 2025-08-15 RX ORDER — LEVOTHYROXINE SODIUM 75 UG/1
75 TABLET ORAL
COMMUNITY

## 2025-08-23 LAB
MC_CV_MDC_IDC_RATE_1: 146
MC_CV_MDC_IDC_RATE_1: 30
MC_CV_MDC_IDC_RATE_1: 5
MC_CV_MDC_IDC_ZONE_ID: 1
MC_CV_MDC_IDC_ZONE_ID: 2
MC_CV_MDC_IDC_ZONE_ID: 3
MC_CV_MDC_IDC_ZONE_ID: 4
MC_CV_MDC_IDC_ZONE_ID: 7
MDC_IDC_MSMT_BATTERY_STATUS: NORMAL
MDC_IDC_PG_IMPLANT_DTM: NORMAL
MDC_IDC_PG_MFG: NORMAL
MDC_IDC_PG_MODEL: NORMAL
MDC_IDC_PG_SERIAL: NORMAL
MDC_IDC_PG_TYPE: NORMAL
MDC_IDC_SESS_DTM: NORMAL
MDC_IDC_SESS_TYPE: NORMAL
MDC_IDC_SET_ZONE_DETECTION_DETAILS: 12
MDC_IDC_SET_ZONE_DETECTION_DETAILS: 16
MDC_IDC_SET_ZONE_STATUS: NORMAL
MDC_IDC_SET_ZONE_TYPE: NORMAL

## 2025-08-29 ENCOUNTER — HOSPITAL ENCOUNTER (OUTPATIENT)
Dept: CARDIOLOGY | Facility: HOSPITAL | Age: 85
Discharge: HOME | End: 2025-08-29
Payer: MEDICARE

## 2025-08-29 ENCOUNTER — HOSPITAL ENCOUNTER (OUTPATIENT)
Dept: CT IMAGING | Facility: HOSPITAL | Age: 85
Discharge: HOME | End: 2025-08-29
Payer: MEDICARE

## 2025-08-29 VITALS
OXYGEN SATURATION: 99 % | DIASTOLIC BLOOD PRESSURE: 55 MMHG | HEART RATE: 64 BPM | BODY MASS INDEX: 26.71 KG/M2 | WEIGHT: 163 LBS | SYSTOLIC BLOOD PRESSURE: 154 MMHG

## 2025-08-29 DIAGNOSIS — I50.32 CHRONIC HEART FAILURE WITH PRESERVED EJECTION FRACTION (HFPEF): Primary | ICD-10-CM

## 2025-08-29 DIAGNOSIS — N18.32 STAGE 3B CHRONIC KIDNEY DISEASE: ICD-10-CM

## 2025-08-29 DIAGNOSIS — J90 PLEURAL EFFUSION: ICD-10-CM

## 2025-08-29 DIAGNOSIS — R91.8 OPACITY OF LUNG ON IMAGING STUDY: ICD-10-CM

## 2025-08-29 DIAGNOSIS — E87.1 HYPONATREMIA: ICD-10-CM

## 2025-08-29 LAB
ANION GAP SERPL CALCULATED.3IONS-SCNC: 10 MMOL/L (ref 5–15)
BUN SERPL-MCNC: 68.9 MG/DL (ref 8–23)
BUN/CREAT SERPL: 36.6 (ref 7–25)
CALCIUM SPEC-SCNC: 8.3 MG/DL (ref 8.6–10.5)
CHLORIDE SERPL-SCNC: 89 MMOL/L (ref 98–107)
CO2 SERPL-SCNC: 34 MMOL/L (ref 22–29)
CREAT SERPL-MCNC: 1.88 MG/DL (ref 0.57–1)
EGFRCR SERPLBLD CKD-EPI 2021: 25.8 ML/MIN/1.73
GLUCOSE SERPL-MCNC: 99 MG/DL (ref 65–99)
POTASSIUM SERPL-SCNC: 3.8 MMOL/L (ref 3.5–5.2)
SODIUM SERPL-SCNC: 133 MMOL/L (ref 136–145)

## 2025-08-29 PROCEDURE — 80048 BASIC METABOLIC PNL TOTAL CA: CPT | Performed by: NURSE PRACTITIONER

## 2025-08-29 PROCEDURE — 94726 PLETHYSMOGRAPHY LUNG VOLUMES: CPT | Performed by: NURSE PRACTITIONER

## 2025-08-29 PROCEDURE — 71250 CT THORAX DX C-: CPT

## (undated) DEVICE — FORCEPS BX 240CM 2.4MM L NDL RAD JAW 4 M00513334

## (undated) DEVICE — GLOVE ORANGE PI 7 1/2   MSG9075

## (undated) DEVICE — NERVE BLOCK TRAY (FACET)-LF: Brand: MEDLINE INDUSTRIES, INC.

## (undated) DEVICE — ENDO KIT,LOURDES HOSPITAL: Brand: MEDLINE INDUSTRIES, INC.

## (undated) DEVICE — TRAP POLYP ETRAP